# Patient Record
Sex: FEMALE | Race: WHITE | NOT HISPANIC OR LATINO | Employment: FULL TIME | URBAN - METROPOLITAN AREA
[De-identification: names, ages, dates, MRNs, and addresses within clinical notes are randomized per-mention and may not be internally consistent; named-entity substitution may affect disease eponyms.]

---

## 2017-01-04 ENCOUNTER — GENERIC CONVERSION - ENCOUNTER (OUTPATIENT)
Dept: OTHER | Facility: OTHER | Age: 57
End: 2017-01-04

## 2017-01-11 ENCOUNTER — GENERIC CONVERSION - ENCOUNTER (OUTPATIENT)
Dept: OTHER | Facility: OTHER | Age: 57
End: 2017-01-11

## 2017-02-02 ENCOUNTER — GENERIC CONVERSION - ENCOUNTER (OUTPATIENT)
Dept: OTHER | Facility: OTHER | Age: 57
End: 2017-02-02

## 2017-04-17 ENCOUNTER — ALLSCRIPTS OFFICE VISIT (OUTPATIENT)
Dept: OTHER | Facility: OTHER | Age: 57
End: 2017-04-17

## 2017-04-24 ENCOUNTER — GENERIC CONVERSION - ENCOUNTER (OUTPATIENT)
Dept: OTHER | Facility: OTHER | Age: 57
End: 2017-04-24

## 2017-06-19 ENCOUNTER — GENERIC CONVERSION - ENCOUNTER (OUTPATIENT)
Dept: OTHER | Facility: OTHER | Age: 57
End: 2017-06-19

## 2017-07-10 ENCOUNTER — GENERIC CONVERSION - ENCOUNTER (OUTPATIENT)
Dept: OTHER | Facility: OTHER | Age: 57
End: 2017-07-10

## 2017-08-21 DIAGNOSIS — Z12.31 ENCOUNTER FOR SCREENING MAMMOGRAM FOR MALIGNANT NEOPLASM OF BREAST: ICD-10-CM

## 2017-08-21 DIAGNOSIS — E78.00 PURE HYPERCHOLESTEROLEMIA: ICD-10-CM

## 2017-08-21 DIAGNOSIS — R10.11 RIGHT UPPER QUADRANT PAIN: ICD-10-CM

## 2017-08-21 DIAGNOSIS — I10 ESSENTIAL (PRIMARY) HYPERTENSION: ICD-10-CM

## 2017-08-22 ENCOUNTER — ALLSCRIPTS OFFICE VISIT (OUTPATIENT)
Dept: OTHER | Facility: OTHER | Age: 57
End: 2017-08-22

## 2017-09-07 ENCOUNTER — ALLSCRIPTS OFFICE VISIT (OUTPATIENT)
Dept: OTHER | Facility: OTHER | Age: 57
End: 2017-09-07

## 2017-09-11 ENCOUNTER — GENERIC CONVERSION - ENCOUNTER (OUTPATIENT)
Dept: OTHER | Facility: OTHER | Age: 57
End: 2017-09-11

## 2017-09-19 ENCOUNTER — GENERIC CONVERSION - ENCOUNTER (OUTPATIENT)
Dept: OTHER | Facility: OTHER | Age: 57
End: 2017-09-19

## 2017-09-21 ENCOUNTER — GENERIC CONVERSION - ENCOUNTER (OUTPATIENT)
Dept: OTHER | Facility: OTHER | Age: 57
End: 2017-09-21

## 2017-09-21 ENCOUNTER — LAB CONVERSION - ENCOUNTER (OUTPATIENT)
Dept: OTHER | Facility: OTHER | Age: 57
End: 2017-09-21

## 2017-09-21 LAB
A/G RATIO (HISTORICAL): 1.7 (CALC) (ref 1–2.5)
ALBUMIN SERPL BCP-MCNC: 3.7 G/DL (ref 3.6–5.1)
ALP SERPL-CCNC: 88 U/L (ref 33–130)
ALT SERPL W P-5'-P-CCNC: 14 U/L (ref 6–29)
AMYLASE (HISTORICAL): 58 U/L (ref 21–101)
AST SERPL W P-5'-P-CCNC: 14 U/L (ref 10–35)
BASOPHILS # BLD AUTO: 0.3 %
BASOPHILS # BLD AUTO: 23 CELLS/UL (ref 0–200)
BILIRUB SERPL-MCNC: 0.3 MG/DL (ref 0.2–1.2)
BUN SERPL-MCNC: 13 MG/DL (ref 7–25)
BUN/CREA RATIO (HISTORICAL): ABNORMAL (CALC) (ref 6–22)
CALCIUM SERPL-MCNC: 9 MG/DL (ref 8.6–10.4)
CHLORIDE SERPL-SCNC: 111 MMOL/L (ref 98–110)
CHOLEST SERPL-MCNC: 176 MG/DL
CHOLEST/HDLC SERPL: 3.5 (CALC)
CO2 SERPL-SCNC: 26 MMOL/L (ref 20–31)
CREAT SERPL-MCNC: 0.63 MG/DL (ref 0.5–1.05)
DEPRECATED RDW RBC AUTO: 13.2 % (ref 11–15)
EGFR AFRICAN AMERICAN (HISTORICAL): 118 ML/MIN/1.73M2
EGFR-AMERICAN CALC (HISTORICAL): 102 ML/MIN/1.73M2
EOSINOPHIL # BLD AUTO: 198 CELLS/UL (ref 15–500)
EOSINOPHIL # BLD AUTO: 2.6 %
GAMMA GLOBULIN (HISTORICAL): 2.2 G/DL (CALC) (ref 1.9–3.7)
GLUCOSE (HISTORICAL): 100 MG/DL (ref 65–99)
HCT VFR BLD AUTO: 35.6 % (ref 35–45)
HDLC SERPL-MCNC: 51 MG/DL
HGB BLD-MCNC: 11.5 G/DL (ref 11.7–15.5)
LDL CHOLESTEROL (HISTORICAL): 95 MG/DL (CALC)
LIPASE SERPL-CCNC: 44 U/L (ref 7–60)
LYMPHOCYTES # BLD AUTO: 1345 CELLS/UL (ref 850–3900)
LYMPHOCYTES # BLD AUTO: 17.7 %
MCH RBC QN AUTO: 31.9 PG (ref 27–33)
MCHC RBC AUTO-ENTMCNC: 32.3 G/DL (ref 32–36)
MCV RBC AUTO: 98.9 FL (ref 80–100)
MONOCYTES # BLD AUTO: 388 CELLS/UL (ref 200–950)
MONOCYTES (HISTORICAL): 5.1 %
NEUTROPHILS # BLD AUTO: 5647 CELLS/UL (ref 1500–7800)
NEUTROPHILS # BLD AUTO: 74.3 %
NON-HDL-CHOL (CHOL-HDL) (HISTORICAL): 125 MG/DL (CALC)
PLATELET # BLD AUTO: 288 THOUSAND/UL (ref 140–400)
PMV BLD AUTO: 10 FL (ref 7.5–12.5)
POTASSIUM SERPL-SCNC: 4.4 MMOL/L (ref 3.5–5.3)
RBC # BLD AUTO: 3.6 MILLION/UL (ref 3.8–5.1)
SODIUM SERPL-SCNC: 144 MMOL/L (ref 135–146)
TOTAL PROTEIN (HISTORICAL): 5.9 G/DL (ref 6.1–8.1)
TRIGL SERPL-MCNC: 209 MG/DL
WBC # BLD AUTO: 7.6 THOUSAND/UL (ref 3.8–10.8)

## 2017-09-25 ENCOUNTER — HOSPITAL ENCOUNTER (OUTPATIENT)
Dept: CT IMAGING | Facility: HOSPITAL | Age: 57
Discharge: HOME | End: 2017-09-25
Attending: INTERNAL MEDICINE
Payer: COMMERCIAL

## 2017-09-25 DIAGNOSIS — R10.11 RIGHT UPPER QUADRANT PAIN: ICD-10-CM

## 2017-09-25 PROCEDURE — 74177 CT ABD & PELVIS W/CONTRAST: CPT

## 2017-09-25 RX ADMIN — IOHEXOL 100 ML: 350 INJECTION, SOLUTION INTRAVENOUS at 14:09

## 2017-09-27 ENCOUNTER — GENERIC CONVERSION - ENCOUNTER (OUTPATIENT)
Dept: OTHER | Facility: OTHER | Age: 57
End: 2017-09-27

## 2017-09-28 ENCOUNTER — GENERIC CONVERSION - ENCOUNTER (OUTPATIENT)
Dept: OTHER | Facility: OTHER | Age: 57
End: 2017-09-28

## 2017-10-25 ENCOUNTER — ALLSCRIPTS OFFICE VISIT (OUTPATIENT)
Dept: OTHER | Facility: OTHER | Age: 57
End: 2017-10-25

## 2017-10-25 DIAGNOSIS — Z12.31 ENCOUNTER FOR SCREENING MAMMOGRAM FOR MALIGNANT NEOPLASM OF BREAST: ICD-10-CM

## 2017-10-26 NOTE — PROGRESS NOTES
Assessment  1  Benign essential HTN (401 1) (I10)   2  History of Depressive disorder (311) (F32 9)   3  Chronic pain (338 29) (G89 29)   4  Dermatophytosis, nail (110 1) (B35 1)   5  History of esophagitis (V12 79) (Z87 19)   6  Gastritis (535 50) (K29 70)   7  History of kidney stones (V13 01) (Z87 442)   8  Anxiety (300 00) (F41 9)   9  Hypercholesterolemia (272 0) (E78 00)   10  Psoriasis (696 1) (L40 9)    Plan  Anxiety    · From  ClonazePAM 0 5 MG Oral Tablet take 1 tablet by mouth twice a day if needed To  ClonazePAM 0 5 MG Oral Tablet take 1 tablet by mouth three times a day if needed  Benign essential HTN    · From  AmLODIPine Besylate 5 MG Oral Tablet take 1 tablet by mouth once daily To  AmLODIPine Besylate 10 MG Oral Tablet TAKE 1 TABLET DAILY AS DIRECTED    Discussion/Summary  Discussion Summary:   1  increase amlodipine dose- call if not improving - terun to office in 6-8 weeksclonazepam- will increae to 3x dayas needed  Counseling Documentation With Imm: The patient was counseled regarding diagnostic results,-- prognosis,-- risks and benefits of treatment options  Medication SE Review and Pt Understands Tx: Possible side effects of new medications were reviewed with the patient/guardian today  Self Referrals:   Self Referrals: Yes      Chief Complaint  Chief Complaint Free Text Note Form: Pt here for f/u today  Already had her flu shot last time  I gave 5 wishes today  Wants to discuss increasing Clonazepam  Pt states that she will be finishing her current Hydrocodone rx and then try to stay off of it  dk  dr Oscar Bhatia since last here  History of Present Illness  Hypertension (Follow-Up): The patient presents for follow-up of essential hypertension-- and-- elevated today- doing oot as this is busy season busy  She has no comorbid illnesses     Symptoms:       HPI: 1  hypertension- elevated today- increased stress and anxietyabdominal pain- had ct- some gastritis-seen by gi- had seen by them- stopped alieve or ibuprofen or meloxicam- using tylenol for joint pain and now gi pain is better - has increased flatulenceanxiety- improving with clonazepam- wishes to increase if possible      Review of Systems  Complete-Female:   Constitutional: no fever,-- no chills-- and-- not feeling tired  ENT: no nasal discharge  Cardiovascular: no chest pain-- and-- no palpitations  Respiratory: smoking 1ppd- discussed cessation, but-- no cough  Gastrointestinal: blaoting flatulence  Genitourinary: no pelvic pain  Active Problems  1  Allergic rhinitis (477 9) (J30 9)   2  Anxiety (300 00) (F41 9)   3  Asbestos exposure (V15 84) (Z77 090)   4  Benign essential HTN (401 1) (I10)   5  Chronic pain (338 29) (G89 29)   6  Dermatophytosis, nail (110 1) (B35 1)   7  Gastritis (535 50) (K29 70)   8  Hiatal hernia (553 3) (K44 9)   9  History of kidney stones (V13 01) (Z87 442)   10  Hypercholesterolemia (272 0) (E78 00)   11  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)   12  Other screening mammogram (V76 12) (Z12 31)   13  Psoriasis (696 1) (L40 9)   14  Restless leg syndrome (333 94) (G25 81)    Past Medical History  1  History of Cough (786 2) (R05)   2  History of urinary frequency (V13 09) (Z87 898)   3  History of UTI (V13 02) (Z87 440)   4  History of Tendonitis of wrist, right (727 05) (M77 8)   5  History of Tobacco use disorder (305 1) (F17 200)    Family History  Mother    1  Denied: Family history of substance abuse  Father    2  Denied: Family history of substance abuse  Other    3  Family history of Cardiovascular disease    Social History   · Always uses seat belt   · Current every day smoker (305 1) (F17 200)   · Dental care, regularly   · Exercise: Walking   · Lives with    · Living Situation: Supportive and safe   ·    · No advance directives (V49 89) (Z78 9)   · No alcohol use   · No illicit drug use  Social History Reviewed: The social history was reviewed and updated today  Current Meds   1  AmLODIPine Besylate 5 MG Oral Tablet; take 1 tablet by mouth once daily; Therapy: 54ICQ6564 to (Evaluate:22Kqc6737)  Requested for: 89QVD0527; Last Rx:29Nec3780   Ordered   2  Claritin-D 12 Hour 5-120 MG Oral Tablet Extended Release 12 Hour; TAKE 1 TABLET EVERY 12   HOURS AS NEEDED; Therapy: 27MZD9625 to Recorded   3  ClonazePAM 0 5 MG Oral Tablet; take 1 tablet by mouth twice a day if needed; Therapy: 08YZL4219 to (Evaluate:05Vir6600)  Requested for: 36Fnw1397; Last Rx:20Bgf3317   Ordered   4  Cranberry 200 MG Oral Capsule; take 1 capsule daily; Therapy: 11ICR7002 to Recorded   5  Fluticasone Propionate 50 MCG/ACT Nasal Suspension; INSTILL 2 SQUIRT Bedtime IN EACH   NOSTRIL DAILY; Therapy: 19Gfn0999 to (Evaluate:02Rqu9379)  Requested for: 23Ozf4002; Last Rx:64Uxz0524   Ordered   6  Gabapentin 300 MG Oral Capsule; 1 in the AM and 2 hs; Therapy: 75KAA7154 to  Requested for: 25Oct2017 Recorded   7  Hydrocodone-Acetaminophen  MG Oral Tablet; TAKE 1 TABLET 3 times daily; Therapy: 21YGC0329 to (Evaluate:22Ipx1849); Last Rx:29Pyb7748 Ordered   8  Multiple Vitamin TABS; Take 1 tablet daily; Therapy: 05QCO2488 to (Evaluate:29Oct2016) Recorded   9  Omeprazole 40 MG Oral Capsule Delayed Release; take 1 capsule daily; Therapy: 25Kim2461 to (Last Rx:55Xeo0697)  Requested for: 33Mpx8882 Ordered   10  Vitamin D3 5000 UNIT Oral Tablet; 1 qd; Therapy: 25YSA3501 to Recorded  Medication List Reviewed: The medication list was reviewed and updated today  Allergies  1  No Known Drug Allergies  2  Pollen   3  Trees    Vitals  Vital Signs    Recorded: 61QGZ2380 08:36AM   Heart Rate 64   Respiration 16   Systolic 588   Diastolic 80   Height 5 ft 2 in   Weight 122 lb    BMI Calculated 22 31   BSA Calculated 1 55     Physical Exam    Constitutional   General appearance: No acute distress, well appearing and well nourished      Ears, Nose, Mouth, and Throat   Otoscopic examination: Tympanic membranes translucent with normal light reflex  Canals patent without erythema  Nasal mucosa, septum, and turbinates: Normal without edema or erythema  Pulmonary   Auscultation of lungs: Abnormal  -- some end exp wheezes on right upper lobe  Cardiovascular   Auscultation of heart: Normal rate and rhythm, normal S1 and S2, without murmurs  Abdomen   Abdomen: Non-tender, no masses  Lymphatic   Palpation of lymph nodes in neck: No lymphadenopathy      Musculoskeletal   Gait and station: Normal     Inspection/palpation of joints, bones, and muscles: Normal          Future Appointments    Date/Time Provider Specialty Site   12/22/2017 01:00 PM Marco A Rodriguez DO Internal Medicine [facility] INTERNAL MED     Signatures   Electronically signed by : Merline Elias DO; Oct 25 2017  9:33AM EST                       (Author)

## 2017-12-14 ENCOUNTER — ALLSCRIPTS OFFICE VISIT (OUTPATIENT)
Dept: OTHER | Facility: OTHER | Age: 57
End: 2017-12-14

## 2017-12-14 DIAGNOSIS — S63.91XA SPRAIN OF UNSPECIFIED PART OF RIGHT WRIST AND HAND, INITIAL ENCOUNTER: ICD-10-CM

## 2017-12-14 DIAGNOSIS — S66.911A STRAIN OF UNSPECIFIED MUSCLE, FASCIA AND TENDON AT WRIST AND HAND LEVEL, RIGHT HAND, INITIAL ENCOUNTER: ICD-10-CM

## 2017-12-14 DIAGNOSIS — D64.9 ANEMIA: ICD-10-CM

## 2017-12-15 NOTE — PROGRESS NOTES
Assessment  1  Benign essential HTN (401 1) (I10)   2  Anxiety (300 00) (F41 9)   3  Anemia (285 9) (D64 9)   4  Psoriasis (696 1) (L40 9)    Plan  Anemia    · (1) FOLATE; Status:Active; Requested for:04Hju3244;    · (1) IRON SATURATION %, TIBC; Status:Active; Requested for:85Joj8440;    · (1) VITAMIN B12; Status:Active; Requested for:07Tug8347;    · (1) VITAMIN D 25-HYDROXY; Status:Active; Requested for:46Kvw7235; Anxiety    · ClonazePAM 0 5 MG Oral Tablet; take 1 tablet by mouth three times a day if needed  Need for Tdap vaccination    · Tdap (Adacel)  Psoriasis    · Cephalexin 500 MG Oral Capsule; TAKE 1 CAPSULE 3 TIMES DAILY UNTIL GONE   · Clobetasol Propionate 0 05 % External Cream; APPLY SPARINGLY TO AFFECTED AREA(S)TWICE DAILY  Sprain or strain of right hand    · (1) UMM SCREEN W/REFLEX TO TITER/PATTERN; Status:Active; Requested for:64Yum7152;    · (1) URIC ACID; Status:Active; Requested for:48Ovp3409;     Chief Complaint  Chief Complaint Free Text Note Form: duplicate note      Active Problems  1  Allergic rhinitis (477 9) (J30 9)   2  Anxiety (300 00) (F41 9)   3  Asbestos exposure (V15 84) (Z77 090)   4  Benign essential HTN (401 1) (I10)   5  Chronic pain (338 29) (G89 29)   6  Dermatophytosis, nail (110 1) (B35 1)   7  Gastritis (535 50) (K29 70)   8  Hiatal hernia (553 3) (K44 9)   9  History of kidney stones (V13 01) (Z87 442)   10  Hypercholesterolemia (272 0) (E78 00)   11  Mental health problem (V40 9) (F48 9)   12  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)   13  No advance directives (V49 89) (Z78 9)   14  Other screening mammogram (V76 12) (Z12 31)   15  Psoriasis (696 1) (L40 9)   16  Restless leg syndrome (333 94) (G25 81)    Past Medical History  1  History of Cough (786 2) (R05)   2  History of urinary frequency (V13 09) (Z87 898)   3  History of UTI (V13 02) (Z87 440)   4  History of Tendonitis of wrist, right (727 05) (M77 8)   5   History of Tobacco use disorder (305 1) (F17 200)    Family History  Mother    1  Denied: Family history of substance abuse  Father    2  Denied: Family history of substance abuse  Sister    3  Family history of mental disorder (V17 0) (Z81 8)  Other    4  Family history of Cardiovascular disease    Social History   · Always uses seat belt   · Current every day smoker (305 1) (F17 200)   · Dental care, regularly   · Exercise: Walking   · Lives with    · Living Situation: Supportive and safe   ·    · No advance directives (V49 89) (Z78 9)   · No advance directives (V49 89) (Z78 9)   · No alcohol use   · No illicit drug use    Current Meds   1  AmLODIPine Besylate 10 MG Oral Tablet; TAKE 1 TABLET DAILY AS DIRECTED; Therapy: 89IZM9477 to (Evaluate:20Oct2018)  Requested for: 25Oct2017; Last Rx:03Out7613 Ordered   2  Claritin-D 12 Hour 5-120 MG Oral Tablet Extended Release 12 Hour; TAKE 1 TABLET EVERY 12 HOURS AS NEEDED; Therapy: 25OAJ1982 to Recorded   3  ClonazePAM 0 5 MG Oral Tablet; take 1 tablet by mouth three times a day if needed; Therapy: 49IEL1993 to (XBGTERBR:51FDQ3460)  Requested for: 26Oct2017; Last NO:04INR8439 Ordered   4  Cranberry 200 MG Oral Capsule; take 1 capsule daily; Therapy: 12UXZ6573 to Recorded   5  Fluticasone Propionate 50 MCG/ACT Nasal Suspension; INSTILL 2 SQUIRT Bedtime IN EACH NOSTRIL DAILY; Therapy: 03Fxb8533 to (Evaluate:38Wrc3401)  Requested for: 33Xdj6957; Last Rx:91Nml7023 Ordered   6  Gabapentin 300 MG Oral Capsule; 1 in the AM and 2 hs; Therapy: 66YIL4347 to  Requested for: 25Oct2017 Recorded   7  Hydrocodone-Acetaminophen  MG Oral Tablet; TAKE 1 TABLET 3 times daily; Therapy: 42BJP5796 to (Evaluate:79Hix5588); Last Rx:55Nsh1924 Ordered   8  Meloxicam 15 MG Oral Tablet; take one tablet by mouth daily; Therapy: 89JFX2571 to Recorded   9  Multiple Vitamin TABS; Take 1 tablet daily; Therapy: 01QMJ8718 to (Evaluate:29Oct2016) Recorded   10   Omeprazole 40 MG Oral Capsule Delayed Release; take 1 capsule daily; Therapy: 87Pvc0922 to (Last Rx:07Sep2017)  Requested for: 79Pst5485 Ordered   11  Vitamin D3 5000 UNIT Oral Tablet; 1 qd; Therapy: 31Qrd3735 to Recorded    Allergies  1  No Known Drug Allergies  2  Pollen   3  Trees    Vitals  Vital Signs    Recorded: 03LAW8584 08:25AM   Heart Rate 87, R DP   Pulse Quality Normal, R DP   Systolic 215, LUE, Sitting   Diastolic 76, LUE, Sitting   Height 5 ft 2 in   Weight 127 lb 8 0 oz   BMI Calculated 23 32   BSA Calculated 1 58   O2 Saturation 95, RA     Results/Data  PHQ-2 Adult Depression Screening 56WVR3203 08:28AM User, Ahs     Test Name Result Flag Reference   PHQ-2 Adult Depression Score 0     Over the last two weeks, how often have you been bothered by any of the following problems?  Little interest or pleasure in doing things: Not at all - 0 Feeling down, depressed, or hopeless: Not at all - 0   PHQ-2 Adult Depression Screening Negative         Future Appointments    Date/Time Provider Specialty Site   03/14/2018 08:30 AM Cleveland Tucker DO Internal Medicine [facility] INTERNAL MED     Signatures   Electronically signed by : Jeff Quiles DO; Dec 14 2017  9:24AM EST                       (Author)

## 2017-12-15 NOTE — PROGRESS NOTES
Assessment  1  Benign essential HTN (401 1) (I10)   2  Anxiety (300 00) (F41 9)   3  Anemia (285 9) (D64 9)   4  Psoriasis (696 1) (L40 9)    Plan  Anemia    · (1) FOLATE; Status:Active; Requested for:68Mrh4247;    · (1) IRON SATURATION %, TIBC; Status:Active; Requested for:29Pqm1935;    · (1) VITAMIN B12; Status:Active; Requested for:15Lol2942;    · (1) VITAMIN D 25-HYDROXY; Status:Active; Requested for:45Hyl5042; Anxiety    · ClonazePAM 0 5 MG Oral Tablet; take 1 tablet by mouth three times a day if needed  Psoriasis    · Cephalexin 500 MG Oral Capsule; TAKE 1 CAPSULE 3 TIMES DAILY UNTIL GONE   · Clobetasol Propionate 0 05 % External Cream; APPLY SPARINGLY TO AFFECTED AREA(S)TWICE DAILY  Sprain or strain of right hand    · (1) UMM SCREEN W/REFLEX TO TITER/PATTERN; Status:Active; Requested for:86Skl3951;    · (1) URIC ACID; Status:Active; Requested for:38Bdr1413;     Discussion/Summary  Discussion Summary:   Will treat with 1 week cephalexin- if not improved call tdap today  Medication SE Review and Pt Understands Tx: Possible side effects of new medications were reviewed with the patient/guardian today  Chief Complaint  Chief Complaint Free Text Note Form: Pt is here today for a follow up visit for her BP and fungus on her nails  Pt is asking to get a tetanus shot today  Medications are current  DD      History of Present Illness  HPI: 1  right hand swelling- had bumped the right lateral hand 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- went to [facility] on nov 22- had xray and cbc- normal wbc  given medrol dose pack and due to go back to work on Monday  Will test uric acid level to r/po gout2  tetanus- our records show last tetanus in 2005- will give tdap today3  back pain- no severe flare of symptoms now- on gabapentin with some improvement- last saw Dr Stefanie Hassan in september- stable4   anxiety- doing better with klonipin- worried about her son having some neurologic issues after injuries- using 3x day currently5  hypertesnion      Review of Systems  Complete-Female:  Constitutional: feeling tired-- and-- anxiety issues with son- limited sleep at times  Eyes: no eyesight problems  Cardiovascular: no chest pain-- and-- no palpitations  Respiratory: no cough-- and-- no shortness of breath during exertion  Active Problems  1  Allergic rhinitis (477 9) (J30 9)   2  Anxiety (300 00) (F41 9)   3  Asbestos exposure (V15 84) (Z77 090)   4  Benign essential HTN (401 1) (I10)   5  Chronic pain (338 29) (G89 29)   6  Dermatophytosis, nail (110 1) (B35 1)   7  Gastritis (535 50) (K29 70)   8  Hiatal hernia (553 3) (K44 9)   9  History of kidney stones (V13 01) (Z87 442)   10  Hypercholesterolemia (272 0) (E78 00)   11  Mental health problem (V40 9) (F48 9)   12  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)   13  No advance directives (V49 89) (Z78 9)   14  Other screening mammogram (V76 12) (Z12 31)   15  Psoriasis (696 1) (L40 9)   16  Restless leg syndrome (333 94) (G25 81)    Past Medical History  1  History of Cough (786 2) (R05)   2  History of urinary frequency (V13 09) (Z87 898)   3  History of UTI (V13 02) (Z87 440)   4  History of Tendonitis of wrist, right (727 05) (M77 8)   5  History of Tobacco use disorder (305 1) (F17 200)    Family History  Mother    1  Denied: Family history of substance abuse  Father    2  Denied: Family history of substance abuse  Sister    3  Family history of mental disorder (V17 0) (Z81 8)  Other    4  Family history of Cardiovascular disease    Social History   · Always uses seat belt   · Current every day smoker (305 1) (F17 200)   · Dental care, regularly   · Exercise: Walking   · Lives with    · Living Situation: Supportive and safe   ·    · No advance directives (V49 89) (Z78 9)   · No advance directives (V49 89) (Z78 9)   · No alcohol use   · No illicit drug use    Current Meds   1   AmLODIPine Besylate 10 MG Oral Tablet; TAKE 1 TABLET DAILY AS DIRECTED; Therapy: 43LHD7430 to (Evaluate:20Oct2018)  Requested for: 25Oct2017; Last Rx:11Ehy9739 Ordered   2  Claritin-D 12 Hour 5-120 MG Oral Tablet Extended Release 12 Hour; TAKE 1 TABLET EVERY 12 HOURS AS NEEDED; Therapy: 78CIM0495 to Recorded   3  ClonazePAM 0 5 MG Oral Tablet; take 1 tablet by mouth three times a day if needed; Therapy: 10QBO2349 to (AQNVVQWM:32RMM7116)  Requested for: 26Oct2017; Last VE:14IFR2873 Ordered   4  Cranberry 200 MG Oral Capsule; take 1 capsule daily; Therapy: 91IJT2934 to Recorded   5  Fluticasone Propionate 50 MCG/ACT Nasal Suspension; INSTILL 2 SQUIRT Bedtime IN EACH NOSTRIL DAILY; Therapy: 66Cua6526 to (Evaluate:98Bpj0765)  Requested for: 17Apr2017; Last Rx:70Wuk9378 Ordered   6  Gabapentin 300 MG Oral Capsule; 1 in the AM and 2 hs; Therapy: 72RGK2770 to  Requested for: 25Oct2017 Recorded   7  Hydrocodone-Acetaminophen  MG Oral Tablet; TAKE 1 TABLET 3 times daily; Therapy: 31RFN5508 to (Evaluate:49Qes3343); Last Rx:91Wqt4873 Ordered   8  Meloxicam 15 MG Oral Tablet; take one tablet by mouth daily; Therapy: 95WRW9482 to Recorded   9  Multiple Vitamin TABS; Take 1 tablet daily; Therapy: 94NYY4583 to (Evaluate:29Oct2016) Recorded   10  Omeprazole 40 MG Oral Capsule Delayed Release; take 1 capsule daily; Therapy: 96Knj7507 to (Last Rx:86Rdw8379)  Requested for: 93Ivz8844 Ordered   11  Vitamin D3 5000 UNIT Oral Tablet; 1 qd; Therapy: 82SVF2500 to Recorded  Medication List Reviewed: The medication list was reviewed and updated today  Allergies  1  No Known Drug Allergies  2  Pollen   3  Trees    Vitals  Vital Signs    Recorded: 05OOO5164 08:25AM   Heart Rate 87, R DP   Pulse Quality Normal, R DP   Systolic 171, LUE, Sitting   Diastolic 76, LUE, Sitting   Height 5 ft 2 in   Weight 127 lb 8 0 oz   BMI Calculated 23 32   BSA Calculated 1 58   O2 Saturation 95, RA     Physical Exam   Constitutional  General appearance: No acute distress, well appearing and well nourished     Ears, Nose, Mouth, and Throat  External inspection of ears and nose: Normal    Oropharynx: Normal with no erythema, edema, exudate or lesions  Pulmonary  Auscultation of lungs: Clear to auscultation  Cardiovascular  Auscultation of heart: Normal rate and rhythm, normal S1 and S2, without murmurs  Abdomen  Abdomen: Non-tender, no masses  Musculoskeletal  Digits and nails: Abnormal  -- right 4th mcp- with some redness - no increased warmth mild erythema  Results/Data  PHQ-2 Adult Depression Screening 52LXJ3101 08:28AM User, s     Test Name Result Flag Reference   PHQ-2 Adult Depression Score 0     Over the last two weeks, how often have you been bothered by any of the following problems?  Little interest or pleasure in doing things: Not at all - 0 Feeling down, depressed, or hopeless: Not at all - 0   PHQ-2 Adult Depression Screening Negative         Signatures   Electronically signed by : Parth Decker DO; Dec 14 2017  8:57AM EST                       (Author)

## 2018-01-03 ENCOUNTER — LAB CONVERSION - ENCOUNTER (OUTPATIENT)
Dept: OTHER | Facility: OTHER | Age: 58
End: 2018-01-03

## 2018-01-03 LAB
25(OH)D3 SERPL-MCNC: 75 NG/ML (ref 30–100)
ANTI-NUCLEAR ANTIBODY (ANA) (HISTORICAL): NEGATIVE
FOLATE SERPL-MCNC: >24 NG/ML
IRON SATN MFR SERPL: 9 % (CALC) (ref 11–50)
IRON SERPL-MCNC: 36 MCG/DL (ref 45–160)
TIBC SERPL-MCNC: 411 MCG/DL (CALC) (ref 250–450)
URIC ACID (HISTORICAL): 2.9 MG/DL (ref 2.5–7)
VIT B12 SERPL-MCNC: 1016 PG/ML (ref 200–1100)

## 2018-01-04 ENCOUNTER — GENERIC CONVERSION - ENCOUNTER (OUTPATIENT)
Dept: OTHER | Facility: OTHER | Age: 58
End: 2018-01-04

## 2018-01-11 NOTE — RESULT NOTES
Verified Results  Chintan VASQUEZ  25  68HGB7283 10:36AM Vonnie Lancaster    Order Number: [id number]    - Patient Instructions: To schedule this appointment, please contact Central Scheduling at [phone]  Test Name Result Flag Reference   US GALLBLADDER (Report)     RIGHT UPPER QUADRANT ULTRASOUND     INDICATION: Epigastric pain  COMPARISON: Abdomen and pelvic CT from 8/31/2013  TECHNIQUE:  Real-time ultrasound of the right upper quadrant was performed with a curvilinear transducer with both volumetric sweeps and still imaging techniques  FINDINGS:     PANCREAS: Visualized portions of the pancreas are within normal limits  AORTA AND IVC: Visualized portions are normal for patient age  LIVER:   Size: Within normal range  The liver measures 15 4 cm in the midclavicular line  Contour: Surface contour is smooth  Parenchyma: Echogenicity and echotexture are within normal limits  No evidence of suspicious mass  The main portal vein is patent and hepatopetal       BILIARY:   The gallbladder is normal in caliber  No wall thickening or pericholecystic fluid  No stones or sludge identified  No sonographic Parsons's sign  No intrahepatic biliary dilatation  CBD measures 4 mm  No choledocholithiasis  KIDNEY:    Right kidney measures 10 6 cm  Within normal limits  ASCITES:  None         IMPRESSION:     Normal        Workstation performed: [id number]     Signed by:   Kya Hurst MD   10/7/16

## 2018-01-13 VITALS
DIASTOLIC BLOOD PRESSURE: 70 MMHG | SYSTOLIC BLOOD PRESSURE: 120 MMHG | WEIGHT: 118.5 LBS | TEMPERATURE: 97.2 F | BODY MASS INDEX: 23.14 KG/M2

## 2018-01-14 VITALS
WEIGHT: 123 LBS | BODY MASS INDEX: 22.63 KG/M2 | HEART RATE: 76 BPM | DIASTOLIC BLOOD PRESSURE: 80 MMHG | SYSTOLIC BLOOD PRESSURE: 130 MMHG | HEIGHT: 62 IN

## 2018-01-14 VITALS
DIASTOLIC BLOOD PRESSURE: 76 MMHG | BODY MASS INDEX: 25 KG/M2 | WEIGHT: 128 LBS | SYSTOLIC BLOOD PRESSURE: 144 MMHG | HEART RATE: 86 BPM | TEMPERATURE: 99 F

## 2018-01-14 VITALS
BODY MASS INDEX: 22.45 KG/M2 | WEIGHT: 122 LBS | RESPIRATION RATE: 16 BRPM | SYSTOLIC BLOOD PRESSURE: 152 MMHG | HEART RATE: 64 BPM | HEIGHT: 62 IN | DIASTOLIC BLOOD PRESSURE: 80 MMHG

## 2018-01-16 NOTE — RESULT NOTES
Verified Results  * CT ABDOMEN PELVIS W CONTRAST [id number] 01:21PM Bryan Pay Order Number: [id number]    - Patient Instructions: To schedule this appointment, please contact Central Scheduling at [phone]  Test Name Result Flag Reference   CT ABDOMEN PELVIS W CONTRAST (Report)     CT ABDOMEN AND PELVIS WITH IV CONTRAST     INDICATION: 53-year-old female, right upper quadrant pain     COMPARISON: 8/31/2013 CT     TECHNIQUE: CT examination of the abdomen and pelvis was performed  Reformatted images were created in axial, sagittal, and coronal planes  Radiation dose length product (DLP) for this visit: 183 95 mGy-cm   This examination, like all CT scans performed in the [facility], was performed utilizing techniques to minimize radiation dose exposure, including the use of iterative   reconstruction and automated exposure control  IV Contrast: 100 mL of iohexol (OMNIPAQUE)      Enteric Contrast: Enteric contrast was administered  FINDINGS:     ABDOMEN     LOWER CHEST: No significant abnormalities identified in the lower chest      LIVER/BILIARY TREE: Mild hepatomegaly  GALLBLADDER: Mildly contracted, no calculi  SPLEEN: Unremarkable  PANCREAS: Unremarkable  ADRENAL GLANDS: Unremarkable  KIDNEYS/URETERS: Multiple nonobstructive left renal calculi again evident  Tiny right renal calculus previously identified not visualized  No hydronephrosis or hydroureter  STOMACH AND BOWEL: There is apparent wall thickening involving the region of the gastric antrum suspicious for gastritis  If clinically appropriate, direct endoscopic assessment may be warranted  This appearance was not evident on the prior study  APPENDIX: No findings to suggest appendicitis  ABDOMINOPELVIC CAVITY: No ascites or free intraperitoneal air  No lymphadenopathy  VESSELS: Unremarkable for patient's age       PELVIS     REPRODUCTIVE ORGANS: Unremarkable for patient's age      URINARY BLADDER: Unremarkable  ABDOMINAL WALL/INGUINAL REGIONS: Unremarkable  OSSEOUS STRUCTURES: No acute fracture or destructive osseous lesion  IMPRESSION:   Development of suspected gastric antral wall thickening suspicious for gastritis  Clinical correlation recommended   Direct endoscopic assessment may be warranted     Persistent nonobstructive left renal calculi       ##sigslh##sigslh               Workstation performed: [id number]     Signed by:   Freda Kearns MD   9/26/17

## 2018-01-17 NOTE — RESULT NOTES
Verified Results  (1) CBC/PLT/DIFF 06Oct2016 11:19AM Lissette Arguello     Test Name Result Flag Reference   WHITE BLOOD CELL COUNT 6 5 Thousand/uL  3 8-10 8   RED BLOOD CELL COUNT 3 79 Million/uL L 3 80-5 10   HEMOGLOBIN 11 9 g/dL  11 7-15 5   HEMATOCRIT 37 4 %  35 0-45 0   MCV 98 6 fL  80 0-100 0   MCH 31 4 pg  27 0-33 0   MCHC 31 8 g/dL L 32 0-36 0   RDW 13 8 %  11 0-15 0   PLATELET COUNT 575 Thousand/uL  140-400   MPV 7 8 fL  7 5-11 5   ABSOLUTE NEUTROPHILS 4459 cells/uL  2814-5482   ABSOLUTE LYMPHOCYTES 1541 cells/uL  850-3900   ABSOLUTE MONOCYTES 267 cells/uL  200-950   ABSOLUTE EOSINOPHILS 208 cells/uL     ABSOLUTE BASOPHILS 26 cells/uL  0-200   NEUTROPHILS 68 6 %     LYMPHOCYTES 23 7 %     MONOCYTES 4 1 %     EOSINOPHILS 3 2 %     BASOPHILS 0 4 %       (1) COMPREHENSIVE METABOLIC PANEL [id number] 62:05GW Kelsey Alegria     Test Name Result Flag Reference   GLUCOSE 83 mg/dL  65-99   Fasting reference interval   UREA NITROGEN (BUN) 18 mg/dL  7-25   CREATININE 0 70 mg/dL  0 50-1 05   For patients >52years of age, the reference limit  for Creatinine is approximately 13% higher for people  identified as -American  eGFR NON-AFR   AMERICAN 98 mL/min/1 73m2  > OR = 60   eGFR AFRICAN AMERICAN 114 mL/min/1 73m2  > OR = 60   BUN/CREATININE RATIO   5-71   NOT APPLICABLE (calc)   SODIUM 141 mmol/L  135-146   POTASSIUM 4 5 mmol/L  3 5-5 3   CHLORIDE 107 mmol/L     CARBON DIOXIDE 26 mmol/L  20-31   CALCIUM 9 1 mg/dL  8 6-10 4   PROTEIN, TOTAL 6 3 g/dL  6 1-8 1   ALBUMIN 4 2 g/dL  3 6-5 1   GLOBULIN 2 1 g/dL (calc)  1 9-3 7   ALBUMIN/GLOBULIN RATIO 2 0 (calc)  1 0-2 5   BILIRUBIN, TOTAL 0 3 mg/dL  0 2-1 2   ALKALINE PHOSPHATASE 69 U/L     AST 18 U/L  10-35   ALT 17 U/L  6-29     (1) LIPASE 06Oct2016 11:19AM Lissette Arguello   REPORT COMMENT:  FASTING:NO     Test Name Result Flag Reference   LIPASE 26 U/L  7-60     (Q) AMYLASE 06Oct2016 11:19AM Kelsey Alegria     Test Name Result Flag Reference   AMYLASE 55 U/L

## 2018-01-17 NOTE — PROGRESS NOTES
Assessment    1  Anxiety (300 00) (F41 9)   2  Restless leg syndrome (333 94) (G25 81)   3  Onychomycosis (110 1) (B35 1)   4  Allergic rhinitis (477 9) (J30 9)   5  Benign essential HTN (401 1) (I10)   6  Encounter for preventive health examination (V70 0) (Z00 00)    Plan  Anxiety    · ClonazePAM 0 5 MG Oral Tablet; TAKE 1 TABLET TWICE DAILY AS NEEDED  Benign essential HTN    · AmLODIPine Besylate 5 MG Oral Tablet; TAKE 1 TABLET DAILY  Chronic pain    · Gabapentin 100 MG Oral Capsule; 3 pills qhs  Encounter for screening mammogram for breast cancer    · * MAMMO SCREENING BILATERAL W CAD; Status:Hold For - Scheduling; Requested  for:20Jun2016;   Onychomycosis    · Terbinafine HCl - 250 MG Oral Tablet (LamISIL); TAKE 1 TABLET DAILY    Discussion/Summary  health maintenance visit Currently, she eats a healthy diet and has an adequate exercise regimen  cervical cancer screening is managed by GYN  Breast cancer screening: self breast exam technique was taught, monthly self breast exam was advised and mammogram has been ordered  Discussed stress relieving recommendations, info given to patient  Discussed meal planning and supplementations  RTO 1-2 months  Rx  sent to pharmacy for Lamisil 250 mg  qd for nails  Chief Complaint  Pt here for PE  Lab review- 05/07/16 results in chart  Requests refill on Gabapentin  History of Present Illness  HPI: 50year old white female presents for PE  Plans to see GYN for well woman exam  Last mammogram more than one year ago  LNMP- more than 5 years ago  Still smoking, thinking of stopping, tried Chantix, and had side effects  Having a lot of stress, recently laid off, but will get re-hired July 11th, with different pay structure  Works for "[employer]  Review of Systems    Constitutional: feeling tired and Sometimes tired  , but no fever and no chills  ENT: no earache, no sore throat and no nasal discharge  Cardiovascular: no chest pain and no palpitations  Respiratory: no shortness of breath and no cough  Gastrointestinal: no abdominal pain, no nausea and no vomiting  Neurological: no headache, no numbness, no tingling, no dizziness and no fainting  Psychiatric: anxiety and sleep disturbances, but not suicidal and no depression  Active Problems    1  Acute sinusitis (461 9) (J01 90)   2  Allergic rhinitis (477 9) (J30 9)   3  Anxiety (300 00) (F41 9)   4  Asbestos exposure (V15 84) (Z77 090)   5  Benign essential HTN (401 1) (I10)   6  Chronic pain (338 29) (G89 29)   7  Depressive disorder (311) (F32 9)   8  Dermatophytosis, nail (110 1) (B35 1)   9  Esophagitis (530 10) (K20 9)   10  History of kidney stones (V13 01) (Z87 442)   11  Hypercholesterolemia (272 0) (E78 0)   12  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)   13  Need for pneumococcal vaccine (V03 82) (Z23)   14  Onychomycosis (110 1) (B35 1)   15  Psoriasis (696 1) (L40 9)   16  Restless leg syndrome (333 94) (G25 81)   17  Tendonitis of wrist, right (727 05) (M77 8)   18  Tracheobronchitis (490) (J40)    Past Medical History    · History of Cough (786 2) (R05)   · History of urinary frequency (V13 09) (V18 425)   · History of UTI (V13 02) (Z87 440)   · History of Tobacco use disorder (305 1) (Z72 0)    Family History  Other    · Family history of Cardiovascular disease  Family History    · Family history of substance abuse (V17 0) (Z81 4)    Social History    · Current every day smoker (305 1) (F17 200)   · Dental care, regularly   · Lives with    · Living Situation: Supportive and safe   ·    · No advance directives (V49 89) (Z78 9)    Current Meds   1  AmLODIPine Besylate 5 MG Oral Tablet; TAKE 1 TABLET DAILY; Therapy: 85MGK9130 to (Evaluate:11Qxy1477)  Requested for: 37TKA5823; Last   VL:[id number] Ordered   2  Claritin-D 12 Hour 5-120 MG Oral Tablet Extended Release 12 Hour; TAKE 1 TABLET   EVERY 12 HOURS AS NEEDED; Therapy: 34PNP0407 to Recorded   3   Cranberry 200 MG Oral Capsule; take 1 capsule daily; Therapy: 82TFK7041 to Recorded   4  Fish Oil 1000 MG Oral Capsule; TAKE 1 CAPSULE DAILY; Therapy: 19GQD5507 to Recorded   5  Gabapentin 100 MG Oral Capsule; Take 1 - 3 q hs;   Therapy: 63ENF4225 to Recorded   6  Hydrocodone-Acetaminophen  MG Oral Tablet; TAKE 1 TABLET 3 times daily; Therapy: 51UMM1146 to (Evaluate:81Gny7850); Last Rx:41Eky3524 Ordered   7  Multiple Vitamin Oral Tablet; Take 1 tablet daily; Therapy: 37HAD5909 to (Evaluate:29Oct2016) Recorded   8  Vitamin E 200 UNIT Oral Tablet; Take 1 tablet daily; Therapy: 68DZF7047 to Recorded    Allergies    1  No Known Drug Allergies    2  Pollen   3  Trees    Vitals   Recorded: 20Jun2016 08:55AM   Heart Rate 90, L Radial   Pulse Quality Normal, L Radial   Respiration 15   Systolic 601, LUE, Sitting   Diastolic 66, LUE, Sitting   Height 5 ft 2 in   Weight 128 lb    BMI Calculated 23 41   BSA Calculated 1 58     Physical Exam    Constitutional   General appearance: No acute distress, well appearing and well nourished  Head and Face   Head and face: Normal     Eyes   Conjunctiva and lids: No swelling, erythema or discharge  Pupils and irises: Equal, round, reactive to light  Ears, Nose, Mouth, and Throat   External inspection of ears and nose: Normal     Otoscopic examination: Tympanic membranes translucent with normal light reflex  Canals patent without erythema  Hearing: Normal   Congestion noted in middle ears  Nasal mucosa, septum, and turbinates: Normal without edema or erythema  Lips, teeth, and gums: Normal, good dentition  Oropharynx: Normal with no erythema, edema, exudate or lesions  Neck   Neck: Supple, symmetric, trachea midline, no masses  Pulmonary   Respiratory effort: No increased work of breathing or signs of respiratory distress  Auscultation of lungs: Clear to auscultation      Cardiovascular   Auscultation of heart: Normal rate and rhythm, normal S1 and S2, no murmurs  Pedal pulses: 2+ bilaterally  Examination of extremities for edema and/or varicosities: Normal     Chest   Breasts: Normal, no dimpling or skin changes appreciated  Palpation of breasts and axillae: Normal, no masses palpated  Chest: Normal     Abdomen   Abdomen: Non-tender, no masses  Liver and spleen: No hepatomegaly or splenomegaly  Lymphatic   Palpation of lymph nodes in neck: No lymphadenopathy  Palpation of lymph nodes in axillae: No lymphadenopathy  Musculoskeletal   Gait and station: Normal     Digits and nails: Abnormal   Thick, dark fingernails noted  Joints, bones, and muscles: Normal     Range of motion: Normal     Stability: Normal     Muscle strength/tone: Normal     Neurologic   Sensation: No sensory loss  Coordination: Normal finger to nose and heel to shin  Psychiatric   Judgment and insight: Normal     Orientation to person, place, and time: Normal     Recent and remote memory: Intact      Mood and affect: Normal        Signatures   Electronically signed by : ZARA Jasso; Jun 20 2016 10:35AM EST                       (Author)    Electronically signed by : ALYSSA Rahman ; Jun 20 2016 11:59AM EST

## 2018-01-17 NOTE — RESULT NOTES
Verified Results  (Q) LIPID PANEL WITH REFLEX TO DIRECT LDL 44Syg9405 07:18AM Lissette Arguello     Test Name Result Flag Reference   CHOLESTEROL, TOTAL 176 mg/dL  <200   HDL CHOLESTEROL 51 mg/dL  >68   TRIGLICERIDES 308 mg/dL H <150   LDL-CHOLESTEROL 95 mg/dL (calc)     Reference range: <100     Desirable range <100 mg/dL for patients with CHD or  diabetes and <70 mg/dL for diabetic patients with  known heart disease  LDL-C is now calculated using the Presley-Benavidez   calculation, which is a validated novel method providing   better accuracy than the Friedewald equation in the   estimation of LDL-C  Abdulaziz Grey  6402;029(57): 2084-0436   (http://[employer]/faq/JXF166)   CHOL/HDLC RATIO 3 5 (calc)  <5 0   NON HDL CHOLESTEROL 125 mg/dL (calc)  <130   For patients with diabetes plus 1 major ASCVD risk   factor, treating to a non-HDL-C goal of <100 mg/dL   (LDL-C of <70 mg/dL) is considered a therapeutic   option  (1) COMPREHENSIVE METABOLIC PANEL 52JGP4216 96:95RG Tree Samia     Test Name Result Flag Reference   GLUCOSE 100 mg/dL H 65-99   Fasting reference interval     For someone without known diabetes, a glucose value  between 100 and 125 mg/dL is consistent with  prediabetes and should be confirmed with a  follow-up test    UREA NITROGEN (BUN) 13 mg/dL  7-25   CREATININE 0 63 mg/dL  0 50-1 05   For patients >52years of age, the reference limit  for Creatinine is approximately 13% higher for people  identified as -American  eGFR NON-AFR   AMERICAN 102 mL/min/1 73m2  > OR = 60   eGFR AFRICAN AMERICAN 118 mL/min/1 73m2  > OR = 60   BUN/CREATININE RATIO   9-48   NOT APPLICABLE (calc)   SODIUM 144 mmol/L  135-146   POTASSIUM 4 4 mmol/L  3 5-5 3   CHLORIDE 111 mmol/L H    CARBON DIOXIDE 26 mmol/L  20-31   CALCIUM 9 0 mg/dL  8 6-10 4   PROTEIN, TOTAL 5 9 g/dL L 6 1-8 1   ALBUMIN 3 7 g/dL  3 6-5 1   GLOBULIN 2 2 g/dL (calc)  1 9-3 7   ALBUMIN/GLOBULIN RATIO 1 7 (calc) 1 0-2 5   BILIRUBIN, TOTAL 0 3 mg/dL  0 2-1 2   ALKALINE PHOSPHATASE 88 U/L     AST 14 U/L  10-35   ALT 14 U/L  6-29     (1) CBC/PLT/DIFF 50Csd3413 07:18AM Lissette Arguello     Test Name Result Flag Reference   WHITE BLOOD CELL COUNT 7 6 Thousand/uL  3 8-10 8   RED BLOOD CELL COUNT 3 60 Million/uL L 3 80-5 10   HEMOGLOBIN 11 5 g/dL L 11 7-15 5   HEMATOCRIT 35 6 %  35 0-45 0   MCV 98 9 fL  80 0-100 0   MCH 31 9 pg  27 0-33 0   MCHC 32 3 g/dL  32 0-36 0   RDW 13 2 %  11 0-15 0   PLATELET COUNT 183 Thousand/uL  140-400   ABSOLUTE NEUTROPHILS 5647 cells/uL  1615-7363   ABSOLUTE LYMPHOCYTES 1345 cells/uL  850-3900   ABSOLUTE MONOCYTES 388 cells/uL  200-950   ABSOLUTE EOSINOPHILS 198 cells/uL     ABSOLUTE BASOPHILS 23 cells/uL  0-200   NEUTROPHILS 74 3 %     LYMPHOCYTES 17 7 %     MONOCYTES 5 1 %     EOSINOPHILS 2 6 %     BASOPHILS 0 3 %     MPV 10 0 fL  7 5-12 5     (Q) AMYLASE 25Xcs9701 07:18AM Lissette Arguello     Test Name Result Flag Reference   AMYLASE 58 U/L       (1) LIPASE 79Lol2850 07:18AM Lissette Arguello   REPORT COMMENT:  FASTING:YES     Test Name Result Flag Reference   LIPASE 44 U/L  7-60

## 2018-01-19 ENCOUNTER — ALLSCRIPTS OFFICE VISIT (OUTPATIENT)
Dept: OTHER | Facility: OTHER | Age: 58
End: 2018-01-19

## 2018-01-19 ENCOUNTER — HOSPITAL ENCOUNTER (OUTPATIENT)
Dept: RADIOLOGY | Facility: HOSPITAL | Age: 58
Discharge: HOME | End: 2018-01-19
Payer: COMMERCIAL

## 2018-01-19 ENCOUNTER — TRANSCRIBE ORDERS (OUTPATIENT)
Dept: ADMINISTRATIVE | Facility: HOSPITAL | Age: 58
End: 2018-01-19

## 2018-01-19 DIAGNOSIS — Z77.090 CONTACT WITH AND (SUSPECTED) EXPOSURE TO ASBESTOS (CODE): ICD-10-CM

## 2018-01-19 DIAGNOSIS — F17.200 NICOTINE DEPENDENCE, UNCOMPLICATED: ICD-10-CM

## 2018-01-19 PROCEDURE — 71046 X-RAY EXAM CHEST 2 VIEWS: CPT

## 2018-01-20 NOTE — PROGRESS NOTES
Assessment   1  Abdominal pain of multiple sites (789 09) (R10 9)   2  Anemia (285 9) (D64 9)   3  Abnormal weight loss (783 21) (R63 4)   4  Allergic rhinitis (477 9) (J30 9)   5  Benign essential HTN (401 1) (I10)   6  Nicotine dependence (305 1) (F17 200)    Plan   Allergic rhinitis    · Fluticasone Propionate 50 MCG/ACT Nasal Suspension  Asbestos exposure, Nicotine dependence    · * XR CHEST PA & LATERAL; Status:Active; Requested EWU:77MLC6494; Discussion/Summary      Discussed meal planning, recommend protein shakes, samples given to patient  Patient told stool dark due to iron  Recommend increasing fiber in diet  Urged smoke cessation  Given samples of Florastor bid, for at least one week then prn  Patient to stop Claritin D and try another allergy medication  Continue HTN meds  blood pressure under good control, continue Iron daily until next blood test, after next visit in few months  RTO in a few months, may have apt  already in March  Sent to get chest x-ray  Call office in 1-2 weeks if sx  worsen or persist     Possible side effects of new medications were reviewed with the patient/guardian today  The treatment plan was reviewed with the patient/guardian  The patient/guardian understands and agrees with the treatment plan      Chief Complaint   Pt is here complaining of abdominal pain, gas  Dark stools since starting iron  Reflux  CR      History of Present Illness   HPI: 56year old white female presents with c/o abd  pain, and dark stool  Taking multivitamin with iron  Recently cut back to one daily  Also having chest pain, center, going up; and notices has more heartburn; hx  of hiatal hernia  Has sinus congestion, and cough productive of phlegm - dark/light, past few years, on and off  Admits to smoking  Has been drinking protein shakes, out of work, very stressed, since over holidays  Admits to smoking about one pack a day        Review of Systems        Constitutional: fever,-- feeling poorly,-- chills,-- feeling tired-- and-- Has sweats on and off  ENT: Congested ears  , but-- as noted in HPI,-- no earache-- and-- no sore throat  Respiratory: shortness of breath-- and-- cough, but-- as noted in HPI  Gastrointestinal: abdominal pain,-- constipation-- and-- diarrhea, but-- no nausea-- and-- no vomiting  Neurological: headache  Active Problems   1  Allergic rhinitis (477 9) (J30 9)   2  Anemia (285 9) (D64 9)   3  Anxiety (300 00) (F41 9)   4  Asbestos exposure (V15 84) (Z77 090)   5  Benign essential HTN (401 1) (I10)   6  Dermatophytosis, nail (110 1) (B35 1)   7  Hiatal hernia (553 3) (K44 9)   8  Mental health problem (V40 9) (F48 9)   9  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)   10  No advance directives (V49 89) (Z78 9)   11  Psoriasis (696 1) (L40 9)   12  Restless leg syndrome (333 94) (G25 81)    Surgical History   Surgical History Reviewed: The surgical history was reviewed and updated today  Social History    · Always uses seat belt   · Current every day smoker (305 1) (F17 200)   · Dental care, regularly   · Exercise: Walking   · Lives with    · Living Situation: Supportive and safe   ·    · No advance directives (V49 89) (Z78 9)   · No advance directives (V49 89) (Z78 9)   · No alcohol use   · No illicit drug use  The social history was reviewed and updated today  The social history was reviewed and is unchanged  Current Meds    1  AmLODIPine Besylate 10 MG Oral Tablet; TAKE 1 TABLET DAILY AS DIRECTED; Therapy: 43DOW7528 to (Evaluate:20Oct2018)  Requested for: 25Oct2017; Last     Rx:25Oct2017 Ordered   2  Claritin-D 12 Hour 5-120 MG Oral Tablet Extended Release 12 Hour; TAKE 1 TABLET     EVERY 12 HOURS AS NEEDED; Therapy: 80BVN7092 to Recorded   3  Clobetasol Propionate 0 05 % External Cream; APPLY SPARINGLY TO AFFECTED     AREA(S) TWICE DAILY; Therapy: 89JXR2942 to (Last Rx:56Wdc8072)  Requested for: 76EMT0459 Ordered   4  ClonazePAM 0 5 MG Oral Tablet; take 1 tablet by mouth three times a day if needed; Therapy: 49Kkd5459 to (Evaluate:14Mar2018)  Requested for: 03KYX4930; Last     Rx:35Dzm6516 Ordered   5  Cranberry 200 MG Oral Capsule; take 1 capsule daily; Therapy: 06JLC4044 to Recorded   6  Ferrous Sulfate 325 (65 Fe) MG Oral Tablet; 1 BID; Therapy: 69NKA6241 to Recorded   7  Fluticasone Propionate 50 MCG/ACT Nasal Suspension; INSTILL 2 SQUIRT Bedtime IN     EACH NOSTRIL DAILY; Therapy: 96Nga6326 to (Evaluate:93Uip1328)  Requested for: 35Wab7365; Last     Rx:19Miy8766 Ordered   8  Gabapentin 300 MG Oral Capsule; 1 in the AM and 2 hs; Therapy: 88JIM7045 to  Requested for: 25Oct2017 Recorded   9  Meloxicam 15 MG Oral Tablet; take one tablet by mouth daily; Therapy: 49DYF4920 to Recorded   10  Multiple Vitamin TABS; Take 1 tablet daily; Therapy: 30JGF6153 to (Evaluate:29Oct2016) Recorded   11  Omeprazole 40 MG Oral Capsule Delayed Release; take 1 capsule by mouth daily; Therapy: 29Vox3093 to (Evaluate:24Jun2018)  Requested for: 44Rrj0875; Last      Rx:70Wnh0793 Ordered   12  Vitamin D3 5000 UNIT Oral Tablet; 1 qd; Therapy: 87AAY3813 to Recorded     The medication list was reviewed and updated today  Allergies   1  No Known Drug Allergies  2  Pollen   3  Trees    Vitals    Recorded: 66VQH4906 09:32AM   Temperature 98 8 F, Tympanic   Heart Rate 84, R Radial   Systolic 763, LUE, Sitting   Diastolic 70, LUE, Sitting   Height 5 ft 2 in   Weight 121 lb    BMI Calculated 22 13   BSA Calculated 1 54     Physical Exam        Constitutional      General appearance: No acute distress, well appearing and well nourished  Eyes      Conjunctiva and lids: No swelling, erythema or discharge  Pupils and irises: Equal, round and reactive to light         Ears, Nose, Mouth, and Throat      External inspection of ears and nose: Normal        Otoscopic examination: Abnormal  -- Congestion noted in middle ears  Nasal mucosa, septum, and turbinates: Abnormal  -- Turbinates inflamed  Oropharynx: Normal with no erythema, edema, exudate or lesions  Pulmonary      Respiratory effort: No increased work of breathing or signs of respiratory distress  Auscultation of lungs: Clear to auscultation            Future Appointments      Date/Time Provider Specialty Site   03/14/2018 08:30 AM Vickie Nielsen DO Internal Medicine [facility]     Signatures    Electronically signed by : Rayne Gunter, [facility]; Jan 19 2018 11:23AM EST                       (Author)     Electronically signed by : ALYSSA Garvey ; Jan 19 2018  2:12PM EST

## 2018-01-22 VITALS
HEART RATE: 62 BPM | BODY MASS INDEX: 22.45 KG/M2 | DIASTOLIC BLOOD PRESSURE: 84 MMHG | WEIGHT: 122 LBS | SYSTOLIC BLOOD PRESSURE: 126 MMHG | TEMPERATURE: 98 F | HEIGHT: 62 IN

## 2018-01-23 VITALS
DIASTOLIC BLOOD PRESSURE: 70 MMHG | HEART RATE: 84 BPM | TEMPERATURE: 98.8 F | WEIGHT: 121 LBS | BODY MASS INDEX: 22.26 KG/M2 | HEIGHT: 62 IN | SYSTOLIC BLOOD PRESSURE: 128 MMHG

## 2018-01-23 VITALS
HEART RATE: 87 BPM | SYSTOLIC BLOOD PRESSURE: 128 MMHG | BODY MASS INDEX: 23.46 KG/M2 | OXYGEN SATURATION: 95 % | DIASTOLIC BLOOD PRESSURE: 76 MMHG | HEIGHT: 62 IN | WEIGHT: 127.5 LBS

## 2018-01-23 NOTE — RESULT NOTES
Verified Results  (1) VITAMIN B12 32Abl4199 03:43PM Bianca Elizabeth     Test Name Result Flag Reference   VITAMIN B12 1016 pg/mL  200-1100     (1) URIC ACID 87Iih8938 03:43PM Jos, Lissette     Test Name Result Flag Reference   URIC ACID 2 9 mg/dL  2 5-7 0   Therapeutic target for gout patients: <6 0 mg/dL     (1) IRON SATURATION %, TIBC 86DEO2972 03:43PM Jos, Lissette     Test Name Result Flag Reference   IRON, TOTAL 36 mcg/dL L    IRON BINDING CAPACITY 411 mcg/dL (calc)  250-450   % SATURATION 9 % (calc) L 11-50     (Q) UMM SCREEN, IFA, WITH REFLEX TO TITER AND PATTERN 79Oyg2306 03:43PM Jos, Lissette     Test Name Result Flag Reference   UMM SCREEN, IFA NEGATIVE  NEGATIVE   UMM IFA is a first line screen for detecting the  presence of up to approximately 150 autoantibodies in  various autoimmune diseases  A negative UMM IFA result  suggests UMM-associated autoimmune diseases are not  present at this time  Visit Physician FAQs for interpretation of all  antibodies in the [place], prevalence, and association  with diseases at [email or web address]/  [email or web address]     (1) FOLATE 82Iej1915 03:43PM Jos, Lissette     Test Name Result Flag Reference   FOLATE, SERUM >24 0 ng/mL     Reference Range                             Low:           <3 4                             Borderline:    3 4-5 4                             Normal:        >5 4     *(Q) VITAMIN D, 25-HYDROXY, LC/MS/MS [id number] 03:43PM Lissette Arguello   REPORT COMMENT:  FASTING:NO     Test Name Result Flag Reference   VITAMIN D, 25-OH, TOTAL 75 ng/mL     Vitamin D Status         25-OH Vitamin D:     Deficiency:                    <20 ng/mL  Insufficiency:             20 - 29 ng/mL  Optimal:                 > or = 30 ng/mL     For 25-OH Vitamin D testing on patients on   D2-supplementation and patients for whom quantitation   of D2 and D3 fractions is required, the QuestAssureD(TM)  25-OH VIT D, (D2,D3), LC/MS/MS is recommended: order   code 70872 (patients >2yrs)  For more information on this test, go to:  http://[employer]/faq/JBJ298  (This link is being provided for   informational/educational purposes only )

## 2018-02-01 ENCOUNTER — TELEPHONE (OUTPATIENT)
Dept: FAMILY MEDICINE CLINIC | Facility: HOSPITAL | Age: 58
End: 2018-02-01

## 2018-02-02 DIAGNOSIS — D50.8 OTHER IRON DEFICIENCY ANEMIA: Primary | ICD-10-CM

## 2018-02-05 LAB
BASOPHILS # BLD AUTO: 32 CELLS/UL (ref 0–200)
BASOPHILS NFR BLD AUTO: 0.5 %
EOSINOPHIL # BLD AUTO: 158 CELLS/UL (ref 15–500)
EOSINOPHIL NFR BLD AUTO: 2.5 %
ERYTHROCYTE [DISTWIDTH] IN BLOOD BY AUTOMATED COUNT: 14.9 % (ref 11–15)
FOLATE SERPL-MCNC: >24 NG/ML
HCT VFR BLD AUTO: 39.4 % (ref 35–45)
HGB BLD-MCNC: 12.9 G/DL (ref 11.7–15.5)
LYMPHOCYTES # BLD AUTO: 1588 CELLS/UL (ref 850–3900)
LYMPHOCYTES NFR BLD AUTO: 25.2 %
MCH RBC QN AUTO: 32.7 PG (ref 27–33)
MCHC RBC AUTO-ENTMCNC: 32.7 G/DL (ref 32–36)
MCV RBC AUTO: 99.7 FL (ref 80–100)
MONOCYTES # BLD AUTO: 510 CELLS/UL (ref 200–950)
MONOCYTES NFR BLD AUTO: 8.1 %
NEUTROPHILS # BLD AUTO: 4013 CELLS/UL (ref 1500–7800)
NEUTROPHILS NFR BLD AUTO: 63.7 %
PLATELET # BLD AUTO: 341 THOUSAND/UL (ref 140–400)
PMV BLD REES-ECKER: 10.3 FL (ref 7.5–12.5)
RBC # BLD AUTO: 3.95 MILLION/UL (ref 3.8–5.1)
WBC # BLD AUTO: 6.3 THOUSAND/UL (ref 3.8–10.8)

## 2018-03-13 PROBLEM — K44.9 HIATAL HERNIA: Status: ACTIVE | Noted: 2017-08-22

## 2018-03-13 PROBLEM — R63.4 ABNORMAL WEIGHT LOSS: Status: ACTIVE | Noted: 2018-01-19

## 2018-03-13 PROBLEM — D64.9 ANEMIA: Status: ACTIVE | Noted: 2017-12-14

## 2018-03-14 ENCOUNTER — OFFICE VISIT (OUTPATIENT)
Dept: FAMILY MEDICINE CLINIC | Facility: HOSPITAL | Age: 58
End: 2018-03-14
Payer: COMMERCIAL

## 2018-03-14 VITALS
BODY MASS INDEX: 23.79 KG/M2 | HEIGHT: 61 IN | SYSTOLIC BLOOD PRESSURE: 122 MMHG | HEART RATE: 91 BPM | DIASTOLIC BLOOD PRESSURE: 72 MMHG | WEIGHT: 126 LBS | OXYGEN SATURATION: 96 %

## 2018-03-14 DIAGNOSIS — L60.9 NAIL ABNORMALITY: ICD-10-CM

## 2018-03-14 DIAGNOSIS — I10 BENIGN ESSENTIAL HTN: Primary | ICD-10-CM

## 2018-03-14 DIAGNOSIS — D64.9 ANEMIA, UNSPECIFIED TYPE: ICD-10-CM

## 2018-03-14 DIAGNOSIS — K44.9 HIATAL HERNIA: ICD-10-CM

## 2018-03-14 DIAGNOSIS — F41.9 ANXIETY: ICD-10-CM

## 2018-03-14 DIAGNOSIS — R19.8 ALTERED BOWEL FUNCTION: ICD-10-CM

## 2018-03-14 PROBLEM — L60.0 INGROWN TOENAIL WITHOUT INFECTION: Status: ACTIVE | Noted: 2018-03-14

## 2018-03-14 PROCEDURE — 99214 OFFICE O/P EST MOD 30 MIN: CPT | Performed by: INTERNAL MEDICINE

## 2018-03-14 RX ORDER — CLOBETASOL PROPIONATE 0.5 MG/G
CREAM TOPICAL 2 TIMES DAILY
COMMUNITY
Start: 2017-12-14 | End: 2018-07-22

## 2018-03-14 RX ORDER — CRANBERRY FRUIT EXTRACT 200 MG
1 CAPSULE ORAL DAILY
COMMUNITY
Start: 2016-05-02

## 2018-03-14 RX ORDER — FERROUS SULFATE 325(65) MG
TABLET ORAL 2 TIMES DAILY
COMMUNITY
Start: 2018-01-04 | End: 2018-03-14 | Stop reason: SDUPTHER

## 2018-03-14 RX ORDER — MAG HYDROX/ALUMINUM HYD/SIMETH 400-400-40
SUSPENSION, ORAL (FINAL DOSE FORM) ORAL DAILY
COMMUNITY
Start: 2017-09-07

## 2018-03-14 RX ORDER — GABAPENTIN 300 MG/1
CAPSULE ORAL
Refills: 0 | COMMUNITY
Start: 2018-02-23 | End: 2018-03-28 | Stop reason: SDUPTHER

## 2018-03-14 RX ORDER — MELOXICAM 15 MG/1
1 TABLET ORAL DAILY
COMMUNITY
Start: 2017-12-14 | End: 2018-03-29 | Stop reason: SDUPTHER

## 2018-03-14 RX ORDER — OMEPRAZOLE 40 MG/1
1 CAPSULE, DELAYED RELEASE ORAL DAILY
COMMUNITY
Start: 2017-08-22 | End: 2018-06-17 | Stop reason: SDUPTHER

## 2018-03-14 RX ORDER — CLONAZEPAM 0.5 MG/1
1 TABLET ORAL 3 TIMES DAILY PRN
COMMUNITY
Start: 2016-06-20 | End: 2018-03-14 | Stop reason: SDUPTHER

## 2018-03-14 RX ORDER — CLONAZEPAM 0.5 MG/1
0.5 TABLET ORAL 3 TIMES DAILY PRN
Qty: 90 TABLET | Refills: 0 | Status: SHIPPED | OUTPATIENT
Start: 2018-03-14 | End: 2018-06-26 | Stop reason: ALTCHOICE

## 2018-03-14 RX ORDER — AMLODIPINE BESYLATE 10 MG/1
TABLET ORAL
Refills: 0 | COMMUNITY
Start: 2018-01-18 | End: 2018-07-11 | Stop reason: SDUPTHER

## 2018-03-14 RX ORDER — MULTIVITAMIN
1 TABLET ORAL DAILY
COMMUNITY
Start: 2016-05-02

## 2018-03-14 RX ORDER — FERROUS SULFATE 325(65) MG
325 TABLET ORAL
Qty: 30 TABLET | Refills: 0
Start: 2018-03-14 | End: 2018-07-18 | Stop reason: HOSPADM

## 2018-03-14 RX ORDER — FLUTICASONE PROPIONATE 50 MCG
2 SPRAY, SUSPENSION (ML) NASAL
COMMUNITY
Start: 2017-04-17 | End: 2019-11-14 | Stop reason: SDUPTHER

## 2018-03-14 NOTE — ASSESSMENT & PLAN NOTE
Improved symptoms since prilosec raised to 40 mg    Now with increased flatus and alternating with loose stools  And bloating and then constipated with hemorrhoid irritation

## 2018-03-14 NOTE — PROGRESS NOTES
Assessment/Plan:             Problem List Items Addressed This Visit        Digestive    Hiatal hernia    Relevant Medications    omeprazole (PriLOSEC) 40 MG capsule       Cardiovascular and Mediastinum    Benign essential HTN - Primary     Doing well today- no headache or dizziness  Continue on amlodipine         Relevant Medications    amLODIPine (NORVASC) 10 mg tablet    Other Relevant Orders    Lipid Panel with Direct LDL reflex    Comprehensive metabolic panel       Musculoskeletal and Integument    Nail abnormality     Wishes to seek opinion with another dermatologist         Relevant Orders    Sedimentation rate, automated    UMM Screen w/ Reflex to Titer/Pattern       Other    Anxiety     Trying melatonin for sleep- on tid clonazepam         Relevant Medications    clonazePAM (KlonoPIN) 0 5 mg tablet    Anemia     Will decrease the iron to once daily as she has normal readings in feb cbc         Relevant Medications    ferrous sulfate 325 (65 Fe) mg tablet    Other Relevant Orders    CBC and differential    Iron Panel    Altered bowel function     Improved symptoms since prilosec raised to 40 mg   Now with increased flatus and alternating with loose stools  And bloating and then constipated with hemorrhoid irritation                 Subjective:      Patient ID: Mecca Walker is a 56 y o  female- see porch charting  Overall feeling stable    HPI    The following portions of the patient's history were reviewed and updated as appropriate: allergies, current medications and problem list      Review of Systems   Gastrointestinal: Positive for diarrhea  Negative for abdominal pain  All other systems reviewed and are negative          Objective:      Current Outpatient Prescriptions:     amLODIPine (NORVASC) 10 mg tablet, , Disp: , Rfl: 0    Cholecalciferol (VITAMIN D3) 5000 units CAPS, Take by mouth daily, Disp: , Rfl:     clobetasol (TEMOVATE) 0 05 % cream, Apply topically 2 (two) times a day, Disp: , Rfl:   clonazePAM (KlonoPIN) 0 5 mg tablet, Take 1 tablet (0 5 mg total) by mouth 3 (three) times a day as needed for anxiety, Disp: 90 tablet, Rfl: 0    Cranberry 200 MG CAPS, Take 1 capsule by mouth daily, Disp: , Rfl:     ferrous sulfate 325 (65 Fe) mg tablet, Take 1 tablet (325 mg total) by mouth daily with breakfast, Disp: 30 tablet, Rfl: 0    fluticasone (FLONASE) 50 mcg/act nasal spray, 2 Squirts into each nostril, Disp: , Rfl:     gabapentin (NEURONTIN) 300 mg capsule, , Disp: , Rfl: 0    loratadine-pseudoephedrine (CLARITIN-D 12 HOUR) 5-120 mg per tablet, Take 1 tablet by mouth every 12 (twelve) hours as needed, Disp: , Rfl:     meloxicam (MOBIC) 15 mg tablet, Take 1 tablet by mouth daily, Disp: , Rfl:     Multiple Vitamin tablet, Take 1 tablet by mouth daily, Disp: , Rfl:     omeprazole (PriLOSEC) 40 MG capsule, Take 1 capsule by mouth daily, Disp: , Rfl:     /72   Pulse 90   Resp 16   Ht 5' 3" (1 6 m)   Wt 65 3 kg (144 lb)   BMI 25 51 kg/m²          Physical Exam   Constitutional: She is oriented to person, place, and time  She appears well-developed and well-nourished  No distress  HENT:   Right Ear: External ear normal    Left Ear: External ear normal    Mouth/Throat: Oropharynx is clear and moist    Eyes: Conjunctivae are normal  Right eye exhibits no discharge  Left eye exhibits no discharge  No scleral icterus  Neck: Neck supple  No thyromegaly present  Cardiovascular: Normal rate and regular rhythm  No murmur heard  Pulmonary/Chest: Breath sounds normal  No respiratory distress  She has no wheezes  She has no rales  Abdominal: Soft  She exhibits no distension  There is no tenderness  Increased bowel sounds   Musculoskeletal: She exhibits no edema or tenderness  Lymphadenopathy:     She has no cervical adenopathy  Neurological: She is alert and oriented to person, place, and time  No cranial nerve deficit  She exhibits normal muscle tone  Skin: Skin is dry   No rash noted  Some cracking in hands and thickened gray fingernails   Psychiatric: She has a normal mood and affect  Her behavior is normal  Judgment and thought content normal    Nursing note and vitals reviewed

## 2018-03-14 NOTE — PATIENT INSTRUCTIONS
See dermatology and podiatry when insurance is secured next month  Do labs in July   check with gi about stool studies

## 2018-03-28 DIAGNOSIS — G89.29 OTHER CHRONIC PAIN: Primary | ICD-10-CM

## 2018-03-29 RX ORDER — GABAPENTIN 300 MG/1
CAPSULE ORAL
Qty: 90 CAPSULE | Refills: 5 | Status: SHIPPED | OUTPATIENT
Start: 2018-03-29 | End: 2018-10-12 | Stop reason: SDUPTHER

## 2018-03-29 RX ORDER — MELOXICAM 15 MG/1
15 TABLET ORAL DAILY
Qty: 30 TABLET | Refills: 5 | Status: SHIPPED | OUTPATIENT
Start: 2018-03-29 | End: 2018-07-11 | Stop reason: SDUPTHER

## 2018-06-17 DIAGNOSIS — K21.9 GASTROESOPHAGEAL REFLUX DISEASE, ESOPHAGITIS PRESENCE NOT SPECIFIED: Primary | ICD-10-CM

## 2018-06-18 RX ORDER — OMEPRAZOLE 40 MG/1
CAPSULE, DELAYED RELEASE ORAL
Qty: 30 CAPSULE | Refills: 5 | Status: SHIPPED | OUTPATIENT
Start: 2018-06-18 | End: 2018-11-13 | Stop reason: SDUPTHER

## 2018-06-26 ENCOUNTER — OFFICE VISIT (OUTPATIENT)
Dept: FAMILY MEDICINE CLINIC | Facility: HOSPITAL | Age: 58
End: 2018-06-26
Payer: COMMERCIAL

## 2018-06-26 ENCOUNTER — TELEPHONE (OUTPATIENT)
Dept: FAMILY MEDICINE CLINIC | Facility: HOSPITAL | Age: 58
End: 2018-06-26

## 2018-06-26 VITALS
SYSTOLIC BLOOD PRESSURE: 116 MMHG | WEIGHT: 131 LBS | HEIGHT: 60 IN | HEART RATE: 91 BPM | DIASTOLIC BLOOD PRESSURE: 70 MMHG | BODY MASS INDEX: 25.72 KG/M2 | TEMPERATURE: 98.4 F

## 2018-06-26 DIAGNOSIS — L03.114 CELLULITIS OF LEFT UPPER EXTREMITY: ICD-10-CM

## 2018-06-26 DIAGNOSIS — M70.22 OLECRANON BURSITIS OF LEFT ELBOW: Primary | ICD-10-CM

## 2018-06-26 PROCEDURE — 99212 OFFICE O/P EST SF 10 MIN: CPT | Performed by: INTERNAL MEDICINE

## 2018-06-26 RX ORDER — CEPHALEXIN 500 MG/1
500 CAPSULE ORAL 4 TIMES DAILY
Qty: 28 CAPSULE | Refills: 0 | Status: SHIPPED | OUTPATIENT
Start: 2018-06-26 | End: 2018-07-03

## 2018-06-26 NOTE — PROGRESS NOTES
1  Left elbow pain and swelling- developed a week ago- was doing deck work and then thought she had a bug bite in area  Now feels warm  No fever but increased swelling noted- no drainage  Had prior injection in shoulders years ago but nothing recently  Had prior treatment for joint swelling on right hand- has lab slip to get inflammatory labs done before appt next month  PE- afebrile-s ee rounding data  Heent- not flushed  Ext- left elbow withvmild increased warmth-and moderate swelling  no drainage or open lesion noted     A/p- left olecronon bursitis- susppect some local cellulitis- will give rx for cephalexin- to call if not imoproving- may need to aspirate if not improving

## 2018-06-29 ENCOUNTER — OFFICE VISIT (OUTPATIENT)
Dept: FAMILY MEDICINE CLINIC | Facility: HOSPITAL | Age: 58
End: 2018-06-29
Payer: COMMERCIAL

## 2018-06-29 ENCOUNTER — TELEPHONE (OUTPATIENT)
Dept: FAMILY MEDICINE CLINIC | Facility: HOSPITAL | Age: 58
End: 2018-06-29

## 2018-06-29 VITALS
SYSTOLIC BLOOD PRESSURE: 118 MMHG | BODY MASS INDEX: 26.31 KG/M2 | HEART RATE: 92 BPM | WEIGHT: 134 LBS | HEIGHT: 60 IN | DIASTOLIC BLOOD PRESSURE: 74 MMHG | TEMPERATURE: 99.6 F

## 2018-06-29 DIAGNOSIS — M70.22 OLECRANON BURSITIS OF LEFT ELBOW: Primary | ICD-10-CM

## 2018-06-29 PROCEDURE — 99212 OFFICE O/P EST SF 10 MIN: CPT | Performed by: INTERNAL MEDICINE

## 2018-06-29 PROCEDURE — 20605 DRAIN/INJ JOINT/BURSA W/O US: CPT | Performed by: INTERNAL MEDICINE

## 2018-06-29 NOTE — PROGRESS NOTES
Assessment/Plan:       Problem List Items Addressed This Visit     None      Visit Diagnoses     Olecranon bursitis of left elbow    -  Primary            Subjective:      Patient ID: Franky Robertson is a 56 y o  female    Still having swelling in left elbow despite taking antibioitc- now more localized swelling in left olecranon region  No f chills- has low grade temp 99 6- no hx personally or of family hx of gout  Has chronic back pain- is on meloxicam for hand  Inflammation-had some right hand inflammation  Has never seen rheumatology in past   Procedure- after prep with betadine then alcohol wipes- a small amount of straw colored fluid was removed from left olecranon region  and sent for cell count and crytal search  The following portions of the patient's history were reviewed and updated as appropriate: allergies, current medications and problem list      Review of Systems   Constitutional: Negative for chills  All other systems reviewed and are negative          Objective:      Current Outpatient Prescriptions:     amLODIPine (NORVASC) 10 mg tablet, , Disp: , Rfl: 0    cephalexin (KEFLEX) 500 mg capsule, Take 1 capsule (500 mg total) by mouth 4 (four) times a day for 7 days, Disp: 28 capsule, Rfl: 0    Cholecalciferol (VITAMIN D3) 5000 units CAPS, Take by mouth daily, Disp: , Rfl:     clobetasol (TEMOVATE) 0 05 % cream, Apply topically 2 (two) times a day, Disp: , Rfl:     Cranberry 200 MG CAPS, Take 1 capsule by mouth daily, Disp: , Rfl:     ferrous sulfate 325 (65 Fe) mg tablet, Take 1 tablet (325 mg total) by mouth daily with breakfast, Disp: 30 tablet, Rfl: 0    fluticasone (FLONASE) 50 mcg/act nasal spray, 2 Squirts into each nostril, Disp: , Rfl:     gabapentin (NEURONTIN) 300 mg capsule, take 1 capsule by mouth daily AND 2 CAPSULES EACH NIGHT, Disp: 90 capsule, Rfl: 5    loratadine-pseudoephedrine (CLARITIN-D 12 HOUR) 5-120 mg per tablet, Take 1 tablet by mouth every 12 (twelve) hours as needed, Disp: , Rfl:     meloxicam (MOBIC) 15 mg tablet, Take 1 tablet (15 mg total) by mouth daily, Disp: 30 tablet, Rfl: 5    Multiple Vitamin tablet, Take 1 tablet by mouth daily, Disp: , Rfl:     omeprazole (PriLOSEC) 40 MG capsule, take 1 capsule by mouth once daily, Disp: 30 capsule, Rfl: 5    /72   Pulse 90   Resp 16   Ht 5' 3" (1 6 m)   Wt 65 3 kg (144 lb)   BMI 25 51 kg/m²          Physical Exam   Musculoskeletal:   Warm swollen left elbow in olecranon region- more local and less redness than earlier in week  Nursing note and vitals reviewed

## 2018-07-02 LAB
ALBUMIN SERPL-MCNC: 4 G/DL (ref 3.5–5.5)
ALBUMIN/GLOB SERPL: 1.9 {RATIO} (ref 1.2–2.2)
ALP SERPL-CCNC: 91 IU/L (ref 39–117)
ALT SERPL-CCNC: 12 IU/L (ref 0–32)
AMBIG ABBREV DEFAULT: NORMAL
ANA SER QL: NEGATIVE
APPEARANCE SNV: ABNORMAL
AST SERPL-CCNC: 13 IU/L (ref 0–40)
BASOPHILS # BLD AUTO: 0 X10E3/UL (ref 0–0.2)
BASOPHILS NFR BLD AUTO: 0 %
BASOPHILS NFR SNV MANUAL: 0 %
BILIRUB SERPL-MCNC: <0.2 MG/DL (ref 0–1.2)
BUN SERPL-MCNC: 17 MG/DL (ref 6–24)
BUN/CREAT SERPL: 25 (ref 9–23)
CALCIUM SERPL-MCNC: 9.4 MG/DL (ref 8.7–10.2)
CHLORIDE SERPL-SCNC: 104 MMOL/L (ref 96–106)
CHOLEST SERPL-MCNC: 171 MG/DL (ref 100–199)
CO2 SERPL-SCNC: 23 MMOL/L (ref 20–29)
COLOR SNV: ABNORMAL
CREAT SERPL-MCNC: 0.69 MG/DL (ref 0.57–1)
CRYSTALS SNV MICRO: NORMAL /HPF
EOSINOPHIL # BLD AUTO: 0.2 X10E3/UL (ref 0–0.4)
EOSINOPHIL NFR BLD AUTO: 2 %
EOSINOPHIL NFR SNV MANUAL: 0 % (ref 0–2)
ERYTHROCYTE [DISTWIDTH] IN BLOOD BY AUTOMATED COUNT: 15 % (ref 12.3–15.4)
ERYTHROCYTE [SEDIMENTATION RATE] IN BLOOD BY WESTERGREN METHOD: 11 MM/HR (ref 0–40)
FERRITIN SERPL-MCNC: 65 NG/ML (ref 15–150)
GLOBULIN SER-MCNC: 2.1 G/DL (ref 1.5–4.5)
GLUCOSE SERPL-MCNC: 94 MG/DL (ref 65–99)
HCT VFR BLD AUTO: 39.4 % (ref 34–46.6)
HDLC SERPL-MCNC: 52 MG/DL
HGB BLD-MCNC: 13.1 G/DL (ref 11.1–15.9)
IMM GRANULOCYTES # BLD: 0 X10E3/UL (ref 0–0.1)
IMM GRANULOCYTES NFR BLD: 0 %
IRON SATN MFR SERPL: 23 % (ref 15–55)
IRON SERPL-MCNC: 72 UG/DL (ref 27–159)
LDLC SERPL CALC-MCNC: 88 MG/DL (ref 0–99)
LYMPHOCYTES # BLD AUTO: 2 X10E3/UL (ref 0.7–3.1)
LYMPHOCYTES NFR BLD AUTO: 22 %
LYMPHOCYTES NFR SNV MANUAL: 0 % (ref 0–74)
MCH RBC QN AUTO: 33.9 PG (ref 26.6–33)
MCHC RBC AUTO-ENTMCNC: 33.2 G/DL (ref 31.5–35.7)
MCV RBC AUTO: 102 FL (ref 79–97)
MONOCYTES # BLD AUTO: 0.5 X10E3/UL (ref 0.1–0.9)
MONOCYTES NFR BLD AUTO: 6 %
MONOS+MACROS NFR SNV MANUAL: 8 % (ref 0–69)
NEUTROPHILS # BLD AUTO: 6.4 X10E3/UL (ref 1.4–7)
NEUTROPHILS NFR BLD AUTO: 70 %
NEUTROPHILS NFR SNV MANUAL: 92 % (ref 0–24)
PLATELET # BLD AUTO: 279 X10E3/UL (ref 150–379)
POTASSIUM SERPL-SCNC: 4.4 MMOL/L (ref 3.5–5.2)
PROT SERPL-MCNC: 6.1 G/DL (ref 6–8.5)
RBC # BLD AUTO: 3.86 X10E6/UL (ref 3.77–5.28)
SL AMB EGFR AFRICAN AMERICAN: 113 ML/MIN/1.73
SL AMB EGFR NON AFRICAN AMERICAN: 98 ML/MIN/1.73
SODIUM SERPL-SCNC: 142 MMOL/L (ref 134–144)
SPECIMEN SOURCE: ABNORMAL
SPECIMEN SOURCE: NORMAL
SYNOVIOCYTES NFR SNV: 3 % (ref 0–15)
TIBC SERPL-MCNC: 317 UG/DL (ref 250–450)
TRIGL SERPL-MCNC: 153 MG/DL (ref 0–149)
UIBC SERPL-MCNC: 245 UG/DL (ref 131–425)
WBC # BLD AUTO: 9 X10E3/UL (ref 3.4–10.8)
WBC # SNV MANUAL: ABNORMAL CELLS/UL

## 2018-07-10 ENCOUNTER — APPOINTMENT (OUTPATIENT)
Dept: RADIOLOGY | Facility: CLINIC | Age: 58
End: 2018-07-10
Payer: COMMERCIAL

## 2018-07-10 ENCOUNTER — OFFICE VISIT (OUTPATIENT)
Dept: OBGYN CLINIC | Facility: CLINIC | Age: 58
End: 2018-07-10
Payer: COMMERCIAL

## 2018-07-10 VITALS
WEIGHT: 133.6 LBS | SYSTOLIC BLOOD PRESSURE: 137 MMHG | HEIGHT: 63 IN | DIASTOLIC BLOOD PRESSURE: 89 MMHG | BODY MASS INDEX: 23.67 KG/M2 | HEART RATE: 108 BPM

## 2018-07-10 DIAGNOSIS — M25.422 ELBOW SWELLING, LEFT: ICD-10-CM

## 2018-07-10 DIAGNOSIS — M70.22 OLECRANON BURSITIS OF LEFT ELBOW: ICD-10-CM

## 2018-07-10 DIAGNOSIS — M25.422 ELBOW SWELLING, LEFT: Primary | ICD-10-CM

## 2018-07-10 PROCEDURE — 99203 OFFICE O/P NEW LOW 30 MIN: CPT | Performed by: FAMILY MEDICINE

## 2018-07-10 PROCEDURE — 73080 X-RAY EXAM OF ELBOW: CPT

## 2018-07-10 NOTE — ASSESSMENT & PLAN NOTE
Left elbow swelling consistent with olecranon bursitis which has since resolved since undergoing a previous aspiration by her PCP 1 week ago  Going forward, I have recommended that if her swelling returns, she may repeat a 2nd aspiration of the olecranon bursa and then I would recommend following this up with a 1 cc steroid into the olecranon bursa for alleviation of this bursitis  Follow-up in the future as needed for any further concerns or questions

## 2018-07-10 NOTE — PROGRESS NOTES
Assessment:     1  Elbow swelling, left    2  Olecranon bursitis of left elbow        Plan:     Problem List Items Addressed This Visit     Elbow swelling, left - Primary    Relevant Orders    XR elbow 3+ vw left    Olecranon bursitis of left elbow     Left elbow swelling consistent with olecranon bursitis which has since resolved since undergoing a previous aspiration by her PCP 1 week ago  Going forward, I have recommended that if her swelling returns, she may repeat a 2nd aspiration of the olecranon bursa and then I would recommend following this up with a 1 cc steroid into the olecranon bursa for alleviation of this bursitis  Follow-up in the future as needed for any further concerns or questions  Subjective:     Patient ID: Rivera Canales is a 56 y o  female  Chief Complaint:  Patient is a 72-year-old female presenting today for evaluation of left elbow pain and swelling  She reports hitting her elbow couple weeks back and developing swelling of her elbow  One week ago she underwent aspiration the left elbow for a suspected olecranon bursitis by her PCP  She states that since that procedure, she has had no further pain or swelling in the elbow  She has also completed a course of antibiotics  At this time, she has no pain in the elbow she denies any redness, warmth, numbness or tingling  She denies any any crepitus  Allergy:  Allergies   Allergen Reactions    Pollen Extract     Tree Extract      Medications:  all current active meds have been reviewed  Past Medical History:  Past Medical History:   Diagnosis Date    Tobacco use disorder      Past Surgical History:  History reviewed  No pertinent surgical history    Family History:  Family History   Problem Relation Age of Onset    Mental illness Sister     Other Other         Cardiovascular disease     Social History:  History   Alcohol Use No     History   Drug Use No     History   Smoking Status    Current Every Day Smoker Smokeless Tobacco    Never Used     Review of Systems   Constitutional: Negative  HENT: Negative  Eyes: Negative  Respiratory: Negative  Cardiovascular: Negative  Gastrointestinal: Negative  Genitourinary: Negative  Musculoskeletal: Positive for arthralgias, joint swelling and myalgias  Skin: Negative  Allergic/Immunologic: Negative  Neurological: Negative  Hematological: Negative  Psychiatric/Behavioral: Negative  Objective:  BP Readings from Last 1 Encounters:   07/10/18 137/89      Wt Readings from Last 1 Encounters:   07/10/18 60 6 kg (133 lb 9 6 oz)      BMI:   Estimated body mass index is 23 67 kg/m² as calculated from the following:    Height as of this encounter: 5' 3" (1 6 m)  Weight as of this encounter: 60 6 kg (133 lb 9 6 oz)  BSA:   Estimated body surface area is 1 63 meters squared as calculated from the following:    Height as of this encounter: 5' 3" (1 6 m)  Weight as of this encounter: 60 6 kg (133 lb 9 6 oz)  Physical Exam   Constitutional: She is oriented to person, place, and time  Vital signs are normal  She appears well-developed  HENT:   Head: Normocephalic  Eyes: Pupils are equal, round, and reactive to light  Pulmonary/Chest: Effort normal    Musculoskeletal: She exhibits edema  She exhibits no tenderness  Neurological: She is alert and oriented to person, place, and time  Skin: Skin is warm and dry  Psychiatric: She has a normal mood and affect  Nursing note and vitals reviewed  Left Elbow Exam     Tenderness   The patient is experiencing tenderness in the olecranon fossa  Range of Motion   Extension: normal   Flexion: normal   Pronation: normal   Supination: normal     Muscle Strength   The patient has normal left elbow strength    Pronation:  5/5   Supination:  5/5     Tests Varus: negative  Valgus: negative        Other   Erythema: absent  Sensation: normal  Pulse: present            I have personally reviewed pertinent films in PACS  No osseous abnormalities noted

## 2018-07-11 ENCOUNTER — OFFICE VISIT (OUTPATIENT)
Dept: FAMILY MEDICINE CLINIC | Facility: HOSPITAL | Age: 58
End: 2018-07-11
Payer: COMMERCIAL

## 2018-07-11 ENCOUNTER — HOSPITAL ENCOUNTER (OUTPATIENT)
Dept: RADIOLOGY | Facility: HOSPITAL | Age: 58
Discharge: HOME | End: 2018-07-11
Attending: INTERNAL MEDICINE
Payer: COMMERCIAL

## 2018-07-11 VITALS
SYSTOLIC BLOOD PRESSURE: 130 MMHG | HEIGHT: 63 IN | HEART RATE: 98 BPM | WEIGHT: 133.5 LBS | DIASTOLIC BLOOD PRESSURE: 80 MMHG | TEMPERATURE: 98.5 F | OXYGEN SATURATION: 93 % | BODY MASS INDEX: 23.65 KG/M2

## 2018-07-11 DIAGNOSIS — E78.00 HYPERCHOLESTEROLEMIA: ICD-10-CM

## 2018-07-11 DIAGNOSIS — G89.29 OTHER CHRONIC PAIN: ICD-10-CM

## 2018-07-11 DIAGNOSIS — M70.22 OLECRANON BURSITIS OF LEFT ELBOW: ICD-10-CM

## 2018-07-11 DIAGNOSIS — I10 BENIGN ESSENTIAL HTN: Primary | ICD-10-CM

## 2018-07-11 DIAGNOSIS — I10 ESSENTIAL HYPERTENSION: ICD-10-CM

## 2018-07-11 PROCEDURE — 73522 X-RAY EXAM HIPS BI 3-4 VIEWS: CPT

## 2018-07-11 PROCEDURE — 3079F DIAST BP 80-89 MM HG: CPT | Performed by: INTERNAL MEDICINE

## 2018-07-11 PROCEDURE — 3075F SYST BP GE 130 - 139MM HG: CPT | Performed by: INTERNAL MEDICINE

## 2018-07-11 PROCEDURE — 99214 OFFICE O/P EST MOD 30 MIN: CPT | Performed by: INTERNAL MEDICINE

## 2018-07-11 RX ORDER — MELOXICAM 7.5 MG/1
7.5 TABLET ORAL DAILY
Qty: 30 TABLET | Refills: 3 | Status: SHIPPED | OUTPATIENT
Start: 2018-07-11 | End: 2018-11-02 | Stop reason: SDUPTHER

## 2018-07-11 RX ORDER — AMLODIPINE BESYLATE 5 MG/1
5 TABLET ORAL DAILY
Qty: 90 TABLET | Refills: 3 | Status: SHIPPED | OUTPATIENT
Start: 2018-07-11 | End: 2018-11-13 | Stop reason: SDUPTHER

## 2018-07-11 NOTE — PROGRESS NOTES
Assessment/Plan:             Problem List Items Addressed This Visit        Cardiovascular and Mediastinum    Benign essential HTN - Primary     Well controlled            Musculoskeletal and Integument    Olecranon bursitis of left elbow     Seen by Dr Maryjo Buchanan- no further draiange needed at that time- ws told it may take another 2-3 weeks to resolve- had negative crystal search on my aspiration but wbc  Now with some soreness when bumping it - discussed if not improving willl have her come back for steroid injection            Other    Hypercholesterolemia     Stable labs         Chronic pain     Had prior mri of lower back but now with increased pain in hips laterally and in right groin in past week  Using mobic 15 mg daily  But has some reflux symptoms         Relevant Medications    meloxicam (MOBIC) 7 5 mg tablet    Other Relevant Orders    XR hip/pelv 2-3 vws right if performed    XR hip/pelv 2-3 vws left if performed            Subjective:      Patient ID: Maryjo Buchanan is a 56 y o  female    1  Chest pain-- more reflux related- having some reflux  Issues- goes thorugh to back and into midepigastric- had egd with Dr Suleiman Gonzales- has hiatal hernia- om omeprazole  No diaphoresis or palpitations with pain        The following portions of the patient's history were reviewed and updated as appropriate: allergies, current medications and problem list      Review of Systems   Cardiovascular: Negative for palpitations and leg swelling  All other systems reviewed and are negative          Objective:      Current Outpatient Prescriptions:     amLODIPine (NORVASC) 10 mg tablet, , Disp: , Rfl: 0    Cholecalciferol (VITAMIN D3) 5000 units CAPS, Take by mouth daily, Disp: , Rfl:     clobetasol (TEMOVATE) 0 05 % cream, Apply topically 2 (two) times a day, Disp: , Rfl:     Cranberry 200 MG CAPS, Take 1 capsule by mouth daily, Disp: , Rfl:     ferrous sulfate 325 (65 Fe) mg tablet, Take 1 tablet (325 mg total) by mouth daily with breakfast, Disp: 30 tablet, Rfl: 0    fluticasone (FLONASE) 50 mcg/act nasal spray, 2 Squirts into each nostril, Disp: , Rfl:     gabapentin (NEURONTIN) 300 mg capsule, take 1 capsule by mouth daily AND 2 CAPSULES EACH NIGHT, Disp: 90 capsule, Rfl: 5    loratadine-pseudoephedrine (CLARITIN-D 12 HOUR) 5-120 mg per tablet, Take 1 tablet by mouth every 12 (twelve) hours as needed, Disp: , Rfl:     meloxicam (MOBIC) 7 5 mg tablet, Take 1 tablet (7 5 mg total) by mouth daily, Disp: 30 tablet, Rfl: 3    Multiple Vitamin tablet, Take 1 tablet by mouth daily, Disp: , Rfl:     omeprazole (PriLOSEC) 40 MG capsule, take 1 capsule by mouth once daily, Disp: 30 capsule, Rfl: 5    Blood pressure 130/80, pulse 98, temperature 98 5 °F (36 9 °C), temperature source Tympanic, height 5' 3" (1 6 m), weight 60 6 kg (133 lb 8 oz), SpO2 93 %  Physical Exam   Constitutional: She appears well-developed and well-nourished  HENT:   Head: Normocephalic  Right Ear: External ear normal    Left Ear: External ear normal    Nose: Nose normal    Eyes: Pupils are equal, round, and reactive to light  Right eye exhibits no discharge  Left eye exhibits no discharge  Neck: No JVD present  Cardiovascular: Normal rate, regular rhythm and normal heart sounds  Exam reveals no friction rub  No murmur heard  Pulmonary/Chest: Effort normal and breath sounds normal  No respiratory distress  She has no wheezes  She has no rales  Abdominal: Soft  Bowel sounds are normal  She exhibits no distension  Musculoskeletal: Normal range of motion  No crepitance in hip rotation or flexion abdumction with some mild restriction on right  Left elbow with mild swelling- no increased warmth   Neurological: She is alert  She displays normal reflexes  No cranial nerve deficit  Skin: Skin is warm and dry  Nursing note and vitals reviewed

## 2018-07-11 NOTE — ASSESSMENT & PLAN NOTE
Had prior mri of lower back but now with increased pain in hips laterally and in right groin in past week   Using mobic 15 mg daily  But has some reflux symptoms

## 2018-07-11 NOTE — ASSESSMENT & PLAN NOTE
Seen by Dr Mcgee Messenger- no further draiange needed at that time- ws told it may take another 2-3 weeks to resolve- had negative crystal search on my aspiration but wbc   Now with some soreness when bumping it - discussed if not improving willl have her come back for steroid injection

## 2018-07-11 NOTE — PATIENT INSTRUCTIONS
Call if left elbow still with swelling in 2-3 weeks for steroid injection  Decrease amlodipine to 5 mg daily- tight bp control- check bp  1-2  X week at home  Do hip xrays today

## 2018-07-18 ENCOUNTER — TELEPHONE (OUTPATIENT)
Dept: FAMILY MEDICINE CLINIC | Facility: HOSPITAL | Age: 58
End: 2018-07-18

## 2018-07-18 ENCOUNTER — OFFICE VISIT (OUTPATIENT)
Dept: FAMILY MEDICINE CLINIC | Facility: HOSPITAL | Age: 58
End: 2018-07-18
Payer: COMMERCIAL

## 2018-07-18 VITALS
HEIGHT: 63 IN | DIASTOLIC BLOOD PRESSURE: 80 MMHG | OXYGEN SATURATION: 97 % | SYSTOLIC BLOOD PRESSURE: 148 MMHG | HEART RATE: 88 BPM | WEIGHT: 131 LBS | BODY MASS INDEX: 23.21 KG/M2

## 2018-07-18 DIAGNOSIS — F41.9 ANXIETY: ICD-10-CM

## 2018-07-18 DIAGNOSIS — Z12.39 SCREENING FOR BREAST CANCER: Primary | ICD-10-CM

## 2018-07-18 DIAGNOSIS — R10.9 ABDOMINAL PAIN, UNSPECIFIED ABDOMINAL LOCATION: ICD-10-CM

## 2018-07-18 PROCEDURE — 3008F BODY MASS INDEX DOCD: CPT | Performed by: PHYSICIAN ASSISTANT

## 2018-07-18 PROCEDURE — 99214 OFFICE O/P EST MOD 30 MIN: CPT | Performed by: PHYSICIAN ASSISTANT

## 2018-07-18 RX ORDER — ACETAMINOPHEN AND CODEINE PHOSPHATE 300; 30 MG/1; MG/1
1 TABLET ORAL
Qty: 90 TABLET | Refills: 2 | Status: SHIPPED | OUTPATIENT
Start: 2018-07-18 | End: 2018-11-13 | Stop reason: SDUPTHER

## 2018-07-18 RX ORDER — CLONAZEPAM 0.5 MG/1
0.5 TABLET ORAL 2 TIMES DAILY PRN
Qty: 60 TABLET | Refills: 2 | Status: SHIPPED | OUTPATIENT
Start: 2018-07-18 | End: 2018-08-09

## 2018-07-18 NOTE — PATIENT INSTRUCTIONS
Reviewed patient's blood test, done recently, slightly elevated trig  Discussed meal planning, recommend 3 balanced meals daily, info given to patient  Referred to GI for follow up, and mammogram    Recommend Klonopin bid, prn, and Tylenol #3 for pain

## 2018-07-18 NOTE — PROGRESS NOTES
Assessment/Plan:         Diagnoses and all orders for this visit:    Screening for breast cancer  -     Mammo screening bilateral w cad; Future    Abdominal pain, unspecified abdominal location  -     acetaminophen-codeine (TYLENOL/CODEINE #3) 300-30 MG per tablet; Take 1 tablet by mouth 3 (three) times a day with meals    Anxiety  -     clonazePAM (KlonoPIN) 0 5 mg tablet; Take 1 tablet (0 5 mg total) by mouth 2 (two) times a day as needed for anxiety        Subjective:      Patient ID: Eladio Duane is a 56 y o  female  56year old white female presents with ongoing pain all over ribs, on sides and upper back pain, middle area, ongoing over one year, not relieved with Tylenol  Pain radiates up and down center of chest, and around gastric pouch  Concerned about having cancer, did have endoscopy and saw GI specialist 10/16, was dx  With hiatal hernia  Does take probiotics, and Omeprazole  Also has flank pain  Had blood test end of June (30th) at [address] to eating 3 meals daily-  Breakfast- eggs, or pancakes with blueberries; lunch- light- macaroni salad, or yogurt, 1/2 sandwich with cold cuts; dinner- grilled hot dogs, vegies, or chicken, or talapia/fish  Snacks-  None;  Fluids-  Water, coffee- 3- 16 oz  Admits to having stress/anxiety  Son home with chronic lyme dz , forced to work, but has multiple medical problems; Age 25  Review of Systems   Constitutional: Positive for diaphoresis and fatigue  Negative for chills and fever  Cardiovascular: Positive for chest pain  Center of chest and around gastric pouch  Gastrointestinal: Positive for abdominal pain, blood in stool, constipation and diarrhea  Negative for nausea and vomiting  Has GERD sx  And hemorrhoids- internal and external    Has bowel movement every other day  Psychiatric/Behavioral: Positive for confusion, decreased concentration and sleep disturbance   Negative for self-injury and suicidal ideas  The patient is nervous/anxious  Objective:      /80 (BP Location: Left arm, Patient Position: Sitting, Cuff Size: Standard)   Pulse 88   Ht 5' 3" (1 6 m)   Wt 59 4 kg (131 lb)   LMP  (LMP Unknown)   SpO2 97%   BMI 23 21 kg/m²          Physical Exam   Constitutional: She is oriented to person, place, and time  She appears well-developed and well-nourished  No distress  Cardiovascular: Normal rate, regular rhythm and normal heart sounds  Pulmonary/Chest: Effort normal and breath sounds normal  No respiratory distress  She has no wheezes  She has no rales  She exhibits no tenderness  Abdominal: Soft  Bowel sounds are normal  She exhibits no distension and no mass  There is no tenderness  There is no rebound and no guarding  Musculoskeletal: Normal range of motion  She exhibits no edema  Neurological: She is alert and oriented to person, place, and time  Skin: She is not diaphoretic  Psychiatric: She has a normal mood and affect  Her behavior is normal  Judgment and thought content normal    Nursing note and vitals reviewed

## 2018-07-19 NOTE — TELEPHONE ENCOUNTER
Bethanie Barragan - was this med just started? If she not going to be on it - I think that's all she get

## 2018-07-19 NOTE — TELEPHONE ENCOUNTER
Called PT and also [facility] - PT said that the Tylenol # 3 is working and she would like more than a 5 day supply - [facility] told her that we would have to call her insurance company - I guess she needs a prior [facility] [place]  HELP!

## 2018-07-20 ENCOUNTER — TELEPHONE (OUTPATIENT)
Dept: FAMILY MEDICINE CLINIC | Facility: HOSPITAL | Age: 58
End: 2018-07-20

## 2018-07-20 NOTE — TELEPHONE ENCOUNTER
I handled this  Pt can get another 15 on 7/23 per P A dept  I did P A  And will send in for pt  Pt aware     dk

## 2018-07-20 NOTE — TELEPHONE ENCOUNTER
Pt was given an rx for tylenol with codeine #3 by cyril  #90  Pharm would only give her #15 --direc are 1 tid prn  This was ins reason  Bal needs P A  We got paperwork from pharm to call for PA and I called [phone]--future scripts  Talked to April--whe is faxing form over  I called central faxing team to look for this fax so we get it  April said that the pt can get another 15 on 7/23, but then that is it  Cent faxing called back and got the form, they said it will be available in [address] now  I called pt and told her about this  Told her we most likely wont have an answer until next week, she understood  She said her pain is chronic, upper back and across her chest--that was the reason for the rx     dk

## 2018-07-22 ENCOUNTER — APPOINTMENT (EMERGENCY)
Dept: RADIOLOGY | Facility: HOSPITAL | Age: 58
DRG: 440 | End: 2018-07-22
Payer: COMMERCIAL

## 2018-07-22 ENCOUNTER — HOSPITAL ENCOUNTER (INPATIENT)
Facility: HOSPITAL | Age: 58
LOS: 4 days | Discharge: HOME | DRG: 440 | End: 2018-07-26
Attending: EMERGENCY MEDICINE | Admitting: INTERNAL MEDICINE
Payer: COMMERCIAL

## 2018-07-22 ENCOUNTER — APPOINTMENT (INPATIENT)
Dept: CT IMAGING | Facility: HOSPITAL | Age: 58
DRG: 440 | End: 2018-07-22
Payer: COMMERCIAL

## 2018-07-22 DIAGNOSIS — M70.22 OLECRANON BURSITIS OF LEFT ELBOW: ICD-10-CM

## 2018-07-22 DIAGNOSIS — K85.90 ACUTE PANCREATITIS: Primary | ICD-10-CM

## 2018-07-22 LAB
ALBUMIN SERPL BCP-MCNC: 2.9 G/DL (ref 3.5–5)
ALP SERPL-CCNC: 119 U/L (ref 46–116)
ALT SERPL W P-5'-P-CCNC: 87 U/L (ref 12–78)
ANION GAP SERPL CALCULATED.3IONS-SCNC: 11 MMOL/L (ref 4–13)
APAP SERPL-MCNC: 4.2 UG/ML (ref 10–30)
AST SERPL W P-5'-P-CCNC: 73 U/L (ref 5–45)
BASOPHILS # BLD AUTO: 0.01 THOUSANDS/ΜL (ref 0–0.1)
BASOPHILS NFR BLD AUTO: 0 % (ref 0–1)
BILIRUB SERPL-MCNC: 0.3 MG/DL (ref 0.2–1)
BUN SERPL-MCNC: 13 MG/DL (ref 5–25)
CALCIUM SERPL-MCNC: 8.6 MG/DL (ref 8.3–10.1)
CHLORIDE SERPL-SCNC: 108 MMOL/L (ref 100–108)
CLARITY, POC: CLEAR
CO2 SERPL-SCNC: 24 MMOL/L (ref 21–32)
COLOR, POC: YELLOW
CREAT SERPL-MCNC: 0.68 MG/DL (ref 0.6–1.3)
EOSINOPHIL # BLD AUTO: 0.19 THOUSAND/ΜL (ref 0–0.61)
EOSINOPHIL NFR BLD AUTO: 3 % (ref 0–6)
ERYTHROCYTE [DISTWIDTH] IN BLOOD BY AUTOMATED COUNT: 14.6 % (ref 11.6–15.1)
ETHANOL SERPL-MCNC: <3 MG/DL (ref 0–3)
EXT BILIRUBIN, UA: NEGATIVE
EXT BLOOD URINE: NORMAL
EXT GLUCOSE, UA: NEGATIVE
EXT KETONES: NEGATIVE
EXT NITRITE, UA: NEGATIVE
EXT PH, UA: 6.5
EXT PROTEIN, UA: NEGATIVE
EXT SPECIFIC GRAVITY, UA: 1.01
EXT UROBILINOGEN: 0.2
GFR SERPL CREATININE-BSD FRML MDRD: 99 ML/MIN/1.73SQ M
GLUCOSE SERPL-MCNC: 104 MG/DL (ref 65–140)
HCT VFR BLD AUTO: 33.5 % (ref 34.8–46.1)
HGB BLD-MCNC: 11 G/DL (ref 11.5–15.4)
IMM GRANULOCYTES # BLD AUTO: 0.02 THOUSAND/UL (ref 0–0.2)
IMM GRANULOCYTES NFR BLD AUTO: 0 % (ref 0–2)
LIPASE SERPL-CCNC: 1206 U/L (ref 73–393)
LYMPHOCYTES # BLD AUTO: 1.44 THOUSANDS/ΜL (ref 0.6–4.47)
LYMPHOCYTES NFR BLD AUTO: 20 % (ref 14–44)
MCH RBC QN AUTO: 33.3 PG (ref 26.8–34.3)
MCHC RBC AUTO-ENTMCNC: 32.8 G/DL (ref 31.4–37.4)
MCV RBC AUTO: 102 FL (ref 82–98)
MONOCYTES # BLD AUTO: 0.37 THOUSAND/ΜL (ref 0.17–1.22)
MONOCYTES NFR BLD AUTO: 5 % (ref 4–12)
NEUTROPHILS # BLD AUTO: 5.11 THOUSANDS/ΜL (ref 1.85–7.62)
NEUTS SEG NFR BLD AUTO: 72 % (ref 43–75)
NRBC BLD AUTO-RTO: 0 /100 WBCS
PLATELET # BLD AUTO: 282 THOUSANDS/UL (ref 149–390)
PMV BLD AUTO: 9.5 FL (ref 8.9–12.7)
POTASSIUM SERPL-SCNC: 3.6 MMOL/L (ref 3.5–5.3)
PROT SERPL-MCNC: 6.2 G/DL (ref 6.4–8.2)
RBC # BLD AUTO: 3.3 MILLION/UL (ref 3.81–5.12)
SALICYLATES SERPL-MCNC: 3.7 MG/DL (ref 3–20)
SODIUM SERPL-SCNC: 143 MMOL/L (ref 136–145)
TROPONIN I SERPL-MCNC: <0.02 NG/ML
WBC # BLD AUTO: 7.14 THOUSAND/UL (ref 4.31–10.16)
WBC # BLD EST: NEGATIVE 10*3/UL

## 2018-07-22 PROCEDURE — 36415 COLL VENOUS BLD VENIPUNCTURE: CPT | Performed by: EMERGENCY MEDICINE

## 2018-07-22 PROCEDURE — 83690 ASSAY OF LIPASE: CPT | Performed by: EMERGENCY MEDICINE

## 2018-07-22 PROCEDURE — 99285 EMERGENCY DEPT VISIT HI MDM: CPT

## 2018-07-22 PROCEDURE — 80053 COMPREHEN METABOLIC PANEL: CPT | Performed by: EMERGENCY MEDICINE

## 2018-07-22 PROCEDURE — 80320 DRUG SCREEN QUANTALCOHOLS: CPT | Performed by: EMERGENCY MEDICINE

## 2018-07-22 PROCEDURE — 85025 COMPLETE CBC W/AUTO DIFF WBC: CPT | Performed by: EMERGENCY MEDICINE

## 2018-07-22 PROCEDURE — 81002 URINALYSIS NONAUTO W/O SCOPE: CPT | Performed by: EMERGENCY MEDICINE

## 2018-07-22 PROCEDURE — 84484 ASSAY OF TROPONIN QUANT: CPT | Performed by: EMERGENCY MEDICINE

## 2018-07-22 PROCEDURE — 80329 ANALGESICS NON-OPIOID 1 OR 2: CPT | Performed by: EMERGENCY MEDICINE

## 2018-07-22 PROCEDURE — 93005 ELECTROCARDIOGRAM TRACING: CPT

## 2018-07-22 PROCEDURE — 74177 CT ABD & PELVIS W/CONTRAST: CPT

## 2018-07-22 PROCEDURE — 99223 1ST HOSP IP/OBS HIGH 75: CPT | Performed by: INTERNAL MEDICINE

## 2018-07-22 PROCEDURE — 71046 X-RAY EXAM CHEST 2 VIEWS: CPT

## 2018-07-22 RX ORDER — PANTOPRAZOLE SODIUM 40 MG/1
40 INJECTION, POWDER, FOR SOLUTION INTRAVENOUS EVERY 12 HOURS SCHEDULED
Status: DISCONTINUED | OUTPATIENT
Start: 2018-07-23 | End: 2018-07-25

## 2018-07-22 RX ORDER — GABAPENTIN 300 MG/1
600 CAPSULE ORAL
Status: DISCONTINUED | OUTPATIENT
Start: 2018-07-23 | End: 2018-07-26 | Stop reason: HOSPADM

## 2018-07-22 RX ORDER — MAGNESIUM HYDROXIDE/ALUMINUM HYDROXICE/SIMETHICONE 120; 1200; 1200 MG/30ML; MG/30ML; MG/30ML
20 SUSPENSION ORAL ONCE
Status: COMPLETED | OUTPATIENT
Start: 2018-07-22 | End: 2018-07-22

## 2018-07-22 RX ORDER — GABAPENTIN 300 MG/1
300 CAPSULE ORAL DAILY
Status: DISCONTINUED | OUTPATIENT
Start: 2018-07-23 | End: 2018-07-26 | Stop reason: HOSPADM

## 2018-07-22 RX ORDER — AMLODIPINE BESYLATE 5 MG/1
5 TABLET ORAL DAILY
Status: DISCONTINUED | OUTPATIENT
Start: 2018-07-23 | End: 2018-07-26 | Stop reason: HOSPADM

## 2018-07-22 RX ORDER — MELATONIN
5000 DAILY
Status: DISCONTINUED | OUTPATIENT
Start: 2018-07-23 | End: 2018-07-26 | Stop reason: HOSPADM

## 2018-07-22 RX ORDER — SODIUM CHLORIDE 9 MG/ML
125 INJECTION, SOLUTION INTRAVENOUS CONTINUOUS
Status: DISCONTINUED | OUTPATIENT
Start: 2018-07-23 | End: 2018-07-23

## 2018-07-22 RX ORDER — NICOTINE 21 MG/24HR
1 PATCH, TRANSDERMAL 24 HOURS TRANSDERMAL DAILY
Status: DISCONTINUED | OUTPATIENT
Start: 2018-07-23 | End: 2018-07-26 | Stop reason: HOSPADM

## 2018-07-22 RX ORDER — CLONAZEPAM 0.5 MG/1
0.5 TABLET ORAL 2 TIMES DAILY PRN
Status: DISCONTINUED | OUTPATIENT
Start: 2018-07-22 | End: 2018-07-26 | Stop reason: HOSPADM

## 2018-07-22 RX ORDER — ONDANSETRON 2 MG/ML
4 INJECTION INTRAMUSCULAR; INTRAVENOUS EVERY 6 HOURS PRN
Status: DISCONTINUED | OUTPATIENT
Start: 2018-07-22 | End: 2018-07-26 | Stop reason: HOSPADM

## 2018-07-22 RX ORDER — SODIUM CHLORIDE, SODIUM LACTATE, POTASSIUM CHLORIDE, CALCIUM CHLORIDE 600; 310; 30; 20 MG/100ML; MG/100ML; MG/100ML; MG/100ML
100 INJECTION, SOLUTION INTRAVENOUS CONTINUOUS
Status: DISCONTINUED | OUTPATIENT
Start: 2018-07-22 | End: 2018-07-22

## 2018-07-22 RX ORDER — FLUTICASONE PROPIONATE 50 MCG
2 SPRAY, SUSPENSION (ML) NASAL DAILY
Status: DISCONTINUED | OUTPATIENT
Start: 2018-07-23 | End: 2018-07-26 | Stop reason: HOSPADM

## 2018-07-22 RX ADMIN — LIDOCAINE HYDROCHLORIDE 10 ML: 20 SOLUTION ORAL; TOPICAL at 20:49

## 2018-07-22 RX ADMIN — SODIUM CHLORIDE, SODIUM LACTATE, POTASSIUM CHLORIDE, AND CALCIUM CHLORIDE 100 ML/HR: .6; .31; .03; .02 INJECTION, SOLUTION INTRAVENOUS at 22:00

## 2018-07-22 RX ADMIN — HYDROMORPHONE HYDROCHLORIDE 0.5 MG: 1 INJECTION, SOLUTION INTRAMUSCULAR; INTRAVENOUS; SUBCUTANEOUS at 22:38

## 2018-07-22 RX ADMIN — ALUMINUM HYDROXIDE, MAGNESIUM HYDROXIDE, AND SIMETHICONE 20 ML: 200; 200; 20 SUSPENSION ORAL at 20:49

## 2018-07-22 RX ADMIN — IOHEXOL 100 ML: 350 INJECTION, SOLUTION INTRAVENOUS at 23:28

## 2018-07-23 ENCOUNTER — APPOINTMENT (INPATIENT)
Dept: ULTRASOUND IMAGING | Facility: HOSPITAL | Age: 58
DRG: 440 | End: 2018-07-23
Payer: COMMERCIAL

## 2018-07-23 ENCOUNTER — APPOINTMENT (INPATIENT)
Dept: MRI IMAGING | Facility: HOSPITAL | Age: 58
DRG: 440 | End: 2018-07-23
Payer: COMMERCIAL

## 2018-07-23 PROBLEM — K85.90 ACUTE PANCREATITIS: Status: ACTIVE | Noted: 2018-07-23

## 2018-07-23 PROBLEM — R10.13 EPIGASTRIC PAIN: Status: ACTIVE | Noted: 2018-07-23

## 2018-07-23 PROBLEM — K85.81 OTHER ACUTE PANCREATITIS WITH UNINFECTED NECROSIS: Status: ACTIVE | Noted: 2018-07-23

## 2018-07-23 LAB
ALBUMIN SERPL BCP-MCNC: 2.5 G/DL (ref 3.5–5)
ALP SERPL-CCNC: 111 U/L (ref 46–116)
ALT SERPL W P-5'-P-CCNC: 70 U/L (ref 12–78)
ANION GAP SERPL CALCULATED.3IONS-SCNC: 6 MMOL/L (ref 4–13)
AST SERPL W P-5'-P-CCNC: 36 U/L (ref 5–45)
BASOPHILS # BLD AUTO: 0.01 THOUSANDS/ΜL (ref 0–0.1)
BASOPHILS NFR BLD AUTO: 0 % (ref 0–1)
BILIRUB SERPL-MCNC: 0.2 MG/DL (ref 0.2–1)
BUN SERPL-MCNC: 11 MG/DL (ref 5–25)
CALCIUM SERPL-MCNC: 8.3 MG/DL (ref 8.3–10.1)
CHLORIDE SERPL-SCNC: 111 MMOL/L (ref 100–108)
CO2 SERPL-SCNC: 27 MMOL/L (ref 21–32)
CREAT SERPL-MCNC: 0.52 MG/DL (ref 0.6–1.3)
EOSINOPHIL # BLD AUTO: 0.19 THOUSAND/ΜL (ref 0–0.61)
EOSINOPHIL NFR BLD AUTO: 3 % (ref 0–6)
ERYTHROCYTE [DISTWIDTH] IN BLOOD BY AUTOMATED COUNT: 14.4 % (ref 11.6–15.1)
GFR SERPL CREATININE-BSD FRML MDRD: 108 ML/MIN/1.73SQ M
GLUCOSE SERPL-MCNC: 94 MG/DL (ref 65–140)
HCT VFR BLD AUTO: 32.9 % (ref 34.8–46.1)
HGB BLD-MCNC: 10.7 G/DL (ref 11.5–15.4)
IMM GRANULOCYTES # BLD AUTO: 0.02 THOUSAND/UL (ref 0–0.2)
IMM GRANULOCYTES NFR BLD AUTO: 0 % (ref 0–2)
LYMPHOCYTES # BLD AUTO: 1.5 THOUSANDS/ΜL (ref 0.6–4.47)
LYMPHOCYTES NFR BLD AUTO: 26 % (ref 14–44)
MAGNESIUM SERPL-MCNC: 1.9 MG/DL (ref 1.6–2.6)
MCH RBC QN AUTO: 32.8 PG (ref 26.8–34.3)
MCHC RBC AUTO-ENTMCNC: 32.5 G/DL (ref 31.4–37.4)
MCV RBC AUTO: 101 FL (ref 82–98)
MONOCYTES # BLD AUTO: 0.32 THOUSAND/ΜL (ref 0.17–1.22)
MONOCYTES NFR BLD AUTO: 6 % (ref 4–12)
NEUTROPHILS # BLD AUTO: 3.69 THOUSANDS/ΜL (ref 1.85–7.62)
NEUTS SEG NFR BLD AUTO: 65 % (ref 43–75)
NRBC BLD AUTO-RTO: 0 /100 WBCS
PHOSPHATE SERPL-MCNC: 4 MG/DL (ref 2.7–4.5)
PLATELET # BLD AUTO: 274 THOUSANDS/UL (ref 149–390)
PMV BLD AUTO: 9.6 FL (ref 8.9–12.7)
POTASSIUM SERPL-SCNC: 3.7 MMOL/L (ref 3.5–5.3)
PROT SERPL-MCNC: 5.8 G/DL (ref 6.4–8.2)
RBC # BLD AUTO: 3.26 MILLION/UL (ref 3.81–5.12)
SODIUM SERPL-SCNC: 144 MMOL/L (ref 136–145)
WBC # BLD AUTO: 5.73 THOUSAND/UL (ref 4.31–10.16)

## 2018-07-23 PROCEDURE — 84100 ASSAY OF PHOSPHORUS: CPT | Performed by: INTERNAL MEDICINE

## 2018-07-23 PROCEDURE — 99232 SBSQ HOSP IP/OBS MODERATE 35: CPT | Performed by: INTERNAL MEDICINE

## 2018-07-23 PROCEDURE — 74181 MRI ABDOMEN W/O CONTRAST: CPT

## 2018-07-23 PROCEDURE — 80053 COMPREHEN METABOLIC PANEL: CPT | Performed by: INTERNAL MEDICINE

## 2018-07-23 PROCEDURE — 76376 3D RENDER W/INTRP POSTPROCES: CPT

## 2018-07-23 PROCEDURE — 85025 COMPLETE CBC W/AUTO DIFF WBC: CPT | Performed by: INTERNAL MEDICINE

## 2018-07-23 PROCEDURE — C9113 INJ PANTOPRAZOLE SODIUM, VIA: HCPCS | Performed by: INTERNAL MEDICINE

## 2018-07-23 PROCEDURE — 83735 ASSAY OF MAGNESIUM: CPT | Performed by: INTERNAL MEDICINE

## 2018-07-23 PROCEDURE — 76705 ECHO EXAM OF ABDOMEN: CPT

## 2018-07-23 RX ORDER — SODIUM CHLORIDE, SODIUM LACTATE, POTASSIUM CHLORIDE, CALCIUM CHLORIDE 600; 310; 30; 20 MG/100ML; MG/100ML; MG/100ML; MG/100ML
150 INJECTION, SOLUTION INTRAVENOUS CONTINUOUS
Status: DISCONTINUED | OUTPATIENT
Start: 2018-07-23 | End: 2018-07-26

## 2018-07-23 RX ADMIN — HYDROMORPHONE HYDROCHLORIDE 0.5 MG: 1 INJECTION, SOLUTION INTRAMUSCULAR; INTRAVENOUS; SUBCUTANEOUS at 14:13

## 2018-07-23 RX ADMIN — PANTOPRAZOLE SODIUM 40 MG: 40 INJECTION, POWDER, FOR SOLUTION INTRAVENOUS at 00:19

## 2018-07-23 RX ADMIN — SODIUM CHLORIDE, SODIUM LACTATE, POTASSIUM CHLORIDE, AND CALCIUM CHLORIDE 150 ML/HR: .6; .31; .03; .02 INJECTION, SOLUTION INTRAVENOUS at 22:22

## 2018-07-23 RX ADMIN — HYDROMORPHONE HYDROCHLORIDE 0.5 MG: 1 INJECTION, SOLUTION INTRAMUSCULAR; INTRAVENOUS; SUBCUTANEOUS at 05:21

## 2018-07-23 RX ADMIN — SODIUM CHLORIDE, SODIUM LACTATE, POTASSIUM CHLORIDE, AND CALCIUM CHLORIDE 150 ML/HR: .6; .31; .03; .02 INJECTION, SOLUTION INTRAVENOUS at 10:52

## 2018-07-23 RX ADMIN — HYDROMORPHONE HYDROCHLORIDE 1 MG: 1 INJECTION, SOLUTION INTRAMUSCULAR; INTRAVENOUS; SUBCUTANEOUS at 20:34

## 2018-07-23 RX ADMIN — HYDROMORPHONE HYDROCHLORIDE 0.5 MG: 1 INJECTION, SOLUTION INTRAMUSCULAR; INTRAVENOUS; SUBCUTANEOUS at 17:13

## 2018-07-23 RX ADMIN — GABAPENTIN 600 MG: 300 CAPSULE ORAL at 21:18

## 2018-07-23 RX ADMIN — SODIUM CHLORIDE 125 ML/HR: 0.9 INJECTION, SOLUTION INTRAVENOUS at 00:20

## 2018-07-23 RX ADMIN — PANTOPRAZOLE SODIUM 40 MG: 40 INJECTION, POWDER, FOR SOLUTION INTRAVENOUS at 20:34

## 2018-07-23 RX ADMIN — HYDROMORPHONE HYDROCHLORIDE 0.5 MG: 1 INJECTION, SOLUTION INTRAMUSCULAR; INTRAVENOUS; SUBCUTANEOUS at 00:40

## 2018-07-23 RX ADMIN — AMLODIPINE BESYLATE 5 MG: 5 TABLET ORAL at 09:39

## 2018-07-23 RX ADMIN — HYDROMORPHONE HYDROCHLORIDE 0.5 MG: 1 INJECTION, SOLUTION INTRAMUSCULAR; INTRAVENOUS; SUBCUTANEOUS at 10:46

## 2018-07-23 NOTE — NURSING NOTE
Report from [facility] @ [phone], patient to go for CT  Prior to coming to unit

## 2018-07-23 NOTE — ED PROVIDER NOTES
History  Chief Complaint   Patient presents with    Chest Pain     Chest pain all over chest into abdomen and into low back; has had it since February; had upper GI done and found hiatal hernia; colonoscopy showed hemorrhoids  56year old female presents with aching/stabbing pains over her entire abdomen, chest and low back which has been constant since 02/2018 when she was diagnosed with hiatal hernia  Patient had been seen by GI who had performed an upper endoscopy and colonoscopy with findings of the hiatal hernia and internal hemorrhoids according to patient  She has been taking omeprazole daily, but developed hip pain and subsequently decreased her dose several days ago  Patient denies shortness of breath  Mild urinary frequency  No hematuria or dysuria  No nausea or vomiting  Normal bowel movement this morning  No recent fevers or chills  Patient was recently prescribed tylenol 3 by her PCP for this pain which she last took 1 hour ago with no improvement in symptoms  No recent alcohol use  No prior abdominal surgeries  Patient's  pulled me aside and states that he has seen the patient taking more than her prescribed amount of medications including aspirin  History provided by:  Patient  Chest Pain   Chest pain location: entire anterior chest   Pain quality: aching and stabbing    Radiates to: abdomen and lower back  Pain radiates to the back: yes    Pain severity:  Severe  Onset quality:  Unable to specify  Duration:  5 months  Last known well instant: Feb, 2018  Timing:  Constant  Progression:  Unchanged  Chronicity:  Chronic  Relieved by:  Nothing  Worsened by:  Nothing tried  Ineffective treatments: omeprazole, tylenol 3    Associated symptoms: abdominal pain    Associated symptoms: no cough, no fever, no headache, no nausea, no palpitations, no shortness of breath, not vomiting and no weakness    Abdominal pain:     Location:  Generalized    Quality:  Aching and stabbing    Severity: Severe    Onset quality:  Unable to specify    Duration:  5 months    Timing:  Constant    Progression:  Unchanged    Chronicity:  Chronic  Risk factors: hypertension and smoking        Prior to Admission Medications   Prescriptions Last Dose Informant Patient Reported? Taking? Cholecalciferol (VITAMIN D3) 5000 units CAPS   Yes No   Sig: Take by mouth daily   Cranberry 200 MG CAPS   Yes No   Sig: Take 1 capsule by mouth daily   Multiple Vitamin tablet   Yes No   Sig: Take 1 tablet by mouth daily   acetaminophen-codeine (TYLENOL/CODEINE #3) 300-30 MG per tablet   No No   Sig: Take 1 tablet by mouth 3 (three) times a day with meals   amLODIPine (NORVASC) 5 mg tablet   No No   Sig: Take 1 tablet (5 mg total) by mouth daily Decreased dose   clonazePAM (KlonoPIN) 0 5 mg tablet   No No   Sig: Take 1 tablet (0 5 mg total) by mouth 2 (two) times a day as needed for anxiety   fluticasone (FLONASE) 50 mcg/act nasal spray   Yes No   Si Squirts into each nostril   gabapentin (NEURONTIN) 300 mg capsule   No No   Sig: take 1 capsule by mouth daily AND 2 CAPSULES EACH NIGHT   loratadine-pseudoephedrine (CLARITIN-D 12 HOUR) 5-120 mg per tablet   Yes No   Sig: Take 1 tablet by mouth every 12 (twelve) hours as needed   meloxicam (MOBIC) 7 5 mg tablet   No No   Sig: Take 1 tablet (7 5 mg total) by mouth daily   omeprazole (PriLOSEC) 40 MG capsule   No No   Sig: take 1 capsule by mouth once daily      Facility-Administered Medications: None       Past Medical History:   Diagnosis Date    Kidney stones     Psychiatric disorder     anxiety    Tobacco use disorder        Past Surgical History:   Procedure Laterality Date    NO PAST SURGERIES         Family History   Problem Relation Age of Onset    Mental illness Sister    Cherise Dickinson Other Other         Cardiovascular disease     I have reviewed and agree with the history as documented      Social History   Substance Use Topics    Smoking status: Current Every Day Smoker    Smokeless tobacco: Never Used    Alcohol use No        Review of Systems   Constitutional: Negative for appetite change, chills and fever  HENT: Negative for congestion, rhinorrhea and sore throat  Respiratory: Negative for cough, chest tightness and shortness of breath  Cardiovascular: Positive for chest pain  Negative for palpitations and leg swelling  Gastrointestinal: Positive for abdominal pain  Negative for constipation, diarrhea, nausea and vomiting  Genitourinary: Positive for frequency  Negative for dysuria and hematuria  Musculoskeletal: Negative for myalgias, neck pain and neck stiffness  Skin: Negative for pallor  Neurological: Negative for syncope, weakness and headaches  All other systems reviewed and are negative  Physical Exam  Physical Exam   Constitutional: She is oriented to person, place, and time  She appears well-developed and well-nourished  Non-toxic appearance  No distress  HENT:   Head: Normocephalic and atraumatic  Eyes: Conjunctivae and EOM are normal  Pupils are equal, round, and reactive to light  Neck: Normal range of motion  Neck supple  No tracheal deviation present  No thyromegaly present  Cardiovascular: Normal rate, regular rhythm, normal heart sounds and intact distal pulses  Pulmonary/Chest: Effort normal and breath sounds normal    Abdominal: Soft  Bowel sounds are normal  She exhibits no distension  There is tenderness in the epigastric area  Lymphadenopathy:     She has no cervical adenopathy  Neurological: She is alert and oriented to person, place, and time  Skin: Skin is warm and dry  She is not diaphoretic  Nursing note and vitals reviewed        Vital Signs  ED Triage Vitals   Temperature Pulse Respirations Blood Pressure SpO2   07/22/18 2040 07/22/18 2040 07/22/18 2040 07/22/18 2040 07/22/18 2040   (!) 96 9 °F (36 1 °C) 79 14 (!) 174/85 96 %      Temp Source Heart Rate Source Patient Position - Orthostatic VS BP Location FiO2 (%) 07/22/18 2040 07/22/18 2040 07/22/18 2040 07/22/18 2040 --   Tympanic Monitor Lying Right arm       Pain Score       07/22/18 2238       Worst Possible Pain           Vitals:    07/22/18 2040 07/22/18 2230 07/22/18 2327   BP: (!) 174/85 167/75 131/70   Pulse: 79 74 76   Patient Position - Orthostatic VS: Lying Lying Lying       Visual Acuity      ED Medications  Medications   amLODIPine (NORVASC) tablet 5 mg (not administered)   cholecalciferol (VITAMIN D3) tablet 5,000 Units (not administered)   clonazePAM (KlonoPIN) tablet 0 5 mg (not administered)   fluticasone (FLONASE) 50 mcg/act nasal spray 2 spray (not administered)   gabapentin (NEURONTIN) capsule 300 mg (not administered)   multivitamin-minerals (CENTRUM) tablet 1 tablet (not administered)   sodium chloride 0 9 % infusion (125 mL/hr Intravenous New Bag 7/23/18 0020)   ondansetron (ZOFRAN) injection 4 mg (not administered)   nicotine (NICODERM CQ) 21 mg/24 hr TD 24 hr patch 1 patch (not administered)   pantoprazole (PROTONIX) injection 40 mg (40 mg Intravenous Given 7/23/18 0019)   gabapentin (NEURONTIN) capsule 600 mg (600 mg Oral Not Given 7/23/18 0017)   HYDROmorphone (DILAUDID) injection 0 5 mg (0 5 mg Intravenous Given 7/23/18 0040)   enoxaparin (LOVENOX) subcutaneous injection 40 mg (not administered)   lidocaine viscous (XYLOCAINE) 2 % mucosal solution 10 mL (10 mL Swish & Swallow Given 7/22/18 2049)   aluminum-magnesium hydroxide-simethicone (MYLANTA) 200-200-20 mg/5 mL oral suspension 20 mL (20 mL Oral Given 7/22/18 2049)   HYDROmorphone (DILAUDID) injection 0 5 mg (0 5 mg Intravenous Given 7/22/18 2238)   iohexol (OMNIPAQUE) 350 MG/ML injection (MULTI-DOSE) 100 mL (100 mL Intravenous Given 7/22/18 2328)       Diagnostic Studies  Results Reviewed     Procedure Component Value Units Date/Time    Ethanol [[id number]]  (Normal) Collected:  07/22/18 2252    Lab Status:  Final result Specimen:  Blood from Arm, Left Updated:  07/22/18 9761     Ethanol Lvl <3 mg/dL     Salicylate level [[id number]]  (Normal) Collected:  07/22/18 2252    Lab Status:  Final result Specimen:  Blood from Arm, Left Updated:  39/01/96 1591     Salicylate Lvl 3 7 mg/dL     Acetaminophen level [[id number]]  (Abnormal) Collected:  07/22/18 2252    Lab Status:  Final result Specimen:  Blood from Arm, Left Updated:  07/22/18 2331     Acetaminophen Level 4 2 (L) ug/mL     POCT urinalysis dipstick [[id number]]  (Normal) Resulted:  07/22/18 2210    Lab Status:  Final result Specimen:  Urine Updated:  07/22/18 2211     Color, UA yellow     Clarity, UA clear     EXT Glucose, UA (Ref: Negative) negative     EXT Bilirubin, UA (Ref: Negative) negative     EXT Ketones, UA (Ref: Negative) negative     EXT Spec Grav, UA 1 010     EXT Blood, UA (Ref: Negative) trace     EXT pH, UA 6 5     EXT Protein, UA (Ref: Negative) negative     EXT Urobilinogen, UA (Ref: 0 2- 1 0) 0 2     EXT Leukocytes, UA (Ref: Negative) negative     EXT Nitrite, UA (Ref: Negative) negative    Troponin I [[id number]]  (Normal) Collected:  07/22/18 2049    Lab Status:  Final result Specimen:  Blood from Arm, Right Updated:  07/22/18 2140     Troponin I <0 02 ng/mL     Comprehensive metabolic panel [[id number]]  (Abnormal) Collected:  07/22/18 2049    Lab Status:  Final result Specimen:  Blood from Arm, Right Updated:  07/22/18 2136     Sodium 143 mmol/L      Potassium 3 6 mmol/L      Chloride 108 mmol/L      CO2 24 mmol/L      Anion Gap 11 mmol/L      BUN 13 mg/dL      Creatinine 0 68 mg/dL      Glucose 104 mg/dL      Calcium 8 6 mg/dL      AST 73 (H) U/L      ALT 87 (H) U/L      Alkaline Phosphatase 119 (H) U/L      Total Protein 6 2 (L) g/dL      Albumin 2 9 (L) g/dL      Total Bilirubin 0 30 mg/dL      eGFR 99 ml/min/1 73sq m     Narrative:         National Kidney Disease Education Program recommendations are as follows:  GFR calculation is accurate only with a steady state creatinine  Chronic Kidney disease less than 60 ml/min/1 73 sq  meters  Kidney failure less than 15 ml/min/1 73 sq  meters  Lipase [[id number]]  (Abnormal) Collected:  07/22/18 2049    Lab Status:  Final result Specimen:  Blood from Arm, Right Updated:  07/22/18 2136     Lipase 1,206 (H) u/L     CBC and differential [[id number]]  (Abnormal) Collected:  07/22/18 2049    Lab Status:  Final result Specimen:  Blood from Arm, Right Updated:  07/22/18 2119     WBC 7 14 Thousand/uL      RBC 3 30 (L) Million/uL      Hemoglobin 11 0 (L) g/dL      Hematocrit 33 5 (L) %       (H) fL      MCH 33 3 pg      MCHC 32 8 g/dL      RDW 14 6 %      MPV 9 5 fL      Platelets 444 Thousands/uL      nRBC 0 /100 WBCs      Neutrophils Relative 72 %      Immat GRANS % 0 %      Lymphocytes Relative 20 %      Monocytes Relative 5 %      Eosinophils Relative 3 %      Basophils Relative 0 %      Neutrophils Absolute 5 11 Thousands/µL      Immature Grans Absolute 0 02 Thousand/uL      Lymphocytes Absolute 1 44 Thousands/µL      Monocytes Absolute 0 37 Thousand/µL      Eosinophils Absolute 0 19 Thousand/µL      Basophils Absolute 0 01 Thousands/µL                  CT abdomen pelvis with contrast   Final Result by Qubrit DO Ajay (07/23 0010)      Distended gallbladder with intra and extrahepatic biliary ductal dilatation  Further evaluation with a right upper quadrant sonogram is recommended to rule out acute cholecystitis  Mild thickening of the distal gastric wall and proximal duodenum may represent gastritis/duodenitis  Recommend posttreatment follow-up with gastroenterology  The study was marked in EPIC for significant notification              Workstation performed: [id number]         XR chest 2 views   ED Interpretation by Harjit Joy MD (07/22 2131)   No acute pulmonary pathology      US right upper quadrant    (Results Pending)              Procedures  ECG 12 Lead Documentation  Date/Time: 7/22/2018 9:31 PM  Performed by: Martha Cintron by: Lara Amos Indications / Diagnosis:  Chest pain  ECG reviewed by me, the ED Provider: yes    Patient location:  ED  Previous ECG:     Previous ECG:  Compared to current    Comparison ECG info:  5/21/2001 normal EKG    Similarity:  No change  Interpretation:     Interpretation: normal    Rate:     ECG rate:  80    ECG rate assessment: normal    Rhythm:     Rhythm: sinus rhythm    Ectopy:     Ectopy: none    QRS:     QRS axis:  Normal    QRS intervals:  Normal  Conduction:     Conduction: normal    ST segments:     ST segments:  Normal  T waves:     T waves: normal      Abdominal Ultrasound  Performed by: Yoon Mendez by: Marisol Medina     Procedure date/time:  7/22/2018 10:05 PM  Patient location:  ED  Procedure details:     Indications: abdominal pain      Assessment for:  Gallstones    Hepatobiliary:  Visualized  Hepatobiliary findings:     Common bile duct:  Unable to visualize    Gallbladder wall:  Normal    Gallbladder stones: not identified      Mass: not identified      Intra-abdominal fluid: not identified      Polyps: not identified      Sonographic Parsons's sign: negative    Ultrasound image(s) saved with chart: No    Comments:      Dilated gallbladder           Phone Contacts  ED Phone Contact    ED Course  ED Course as of Jul 23 0146   Sun Jul 22, 2018   2142 Lipase: (!) 1,206                               MDM  Number of Diagnoses or Management Options  Acute pancreatitis: new and requires workup  Diagnosis management comments: 56year old female presents with chronic abdominal, chest and back pain unresponsive to tylenol 3 recently prescribed by her PCP  Epigastric tenderness on exam  Labs significant for elevated lipase  Bedside ultrasound shows dilated gallbladder without wall thickening or stones  Unable to visualize CBD  Coma panel added as patient's  concerned that she has been taking more than her prescribed amount of medications   Patient admitted for further evaluation and management  Amount and/or Complexity of Data Reviewed  Clinical lab tests: ordered and reviewed  Tests in the radiology section of CPT®: ordered and reviewed    Patient Progress  Patient progress: stable    CritCare Time    Disposition  Final diagnoses:   Acute pancreatitis     Time reflects when diagnosis was documented in both MDM as applicable and the Disposition within this note     Time User Action Codes Description Comment    7/22/2018 10:04 PM Barbra Knight [K85 [id number]] Acute pancreatitis       ED Disposition     ED Disposition Condition Comment    Admit  Case was discussed with MARISA and the patient's admission status was agreed to be Admission Status: inpatient status to the service of Dr Arvind Del Angel           Follow-up Information    None         Current Discharge Medication List      CONTINUE these medications which have NOT CHANGED    Details   acetaminophen-codeine (TYLENOL/CODEINE #3) 300-30 MG per tablet Take 1 tablet by mouth 3 (three) times a day with meals  Qty: 90 tablet, Refills: 2    Associated Diagnoses: Abdominal pain, unspecified abdominal location      amLODIPine (NORVASC) 5 mg tablet Take 1 tablet (5 mg total) by mouth daily Decreased dose  Qty: 90 tablet, Refills: 3    Associated Diagnoses: Benign essential HTN      Cholecalciferol (VITAMIN D3) 5000 units CAPS Take by mouth daily      clonazePAM (KlonoPIN) 0 5 mg tablet Take 1 tablet (0 5 mg total) by mouth 2 (two) times a day as needed for anxiety  Qty: 60 tablet, Refills: 2    Associated Diagnoses: Anxiety      Cranberry 200 MG CAPS Take 1 capsule by mouth daily      fluticasone (FLONASE) 50 mcg/act nasal spray 2 Squirts into each nostril      gabapentin (NEURONTIN) 300 mg capsule take 1 capsule by mouth daily AND 2 CAPSULES EACH NIGHT  Qty: 90 capsule, Refills: 5    Associated Diagnoses: Other chronic pain      loratadine-pseudoephedrine (CLARITIN-D 12 HOUR) 5-120 mg per tablet Take 1 tablet by mouth every 12 (twelve) hours as needed      meloxicam (MOBIC) 7 5 mg tablet Take 1 tablet (7 5 mg total) by mouth daily  Qty: 30 tablet, Refills: 3    Associated Diagnoses: Other chronic pain      Multiple Vitamin tablet Take 1 tablet by mouth daily      omeprazole (PriLOSEC) 40 MG capsule take 1 capsule by mouth once daily  Qty: 30 capsule, Refills: 5    Associated Diagnoses: Gastroesophageal reflux disease, esophagitis presence not specified           No discharge procedures on file      ED Provider  Electronically Signed by           Dg Aviles MD  07/23/18 7113

## 2018-07-23 NOTE — PROGRESS NOTES
[facility] Progress Note  Patient: Alexander Chavez 56 y o  female   MRN: [id number]  PCP: Nick Londono DO  Unit/Bed#: [address] [place] [address] Encounter: [id number]  Date Of Visit: 18    Assessment:    Principal Problem:    Acute pancreatitis  Active Problems:    Restless leg syndrome    Chronic pain    Benign essential HTN    Anxiety    Hiatal hernia    Epigastric pain      Plan:    · Acute pancreatitis with enlarged common bile duct likely biliary - aggressive IV fluids with IV Ringer's lactate, analgesics for pain relief, NPO for now monitor BUN, hematocrit, GI consulted  · Transaminitis with dilated biliary system monitor  · History of gastritis/chart lower knee  · Tobacco abuse counseled pre contemplative  · Hypertension monitor blood pressures, continue amlodipine  · Anxiety continue Klonopin       VTE Pharmacologic Prophylaxis:   Pharmacologic: Enoxaparin (Lovenox)  Mechanical VTE Prophylaxis in Place: Yes    Patient Centered Rounds: I have performed bedside rounds with nursing staff today  Discussions with Specialists or Other Care Team Provider:     Education and Discussions with Family / Patient:  Discussed with the patient    Time Spent for Care: 30 minutes  More than 50% of total time spent on counseling and coordination of care as described above      Current Length of Stay: 1 day(s)    Current Patient Status: Inpatient   Certification Statement: The patient will continue to require additional inpatient hospital stay due to As mentioned    Discharge Plan / Estimated Discharge Date:  Acute pancreatitis requiring further evaluation close monitoring as outlined    Code Status: Level 1 - Full Code      Subjective:     "I want to smoke"  Patient reports feeling irritable as she is unable to smoke  Abdominal pain noted  History chart labs medications reviewed      Objective:     Vitals:   Temp (24hrs), Av 8 °F (36 6 °C), Min:96 9 °F (36 1 °C), Max:98 9 °F (37 2 °C)    HR:  [facility] 76  Resp:  [14-20] 20  BP: (131-174)/(70-85) 138/78  SpO2:  [94 %-97 %] 94 %  Body mass index is 24 83 kg/m²  Input and Output Summary (last 24 hours):     No intake or output data in the 24 hours ending 07/23/18 1532    Physical Exam:     Physical Exam    Comfortably sitting up in chair  Neck supple  Lungs diminished breath sounds bases  Heart sounds S1 and S2 noted  Abdomen soft epigastric tenderness noted  Awake alert obeys simple commands  No rash    Additional Data:     Labs:      Results from last 7 days  Lab Units 07/23/18  0531   WBC Thousand/uL 5 73   HEMOGLOBIN g/dL 10 7*   HEMATOCRIT % 32 9*   PLATELETS Thousands/uL 274   NEUTROS PCT % 65   LYMPHS PCT % 26   MONOS PCT % 6   EOS PCT % 3       Results from last 7 days  Lab Units 07/23/18  0531   SODIUM mmol/L 144   POTASSIUM mmol/L 3 7   CHLORIDE mmol/L 111*   CO2 mmol/L 27   BUN mg/dL 11   CREATININE mg/dL 0 52*   CALCIUM mg/dL 8 3   TOTAL PROTEIN g/dL 5 8*   BILIRUBIN TOTAL mg/dL 0 20   ALK PHOS U/L 111   ALT U/L 70   AST U/L 36   GLUCOSE RANDOM mg/dL 94           * I Have Reviewed All Lab Data Listed Above  * Additional Pertinent Lab Tests Reviewed:  [facility] Admission Reviewed    Imaging:    Imaging Reports Reviewed Today Include: imaging studies reviewed  Imaging Personally Reviewed by Myself Includes:      Recent Cultures (last 7 days):           Last 24 Hours Medication List:     Current Facility-Administered Medications:  amLODIPine 5 mg Oral Daily Shawna Farooq MD    cholecalciferol 5,000 Units Oral Daily Shawna Farooq MD    clonazePAM 0 5 mg Oral BID PRN Shawna Farooq MD    enoxaparin 40 mg Subcutaneous Q24H [address] Shawna Farooq MD    fluticasone 2 spray Nasal Daily Shawna Farooq MD    gabapentin 300 mg Oral Daily Shawna Farooq MD    gabapentin 600 mg Oral HS Shawna ARCHER MD    HYDROmorphone 0 5 mg Intravenous Q4H PRN Shawna Farooq MD    lactated ringers 150 mL/hr Intravenous Continuous Uzma Mac MD Last Rate: 150 mL/hr (07/23/18 1052)   multivitamin-minerals 1 tablet Oral Daily Shawna Coffey MD    nicotine 1 patch Transdermal Daily Shawna Coffey MD    ondansetron 4 mg Intravenous Q6H PRN Shawna Coffey MD    pantoprazole 40 mg Intravenous Q12H [address] Alice Mooney MD         Today, Patient Was Seen By: Uzma Mac MD    ** Please Note: This note has been constructed using a voice recognition system   **

## 2018-07-23 NOTE — H&P
H&P Exam - Maryjo Buchanan 56 y o  female MRN: [id number]    Unit/Bed#: [address] [place] [address] Encounter: [id number]        History of Present Illness     HPI:  Maryjo Buchanan is a 56 y o  female history of hypertension, hiatal hernia, chronic pain, anxiety and depression that presents to the ED today with persistent pain in the abdomen  She states the pain usually starts on both sides of the lateral chest/ribs and then move to the middle in the epigastric area  She states it is worse with food  She also has some back pain  She states she has been having the pain for over 1 yr and but it has been worse in the last 2 months  About 1 week ago she was prescribed tylenol #3 but was only written for 5 days  She has since ran out and pain is getting worse hence presentation to the ED  Pain described as sometime burning  She had upper endoscopy and colonoscopy done and noted to have hiatal hernia and internal hemorrhoids  She has intermittent rectal bleeding from her hemorrhoids  No melena  No abdominal distention  No nausea or vomiting  No fever or chills  No shortness of breath or diaphoresis       In the ED she was found to have elevated lipase to 1206    Historical Information   Past Medical History:   Diagnosis Date    Kidney stones     Psychiatric disorder     anxiety    Tobacco use disorder      Patient Active Problem List   Diagnosis    Restless leg syndrome    Psoriasis    Narcolepsy without cataplexy    Hypercholesterolemia    Chronic pain    Benign essential HTN    Anxiety    Abnormal weight loss    Allergic rhinitis    Anemia    Hiatal hernia    Altered bowel function    Ingrown toenail without infection    Nail abnormality    Elbow swelling, left    Olecranon bursitis of left elbow     Past Surgical History:   Procedure Laterality Date    NO PAST SURGERIES         Social History   History   Alcohol Use No     History   Drug Use No     History   Smoking Status    Current Every Day Smoker Smokeless Tobacco    Never Used       Family History:   Family History   Problem Relation Age of Onset    Mental illness Sister     Other Other         Cardiovascular disease       Meds/Allergies       Current Facility-Administered Medications:     [START ON 7/23/2018] amLODIPine (NORVASC) tablet 5 mg, 5 mg, Oral, Daily, Shawna Magallanes MD    clonazePAM (KlonoPIN) tablet 0 5 mg, 0 5 mg, Oral, BID PRN, Shawna Magallanes MD    [START ON 7/23/2018] fluticasone (FLONASE) 50 mcg/act nasal spray 2 spray, 2 spray, Nasal, Daily, Shawna Magallanes MD    [START ON 7/23/2018] gabapentin (NEURONTIN) capsule 300 mg, 300 mg, Oral, Daily, Shawna Magallanes MD    lactated ringers infusion, 100 mL/hr, Intravenous, Continuous, Dg Aviles MD, Last Rate: 100 mL/hr at 07/22/18 2200, 100 mL/hr at 07/22/18 2200    [START ON 7/23/2018] Multiple Vitamin 1 tablet, 1 tablet, Oral, Daily, Shawna Magallanes MD    [START ON 7/23/2018] nicotine (NICODERM CQ) 21 mg/24 hr TD 24 hr patch 1 patch, 1 patch, Transdermal, Daily, Shawna Magallanes MD    ondansetron (ZOFRAN) injection 4 mg, 4 mg, Intravenous, Q6H PRN, Shawna Magallanes MD    [START ON 7/23/2018] sodium chloride 0 9 % infusion, 125 mL/hr, Intravenous, Continuous, Shawna Magallanes MD    [START ON 7/23/2018] Vitamin D3 CAPS, , Oral, Daily, Shawna Magallanes MD    Allergies   Allergen Reactions    Pollen Extract     Tree Extract        Review of Systems   Constitutional: Negative for activity change, appetite change, chills and diaphoresis  HENT: Negative for congestion, ear discharge, ear pain, hearing loss, nosebleeds, rhinorrhea, sinus pain and sore throat  Eyes: Negative for pain and discharge  Respiratory: Negative for chest tightness, shortness of breath and wheezing  Cardiovascular: Positive for chest pain  Negative for palpitations and leg swelling  Gastrointestinal: Positive for abdominal pain and blood in stool   Negative for abdominal distention, diarrhea, nausea, rectal pain and vomiting  Endocrine: Negative for cold intolerance and polydipsia  Genitourinary: Negative for difficulty urinating, dysuria, frequency and hematuria  Musculoskeletal: Negative for arthralgias, gait problem, joint swelling and myalgias  Skin: Negative for color change, pallor and rash  Allergic/Immunologic: Negative for environmental allergies  Neurological: Negative for dizziness, seizures, speech difficulty, weakness, light-headedness, numbness and headaches  Hematological: Negative for adenopathy  Psychiatric/Behavioral: Negative for confusion, dysphoric mood and hallucinations  The patient is not nervous/anxious  Objective   Vitals: Blood pressure 131/70, pulse 76, temperature 97 7 °F (36 5 °C), temperature source Oral, resp  rate 18, height 5' 1" (1 549 m), weight 59 6 kg (131 lb 6 3 oz), SpO2 94 %  Physical Exam   General- Awake and alert, oriented X3, lying comfortable in bed at this time  HEENT: PERRLA, EOMI, sclera anicteric, moist mucous membranes, tongue mucosa moist without lesions  Neck: supple, no JVD, lymphadenopathy, thyromegaly  Heart: Regular rate and rhythm, S1S2 present  No murmur, rub or gallop  Lungs; Clear to auscultation bilaterally  No wheezing, crackles or rhonchi  No accessory muscle use or respiratory distress  Abdomen: soft, mild epigastric tenderness, non-distended, NABS  No guarding or rebound  No peritoneal sound or mass  Extremities: no clubbing, cyanosis, or edema  2+ pedal pulses bilaterally  Full range of motion  Neurologic:  Cranial nerves II-XII intact  Strength and sensation globally intact  Speech fluent and goal directed  Awake, alert and oriented x 3  Skin: warm and dry  No petechiae, purpura or rash      Lab Results:     Results from last 7 days  Lab Units 07/22/18  2049   WBC Thousand/uL 7 14   HEMOGLOBIN g/dL 11 0*   HEMATOCRIT % 33 5*   PLATELETS Thousands/uL 282       Results from last 7 days  Lab Units 07/22/18 2049   SODIUM mmol/L 143   POTASSIUM mmol/L 3 6   CHLORIDE mmol/L 108   CO2 mmol/L 24   BUN mg/dL 13   CREATININE mg/dL 0 68   GLUCOSE RANDOM mg/dL 104   CALCIUM mg/dL 8 6         Imaging:  CT abdomen pelvis with contrast   Final Result by Kevyn Herrera DO (07/23 0010)      Distended gallbladder with intra and extrahepatic biliary ductal dilatation  Further evaluation with a right upper quadrant sonogram is recommended to rule out acute cholecystitis  Mild thickening of the distal gastric wall and proximal duodenum may represent gastritis/duodenitis  Recommend posttreatment follow-up with gastroenterology  The study was marked in EPIC for significant notification  Workstation performed: [id number]         XR chest 2 views   ED Interpretation by Charlotte Lakhani MD (07/22 2131)   No acute pulmonary pathology          Assessment/Plan     1  Acute pancreatitis- Unclear cause  She has been having chronic abdominal pain with recent exacerbation  CT showing distended gallbladder but no gallstones  I will obtain a right upper quadrant sonogram  Will obtain GI consultation  Will keep her NPO for now  Place on IVF for hydration  Stop her NSAIDs  IV pain medications for better pain control  Continue her PPI    2  Transaminitis- With CT showing intra and extrahepatic ductal dilatation  Will obtain RUQ sonongram  Trend liver function test      3  History of hiatal hernia/gastritis- Possible contributing to her pain  Will continue with PPI  4  Anxiety - Continue her klonopin    5  Hypertension- Continue with amlodipine 5 mg daily    6  Tobacco abuse- Place on nicotine patch        Code Status: Level 1 - Full Code      Counseling / Coordination of Care  Total floor / unit time spent today 40 minutes  Greater than 50% of total time was spent with the patient and / or family counseling and / or coordination of care    Anticipated duration of stay at least 2 midnight    Portions of the record may have been created with voice recognition software  Occasional wrong word or "sound a like" substitutions may have occurred due to the inherent limitations of voice recognition software  Read the chart carefully and recognize, using context, where substitutions have occurred

## 2018-07-23 NOTE — ED NOTES
Report given to [facility]  Pt to go to CT and then to floor        Beverley Phipps RN  07/22/18 9654
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
## 2018-07-23 NOTE — CASE MANAGEMENT
Initial Clinical Review  Admission: Date/Time/Statement: 7/22/18 @ 2221   Orders Placed This Encounter   Procedures    Inpatient Admission (expected length of stay for this patient is greater than two midnights)     Standing Status:   Standing     Number of Occurrences:   1     Order Specific Question:   Admitting Physician     Answer:   Missy Fox [[id number]]     Order Specific Question:   Level of Care     Answer:   Med Surg [[id number]]     Order Specific Question:   Estimated length of stay     Answer:   More than 2 Midnights     Order Specific Question:   Certification     Answer:   I certify that inpatient services are medically necessary for this patient for a duration of greater than two midnights  See H&P and MD Progress Notes for additional information about the patient's course of treatment  ED: Date/Time/Mode of Arrival:   ED Arrival Information     Expected Arrival Acuity Means of Arrival Escorted By Service Admission Type    - 7/22/2018 20:32 Urgent Walk-In Family Member General Medicine Urgent    Arrival Complaint    CHEST PAIN/ABD PAIN      Chief Complaint:   Chief Complaint   Patient presents with    Chest Pain     Chest pain all over chest into abdomen and into low back; has had it since February; had upper GI done and found hiatal hernia; colonoscopy showed hemorrhoids  History of Illness:   56year old female presents with aching/stabbing pains over her entire abdomen, chest and low back which has been constant since 02/2018 when she was diagnosed with hiatal hernia    ED Vital Signs:   ED Triage Vitals   Temperature Pulse Respirations Blood Pressure SpO2   07/22/18 2040 07/22/18 2040 07/22/18 2040 07/22/18 2040 07/22/18 2040   (!) 96 9 °F (36 1 °C) 79 14 (!) 174/85 96 %      Temp Source Heart Rate Source Patient Position - Orthostatic VS BP Location FiO2 (%)   07/22/18 2040 07/22/18 2040 07/22/18 2040 07/22/18 2040 --   Tympanic Monitor Lying Right arm       Pain Score       07/22/18 2238 Worst Possible Pain        Wt Readings from Last 1 Encounters:   07/22/18 59 6 kg (131 lb 6 3 oz)   Vital Signs (abnormal): Abnormal Labs/Diagnostic Test Results:   HGB 11 0 AST 73 ALT 87 ALK PHOS 119 TPROT 6 2 ALB 2 9 LIPASE 1206   TROP NEG  ACETAMINOPHEN 4 2   CXR=No acute cardiopulmonary disease  CT A/P=Distended gallbladder with intra and extrahepatic biliary ductal dilatation  Further evaluation with a right upper quadrant sonogram is recommended to rule out acute cholecystitis  Mild thickening of the distal gastric wall and proximal duodenum may represent gastritis/duodenitis  Recommend posttreatment follow-up with gastroenterology  EKG=Interpretation: normal    Rate:     ECG rate:  80    ECG rate assessment: normal    Rhythm:     Rhythm: sinus rhythm    Ectopy:     Ectopy: none    QRS:     QRS axis:  Normal    QRS intervals:  Normal  Conduction:     Conduction: normal    ST segments:     ST segments:  Normal  T waves:     T waves: normal    ED Treatment:   Medication Administration from 07/22/2018 2032 to 07/22/2018 2336       Date/Time Order Dose Route Action Action by Comments     07/22/2018 2049 lidocaine viscous (XYLOCAINE) 2 % mucosal solution 10 mL 10 mL Swish & Swallow Given Glen Orozco, WENDY      07/22/2018 2049 aluminum-magnesium hydroxide-simethicone (MYLANTA) 200-200-20 mg/5 mL oral suspension 20 mL 20 mL Oral Given Glen Orozco RN      07/22/2018 2200 lactated ringers infusion 100 mL/hr Intravenous [address] Glen Orozco, WENDY      07/22/2018 2238 HYDROmorphone (DILAUDID) injection 0 5 mg 0 5 mg Intravenous Given Gloria Brewster RN      07/22/2018 2328 iohexol (OMNIPAQUE) 350 MG/ML injection (MULTI-DOSE) 100 mL 100 mL Intravenous Given Arie West       Past Medical/Surgical History:    Active Ambulatory Problems     Diagnosis Date Noted    Restless leg syndrome 12/08/2014    Psoriasis 12/08/2014    Narcolepsy without cataplexy 12/08/2014    Hypercholesterolemia 12/08/2014    Chronic pain 12/08/2014    Benign essential HTN 05/02/2016    Anxiety 12/08/2014    Abnormal weight loss 01/19/2018    Allergic rhinitis 12/08/2014    Anemia 12/14/2017    Hiatal hernia 08/22/2017    Altered bowel function 03/14/2018    Ingrown toenail without infection 03/14/2018    Nail abnormality 03/14/2018    Elbow swelling, left 07/10/2018    Olecranon bursitis of left elbow 07/10/2018     Resolved Ambulatory Problems     Diagnosis Date Noted    No Resolved Ambulatory Problems     Past Medical History:   Diagnosis Date    Kidney stones     Psychiatric disorder     Tobacco use disorder    Admitting Diagnosis: Acute pancreatitis [K85 90]  Chest pain syndrome [R07 9]  Age/Sex: 56 y o  female  Assessment/Plan:   1  Acute pancreatitis- Unclear cause  She has been having chronic abdominal pain with recent exacerbation  CT showing distended gallbladder but no gallstones  I will obtain a right upper quadrant sonogram  Will obtain GI consultation  Will keep her NPO for now  Place on IVF for hydration  Stop her NSAIDs  IV pain medications for better pain control  Continue her PPI  2  Transaminitis- With CT showing intra and extrahepatic ductal dilatation  Will obtain RUQ sonongram  Trend liver function test    3  History of hiatal hernia/gastritis- Possible contributing to her pain   Will continue with PPI  Admission Orders:  MED SURG  CONSULT GI  NPO  Scheduled Meds:   Current Facility-Administered Medications:  amLODIPine 5 mg Oral Daily Shawna Smart MD    cholecalciferol 5,000 Units Oral Daily Shawna Smart MD    clonazePAM 0 5 mg Oral BID PRN Shawna Smart MD    enoxaparin 40 mg Subcutaneous Q24H [address] Shawna Smart MD    fluticasone 2 spray Nasal Daily Shawna Smart MD    gabapentin 300 mg Oral Daily Shawna Smart MD    gabapentin 600 mg Oral HS Shawna Smart MD    HYDROmorphone 0 5 mg Intravenous Q4H PRN Shawna Smart MD    multivitamin-minerals 1 tablet Oral Daily Shawna Ortiz MD    nicotine 1 patch Transdermal Daily Shawna Ortiz MD    ondansetron 4 mg Intravenous Q6H PRN Shawna Ortiz MD    pantoprazole 40 mg Intravenous Q12H [facility] Shawna Ortiz MD    sodium chloride 125 mL/hr Intravenous Continuous Shawna Ortiz MD Last Rate: 125 mL/hr (07/23/18 0020)   Continuous Infusions:   sodium chloride 125 mL/hr Last Rate: 125 mL/hr (07/23/18 0020)   PRN Meds: clonazePAM    HYDROmorphone    ondansetron  Thank you,  Maia Aqq  [facility] Utilization Review Department  Phone: [phone]; Fax [phone]  ATTENTION: The Network Utilization Review Department is now centralized for our 9 Facilities  Make a note that we have a new phone and fax numbers for our Department  Please call with any questions or concerns to [phone] and carefully follow the prompts so that you are directed to the right person  All voicemails are confidential  Fax any determinations, approvals, denials, and requests for initial or continue stay review clinical to [phone]  Due to HIGH CALL volume, it would be easier if you could please send faxed requests to expedite your requests and in part, help us provide discharge notifications faster

## 2018-07-24 LAB
ALBUMIN SERPL BCP-MCNC: 2.5 G/DL (ref 3.5–5)
ALP SERPL-CCNC: 103 U/L (ref 46–116)
ALT SERPL W P-5'-P-CCNC: 58 U/L (ref 12–78)
AMYLASE SERPL-CCNC: 81 IU/L (ref 25–115)
ANION GAP SERPL CALCULATED.3IONS-SCNC: 7 MMOL/L (ref 4–13)
AST SERPL W P-5'-P-CCNC: 29 U/L (ref 5–45)
ATRIAL RATE: 80 BPM
BASOPHILS # BLD AUTO: 0.01 THOUSANDS/ΜL (ref 0–0.1)
BASOPHILS NFR BLD AUTO: 0 % (ref 0–1)
BILIRUB DIRECT SERPL-MCNC: 0.07 MG/DL (ref 0–0.2)
BILIRUB SERPL-MCNC: 0.2 MG/DL (ref 0.2–1)
BUN SERPL-MCNC: 7 MG/DL (ref 5–25)
CALCIUM SERPL-MCNC: 8.9 MG/DL (ref 8.3–10.1)
CHLORIDE SERPL-SCNC: 107 MMOL/L (ref 100–108)
CO2 SERPL-SCNC: 29 MMOL/L (ref 21–32)
CREAT SERPL-MCNC: 0.5 MG/DL (ref 0.6–1.3)
EOSINOPHIL # BLD AUTO: 0.12 THOUSAND/ΜL (ref 0–0.61)
EOSINOPHIL NFR BLD AUTO: 3 % (ref 0–6)
ERYTHROCYTE [DISTWIDTH] IN BLOOD BY AUTOMATED COUNT: 13.6 % (ref 11.6–15.1)
FOLATE SERPL-MCNC: >20 NG/ML (ref 3.1–17.5)
GFR SERPL CREATININE-BSD FRML MDRD: 109 ML/MIN/1.73SQ M
GLUCOSE SERPL-MCNC: 84 MG/DL (ref 65–140)
HCT VFR BLD AUTO: 32.9 % (ref 34.8–46.1)
HGB BLD-MCNC: 10.9 G/DL (ref 11.5–15.4)
IMM GRANULOCYTES # BLD AUTO: 0.01 THOUSAND/UL (ref 0–0.2)
IMM GRANULOCYTES NFR BLD AUTO: 0 % (ref 0–2)
LIPASE SERPL-CCNC: 421 U/L (ref 73–393)
LYMPHOCYTES # BLD AUTO: 1.77 THOUSANDS/ΜL (ref 0.6–4.47)
LYMPHOCYTES NFR BLD AUTO: 38 % (ref 14–44)
MCH RBC QN AUTO: 33 PG (ref 26.8–34.3)
MCHC RBC AUTO-ENTMCNC: 33.1 G/DL (ref 31.4–37.4)
MCV RBC AUTO: 100 FL (ref 82–98)
MONOCYTES # BLD AUTO: 0.3 THOUSAND/ΜL (ref 0.17–1.22)
MONOCYTES NFR BLD AUTO: 6 % (ref 4–12)
NEUTROPHILS # BLD AUTO: 2.5 THOUSANDS/ΜL (ref 1.85–7.62)
NEUTS SEG NFR BLD AUTO: 53 % (ref 43–75)
NRBC BLD AUTO-RTO: 0 /100 WBCS
P AXIS: 76 DEGREES
PLATELET # BLD AUTO: 295 THOUSANDS/UL (ref 149–390)
PMV BLD AUTO: 9.5 FL (ref 8.9–12.7)
POTASSIUM SERPL-SCNC: 3.7 MMOL/L (ref 3.5–5.3)
PR INTERVAL: 130 MS
PROT SERPL-MCNC: 5.7 G/DL (ref 6.4–8.2)
QRS AXIS: -10 DEGREES
QRSD INTERVAL: 94 MS
QT INTERVAL: 360 MS
QTC INTERVAL: 415 MS
RBC # BLD AUTO: 3.3 MILLION/UL (ref 3.81–5.12)
SODIUM SERPL-SCNC: 143 MMOL/L (ref 136–145)
T WAVE AXIS: 21 DEGREES
VENTRICULAR RATE: 80 BPM
VIT B12 SERPL-MCNC: 1700 PG/ML (ref 100–900)
WBC # BLD AUTO: 4.71 THOUSAND/UL (ref 4.31–10.16)

## 2018-07-24 PROCEDURE — 82150 ASSAY OF AMYLASE: CPT | Performed by: INTERNAL MEDICINE

## 2018-07-24 PROCEDURE — C9113 INJ PANTOPRAZOLE SODIUM, VIA: HCPCS | Performed by: INTERNAL MEDICINE

## 2018-07-24 PROCEDURE — 83690 ASSAY OF LIPASE: CPT | Performed by: INTERNAL MEDICINE

## 2018-07-24 PROCEDURE — 93010 ELECTROCARDIOGRAM REPORT: CPT | Performed by: INTERNAL MEDICINE

## 2018-07-24 PROCEDURE — 80048 BASIC METABOLIC PNL TOTAL CA: CPT | Performed by: INTERNAL MEDICINE

## 2018-07-24 PROCEDURE — 99232 SBSQ HOSP IP/OBS MODERATE 35: CPT | Performed by: HOSPITALIST

## 2018-07-24 PROCEDURE — 82746 ASSAY OF FOLIC ACID SERUM: CPT | Performed by: INTERNAL MEDICINE

## 2018-07-24 PROCEDURE — 80076 HEPATIC FUNCTION PANEL: CPT | Performed by: INTERNAL MEDICINE

## 2018-07-24 PROCEDURE — 82607 VITAMIN B-12: CPT | Performed by: INTERNAL MEDICINE

## 2018-07-24 PROCEDURE — 99231 SBSQ HOSP IP/OBS SF/LOW 25: CPT | Performed by: SURGERY

## 2018-07-24 PROCEDURE — 85025 COMPLETE CBC W/AUTO DIFF WBC: CPT | Performed by: INTERNAL MEDICINE

## 2018-07-24 PROCEDURE — 99254 IP/OBS CNSLTJ NEW/EST MOD 60: CPT | Performed by: SURGERY

## 2018-07-24 RX ADMIN — VITAMIN D, TAB 1000IU (100/BT) 5000 UNITS: 25 TAB at 08:11

## 2018-07-24 RX ADMIN — SODIUM CHLORIDE, SODIUM LACTATE, POTASSIUM CHLORIDE, AND CALCIUM CHLORIDE 150 ML/HR: .6; .31; .03; .02 INJECTION, SOLUTION INTRAVENOUS at 18:54

## 2018-07-24 RX ADMIN — HYDROMORPHONE HYDROCHLORIDE 1 MG: 1 INJECTION, SOLUTION INTRAMUSCULAR; INTRAVENOUS; SUBCUTANEOUS at 21:51

## 2018-07-24 RX ADMIN — HYDROMORPHONE HYDROCHLORIDE 1 MG: 1 INJECTION, SOLUTION INTRAMUSCULAR; INTRAVENOUS; SUBCUTANEOUS at 08:10

## 2018-07-24 RX ADMIN — GABAPENTIN 600 MG: 300 CAPSULE ORAL at 21:23

## 2018-07-24 RX ADMIN — HYDROMORPHONE HYDROCHLORIDE 1 MG: 1 INJECTION, SOLUTION INTRAMUSCULAR; INTRAVENOUS; SUBCUTANEOUS at 04:35

## 2018-07-24 RX ADMIN — HYDROMORPHONE HYDROCHLORIDE 1 MG: 1 INJECTION, SOLUTION INTRAMUSCULAR; INTRAVENOUS; SUBCUTANEOUS at 18:54

## 2018-07-24 RX ADMIN — ENOXAPARIN SODIUM 40 MG: 40 INJECTION, SOLUTION INTRAVENOUS; SUBCUTANEOUS at 08:11

## 2018-07-24 RX ADMIN — SODIUM CHLORIDE, SODIUM LACTATE, POTASSIUM CHLORIDE, AND CALCIUM CHLORIDE 150 ML/HR: .6; .31; .03; .02 INJECTION, SOLUTION INTRAVENOUS at 04:35

## 2018-07-24 RX ADMIN — HYDROMORPHONE HYDROCHLORIDE 1 MG: 1 INJECTION, SOLUTION INTRAMUSCULAR; INTRAVENOUS; SUBCUTANEOUS at 00:19

## 2018-07-24 RX ADMIN — HYDROMORPHONE HYDROCHLORIDE 1 MG: 1 INJECTION, SOLUTION INTRAMUSCULAR; INTRAVENOUS; SUBCUTANEOUS at 14:42

## 2018-07-24 RX ADMIN — AMLODIPINE BESYLATE 5 MG: 5 TABLET ORAL at 08:10

## 2018-07-24 RX ADMIN — Medication 1 TABLET: at 08:10

## 2018-07-24 RX ADMIN — GABAPENTIN 300 MG: 300 CAPSULE ORAL at 08:11

## 2018-07-24 RX ADMIN — NICOTINE 1 PATCH: 21 PATCH, EXTENDED RELEASE TRANSDERMAL at 08:11

## 2018-07-24 RX ADMIN — PANTOPRAZOLE SODIUM 40 MG: 40 INJECTION, POWDER, FOR SOLUTION INTRAVENOUS at 08:11

## 2018-07-24 RX ADMIN — HYDROMORPHONE HYDROCHLORIDE 1 MG: 1 INJECTION, SOLUTION INTRAMUSCULAR; INTRAVENOUS; SUBCUTANEOUS at 11:39

## 2018-07-24 RX ADMIN — PANTOPRAZOLE SODIUM 40 MG: 40 INJECTION, POWDER, FOR SOLUTION INTRAVENOUS at 21:23

## 2018-07-24 RX ADMIN — SODIUM CHLORIDE, SODIUM LACTATE, POTASSIUM CHLORIDE, AND CALCIUM CHLORIDE 150 ML/HR: .6; .31; .03; .02 INJECTION, SOLUTION INTRAVENOUS at 11:08

## 2018-07-24 NOTE — PROGRESS NOTES
Alexander Chavez  [id number]    56 y o   female      ASSESSMENT  1  Acute abdominal and chest pain likely related to gallbladder disease/chemical pancreatitis  Patient has distended gallbladder and sludge on exam with associated chest pain and upper abdominal pain  Dilated common bile duct on ultrasonography with minimal gallbladder sludge but no calculi noted  CT scan of the abdomen without oral contrast showed pancreas to appear unremarkable and a distended gallbladder with intra and extrahepatic biliary duct dilation  MRCP also showed distended common bile duct measuring 10 mm  Potential distal common bile stricture  LFTs have normalized  Lipase improving  2   History of hiatal hernia and gastritis - underwent an upper endoscopy and February 2017 with gastritis and a hiatal hernia  No other significant findings  3   Tobacco dependence  4  History of kidney stones  5  Colonoscopy performed in February 2007 without any abnormalities    PLAN  1  Continue with aggressive IV fluids and bowel rest  2  Discussed with Dr Whitaker who would like to review imaging studies prior to a decision as to whether an ERCP will be performed  3   Discussed with Marilou (PA) from surgery who feels that an ERCP should be performed before any decision would be made about surgery  4  Will check  level    Chief Complaint   Patient presents with    Chest Pain     Chest pain all over chest into abdomen and into low back; has had it since February; had upper GI done and found hiatal hernia; colonoscopy showed hemorrhoids  SUBJECTIVE/HPI   Continues with chest and epigastric pain, although slightly improved since yesterday  Denies any nausea vomiting     No fevers or chills      /71   Pulse 73   Temp 97 7 °F (36 5 °C) (Oral)   Resp 18   Ht 5' 1" (1 549 m)   Wt 59 6 kg (131 lb 6 3 oz)   LMP  (LMP Unknown)   SpO2 95%   BMI 24 83 kg/m²       PHYSICALEXAM  Constitutional:  Well developed, well nourished, no acute distress, non-toxic appearance   Eyes:   conjunctiva normal   HENT:  Atraumatic  Respiratory:  No respiratory distress  Cardiovascular:  Normal rate  GI:  Soft, nondistended, normal bowel sounds, nontender  Musculoskeletal:  No edema  Neurologic:  Alert & oriented x 3   Psychiatric:  Speech and behavior appropriate       Lab Results   Component Value Date    GLUCOSE 84 07/24/2018    CALCIUM 8 9 07/24/2018     07/24/2018    K 3 7 07/24/2018    CO2 29 07/24/2018     07/24/2018    BUN 7 07/24/2018    CREATININE 0 50 (L) 07/24/2018     Lab Results   Component Value Date    WBC 4 71 07/24/2018    HGB 10 9 (L) 07/24/2018    HCT 32 9 (L) 07/24/2018     (H) 07/24/2018     07/24/2018     Lab Results   Component Value Date    ALT 58 07/24/2018    AST 29 07/24/2018    ALKPHOS 103 07/24/2018    BILITOT 0 20 07/24/2018     Lab Results   Component Value Date    AMYLASE 81 07/24/2018    AMYLASE 58 09/20/2017     Lab Results   Component Value Date    LIPASE 421 (H) 07/24/2018     Lab Results   Component Value Date    IRON 36 (L) 12/29/2017    TIBC 411 12/29/2017     No results found for: INR    Counseling / Coordination of Care  Total floor / unit time spent today 25 minutes  Greater than 50% of total time was spent with the patient and / or family counseling and / or coordination of care  A description of the counseling / coordination of care: 15    Hurshel Billing

## 2018-07-24 NOTE — SOCIAL WORK
Met with patient  Explained role of care management  Patient lives with spouse and 25year old son in a two story home  Independent adls and ambulation, drives, works  DME - denies  Past services - IP Psych in past - doesn't remember where  Pharmacy of choice is [facility]  She plans on returning home at discharge and is unsure if she will need VNA  Will follow and arrange services if needed

## 2018-07-24 NOTE — PROGRESS NOTES
Assessment and plan    CBD [id number] GI, ERCP recommended  Left bursitis elbow-orthopedic evaluation for ID, steroid injection, as requested by patient  Transaminitis:  Follow-up GI recommendations  Hypertension:  BP control, continue blood pressure monitoring, continue meds  Generalized anxiety disorder:  Klonopin    VTE Pharmacologic Prophylaxis:   Pharmacologic: Enoxaparin (Lovenox)  Mechanical VTE Prophylaxis in Place: Yes    Patient Centered Rounds: I have performed bedside rounds with nursing staff today  Discussions with Specialists or Other Care Team Provider: yes    Education and Discussions with Family / Patient: yes    Time Spent for Care: 45 minutes  More than 50% of total time spent on counseling and coordination of care as described above  Current Length of Stay: 2 day(s)    Current Patient Status: Inpatient   Certification Statement: The patient will continue to require additional inpatient hospital stay due to med mgt of CBD stricture    Discharge Plan:  Home when medically stable    Code Status: Level 1 - Full Code      Subjective:   Patient reported redness in the left elbow  Review of systems negative otherwise    Objective:     Vitals:   Temp (24hrs), Av 9 °F (36 6 °C), Min:97 7 °F (36 5 °C), Max:98 3 °F (36 8 °C)    HR:  [65-76] 76  Resp:  [18-20] 20  BP: (121-150)/(69-71) 121/69  SpO2:  [93 %-97 %] 93 %  Body mass index is 24 83 kg/m²  Input and Output Summary (last 24 hours): Intake/Output Summary (Last 24 hours) at 18 1933  Last data filed at 18 1757   Gross per 24 hour   Intake           2387 5 ml   Output             3000 ml   Net           -612 5 ml       Physical Exam:     Physical Exam   Constitutional: She is oriented to person, place, and time  No distress  HENT:   Head: Normocephalic and atraumatic  Mouth/Throat: Oropharynx is clear and moist    Eyes: Conjunctivae and EOM are normal  Pupils are equal, round, and reactive to light   No scleral icterus  Neck: Normal range of motion  Neck supple  No JVD present  Cardiovascular: Normal rate and normal heart sounds  Exam reveals no gallop and no friction rub  No murmur heard  Pulmonary/Chest: Effort normal and breath sounds normal  No respiratory distress  She has no wheezes  She has no rales  Abdominal: Soft  Bowel sounds are normal  She exhibits no distension  There is no tenderness  There is no rebound and no guarding  Musculoskeletal: Normal range of motion  She exhibits no edema, tenderness or deformity  Neurological: She is alert and oriented to person, place, and time  She has normal reflexes  She displays normal reflexes  No cranial nerve deficit  She exhibits normal muscle tone  Coordination normal    Skin: Skin is warm  No erythema  Psychiatric: She has a normal mood and affect  Her behavior is normal          Additional Data:     Labs:      Results from last 7 days  Lab Units 07/24/18  0457   WBC Thousand/uL 4 71   HEMOGLOBIN g/dL 10 9*   HEMATOCRIT % 32 9*   PLATELETS Thousands/uL 295   NEUTROS PCT % 53   LYMPHS PCT % 38   MONOS PCT % 6   EOS PCT % 3       Results from last 7 days  Lab Units 07/24/18  0458 07/24/18  0217   SODIUM mmol/L 143  --    POTASSIUM mmol/L 3 7  --    CHLORIDE mmol/L 107  --    CO2 mmol/L 29  --    BUN mg/dL 7  --    CREATININE mg/dL 0 50*  --    CALCIUM mg/dL 8 9  --    TOTAL PROTEIN g/dL  --  5 7*   BILIRUBIN TOTAL mg/dL  --  0 20   ALK PHOS U/L  --  103   ALT U/L  --  58   AST U/L  --  29   GLUCOSE RANDOM mg/dL 84  --                      * I Have Reviewed All Lab Data Listed Above  * Additional Pertinent Lab Tests Reviewed:  Octavio 66 Admission Reviewed    Imaging:    Imaging Reports Reviewed Today     Recent Cultures (last 7 days):           Last 24 Hours Medication List:     Current Facility-Administered Medications:  amLODIPine 5 mg Oral Daily Shawna Mcneill MD    cholecalciferol 5,000 Units Oral Daily Shawna Yeboah V, MD    clonazePAM 0 5 mg Oral BID PRN Shawna [place] Better, MD    enoxaparin 40 mg Subcutaneous Q24H [address] Shawna Jane Better, MD    fluticasone 2 spray Nasal Daily Shawna Jane Better, MD    gabapentin 300 mg Oral Daily Shawna Jane Better, MD    gabapentin 600 mg Oral HS Shawna [place] Better, MD    HYDROmorphone 1 mg Intravenous Q3H PRN ROXY Hernández    lactated ringers 150 mL/hr Intravenous Continuous Lonnie Kenyon MD Last Rate: 150 mL/hr (07/24/18 7084)   multivitamin-minerals 1 tablet Oral Daily Shawna [place] Better, MD    nicotine 1 patch Transdermal Daily Shawna Jane Better, MD    ondansetron 4 mg Intravenous Q6H PRN Shawna [place] Better, MD    pantoprazole 40 mg Intravenous Q12H [address] Mis Navarrete MD         Today, Patient Was Seen By: Reza Carney MD    ** Please Note: Dictation voice to text software may have been used in the creation of this document   **

## 2018-07-24 NOTE — CONSULTS
Consultation - [place] 56 y o  female MRN: [id number]  Unit/Bed#: [address] [place] [address] Encounter: [id number]    Assessment/Plan     Upper abdominal pain- related to pancreatitis vs biliary disease vs gastritis   -s/p MRCP with findings of CBD dilatation to 10 mm with possible distal common bile duct stricture  Gallbladder distension without wall thickening, cholelithiasis, or bili cholecystic fluid   - RUQ U/S with enlarged common bile duct and mild intrahepatic biliary dilatation  Minimal gallbladder sludge  No choledocholithiasis  -CT findings of mild thickening of the distal gastric wall and proximal duodenum which may represent gastritis/duodenitis    Pancreatitis  -continue supportive care, IV fluids, bowel rest  -continue to trend lipase-improved to 400 today  -possibly related to common bile duct stricture  -not likely related to gallbladder disease  No findings of gallstones or choledocholithiasis  Possibly related to gallbladder sludge in setting of common bile duct stricture  -would recommend ERCP to rule out common bile duct stricture  If ERCP negative will consider  laparoscopic cholecystectomy    Biliary ductal dilatation  ERCP per GI with washings, biopsy  -CA 19-9 pending    H/o hiatal hernia/gastritis  -continue PPI  -s/p EGD 2017  -may require repeat EGD with CT  findings of mild thickening of the distal gastric wall and proximal duodenum which may represent gastritis/duodenitis    Tobacco abuse-cessation recommended    History of Present Illness     HPI:  Swathi Lan is a 56 y o  female who   Presented to the emergency department yesterday with complaints of severe upper abdominal and lower chest discomfort  Patient complains of similar discomfort over the past few months  Admits to abdominal bloating  She admits to more epigastric discomfort after eating at times but not pain similar to what she has at this time  She is followed by [facility]    She did undergo an EGD last year with findings of hiatal hernia and gastritis  She also underwent a colonoscopy which was unremarkable other than a small rectal polyp  Patient denies any nausea or vomiting  She denies any changes in her bowel habits  She denies any discoloration or blood in her stool  She denies any recent weight changes  She denies any fevers or chills  CT scan consistent with distended gallbladder and possible gastritis  Right upper quadrant ultrasound showed common bile duct dilatation but no significant gallbladder disease and no findings of gallstones  Her LFTs have remained normal  MRCP yesterday confirmed common bile duct dilatation with findings of possible distal stricture and no choledocholithiasis  We are consulted regarding these findings  Consults    Review of Systems   Constitutional: Positive for appetite change  Negative for chills, fever and unexpected weight change  HENT: Negative  Eyes: Negative  Respiratory: Negative  Negative for cough, chest tightness, shortness of breath and wheezing  Cardiovascular: Positive for chest pain  Negative for palpitations and leg swelling  Gastrointestinal: Positive for abdominal distention and abdominal pain  Negative for blood in stool, constipation, diarrhea, nausea and vomiting  Endocrine: Negative  Genitourinary: Negative  Negative for difficulty urinating, dysuria and hematuria  Musculoskeletal: Negative  Skin: Negative  Negative for rash and wound  Neurological: Negative  Hematological: Negative  Psychiatric/Behavioral: Negative          Historical Information   Past Medical History:   Diagnosis Date    Kidney stones     Psychiatric disorder     anxiety    Tobacco use disorder      Past Surgical History:   Procedure Laterality Date    NO PAST SURGERIES       Social History   History   Alcohol Use No     History   Drug Use No     History   Smoking Status    Current Every Day Smoker    Packs/day: 1 00    Years: 40 00    Types: Cigarettes   Smokeless Tobacco    Never Used     Family History: non-contributory    Meds/Allergies   all current active meds have been reviewed  Allergies   Allergen Reactions    Pollen Extract     Tree Extract        Objective   First Vitals:   Blood Pressure: (!) 174/85 (07/22/18 2040)  Pulse: 79 (07/22/18 2040)  Temperature: (!) 96 9 °F (36 1 °C) (07/22/18 2040)  Temp Source: Tympanic (07/22/18 2040)  Respirations: 14 (07/22/18 2040)  Height: 5' 1" (154 9 cm) (07/22/18 2327)  Weight - Scale: 59 4 kg (131 lb) (07/22/18 2040)  SpO2: 96 % (07/22/18 2040)    Current Vitals:   Blood Pressure: 137/71 (07/24/18 0808)  Pulse: 73 (07/24/18 0808)  Temperature: 97 7 °F (36 5 °C) (07/24/18 0822)  Temp Source: Oral (07/24/18 9648)  Respirations: 18 (07/24/18 0808)  Height: 5' 1" (154 9 cm) (07/22/18 2327)  Weight - Scale: 59 6 kg (131 lb 6 3 oz) (07/22/18 2327)  SpO2: 95 % (07/24/18 0808)      Intake/Output Summary (Last 24 hours) at 07/24/18 1130  Last data filed at 07/24/18 1106   Gross per 24 hour   Intake             1965 ml   Output             1000 ml   Net              965 ml       Invasive Devices     Peripheral Intravenous Line            Peripheral IV 07/23/18 Right Antecubital 1 day                Physical Exam   Constitutional: She is oriented to person, place, and time  She appears well-developed and well-nourished  No distress  HENT:   Head: Normocephalic and atraumatic  Mouth/Throat: Oropharynx is clear and moist  No oropharyngeal exudate  Eyes: EOM are normal  Right eye exhibits no discharge  Left eye exhibits no discharge  No scleral icterus  Neck: Neck supple  No JVD present  No tracheal deviation present  No thyromegaly present  Cardiovascular: Normal rate, regular rhythm and normal heart sounds  No murmur heard  Pulmonary/Chest: Effort normal and breath sounds normal  No respiratory distress  She has no wheezes  She has no rales  Abdominal: Soft  Nondistended, tenderness over epigastric and right upper quadrant without rebound or guarding, no Parsons sign, few bowel sounds   Musculoskeletal: Normal range of motion  She exhibits no edema or deformity  Neurological: She is alert and oriented to person, place, and time  No focal deficits   Skin: Skin is warm and dry  No rash noted  She is not diaphoretic  No pallor  Psychiatric: She has a normal mood and affect  Her behavior is normal        Lab Results:   I have personally reviewed pertinent lab results  , CBC:   Lab Results   Component Value Date    WBC 4 71 07/24/2018    HGB 10 9 (L) 07/24/2018    HCT 32 9 (L) 07/24/2018     (H) 07/24/2018     07/24/2018    MCH 33 0 07/24/2018    MCHC 33 1 07/24/2018    RDW 13 6 07/24/2018    MPV 9 5 07/24/2018    NRBC 0 07/24/2018   , CMP:   Lab Results   Component Value Date     07/24/2018    K 3 7 07/24/2018     07/24/2018    CO2 29 07/24/2018    ANIONGAP 7 07/24/2018    BUN 7 07/24/2018    CREATININE 0 50 (L) 07/24/2018    GLUCOSE 84 07/24/2018    CALCIUM 8 9 07/24/2018    AST 29 07/24/2018    ALT 58 07/24/2018    ALKPHOS 103 07/24/2018    PROT 5 7 (L) 07/24/2018    BILITOT 0 20 07/24/2018    EGFR 109 07/24/2018   , Coagulation: No results found for: PT, INR, APTT, Urinalysis: No results found for: COLORU, CLARITYU, SPECGRAV, PHUR, LEUKOCYTESUR, NITRITE, PROTEINUA, GLUCOSEU, KETONESU, BILIRUBINUR, BLOODU, Amylase:   Lab Results   Component Value Date    AMYLASE 81 07/24/2018   , Lipase:   Lab Results   Component Value Date    LIPASE 421 (H) 07/24/2018     Imaging: I have personally reviewed pertinent reports  As above  EKG, Pathology, and Other Studies: I have personally reviewed pertinent reports        Rey Jo PA-C

## 2018-07-24 NOTE — PLAN OF CARE
CARDIOVASCULAR - ADULT     Maintains optimal cardiac output and hemodynamic stability Progressing        DISCHARGE PLANNING     Discharge to home or other facility with appropriate resources Progressing        DISCHARGE PLANNING - CARE MANAGEMENT     Discharge to post-acute care or home with appropriate resources Progressing        INFECTION - ADULT     Absence or prevention of progression during hospitalization Progressing     Absence of fever/infection during neutropenic period Progressing        PAIN - ADULT     Verbalizes/displays adequate comfort level or baseline comfort level Progressing

## 2018-07-24 NOTE — PROGRESS NOTES
Pt observed to be sleeping during most hrly rounds overnight; woke to use BR to void qs and req PRN pain med x2 with stated relief

## 2018-07-25 LAB
ALBUMIN SERPL BCP-MCNC: 2.6 G/DL (ref 3.5–5)
ALP SERPL-CCNC: 101 U/L (ref 46–116)
ALT SERPL W P-5'-P-CCNC: 46 U/L (ref 12–78)
AMYLASE SERPL-CCNC: 69 IU/L (ref 25–115)
ANION GAP SERPL CALCULATED.3IONS-SCNC: 4 MMOL/L (ref 4–13)
AST SERPL W P-5'-P-CCNC: 22 U/L (ref 5–45)
BILIRUB SERPL-MCNC: 0.3 MG/DL (ref 0.2–1)
BUN SERPL-MCNC: 6 MG/DL (ref 5–25)
CALCIUM SERPL-MCNC: 8.8 MG/DL (ref 8.3–10.1)
CHLORIDE SERPL-SCNC: 105 MMOL/L (ref 100–108)
CO2 SERPL-SCNC: 34 MMOL/L (ref 21–32)
CREAT SERPL-MCNC: 0.59 MG/DL (ref 0.6–1.3)
GFR SERPL CREATININE-BSD FRML MDRD: 104 ML/MIN/1.73SQ M
GLUCOSE SERPL-MCNC: 84 MG/DL (ref 65–140)
LIPASE SERPL-CCNC: 310 U/L (ref 73–393)
POTASSIUM SERPL-SCNC: 3.6 MMOL/L (ref 3.5–5.3)
PROT SERPL-MCNC: 5.8 G/DL (ref 6.4–8.2)
SODIUM SERPL-SCNC: 143 MMOL/L (ref 136–145)

## 2018-07-25 PROCEDURE — 83690 ASSAY OF LIPASE: CPT | Performed by: NURSE PRACTITIONER

## 2018-07-25 PROCEDURE — C9113 INJ PANTOPRAZOLE SODIUM, VIA: HCPCS | Performed by: INTERNAL MEDICINE

## 2018-07-25 PROCEDURE — 80053 COMPREHEN METABOLIC PANEL: CPT | Performed by: INTERNAL MEDICINE

## 2018-07-25 PROCEDURE — 82150 ASSAY OF AMYLASE: CPT | Performed by: INTERNAL MEDICINE

## 2018-07-25 PROCEDURE — 99232 SBSQ HOSP IP/OBS MODERATE 35: CPT | Performed by: HOSPITALIST

## 2018-07-25 RX ORDER — PANTOPRAZOLE SODIUM 40 MG/1
40 TABLET, DELAYED RELEASE ORAL
Status: DISCONTINUED | OUTPATIENT
Start: 2018-07-26 | End: 2018-07-26 | Stop reason: HOSPADM

## 2018-07-25 RX ORDER — ACETAMINOPHEN 325 MG/1
650 TABLET ORAL EVERY 6 HOURS PRN
Status: DISCONTINUED | OUTPATIENT
Start: 2018-07-25 | End: 2018-07-26 | Stop reason: HOSPADM

## 2018-07-25 RX ORDER — OXYCODONE HYDROCHLORIDE 5 MG/1
5 TABLET ORAL EVERY 4 HOURS PRN
Status: DISCONTINUED | OUTPATIENT
Start: 2018-07-25 | End: 2018-07-26 | Stop reason: HOSPADM

## 2018-07-25 RX ADMIN — NICOTINE 1 PATCH: 21 PATCH, EXTENDED RELEASE TRANSDERMAL at 08:15

## 2018-07-25 RX ADMIN — GABAPENTIN 600 MG: 300 CAPSULE ORAL at 21:18

## 2018-07-25 RX ADMIN — Medication 1 TABLET: at 08:14

## 2018-07-25 RX ADMIN — PANTOPRAZOLE SODIUM 40 MG: 40 INJECTION, POWDER, FOR SOLUTION INTRAVENOUS at 08:14

## 2018-07-25 RX ADMIN — SODIUM CHLORIDE, SODIUM LACTATE, POTASSIUM CHLORIDE, AND CALCIUM CHLORIDE 150 ML/HR: .6; .31; .03; .02 INJECTION, SOLUTION INTRAVENOUS at 08:15

## 2018-07-25 RX ADMIN — SODIUM CHLORIDE, SODIUM LACTATE, POTASSIUM CHLORIDE, AND CALCIUM CHLORIDE 150 ML/HR: .6; .31; .03; .02 INJECTION, SOLUTION INTRAVENOUS at 16:39

## 2018-07-25 RX ADMIN — SODIUM CHLORIDE, SODIUM LACTATE, POTASSIUM CHLORIDE, AND CALCIUM CHLORIDE 150 ML/HR: .6; .31; .03; .02 INJECTION, SOLUTION INTRAVENOUS at 00:22

## 2018-07-25 RX ADMIN — GABAPENTIN 300 MG: 300 CAPSULE ORAL at 08:14

## 2018-07-25 RX ADMIN — HYDROMORPHONE HYDROCHLORIDE 1 MG: 1 INJECTION, SOLUTION INTRAMUSCULAR; INTRAVENOUS; SUBCUTANEOUS at 07:30

## 2018-07-25 RX ADMIN — VITAMIN D, TAB 1000IU (100/BT) 5000 UNITS: 25 TAB at 08:14

## 2018-07-25 RX ADMIN — ENOXAPARIN SODIUM 40 MG: 40 INJECTION, SOLUTION INTRAVENOUS; SUBCUTANEOUS at 08:14

## 2018-07-25 RX ADMIN — AMLODIPINE BESYLATE 5 MG: 5 TABLET ORAL at 08:14

## 2018-07-25 RX ADMIN — ACETAMINOPHEN 650 MG: 325 TABLET, FILM COATED ORAL at 12:27

## 2018-07-25 RX ADMIN — HYDROMORPHONE HYDROCHLORIDE 1 MG: 1 INJECTION, SOLUTION INTRAMUSCULAR; INTRAVENOUS; SUBCUTANEOUS at 01:25

## 2018-07-25 NOTE — PROGRESS NOTES
Ines Weinstein  [id number]    56 y o   female      ASSESSMENT  1   Acute abdominal and chest pain likely related to gallbladder disease/chemical pancreatitis   Patient has distended gallbladder and sludge on exam with associated chest pain and upper abdominal pain  Dilated common bile duct on ultrasonography with minimal gallbladder sludge but no calculi noted  CT scan of the abdomen without oral contrast showed pancreas to appear unremarkable and a distended gallbladder with intra and extrahepatic biliary duct dilation  MRCP also showed distended common bile duct measuring 10 mm  Potential distal common bile stricture  LFTs have normalized  Probably a passed common bile duct stone  Given her clinical improvement I would recommend advancing her diet slowly to low fat diet  If she tolerates this in her liver enzymes remain normal tomorrow, plan outpatient ERCP to be followed by elective cholecystectomy  If she has more pain we may need to accelerate this  2   History of hiatal hernia and gastritis - underwent an upper endoscopy and February 2017 with gastritis and a hiatal hernia  No other significant findings  3   Tobacco dependence  4   History of kidney stones  5  Colonoscopy performed in February 2007 without any abnormalities    PLAN  Low fat diet recheck LFTs      SUBJECTIVE/HPI comfortable now had pain last evening but not today  Hungry and she wants more to eat then clear liquids    /62   Pulse 75   Temp 98 °F (36 7 °C)   Resp 16   Ht 5' 1" (1 549 m)   Wt 60 6 kg (133 lb 9 6 oz)   LMP  (LMP Unknown)   SpO2 93%   BMI 25 24 kg/m²       Physical Exam:  Awake and alert no acute distress skin is warm and dry abdomen is soft nontender without rebound guarding hepatosplenomegaly       Lab Results   Component Value Date    GLUCOSE 84 07/25/2018    CALCIUM 8 8 07/25/2018     07/25/2018    K 3 6 07/25/2018    CO2 34 (H) 07/25/2018     07/25/2018    BUN 6 07/25/2018    CREATININE 0 59 (L) 07/25/2018     Lab Results   Component Value Date    WBC 4 71 07/24/2018    HGB 10 9 (L) 07/24/2018    HCT 32 9 (L) 07/24/2018     (H) 07/24/2018     07/24/2018     Lab Results   Component Value Date    ALT 46 07/25/2018    AST 22 07/25/2018    ALKPHOS 101 07/25/2018    BILITOT 0 30 07/25/2018     No components found for: [id number]  Lab Results   Component Value Date    LIPASE 310 07/25/2018     Lab Results   Component Value Date    IRON 36 (L) 12/29/2017    TIBC 411 12/29/2017     No results found for: INR    Total floor / unit time spent today twenty-five minutes  Greater than 50% of total time was spent with the patient and / or family counseling and / or coordination of care   A description of the counseling / coordination of care:  25    Lev Sandoval MD

## 2018-07-25 NOTE — PROGRESS NOTES
Assessment and plan    Acute pancreatitis-resolving secondary to CBD stricture with intrahepatic duct dilatation, LFTs stable, advanced diet as tolerated  Transaminitis-resolved, appreciate GI recommendations  Hypertension:  BP control, continue blood pressure monitoring, continue meds  Generalized anxiety disorder:  Continue Klonopin  VTE Pharmacologic Prophylaxis:   Pharmacologic: Enoxaparin (Lovenox)  Mechanical VTE Prophylaxis in Place: Yes    Patient Centered Rounds: I have performed bedside rounds with nursing staff today  Discussions with Specialists or Other Care Team Provider: yes    Education and Discussions with Family / Patient: yes    Time Spent for Care: 45 minutes  More than 50% of total time spent on counseling and coordination of care as described above  Current Length of Stay: 3 day(s)    Current Patient Status: Inpatient   Certification Statement: The patient will continue to require additional inpatient hospital stay due to med mgt of acute pancreatitis    Discharge Plan: home when medically stable    Code Status: Level 1 - Full Code      Subjective:   Abdominal pain persistent, but improved as compared to yesterday  Review of systems negative otherwise    Objective:     Vitals:   Temp (24hrs), Av °F (36 7 °C), Min:97 8 °F (36 6 °C), Max:98 2 °F (36 8 °C)    HR:  [74-76] 75  Resp:  [16-20] 16  BP: (121-138)/(62-82) 132/62  SpO2:  [92 %-93 %] 93 %  Body mass index is 25 24 kg/m²  Input and Output Summary (last 24 hours): Intake/Output Summary (Last 24 hours) at 18 1507  Last data filed at 18 1442   Gross per 24 hour   Intake           3132 5 ml   Output             3875 ml   Net           -742 5 ml       Physical Exam:     Physical Exam   Constitutional: She is oriented to person, place, and time  No distress  HENT:   Head: Normocephalic and atraumatic     Mouth/Throat: Oropharynx is clear and moist    Eyes: Conjunctivae and EOM are normal  Pupils are equal, round, and reactive to light  No scleral icterus  Neck: Normal range of motion  No JVD present  Cardiovascular: Normal rate and normal heart sounds  Exam reveals no gallop and no friction rub  No murmur heard  Pulmonary/Chest: Effort normal and breath sounds normal  No respiratory distress  She has no wheezes  She has no rales  Abdominal: Soft  Bowel sounds are normal  She exhibits no distension  There is no tenderness  There is no rebound and no guarding  Musculoskeletal: Normal range of motion  She exhibits no edema, tenderness or deformity  Neurological: She is alert and oriented to person, place, and time  No cranial nerve deficit  Coordination normal    Skin: Skin is warm  No erythema  Psychiatric: She has a normal mood and affect  Additional Data:     Labs:      Results from last 7 days  Lab Units 07/24/18  0457   WBC Thousand/uL 4 71   HEMOGLOBIN g/dL 10 9*   HEMATOCRIT % 32 9*   PLATELETS Thousands/uL 295   NEUTROS PCT % 53   LYMPHS PCT % 38   MONOS PCT % 6   EOS PCT % 3       Results from last 7 days  Lab Units 07/25/18  0509   SODIUM mmol/L 143   POTASSIUM mmol/L 3 6   CHLORIDE mmol/L 105   CO2 mmol/L 34*   BUN mg/dL 6   CREATININE mg/dL 0 59*   CALCIUM mg/dL 8 8   TOTAL PROTEIN g/dL 5 8*   BILIRUBIN TOTAL mg/dL 0 30   ALK PHOS U/L 101   ALT U/L 46   AST U/L 22   GLUCOSE RANDOM mg/dL 84                     * I Have Reviewed All Lab Data Listed Above  * Additional Pertinent Lab Tests Reviewed:  Octavio 66 Admission Reviewed    Imaging:    Imaging Reports Reviewed Today   Recent Cultures (last 7 days):           Last 24 Hours Medication List:     Current Facility-Administered Medications:  acetaminophen 650 mg Oral Q6H PRN Ramona Archibald MD    amLODIPine 5 mg Oral Daily Shawna Delacruz MD    cholecalciferol 5,000 Units Oral Daily Shawna Delacruz MD    clonazePAM 0 5 mg Oral BID PRN Shawna Delacruz MD    enoxaparin 40 mg Subcutaneous Q24H [facility] Candia Lanes MD SUZI    fluticasone 2 spray Nasal Daily Shawna Jimenezt Remedies, MD    gabapentin 300 mg Oral Daily Shawna Jimenezt Remedies, MD    gabapentin 600 mg Oral HS Shawna Keating, MD    HYDROmorphone 1 mg Intravenous Q3H PRN ROXY Rios    lactated ringers 150 mL/hr Intravenous Continuous Venkat Hester MD Last Rate: 150 mL/hr (07/25/18 0815)   multivitamin-minerals 1 tablet Oral Daily Shawna Jimenezt Remedrl, MD    nicotine 1 patch Transdermal Daily Shawna Keating, MD    ondansetron 4 mg Intravenous Q6H PRN Shawna Keating, MD    oxyCODONE 5 mg Oral Q4H PRN Donovan Laguerre MD    [START ON 7/26/2018] pantoprazole 40 mg Oral Early Morning Donovan Laguerre MD         Today, Patient Was Seen By: Donovan Laguerre MD    ** Please Note: Dictation voice to text software may have been used in the creation of this document   **

## 2018-07-25 NOTE — PROGRESS NOTES
Progress Note - General Surgery   Alessia Flores 56 y o  female MRN: [id number]  Unit/Bed#: [address] [place] [address] Encounter: [id number]  Upper abdominal pain- likely related to pancreatitis  -patient continues with some abdominal pain after starting clear liquid diet  -continue to monitor pain and abdominal exam    -Pancreatitis  -continue supportive care, IV fluids  -currently on clear liquids  -continue to trend lipase  -possibly related to common bile duct stricture although no findings of elevated transaminases or bilirubin  -not likely related to gallbladder disease  No findings of gallstones or choledocholithiasis  Possibly related to gallbladder sludge in setting of common bile duct stricture  -would recommend ERCP to rule out common bile duct stricture  If ERCP negative will consider  laparoscopic cholecystectomy  This workup can be done as an outpatient if patient continues to improve      Biliary ductal dilatation  ERCP per GI with washings, biopsy  -CA 19-9 pending  -GI planning ERCP as outpatient     H/o hiatal hernia/gastritis  -continue PPI  -s/p EGD 2017  -may require repeat EGD with CT  findings of mild thickening of the distal gastric wall and proximal duodenum which may represent gastritis/duodenitis  This can also be done as an outpatient      Tobacco abuse-cessation recommended    Subjective/Objective   Chief Complaint:  Abdominal pain    Subjective:  Patient states she had some increased upper abdominal pain last evening  Pain symptoms better this morning  She is tolerating clear liquids  She is out of bed and ambulating  Objective:     Blood pressure 132/62, pulse 75, temperature 98 °F (36 7 °C), resp  rate 16, height 5' 1" (1 549 m), weight 60 6 kg (133 lb 9 6 oz), SpO2 93 %, not currently breastfeeding  ,Body mass index is 25 24 kg/m²        Intake/Output Summary (Last 24 hours) at 07/25/18 1027  Last data filed at 07/25/18 0821   Gross per 24 hour   Intake           3352 5 ml   Output 3325 ml   Net             27 5 ml       Invasive Devices     Peripheral Intravenous Line            Peripheral IV 07/23/18 Right Antecubital 2 days                Physical Exam   Constitutional: She is oriented to person, place, and time  No distress  HENT:  Sclerae anicteric, mucous membranes moist  Neck: Normal range of motion  Neck supple  No JVD present  No thyromegaly  Cardiovascular: Normal rate and normal heart sounds  No murmur heard  Pulmonary/Chest: Effort normal and breath sounds normal  No respiratory distress  She has no wheezes  She has no rales  Abdominal: Soft  Bowel sounds are normal  She exhibits no distension  There is no tenderness over epigastrium and right upper quadrant  No rebound or guarding  Musculoskeletal: Normal range of motion  She exhibits no edema, tenderness or deformity  Skin: Skin is warm  No erythema  No jaundice  Vital signs reviewed           Lab, Imaging and other studies:  I have personally reviewed pertinent lab results    , CBC: No results found for: WBC, HGB, HCT, MCV, PLT, ADJUSTEDWBC, MCH, MCHC, RDW, MPV, NRBC, CMP:   Lab Results   Component Value Date     07/25/2018    K 3 6 07/25/2018     07/25/2018    CO2 34 (H) 07/25/2018    ANIONGAP 4 07/25/2018    BUN 6 07/25/2018    CREATININE 0 59 (L) 07/25/2018    GLUCOSE 84 07/25/2018    CALCIUM 8 8 07/25/2018    AST 22 07/25/2018    ALT 46 07/25/2018    ALKPHOS 101 07/25/2018    PROT 5 8 (L) 07/25/2018    BILITOT 0 30 07/25/2018    EGFR 104 07/25/2018     VTE Pharmacologic Prophylaxis: Enoxaparin (Lovenox)  VTE Mechanical Prophylaxis: sequential compression device     Amanda Pena PA-C

## 2018-07-26 VITALS
TEMPERATURE: 98.2 F | BODY MASS INDEX: 25.31 KG/M2 | DIASTOLIC BLOOD PRESSURE: 76 MMHG | RESPIRATION RATE: 16 BRPM | HEIGHT: 61 IN | OXYGEN SATURATION: 93 % | SYSTOLIC BLOOD PRESSURE: 127 MMHG | HEART RATE: 71 BPM | WEIGHT: 134.04 LBS

## 2018-07-26 LAB
ALBUMIN SERPL BCP-MCNC: 2.6 G/DL (ref 3.5–5)
ALP SERPL-CCNC: 98 U/L (ref 46–116)
ALT SERPL W P-5'-P-CCNC: 45 U/L (ref 12–78)
ANION GAP SERPL CALCULATED.3IONS-SCNC: 6 MMOL/L (ref 4–13)
AST SERPL W P-5'-P-CCNC: 21 U/L (ref 5–45)
BACTERIA UR QL AUTO: ABNORMAL /HPF
BILIRUB SERPL-MCNC: 0.2 MG/DL (ref 0.2–1)
BILIRUB UR QL STRIP: NEGATIVE
BUN SERPL-MCNC: 5 MG/DL (ref 5–25)
CALCIUM SERPL-MCNC: 9.2 MG/DL (ref 8.3–10.1)
CHLORIDE SERPL-SCNC: 107 MMOL/L (ref 100–108)
CLARITY UR: CLEAR
CO2 SERPL-SCNC: 32 MMOL/L (ref 21–32)
COLOR UR: YELLOW
CREAT SERPL-MCNC: 0.68 MG/DL (ref 0.6–1.3)
ERYTHROCYTE [DISTWIDTH] IN BLOOD BY AUTOMATED COUNT: 13.3 % (ref 11.6–15.1)
GFR SERPL CREATININE-BSD FRML MDRD: 99 ML/MIN/1.73SQ M
GLUCOSE SERPL-MCNC: 120 MG/DL (ref 65–140)
GLUCOSE UR STRIP-MCNC: NEGATIVE MG/DL
HCT VFR BLD AUTO: 31.7 % (ref 34.8–46.1)
HGB BLD-MCNC: 10.5 G/DL (ref 11.5–15.4)
HGB UR QL STRIP.AUTO: ABNORMAL
KETONES UR STRIP-MCNC: NEGATIVE MG/DL
LEUKOCYTE ESTERASE UR QL STRIP: NEGATIVE
MCH RBC QN AUTO: 32.9 PG (ref 26.8–34.3)
MCHC RBC AUTO-ENTMCNC: 33.1 G/DL (ref 31.4–37.4)
MCV RBC AUTO: 99 FL (ref 82–98)
MUCOUS THREADS UR QL AUTO: ABNORMAL
NITRITE UR QL STRIP: NEGATIVE
NON-SQ EPI CELLS URNS QL MICRO: ABNORMAL /HPF
PH UR STRIP.AUTO: 6.5 [PH] (ref 4.5–8)
PLATELET # BLD AUTO: 281 THOUSANDS/UL (ref 149–390)
PMV BLD AUTO: 9.8 FL (ref 8.9–12.7)
POTASSIUM SERPL-SCNC: 3.6 MMOL/L (ref 3.5–5.3)
PROT SERPL-MCNC: 5.8 G/DL (ref 6.4–8.2)
PROT UR STRIP-MCNC: NEGATIVE MG/DL
RBC # BLD AUTO: 3.19 MILLION/UL (ref 3.81–5.12)
RBC #/AREA URNS AUTO: ABNORMAL /HPF
SODIUM SERPL-SCNC: 145 MMOL/L (ref 136–145)
SP GR UR STRIP.AUTO: <=1.005 (ref 1–1.03)
UROBILINOGEN UR QL STRIP.AUTO: 0.2 E.U./DL
WBC # BLD AUTO: 5.45 THOUSAND/UL (ref 4.31–10.16)
WBC #/AREA URNS AUTO: ABNORMAL /HPF

## 2018-07-26 PROCEDURE — 81001 URINALYSIS AUTO W/SCOPE: CPT | Performed by: INTERNAL MEDICINE

## 2018-07-26 PROCEDURE — 99239 HOSP IP/OBS DSCHRG MGMT >30: CPT | Performed by: HOSPITALIST

## 2018-07-26 PROCEDURE — 99253 IP/OBS CNSLTJ NEW/EST LOW 45: CPT | Performed by: PHYSICIAN ASSISTANT

## 2018-07-26 PROCEDURE — 85027 COMPLETE CBC AUTOMATED: CPT | Performed by: HOSPITALIST

## 2018-07-26 PROCEDURE — 80053 COMPREHEN METABOLIC PANEL: CPT | Performed by: INTERNAL MEDICINE

## 2018-07-26 RX ORDER — OXYCODONE HYDROCHLORIDE 5 MG/1
5 TABLET ORAL EVERY 4 HOURS PRN
Qty: 30 TABLET | Refills: 0 | Status: SHIPPED | OUTPATIENT
Start: 2018-07-26 | End: 2018-08-05

## 2018-07-26 RX ORDER — NICOTINE 21 MG/24HR
1 PATCH, TRANSDERMAL 24 HOURS TRANSDERMAL DAILY
Qty: 28 PATCH | Refills: 0 | Status: SHIPPED | OUTPATIENT
Start: 2018-07-27 | End: 2018-11-13 | Stop reason: CLARIF

## 2018-07-26 RX ADMIN — Medication 1 TABLET: at 08:09

## 2018-07-26 RX ADMIN — PANTOPRAZOLE SODIUM 40 MG: 40 TABLET, DELAYED RELEASE ORAL at 06:09

## 2018-07-26 RX ADMIN — VITAMIN D, TAB 1000IU (100/BT) 5000 UNITS: 25 TAB at 08:09

## 2018-07-26 RX ADMIN — NICOTINE 1 PATCH: 21 PATCH, EXTENDED RELEASE TRANSDERMAL at 08:10

## 2018-07-26 RX ADMIN — ACETAMINOPHEN 650 MG: 325 TABLET, FILM COATED ORAL at 06:08

## 2018-07-26 RX ADMIN — ENOXAPARIN SODIUM 40 MG: 40 INJECTION, SOLUTION INTRAVENOUS; SUBCUTANEOUS at 08:09

## 2018-07-26 RX ADMIN — OXYCODONE HYDROCHLORIDE 5 MG: 5 TABLET ORAL at 08:09

## 2018-07-26 RX ADMIN — GABAPENTIN 300 MG: 300 CAPSULE ORAL at 08:09

## 2018-07-26 RX ADMIN — AMLODIPINE BESYLATE 5 MG: 5 TABLET ORAL at 08:09

## 2018-07-26 NOTE — PROGRESS NOTES
Marti Ramesh  [id number]    56 y o   female      ASSESSMENT  1   Acute abdominal and chest pain likely related to gallbladder disease/chemical pancreatitis   Patient has distended gallbladder and sludge on exam with associated chest pain and upper abdominal pain   Dilated common bile duct on ultrasonography with minimal gallbladder sludge but no calculi noted   CT scan of the abdomen without oral contrast showed pancreas to appear unremarkable and a distended gallbladder with intra and extrahepatic biliary duct dilation   MRCP also showed distended common bile duct measuring 10 mm   Potential distal common bile stricture   LFTs have normalized  Probably a passed common bile duct stone  tolerating low-fat diet  Liver enzymes remain normal  Given her flank pain and history of kidney stones I would check a UA  If this is negative she could be discharged on a proton pump inhibitor for her known chronic gastroesophageal reflux and we will set her up for an elective ERCP to evaluate the distal duct  I explained to her if she has any recurrence pain she should contact us  If the ERCP is negative and she continues to do well and elective cholecystectomy would probably be in order to prevent this from happening again  2   History of hiatal hernia and gastritis - underwent an upper endoscopy and February 2017 with gastritis and a hiatal hernia   No other significant findings  Continue PPI  3   Tobacco dependence  4   History of kidney stones  5   Colonoscopy performed in February 2007 without any abnormalities      PLAN  Check UA  If negative for excessive RBCs can discharge on a low-fat diet our office will arrange ERCP  SUBJECTIVE/HPI twinges of left flank pain last night no abdominal pain eating well  No nausea mild heartburn  No bowel movement      /76 (BP Location: Right arm)   Pulse 71   Temp 98 2 °F (36 8 °C) (Oral)   Resp 16   Ht 5' 1" (1 549 m)   Wt 60 8 kg (134 lb 0 6 oz)   LMP  (LMP Unknown)   SpO2 93%   BMI 25 33 kg/m²       Physical Exam:  Awake and alert no acute distress skin is warm and dry abdomen positive bowel sounds soft nontender no rebound guarding hepatosplenomegaly  Lab Results   Component Value Date    GLUCOSE 120 07/26/2018    CALCIUM 9 2 07/26/2018     07/26/2018    K 3 6 07/26/2018    CO2 32 07/26/2018     07/26/2018    BUN 5 07/26/2018    CREATININE 0 68 07/26/2018     Lab Results   Component Value Date    WBC 5 45 07/26/2018    HGB 10 5 (L) 07/26/2018    HCT 31 7 (L) 07/26/2018    MCV 99 (H) 07/26/2018     07/26/2018     Lab Results   Component Value Date    ALT 45 07/26/2018    AST 21 07/26/2018    ALKPHOS 98 07/26/2018    BILITOT 0 20 07/26/2018     No components found for: AMYLJKJJJASE  Lab Results   Component Value Date    LIPASE 310 07/25/2018     Lab Results   Component Value Date    IRON 36 (L) 12/29/2017    TIBC 411 12/29/2017     No results found for: INR    Total floor / unit time spent today thirty-five minutes  Greater than 50% of total time was spent with the patient and / or family counseling and / or coordination of care   A description of the counseling / coordination of care:  Libertad Salcedo MD

## 2018-07-26 NOTE — DISCHARGE INSTRUCTIONS
Orthopedics:  1  Avoid leaning on left elbow  2  Cold compress as needed  3  Follow up in office in 1-2 weeks if elbow pain or swelling worsen  4  Keep elbow moving

## 2018-07-26 NOTE — CONSULTS
Consultation - Betsey Duffy 56 y o  female MRN: [id number]  Unit/Bed#: [address] [place] [address] Encounter: [id number]      Assessment/Plan     Assessment:  Left elbow olecranon bursitis    Plan:  1  No fluid palpated in bursa at this time  Do not recommend aspiration/cortisone injection  2  Avoid leaning on left elbow  3  Continue elbow ROM  4  Cold compress as needed  5  Follow up in office in 1-2 weeks with Dr Micaela Honeycutt for reevaluation if symptoms worsen  History of Present Illness   Physician Requesting Consult: Daniella White MD  Reason for Consult / Principal Problem: left elbow swelling  HPI: Aniya Rebollar is a 56y o  year old female who presents with left elbow swelling for the past month  She denies any trauma to elbow  She states she does lean on the elbow often  She has much more swelling a month ago and was seen by her PCP  Her olecranon bursa was aspirated  No cortisone injected  She states she was tested for gout at that time and the test were negative  The swelling improved and has not returned  She was seen by our sports medicine provide her Dr Kali Edmonds on July 10, 2018 and x-rays were taken that were negative  There is no fluid in the bursa at that time and aspiration was not indicated  She was told to follow up as needed  There has been no increased swelling since then  She was admitted to the medical service for abdominal pain and Orthopedics was consulted  She states she has some discomfort over the elbow when leaning on it  She denies any recent fevers or chills  She states the elbow has been somewhat erythematous since a month ago  She states she took antibiotics for a week that helped  No history of similar symptoms in the past     Inpatient consult to Orthopedic Surgery  Consult performed by: Jack Allen  Consult ordered by: Mary Ramirez          Review of Systems   Constitutional: Negative  HENT: Negative  Eyes: Negative  Respiratory: Negative  Cardiovascular: Negative  Gastrointestinal: Positive for abdominal pain  Endocrine: Negative  Genitourinary: Negative  Musculoskeletal: Positive for arthralgias and joint swelling  Skin: Negative  Allergic/Immunologic: Negative  Neurological: Negative  Hematological: Negative  Psychiatric/Behavioral: Negative  Historical Information   Past Medical History:   Diagnosis Date    Kidney stones     Psychiatric disorder     anxiety    Tobacco use disorder      Past Surgical History:   Procedure Laterality Date    NO PAST SURGERIES       Social History   History   Alcohol Use No     History   Drug Use No     History   Smoking Status    Current Every Day Smoker    Packs/day: 1 00    Years: 40 00    Types: Cigarettes   Smokeless Tobacco    Never Used     Family History: non-contributory    Meds/Allergies   all current active meds have been reviewed  Allergies   Allergen Reactions    Pollen Extract     Tree Extract        Objective   Vitals: Blood pressure 123/62, pulse 66, temperature 98 3 °F (36 8 °C), temperature source Oral, resp  rate 18, height 5' 1" (1 549 m), weight 60 6 kg (133 lb 9 6 oz), SpO2 96 %, not currently breastfeeding  ,Body mass index is 25 24 kg/m²  Intake/Output Summary (Last 24 hours) at 07/26/18 0726  Last data filed at 07/26/18 0601   Gross per 24 hour   Intake             3390 ml   Output             9300 ml   Net            -5910 ml     I/O last 24 hours: In: 1975 [I V :3390]  Out: 9300 [Urine:9300]    Invasive Devices     Peripheral Intravenous Line            Peripheral IV 07/25/18 Left Forearm less than 1 day                Physical Exam   Constitutional: She is oriented to person, place, and time  She appears well-developed  HENT:   Head: Normocephalic and atraumatic  Eyes: EOM are normal  Pupils are equal, round, and reactive to light  Neck: Neck supple  Cardiovascular: Normal rate, regular rhythm and intact distal pulses      No murmur heard   Pulmonary/Chest: Effort normal and breath sounds normal  No respiratory distress  Abdominal: Soft  She exhibits no distension  Neurological: She is alert and oriented to person, place, and time  Skin: Skin is warm  Psychiatric: She has a normal mood and affect  Nursing note and vitals reviewed  Right Elbow Exam   Right elbow exam is normal       Left Elbow Exam     Tenderness   The patient is experiencing no tenderness  Range of Motion   The patient has normal left elbow ROM  Muscle Strength   The patient has normal left elbow strength  Tests Varus: negative  Valgus: negative        Other   Sensation: normal  Pulse: present    Comments:  Erythema over olecranon process  No fluid palpated in bursa  No fluctuance            Lab Results: I have personally reviewed pertinent lab results  Imaging Studies: I have personally reviewed pertinent reports  X-rays of the left elbow reveal no abnormalities  Code Status: Level 1 - Full Code  Advance Directive and Living Will:      Power of :    POLST:      Counseling / Coordination of Care  Total floor / unit time spent today 20 minutes  Greater than 50% of total time was spent with the patient and / or family counseling and / or coordination of care

## 2018-07-26 NOTE — DISCHARGE SUMMARY
Discharging Physician / Practitioner: Krista Philip MD  PCP: Georgette Barahona DO  Admission Date:   Admission Orders     Ordered        07/22/18 2221  Inpatient Admission (expected length of stay for this patient is greater than two midnights)  Once             Discharge Date: 07/26/18    Resolved Problems  Date Reviewed: 7/18/2018    None          Consultations During Hospital Stay:  · Gastroenterology   · Surgery  · Orthopedics    Procedures Performed:   MRI of abdomen with contrast MRCP:CBD dilation up to 10 mm as seen on the recent CT scan and ultrasound without choledocholithiasis; possible distal CBD stricture   Mild intrahepatic biliary dilation   Distended gallbladder without wall thickening cholelithiasis or pericholecystic fluid  Ultrasound right upper quadrant: Enlarged common bile duct, and mild intrahepatic biliary dilatation   No obstructing lesions appreciated   Center MRCP for further evaluation  Minimal gallbladder sludge   No echogenic calculi identified  CT abdomen and pelvis with contrast: Distended gallbladder with intra and extrahepatic biliary ductal dilatation   Further evaluation with a right upper quadrant sonogram is recommended to rule out acute cholecystitis  Mild thickening of the distal gastric wall and proximal duodenum may represent gastritis/duodenitis   Recommend posttreatment follow-up with gastroenterology  Chest x-ray:  No acute cardiopulmonary disease  X-ray of hips bilateral:Mild degenerative changes seen in the both hip joint  No acute displaced fracture seen  Degenerative changes in the visualized lumbar spine    Significant Findings / Test Results:   Olecranon bursitis of left elbow  See above mgt    Incidental Findings:   none  Test Results Pending at Discharge (will require follow up):    · None     Outpatient Tests Requested:  · None    Complications:  None  Reason for Admission:  Abdominal pain  Hospital Course:     Isabella Cheng is a 56 y o  female patient who originally presented to the hospital on 7/22/2018 due to acute abdominal, chest pain likely related to gallbladder disease/chemical pancreatitis  Clinical findings on image present above  Patient was monitored clinically, LFTs trended down during hospitalization  Patient was seen evaluated by Gastroenterology, recommended to continue proton pump inhibitor for chronic GERD, and as per Gastroenterology to have an elective ERCP to evaluate distal stuck stricture  Other comorbidities including hiatal hernia/gastritis, tobacco dependence, history of kidney stones all unremarkable during this admission  During hospitalization patient was noted to left elbow olecranon bursitis patient was recommended not to have aspiration cortisone injection  Continue cool compress, and encourage elbow range of motion  Patient seen evaluated by General surgery, continue management as per GI to have an eventual ERCP to rule malignancy  Hypertension and diabetes remained stable during hospitalization  Please see above list of diagnoses and related plan for additional information  Condition at Discharge: good     Discharge Day Visit / Exam:     Subjective:  No complaints today  Review of systems negative otherwise  Vitals: Blood Pressure: 127/76 (07/26/18 0735)  Pulse: 71 (07/26/18 0735)  Temperature: 98 2 °F (36 8 °C) (07/26/18 0735)  Temp Source: Oral (07/26/18 0735)  Respirations: 16 (07/26/18 0735)  Height: 5' 1" (154 9 cm) (07/22/18 2327)  Weight - Scale: 60 8 kg (134 lb 0 6 oz) (07/26/18 0735)  SpO2: 93 % (07/26/18 0735)  Exam:   Physical Exam   Constitutional: She is oriented to person, place, and time  No distress  HENT:   Head: Normocephalic and atraumatic  Mouth/Throat: Oropharynx is clear and moist    Eyes: Conjunctivae and EOM are normal  Pupils are equal, round, and reactive to light  No scleral icterus  Neck: Normal range of motion  Neck supple  No JVD present  Cardiovascular: Normal rate    Exam reveals no gallop and no friction rub  No murmur heard  Pulmonary/Chest: Effort normal and breath sounds normal  No respiratory distress  She has no wheezes  She has no rales  Abdominal: Soft  Bowel sounds are normal  She exhibits no distension  There is no tenderness  There is no rebound and no guarding  Musculoskeletal: Normal range of motion  She exhibits no edema, tenderness or deformity  Neurological: She is alert and oriented to person, place, and time  She has normal reflexes  No cranial nerve deficit  Coordination normal    Skin: Skin is warm  No erythema  Psychiatric: She has a normal mood and affect  Discussion with Family: yes  Discharge instructions/Information to patient and family:   See after visit summary for information provided to patient and family  Provisions for Follow-Up Care:  See after visit summary for information related to follow-up care and any pertinent home health orders  Disposition:     Home    For Discharges to [facility]:   · Not Applicable to this Patient - Not Applicable to this Patient    Planned Readmission: none     Discharge Statement:  I spent 35 minutes discharging the patient  This time was spent on the day of discharge  I had direct contact with the patient on the day of discharge  Greater than 50% of the total time was spent examining patient, answering all patient questions, arranging and discussing plan of care with patient as well as directly providing post-discharge instructions  Additional time then spent on discharge activities  Discharge Medications:  See after visit summary for reconciled discharge medications provided to patient and family        ** Please Note: This note has been constructed using a voice recognition system **

## 2018-07-26 NOTE — PLAN OF CARE
CARDIOVASCULAR - ADULT     Maintains optimal cardiac output and hemodynamic stability Adequate for Discharge        DISCHARGE PLANNING     Discharge to home or other facility with appropriate resources Adequate for Discharge        DISCHARGE PLANNING - CARE MANAGEMENT     Discharge to post-acute care or home with appropriate resources Adequate for Discharge        INFECTION - ADULT     Absence or prevention of progression during hospitalization Adequate for Discharge     Absence of fever/infection during neutropenic period Adequate for Discharge        PAIN - ADULT     Verbalizes/displays adequate comfort level or baseline comfort level Adequate for Discharge

## 2018-07-26 NOTE — CASE MANAGEMENT
Notification of Discharge  This is a Notification of Discharge from our facility [address]  Please be advised that this patient has been discharge from our facility  Below you will find the admission and discharge date and time including the patients disposition  PRESENTATION DATE: 7/22/2018  8:35 PM  IP ADMISSION DATE: 7/22/18 2221  DISCHARGE DATE: 7/26/2018 12:59 PM  DISPOSITION: Home/Self Care    [address] [facility] in the [facility]  Phone: [phone]; Fax [phone]  ATTENTION: The Network Utilization Review Department is now centralized for our 9 Facilities  Make a note that we have a new phone and fax numbers for our Department  Please call with any questions or concerns to [phone] and carefully follow the prompts so that you are directed to the right person  All voicemails are confidential  Fax any determinations, approvals, denials, and requests for initial or continue stay review clinical to [phone]  Due to HIGH CALL volume, it would be easier if you could please send faxed requests to expedite your requests and in part, help us provide discharge notifications faster        PENDING Lynne Parker [phone]

## 2018-07-26 NOTE — CASE MANAGEMENT
Raejean Seats, RN Registered Nurse Signed   Case Management Date of Service: 7/22/2018  8:38 AM         []Hide copied text  Initial Clinical Review  Admission: Date/Time/Statement: 7/22/18 @ 2221         Orders Placed This Encounter   Procedures    Inpatient Admission (expected length of stay for this patient is greater than two midnights)       Standing Status:   Standing       Number of Occurrences:   1       Order Specific Question:   Admitting Physician       Answer:   Ana Moya [[id number]]       Order Specific Question:   Level of Care       Answer:   Med Surg [16]       Order Specific Question:   Estimated length of stay       Answer:   More than 2 Midnights       Order Specific Question:   Certification       Answer:   I certify that inpatient services are medically necessary for this patient for a duration of greater than two midnights  See H&P and MD Progress Notes for additional information about the patient's course of treatment  ED: Date/Time/Mode of Arrival:             ED Arrival Information      Expected Arrival Acuity Means of Arrival Escorted By Service Admission Type     - 7/22/2018 20:32 Urgent Walk-In Family Member General Medicine Urgent     Arrival Complaint     CHEST PAIN/ABD PAIN       Chief Complaint:        Chief Complaint   Patient presents with    Chest Pain       Chest pain all over chest into abdomen and into low back; has had it since February; had upper GI done and found hiatal hernia; colonoscopy showed hemorrhoids  History of Illness:   56year old female presents with aching/stabbing pains over her entire abdomen, chest and low back which has been constant since 02/2018 when she was diagnosed with hiatal hernia    ED Vital Signs:            ED Triage Vitals   Temperature Pulse Respirations Blood Pressure SpO2   07/22/18 2040 07/22/18 2040 07/22/18 2040 07/22/18 2040 07/22/18 2040   (!) 96 9 °F (36 1 °C) 79 14 (!) 174/85 96 %       Temp Source Heart Rate Source Patient Position - Orthostatic VS BP Location FiO2 (%)   07/22/18 2040 07/22/18 2040 07/22/18 2040 07/22/18 2040 --   Tympanic Monitor Lying Right arm         Pain Score           07/22/18 2238           Worst Possible Pain                Wt Readings from Last 1 Encounters:   07/22/18 59 6 kg (131 lb 6 3 oz)   Vital Signs (abnormal): Abnormal Labs/Diagnostic Test Results:   HGB 11 0 AST 73 ALT 87 ALK PHOS 119 TPROT 6 2 ALB 2 9 LIPASE 1206   TROP NEG  ACETAMINOPHEN 4 2   CXR=No acute cardiopulmonary disease  CT A/P=Distended gallbladder with intra and extrahepatic biliary ductal dilatation   Further evaluation with a right upper quadrant sonogram is recommended to rule out acute cholecystitis  Mild thickening of the distal gastric wall and proximal duodenum may represent gastritis/duodenitis   Recommend posttreatment follow-up with gastroenterology    EKG=Interpretation: normal    Rate:     ECG rate:  80    ECG rate assessment: normal    Rhythm:     Rhythm: sinus rhythm    Ectopy:     Ectopy: none    QRS:     QRS axis:  Normal    QRS intervals:  Normal  Conduction:     Conduction: normal    ST segments:     ST segments:  Normal  T waves:     T waves: normal    ED Treatment:              Medication Administration from 07/22/2018 2032 to 07/22/2018 2336        Date/Time Order Dose Route Action Action by Comments       07/22/2018 2049 lidocaine viscous (XYLOCAINE) 2 % mucosal solution 10 mL 10 mL Swish & Swallow Given Beverley Phipps RN         07/22/2018 2049 aluminum-magnesium hydroxide-simethicone (MYLANTA) 200-200-20 mg/5 mL oral suspension 20 mL 20 mL Oral Given Beverley Phipps RN         07/22/2018 2200 lactated ringers infusion 100 mL/hr Intravenous New Bag Beverley Phipps RN         07/22/2018 2238 HYDROmorphone (DILAUDID) injection 0 5 mg 0 5 mg Intravenous Given Selma Valdovinos RN         07/22/2018 2328 iohexol (OMNIPAQUE) 350 MG/ML injection (MULTI-DOSE) 100 mL 100 mL Intravenous Given Karthik Quigley       Past Medical/Surgical History: Active Ambulatory Problems     Diagnosis Date Noted    Restless leg syndrome 12/08/2014    Psoriasis 12/08/2014    Narcolepsy without cataplexy 12/08/2014    Hypercholesterolemia 12/08/2014    Chronic pain 12/08/2014    Benign essential HTN 05/02/2016    Anxiety 12/08/2014    Abnormal weight loss 01/19/2018    Allergic rhinitis 12/08/2014    Anemia 12/14/2017    Hiatal hernia 08/22/2017    Altered bowel function 03/14/2018    Ingrown toenail without infection 03/14/2018    Nail abnormality 03/14/2018    Elbow swelling, left 07/10/2018    Olecranon bursitis of left elbow 07/10/2018           Resolved Ambulatory Problems     Diagnosis Date Noted    No Resolved Ambulatory Problems           Past Medical History:   Diagnosis Date    Kidney stones      Psychiatric disorder      Tobacco use disorder     Admitting Diagnosis: Acute pancreatitis [K85 90]  Chest pain syndrome [R07 9]  Age/Sex: 56 y o  female  Assessment/Plan:   1  Acute pancreatitis- Unclear cause  She has been having chronic abdominal pain with recent exacerbation  CT showing distended gallbladder but no gallstones  I will obtain a right upper quadrant sonogram  Will obtain GI consultation  Will keep her NPO for now  Place on IVF for hydration  Stop her NSAIDs  IV pain medications for better pain control  Continue her PPI  2  Transaminitis- With CT showing intra and extrahepatic ductal dilatation  Will obtain RUQ sonongram  Trend liver function test    3  History of hiatal hernia/gastritis- Possible contributing to her pain   Will continue with PPI  Admission Orders:  MED SURG  CONSULT GI  NPO  Scheduled Meds:   Current Facility-Administered Medications:  amLODIPine 5 mg Oral Daily Shawna King MD     cholecalciferol 5,000 Units Oral Daily Shawna Kign MD     clonazePAM 0 5 mg Oral BID PRN Shawna King MD     enoxaparin 40 mg Subcutaneous Q24H [address] Shawna King MD   fluticasone 2 spray Nasal Daily Shawna Smith MD     gabapentin 300 mg Oral Daily Shawna Smith MD     gabapentin 600 mg Oral HS Shawna Smith MD     HYDROmorphone 0 5 mg Intravenous Q4H PRN Shawna Smith MD     multivitamin-minerals 1 tablet Oral Daily Shawna Smith MD     nicotine 1 patch Transdermal Daily Shawna Smith MD     ondansetron 4 mg Intravenous Q6H PRN Shawna Smith MD     pantoprazole 40 mg Intravenous Q12H [facility] Shawna Smith MD     sodium chloride 125 mL/hr Intravenous Continuous Shawna Smith MD Last Rate: 125 mL/hr (07/23/18 0020)   Continuous Infusions:   sodium chloride 125 mL/hr Last Rate: 125 mL/hr (07/23/18 0020)   PRN Meds: clonazePAM    HYDROmorphone    ondansetron  Thank you,  Maia Aqq  [facility] Utilization Review Department  Phone: [phone]; Fax [phone]  ATTENTION: The Network Utilization Review Department is now centralized for our 9 Facilities  Make a note that we have a new phone and fax numbers for our Department  Please call with any questions or concerns to [phone] and carefully follow the prompts so that you are directed to the right person  All voicemails are confidential  Fax any determinations, approvals, denials, and requests for initial or continue stay review clinical to [phone]   Due to HIGH CALL volume, it would be easier if you could please send faxed requests to expedite your requests and in part, help us provide discharge notifications faster              Pending Dorys Jarrett [facility]-[phone]

## 2018-07-30 ENCOUNTER — TRANSITIONAL CARE MANAGEMENT (OUTPATIENT)
Dept: FAMILY MEDICINE CLINIC | Facility: HOSPITAL | Age: 58
End: 2018-07-30

## 2018-08-09 ENCOUNTER — ANESTHESIA EVENT (OUTPATIENT)
Dept: PERIOP | Facility: HOSPITAL | Age: 58
End: 2018-08-09
Payer: COMMERCIAL

## 2018-08-09 RX ORDER — FLUOCINONIDE 0.5 MG/G
OINTMENT TOPICAL 2 TIMES DAILY
COMMUNITY
End: 2019-11-14

## 2018-08-09 NOTE — PRE-PROCEDURE INSTRUCTIONS
Pre-Surgery Instructions:   Medication Instructions    acetaminophen-codeine (TYLENOL/CODEINE #3) 300-30 MG per tablet Instructed patient per Anesthesia Guidelines   amLODIPine (NORVASC) 5 mg tablet Instructed patient per Anesthesia Guidelines   Cholecalciferol (VITAMIN D3) 5000 units CAPS Patient was instructed by Physician and understands   Cranberry 200 MG CAPS Patient was instructed by Physician and understands   fluocinonide (LIDEX) 0 05 % ointment Instructed patient per Anesthesia Guidelines   fluticasone (FLONASE) 50 mcg/act nasal spray Instructed patient per Anesthesia Guidelines   gabapentin (NEURONTIN) 300 mg capsule Instructed patient per Anesthesia Guidelines   loratadine-pseudoephedrine (CLARITIN-D 12 HOUR) 5-120 mg per tablet Instructed patient per Anesthesia Guidelines   meloxicam (MOBIC) 7 5 mg tablet Instructed patient per Anesthesia Guidelines   Multiple Vitamin tablet Patient was instructed by Physician and understands   nicotine (NICODERM CQ) 21 mg/24 hr TD 24 hr patch Instructed patient per Anesthesia Guidelines   omeprazole (PriLOSEC) 40 MG capsule Instructed patient per Anesthesia Guidelines  Follow prep as per physician  You will receive a call with your time to arrive at the hospital   Secure transportation, you will be having anesthesia

## 2018-08-15 ENCOUNTER — ANESTHESIA (OUTPATIENT)
Dept: PERIOP | Facility: HOSPITAL | Age: 58
End: 2018-08-15
Payer: COMMERCIAL

## 2018-08-15 ENCOUNTER — APPOINTMENT (OUTPATIENT)
Dept: RADIOLOGY | Facility: HOSPITAL | Age: 58
End: 2018-08-15
Payer: COMMERCIAL

## 2018-08-15 ENCOUNTER — HOSPITAL ENCOUNTER (OUTPATIENT)
Facility: HOSPITAL | Age: 58
Setting detail: OUTPATIENT SURGERY
Discharge: HOME | End: 2018-08-15
Attending: INTERNAL MEDICINE | Admitting: INTERNAL MEDICINE
Payer: COMMERCIAL

## 2018-08-15 VITALS
WEIGHT: 128 LBS | HEIGHT: 61 IN | OXYGEN SATURATION: 99 % | BODY MASS INDEX: 24.17 KG/M2 | RESPIRATION RATE: 18 BRPM | DIASTOLIC BLOOD PRESSURE: 69 MMHG | HEART RATE: 62 BPM | TEMPERATURE: 98 F | SYSTOLIC BLOOD PRESSURE: 108 MMHG

## 2018-08-15 DIAGNOSIS — K83.8 OTHER SPECIFIED DISEASES OF BILIARY TRACT: ICD-10-CM

## 2018-08-15 LAB — EXT PREGNANCY TEST URINE: NEGATIVE

## 2018-08-15 PROCEDURE — 81025 URINE PREGNANCY TEST: CPT | Performed by: INTERNAL MEDICINE

## 2018-08-15 PROCEDURE — 74330 X-RAY BILE/PANC ENDOSCOPY: CPT

## 2018-08-15 PROCEDURE — 88112 CYTOPATH CELL ENHANCE TECH: CPT | Performed by: PATHOLOGY

## 2018-08-15 PROCEDURE — C1769 GUIDE WIRE: HCPCS | Performed by: INTERNAL MEDICINE

## 2018-08-15 RX ORDER — FENTANYL CITRATE/PF 50 MCG/ML
25 SYRINGE (ML) INJECTION
Status: DISCONTINUED | OUTPATIENT
Start: 2018-08-15 | End: 2018-08-15 | Stop reason: HOSPADM

## 2018-08-15 RX ORDER — MIDAZOLAM HYDROCHLORIDE 1 MG/ML
INJECTION INTRAMUSCULAR; INTRAVENOUS AS NEEDED
Status: DISCONTINUED | OUTPATIENT
Start: 2018-08-15 | End: 2018-08-15 | Stop reason: SURG

## 2018-08-15 RX ORDER — METOCLOPRAMIDE HYDROCHLORIDE 5 MG/ML
10 INJECTION INTRAMUSCULAR; INTRAVENOUS ONCE AS NEEDED
Status: DISCONTINUED | OUTPATIENT
Start: 2018-08-15 | End: 2018-08-15 | Stop reason: HOSPADM

## 2018-08-15 RX ORDER — SODIUM CHLORIDE 9 MG/ML
20 INJECTION, SOLUTION INTRAVENOUS CONTINUOUS
Status: DISCONTINUED | OUTPATIENT
Start: 2018-08-15 | End: 2018-08-15 | Stop reason: HOSPADM

## 2018-08-15 RX ORDER — PROPOFOL 10 MG/ML
INJECTION, EMULSION INTRAVENOUS AS NEEDED
Status: DISCONTINUED | OUTPATIENT
Start: 2018-08-15 | End: 2018-08-15 | Stop reason: SURG

## 2018-08-15 RX ORDER — GLYCOPYRROLATE 0.2 MG/ML
INJECTION INTRAMUSCULAR; INTRAVENOUS AS NEEDED
Status: DISCONTINUED | OUTPATIENT
Start: 2018-08-15 | End: 2018-08-15 | Stop reason: SURG

## 2018-08-15 RX ORDER — ONDANSETRON 2 MG/ML
4 INJECTION INTRAMUSCULAR; INTRAVENOUS ONCE AS NEEDED
Status: DISCONTINUED | OUTPATIENT
Start: 2018-08-15 | End: 2018-08-15 | Stop reason: HOSPADM

## 2018-08-15 RX ORDER — ROCURONIUM BROMIDE 10 MG/ML
INJECTION, SOLUTION INTRAVENOUS AS NEEDED
Status: DISCONTINUED | OUTPATIENT
Start: 2018-08-15 | End: 2018-08-15 | Stop reason: SURG

## 2018-08-15 RX ORDER — FENTANYL CITRATE 50 UG/ML
INJECTION, SOLUTION INTRAMUSCULAR; INTRAVENOUS AS NEEDED
Status: DISCONTINUED | OUTPATIENT
Start: 2018-08-15 | End: 2018-08-15 | Stop reason: SURG

## 2018-08-15 RX ADMIN — PROPOFOL 150 MG: 10 INJECTION, EMULSION INTRAVENOUS at 13:10

## 2018-08-15 RX ADMIN — SODIUM CHLORIDE 20 ML/HR: 0.9 INJECTION, SOLUTION INTRAVENOUS at 11:41

## 2018-08-15 RX ADMIN — SODIUM CHLORIDE: 0.9 INJECTION, SOLUTION INTRAVENOUS at 12:50

## 2018-08-15 RX ADMIN — GLYCOPYRROLATE 0.4 MG: 0.2 INJECTION, SOLUTION INTRAMUSCULAR; INTRAVENOUS at 13:45

## 2018-08-15 RX ADMIN — FENTANYL CITRATE 50 MCG: 50 INJECTION, SOLUTION INTRAMUSCULAR; INTRAVENOUS at 13:00

## 2018-08-15 RX ADMIN — SODIUM CHLORIDE: 0.9 INJECTION, SOLUTION INTRAVENOUS at 13:20

## 2018-08-15 RX ADMIN — ROCURONIUM BROMIDE 30 MG: 10 SOLUTION INTRAVENOUS at 13:10

## 2018-08-15 RX ADMIN — GLYCOPYRROLATE 0.2 MG: 0.2 INJECTION, SOLUTION INTRAMUSCULAR; INTRAVENOUS at 13:00

## 2018-08-15 RX ADMIN — FENTANYL CITRATE 50 MCG: 50 INJECTION, SOLUTION INTRAMUSCULAR; INTRAVENOUS at 13:45

## 2018-08-15 RX ADMIN — MIDAZOLAM HYDROCHLORIDE 2 MG: 1 INJECTION, SOLUTION INTRAMUSCULAR; INTRAVENOUS at 12:45

## 2018-08-15 RX ADMIN — MIDAZOLAM HYDROCHLORIDE 2 MG: 1 INJECTION, SOLUTION INTRAMUSCULAR; INTRAVENOUS at 13:00

## 2018-08-15 RX ADMIN — NEOSTIGMINE METHYLSULFATE 3 MG: 1 INJECTION, SOLUTION INTRAMUSCULAR; INTRAVENOUS; SUBCUTANEOUS at 13:45

## 2018-08-15 NOTE — OP NOTE
**** GI/ENDOSCOPY REPORT ****     PATIENT NAME: AYO BANUELOS - VISIT ID:  Patient ID: [id number]   YOB: 1960     INTRODUCTION: Endoscopic Retrograde Cholangiopancreatography - A 57female   patient presents for an outpatient Endoscopic Retrograde   Cholangiopancreatography at [facility]  INDICATIONS: Common bile duct stricture by MRCP  CONSENT:  The benefits, risks, and alternatives to the procedure were   discussed and informed consent was obtained from the patient  PREPARATION:  EKG, pulse, pulse oximetry and blood pressure were monitored   throughout the procedure  MEDICATIONS: Anesthesia-check records     PROCEDURE:  The endoscope was passed with ease through the mouth under   direct visualization and advanced to the 2nd portion of the duodenum  The   scope was withdrawn and the mucosa was carefully examined  The views were   good  The patient's toleration of the procedure was good  FINDINGS:  Ampulla was identified cannulated, wire passed in the   trajectory of the common duct confirmed by Radiology  Dye was insufflated   the duct appeared normal confirmed by Radiology  The distal duct was   brushed and the apparatus removed  COMPLICATIONS: There were no complications  IMPRESSIONS: Ampulla was identified cannulated, wire passed in the   trajectory of the common duct confirmed by Radiology  Dye was insufflated   the duct appeared normal confirmed by Radiology  The distal duct was   brushed and the apparatus removed  RECOMMENDATIONS: With the results of the brushings we want to see her back   in follow-up  Resume regular diet as tolerated  Continue current   medications  ESTIMATED BLOOD LOSS: None  PROCEDURE CODES: 86629 - ERCP diagnostic; incl brushing or washing     ICD-9 Codes:     ICD-10 Codes:     PERFORMED BY: Dr Walker Ca MD on 08/15/2018    Version 1, electronically signed by Dr Noralee Meckel, MD on 08/15/2018 at 14:15

## 2018-08-15 NOTE — ANESTHESIA PREPROCEDURE EVALUATION
Review of Systems/Medical History  Patient summary reviewed  Chart reviewed      Cardiovascular  EKG reviewed, Hyperlipidemia, Hypertension ,    Pulmonary       GI/Hepatic     Hiatal hernia,        Kidney stones,        Endo/Other     GYN       Hematology  Anemia ,     Musculoskeletal       Neurology   Psychology           Physical Exam    Airway    Mallampati score: II  TM Distance: >3 FB  Neck ROM: full     Dental   lower dentures and upper dentures,     Cardiovascular  Rhythm: regular, Rate: normal, Cardiovascular exam normal    Pulmonary  Pulmonary exam normal     Other Findings        Anesthesia Plan  ASA Score- 2     Anesthesia Type- general with ASA Monitors  Additional Monitors:   Airway Plan: ETT  Plan Factors- Patient instructed to abstain from smoking on day of procedure  Patient did not smoke on day of surgery  Induction- intravenous  Postoperative Plan- Plan for postoperative opioid use  Informed Consent- Anesthetic plan and risks discussed with patient  I personally reviewed this patient with the CRNA  Discussed and agreed on the Anesthesia Plan with the CRNA  Aixa Peterson

## 2018-08-27 ENCOUNTER — TRANSCRIBE ORDERS (OUTPATIENT)
Dept: ADMINISTRATIVE | Facility: HOSPITAL | Age: 58
End: 2018-08-27

## 2018-08-27 DIAGNOSIS — Z12.11 SPECIAL SCREENING FOR MALIGNANT NEOPLASMS, COLON: Primary | ICD-10-CM

## 2018-08-31 ENCOUNTER — HOSPITAL ENCOUNTER (OUTPATIENT)
Dept: NUCLEAR MEDICINE | Facility: HOSPITAL | Age: 58
Discharge: HOME | End: 2018-08-31
Attending: INTERNAL MEDICINE
Payer: COMMERCIAL

## 2018-08-31 DIAGNOSIS — Z12.11 SPECIAL SCREENING FOR MALIGNANT NEOPLASMS, COLON: ICD-10-CM

## 2018-08-31 PROCEDURE — A9537 TC99M MEBROFENIN: HCPCS

## 2018-08-31 PROCEDURE — 78227 HEPATOBIL SYST IMAGE W/DRUG: CPT

## 2018-08-31 RX ADMIN — SINCALIDE 1.2 MCG: 5 INJECTION, POWDER, LYOPHILIZED, FOR SOLUTION INTRAVENOUS at 12:46

## 2018-10-12 DIAGNOSIS — G89.29 OTHER CHRONIC PAIN: ICD-10-CM

## 2018-10-12 RX ORDER — GABAPENTIN 300 MG/1
CAPSULE ORAL
Qty: 90 CAPSULE | Refills: 0 | Status: SHIPPED | OUTPATIENT
Start: 2018-10-12 | End: 2018-11-02 | Stop reason: SDUPTHER

## 2018-11-02 DIAGNOSIS — G89.29 OTHER CHRONIC PAIN: ICD-10-CM

## 2018-11-05 DIAGNOSIS — I10 ESSENTIAL HYPERTENSION: Primary | ICD-10-CM

## 2018-11-05 DIAGNOSIS — G89.29 OTHER CHRONIC PAIN: ICD-10-CM

## 2018-11-05 RX ORDER — GABAPENTIN 300 MG/1
CAPSULE ORAL
Qty: 90 CAPSULE | Refills: 0
Start: 2018-11-05 | End: 2018-11-13 | Stop reason: SDUPTHER

## 2018-11-05 RX ORDER — MELOXICAM 7.5 MG/1
7.5 TABLET ORAL DAILY
Qty: 30 TABLET | Refills: 0
Start: 2018-11-05 | End: 2018-11-13 | Stop reason: CLARIF

## 2018-11-05 RX ORDER — MELOXICAM 15 MG/1
TABLET ORAL
Qty: 30 TABLET | Refills: 5 | Status: SHIPPED | OUTPATIENT
Start: 2018-11-05 | End: 2018-11-13 | Stop reason: SDUPTHER

## 2018-11-05 RX ORDER — AMLODIPINE BESYLATE 10 MG/1
TABLET ORAL
Qty: 90 TABLET | Refills: 3 | Status: SHIPPED | OUTPATIENT
Start: 2018-11-05 | End: 2018-11-13 | Stop reason: CLARIF

## 2018-11-13 ENCOUNTER — OFFICE VISIT (OUTPATIENT)
Dept: FAMILY MEDICINE CLINIC | Facility: HOSPITAL | Age: 58
End: 2018-11-13
Payer: COMMERCIAL

## 2018-11-13 VITALS
RESPIRATION RATE: 16 BRPM | SYSTOLIC BLOOD PRESSURE: 158 MMHG | BODY MASS INDEX: 23.6 KG/M2 | DIASTOLIC BLOOD PRESSURE: 88 MMHG | WEIGHT: 125 LBS | HEIGHT: 61 IN | HEART RATE: 95 BPM

## 2018-11-13 DIAGNOSIS — E78.00 HYPERCHOLESTEROLEMIA: ICD-10-CM

## 2018-11-13 DIAGNOSIS — G47.419 NARCOLEPSY WITHOUT CATAPLEXY: ICD-10-CM

## 2018-11-13 DIAGNOSIS — F41.9 ANXIETY: ICD-10-CM

## 2018-11-13 DIAGNOSIS — I10 BENIGN ESSENTIAL HTN: Primary | ICD-10-CM

## 2018-11-13 DIAGNOSIS — G89.29 OTHER CHRONIC PAIN: ICD-10-CM

## 2018-11-13 DIAGNOSIS — Z12.2 ENCOUNTER FOR SCREENING FOR LUNG CANCER: ICD-10-CM

## 2018-11-13 DIAGNOSIS — K21.9 GASTROESOPHAGEAL REFLUX DISEASE, ESOPHAGITIS PRESENCE NOT SPECIFIED: ICD-10-CM

## 2018-11-13 DIAGNOSIS — Z72.0 TOBACCO ABUSE: ICD-10-CM

## 2018-11-13 DIAGNOSIS — R10.9 ABDOMINAL PAIN, UNSPECIFIED ABDOMINAL LOCATION: ICD-10-CM

## 2018-11-13 DIAGNOSIS — K44.9 HIATAL HERNIA: ICD-10-CM

## 2018-11-13 PROBLEM — M16.0 PRIMARY OSTEOARTHRITIS OF BOTH HIPS: Status: ACTIVE | Noted: 2018-11-13

## 2018-11-13 PROBLEM — M25.422 ELBOW SWELLING, LEFT: Status: RESOLVED | Noted: 2018-07-10 | Resolved: 2018-11-13

## 2018-11-13 PROBLEM — K85.90 ACUTE PANCREATITIS: Status: RESOLVED | Noted: 2018-07-23 | Resolved: 2018-11-13

## 2018-11-13 PROBLEM — R10.13 EPIGASTRIC PAIN: Status: RESOLVED | Noted: 2018-07-23 | Resolved: 2018-11-13

## 2018-11-13 PROBLEM — R19.8 ALTERED BOWEL FUNCTION: Status: RESOLVED | Noted: 2018-03-14 | Resolved: 2018-11-13

## 2018-11-13 PROBLEM — M70.22 OLECRANON BURSITIS OF LEFT ELBOW: Status: RESOLVED | Noted: 2018-07-10 | Resolved: 2018-11-13

## 2018-11-13 PROBLEM — R63.4 ABNORMAL WEIGHT LOSS: Status: RESOLVED | Noted: 2018-01-19 | Resolved: 2018-11-13

## 2018-11-13 PROBLEM — L60.0 INGROWN TOENAIL WITHOUT INFECTION: Status: RESOLVED | Noted: 2018-03-14 | Resolved: 2018-11-13

## 2018-11-13 PROCEDURE — 99214 OFFICE O/P EST MOD 30 MIN: CPT | Performed by: INTERNAL MEDICINE

## 2018-11-13 RX ORDER — ACETAMINOPHEN AND CODEINE PHOSPHATE 300; 30 MG/1; MG/1
1 TABLET ORAL 2 TIMES DAILY PRN
Qty: 60 TABLET | Refills: 0 | Status: SHIPPED | OUTPATIENT
Start: 2018-11-13 | End: 2018-11-23 | Stop reason: SDUPTHER

## 2018-11-13 RX ORDER — AMLODIPINE BESYLATE 5 MG/1
5 TABLET ORAL DAILY
Qty: 30 TABLET | Refills: 5 | Status: SHIPPED | OUTPATIENT
Start: 2018-11-13 | End: 2018-11-13 | Stop reason: SDUPTHER

## 2018-11-13 RX ORDER — GABAPENTIN 300 MG/1
CAPSULE ORAL
Qty: 90 CAPSULE | Refills: 5 | Status: SHIPPED | OUTPATIENT
Start: 2018-11-13 | End: 2019-05-07 | Stop reason: SDUPTHER

## 2018-11-13 RX ORDER — AMLODIPINE BESYLATE 5 MG/1
5 TABLET ORAL DAILY
Qty: 90 TABLET | Refills: 3 | Status: SHIPPED | OUTPATIENT
Start: 2018-11-13 | End: 2019-05-14 | Stop reason: SDUPTHER

## 2018-11-13 RX ORDER — LISINOPRIL 10 MG/1
10 TABLET ORAL DAILY
Qty: 90 TABLET | Refills: 3 | Status: SHIPPED | OUTPATIENT
Start: 2018-11-13 | End: 2019-11-14 | Stop reason: ALTCHOICE

## 2018-11-13 RX ORDER — MELOXICAM 15 MG/1
15 TABLET ORAL DAILY
Qty: 30 TABLET | Refills: 5 | Status: SHIPPED | OUTPATIENT
Start: 2018-11-13 | End: 2019-09-06 | Stop reason: SDUPTHER

## 2018-11-13 RX ORDER — OMEPRAZOLE 40 MG/1
CAPSULE, DELAYED RELEASE ORAL
Qty: 30 CAPSULE | Refills: 5 | Status: SHIPPED | OUTPATIENT
Start: 2018-11-13 | End: 2019-06-06 | Stop reason: SDUPTHER

## 2018-11-13 RX ORDER — LORAZEPAM 0.5 MG/1
0.5 TABLET ORAL 2 TIMES DAILY
Qty: 60 TABLET | Refills: 3 | Status: SHIPPED | OUTPATIENT
Start: 2018-11-13 | End: 2019-05-14 | Stop reason: ALTCHOICE

## 2018-11-13 NOTE — ASSESSMENT & PLAN NOTE
Having increased issues- does not feel focused klonipin- had prior rx for that on hand     Son has helath issues and is having injections with dr Bhavna Vilchis today- will give limited rx for ativan

## 2018-11-13 NOTE — ASSESSMENT & PLAN NOTE
To see ortho  Will give limited rx for tylenol #3- is her busy season at work and unable to take time off

## 2018-11-13 NOTE — PROGRESS NOTES
Assessment/Plan:             Problem List Items Addressed This Visit        Digestive    Hiatal hernia     Is on omeprazole- to see gi in 6 months         Relevant Medications    omeprazole (PriLOSEC) 40 MG capsule       Cardiovascular and Mediastinum    Benign essential HTN - Primary     Has increased bp today- had been cut from 10 to 5 mg daily  Will add low dose lisinopril         Relevant Medications    lisinopril (ZESTRIL) 10 mg tablet    amLODIPine (NORVASC) 5 mg tablet       Other    Narcolepsy without cataplexy     Improved - no recurrent symptoms after being off provigil         Hypercholesterolemia     Using omega 3 from to your helath and improving on labs in June         Chronic pain    Relevant Medications    gabapentin (NEURONTIN) 300 mg capsule    meloxicam (MOBIC) 15 mg tablet    Anxiety     Having increased issues- does not feel focused klonipin- had prior rx for that on hand  Son has helath issues and is having injections with dr Bhavna Vilchis today- will give limited rx for ativan         Relevant Medications    LORazepam (ATIVAN) 0 5 mg tablet      Other Visit Diagnoses     Gastroesophageal reflux disease, esophagitis presence not specified        Relevant Medications    omeprazole (PriLOSEC) 40 MG capsule            Subjective:      Patient ID: La Floyd is a 66 y o  female    1  Gi issues- feeling much better after admit for pancreatitis-in July - had ductal dilation  To do colonsocopy every 5 years- in 3 years- had polyp        The following portions of the patient's history were reviewed and updated as appropriate: allergies, current medications and problem list      Review of Systems   Gastrointestinal: Negative for abdominal distention and diarrhea  Genitourinary: Negative for dysuria  Neurological: Negative for light-headedness  All other systems reviewed and are negative          Objective:      Current Outpatient Prescriptions:     acetaminophen-codeine (TYLENOL/CODEINE #3) 300-30 MG per tablet, Take 1 tablet by mouth 3 (three) times a day with meals, Disp: 90 tablet, Rfl: 2    amLODIPine (NORVASC) 5 mg tablet, Take 1 tablet (5 mg total) by mouth daily Decreased dose, Disp: 90 tablet, Rfl: 3    Cholecalciferol (VITAMIN D3) 5000 units CAPS, Take by mouth daily, Disp: , Rfl:     Cranberry 200 MG CAPS, Take 1 capsule by mouth daily, Disp: , Rfl:     fluocinonide (LIDEX) 0 05 % ointment, Apply topically 2 (two) times a day, Disp: , Rfl:     fluticasone (FLONASE) 50 mcg/act nasal spray, 2 Squirts into each nostril, Disp: , Rfl:     gabapentin (NEURONTIN) 300 mg capsule, Take 1 capsule po daily and 2 caps at night, Disp: 90 capsule, Rfl: 5    loratadine-pseudoephedrine (CLARITIN-D 12 HOUR) 5-120 mg per tablet, Take 1 tablet by mouth every 12 (twelve) hours as needed, Disp: , Rfl:     meloxicam (MOBIC) 15 mg tablet, Take 1 tablet (15 mg total) by mouth daily, Disp: 30 tablet, Rfl: 5    Multiple Vitamin tablet, Take 1 tablet by mouth daily, Disp: , Rfl:     omeprazole (PriLOSEC) 40 MG capsule, Takes 1 tab daily, Disp: 30 capsule, Rfl: 5    lisinopril (ZESTRIL) 10 mg tablet, Take 1 tablet (10 mg total) by mouth daily, Disp: 90 tablet, Rfl: 3    LORazepam (ATIVAN) 0 5 mg tablet, Take 1 tablet (0 5 mg total) by mouth 2 (two) times a day, Disp: 60 tablet, Rfl: 3    Blood pressure 158/88, pulse 95, resp  rate 16, height 5' 1" (1 549 m), weight 56 7 kg (125 lb), not currently breastfeeding  Physical Exam   Constitutional: She appears well-developed and well-nourished  HENT:   Head: Normocephalic  Right Ear: External ear normal    Left Ear: External ear normal    Nose: Nose normal    Eyes: Pupils are equal, round, and reactive to light  Right eye exhibits no discharge  Left eye exhibits no discharge  Neck: No JVD present  No thyromegaly present  Cardiovascular: Normal rate, regular rhythm and normal heart sounds  Exam reveals no friction rub      No murmur heard   Pulmonary/Chest: Effort normal and breath sounds normal  No respiratory distress  She has no wheezes  She has no rales  Abdominal: Soft  Bowel sounds are normal  She exhibits no distension  Musculoskeletal: Normal range of motion  Tenderness in left lateral hip greater than right   Neurological: She is alert  She displays normal reflexes  No cranial nerve deficit  Skin: Skin is warm and dry  Nursing note and vitals reviewed

## 2018-11-23 DIAGNOSIS — M79.10 MYALGIA: Primary | ICD-10-CM

## 2018-11-23 DIAGNOSIS — R10.9 ABDOMINAL PAIN, UNSPECIFIED ABDOMINAL LOCATION: ICD-10-CM

## 2018-11-23 DIAGNOSIS — F41.9 ANXIETY: Primary | ICD-10-CM

## 2018-11-23 RX ORDER — CLONAZEPAM 0.5 MG/1
TABLET ORAL
Qty: 60 TABLET | Refills: 2 | OUTPATIENT
Start: 2018-11-23

## 2018-11-23 RX ORDER — ACETAMINOPHEN AND CODEINE PHOSPHATE 300; 30 MG/1; MG/1
TABLET ORAL
Qty: 60 TABLET | Refills: 0 | Status: SHIPPED | OUTPATIENT
Start: 2018-11-23 | End: 2019-05-14 | Stop reason: ALTCHOICE

## 2018-11-23 RX ORDER — MELOXICAM 7.5 MG/1
TABLET ORAL
Qty: 30 TABLET | Refills: 0 | Status: SHIPPED | OUTPATIENT
Start: 2018-11-23 | End: 2019-05-14 | Stop reason: ALTCHOICE

## 2018-11-27 ENCOUNTER — OFFICE VISIT (OUTPATIENT)
Dept: FAMILY MEDICINE CLINIC | Facility: HOSPITAL | Age: 58
End: 2018-11-27
Payer: COMMERCIAL

## 2018-11-27 VITALS
SYSTOLIC BLOOD PRESSURE: 118 MMHG | HEIGHT: 61 IN | BODY MASS INDEX: 23.41 KG/M2 | OXYGEN SATURATION: 96 % | TEMPERATURE: 98.8 F | HEART RATE: 84 BPM | WEIGHT: 124 LBS | DIASTOLIC BLOOD PRESSURE: 72 MMHG

## 2018-11-27 DIAGNOSIS — J40 TRACHEOBRONCHITIS: ICD-10-CM

## 2018-11-27 DIAGNOSIS — R05.9 COUGH: Primary | ICD-10-CM

## 2018-11-27 PROCEDURE — 3008F BODY MASS INDEX DOCD: CPT | Performed by: INTERNAL MEDICINE

## 2018-11-27 PROCEDURE — 99213 OFFICE O/P EST LOW 20 MIN: CPT | Performed by: INTERNAL MEDICINE

## 2018-11-27 RX ORDER — AZITHROMYCIN 250 MG/1
250 TABLET, FILM COATED ORAL DAILY
Qty: 6 TABLET | Refills: 0 | Status: SHIPPED | OUTPATIENT
Start: 2018-11-27 | End: 2018-12-02

## 2018-11-27 RX ORDER — CLONAZEPAM 0.5 MG/1
TABLET ORAL
Refills: 0 | COMMUNITY
Start: 2018-11-08 | End: 2019-05-14 | Stop reason: ALTCHOICE

## 2018-11-27 RX ORDER — CLOBETASOL PROPIONATE 0.5 MG/G
CREAM TOPICAL
Refills: 0 | COMMUNITY
Start: 2018-11-23 | End: 2019-11-14

## 2018-11-27 NOTE — PROGRESS NOTES
Assessment/Plan:             Problem List Items Addressed This Visit     None      Visit Diagnoses     Cough    -  Primary            Subjective:      Patient ID: Kwame Sherwood is a 66 y o  female    1  Cough and low grade fever - started 2 days ago with cough with tickle in back of throat- son had higher fevers and seen yesterday here  Had temp to 100 this am  Some headache and diarrhea this am  No abdominal pain or n/v  The following portions of the patient's history were reviewed and updated as appropriate: allergies, current medications and problem list      Review of Systems   Constitutional: Positive for fatigue and fever  HENT: Positive for ear pain, postnasal drip and sore throat  Negative for sinus pain  Respiratory: Positive for cough and chest tightness  Negative for wheezing  Cardiovascular: Negative for chest pain, palpitations and leg swelling           Objective:      Current Outpatient Prescriptions:     acetaminophen-codeine (TYLENOL #3) 300-30 mg per tablet, take 1 tablet by mouth twice a day if needed for MODERATE PAIN, Disp: 60 tablet, Rfl: 0    amLODIPine (NORVASC) 5 mg tablet, Take 1 tablet (5 mg total) by mouth daily Decreased dose, Disp: 90 tablet, Rfl: 3    Cholecalciferol (VITAMIN D3) 5000 units CAPS, Take by mouth daily, Disp: , Rfl:     clobetasol (TEMOVATE) 0 05 % cream, , Disp: , Rfl: 0    clonazePAM (KlonoPIN) 0 5 mg tablet, , Disp: , Rfl: 0    Cranberry 200 MG CAPS, Take 1 capsule by mouth daily, Disp: , Rfl:     fluocinonide (LIDEX) 0 05 % ointment, Apply topically 2 (two) times a day, Disp: , Rfl:     fluticasone (FLONASE) 50 mcg/act nasal spray, 2 Squirts into each nostril, Disp: , Rfl:     gabapentin (NEURONTIN) 300 mg capsule, Take 1 capsule po daily and 2 caps at night, Disp: 90 capsule, Rfl: 5    lisinopril (ZESTRIL) 10 mg tablet, Take 1 tablet (10 mg total) by mouth daily, Disp: 90 tablet, Rfl: 3    loratadine-pseudoephedrine (CLARITIN-D 12 HOUR) 5-120 mg per tablet, Take 1 tablet by mouth every 12 (twelve) hours as needed, Disp: , Rfl:     LORazepam (ATIVAN) 0 5 mg tablet, Take 1 tablet (0 5 mg total) by mouth 2 (two) times a day, Disp: 60 tablet, Rfl: 3    meloxicam (MOBIC) 15 mg tablet, Take 1 tablet (15 mg total) by mouth daily, Disp: 30 tablet, Rfl: 5    Multiple Vitamin tablet, Take 1 tablet by mouth daily, Disp: , Rfl:     omeprazole (PriLOSEC) 40 MG capsule, Takes 1 tab daily, Disp: 30 capsule, Rfl: 5    meloxicam (MOBIC) 7 5 mg tablet, take 1 tablet by mouth once daily (Patient not taking: Reported on 11/27/2018), Disp: 30 tablet, Rfl: 0    Blood pressure 118/72, pulse 84, temperature 98 8 °F (37 1 °C), temperature source Tympanic, height 5' 1" (1 549 m), weight 56 2 kg (124 lb), SpO2 96 %, not currently breastfeeding  Physical Exam   HENT:   Head: Normocephalic  Left Ear: External ear normal    Small amount of air fluid on right tm, left dull   mild pharyngeal injection   Eyes: Conjunctivae are normal  Right eye exhibits no discharge  Left eye exhibits no discharge  Cardiovascular: Normal rate, regular rhythm and normal heart sounds  Exam reveals no friction rub  No murmur heard  Pulmonary/Chest:   Barky upper airway cough Upper airway rhonchi- no retractions   Abdominal: Soft  Bowel sounds are normal    Nursing note and vitals reviewed

## 2018-11-30 ENCOUNTER — OFFICE VISIT (OUTPATIENT)
Dept: FAMILY MEDICINE CLINIC | Facility: HOSPITAL | Age: 58
End: 2018-11-30
Payer: COMMERCIAL

## 2018-11-30 VITALS
TEMPERATURE: 98.8 F | DIASTOLIC BLOOD PRESSURE: 70 MMHG | BODY MASS INDEX: 23.41 KG/M2 | HEIGHT: 61 IN | SYSTOLIC BLOOD PRESSURE: 122 MMHG | HEART RATE: 77 BPM | WEIGHT: 124 LBS

## 2018-11-30 DIAGNOSIS — J06.9 UPPER RESPIRATORY TRACT INFECTION, UNSPECIFIED TYPE: Primary | ICD-10-CM

## 2018-11-30 PROCEDURE — 99213 OFFICE O/P EST LOW 20 MIN: CPT | Performed by: PHYSICIAN ASSISTANT

## 2018-11-30 PROCEDURE — 3008F BODY MASS INDEX DOCD: CPT | Performed by: PHYSICIAN ASSISTANT

## 2018-11-30 RX ORDER — AMLODIPINE BESYLATE 10 MG/1
TABLET ORAL
Refills: 0 | COMMUNITY
Start: 2018-11-28 | End: 2018-11-30 | Stop reason: DRUGHIGH

## 2018-11-30 NOTE — PROGRESS NOTES
Assessment/Plan:      URI  smoker     Subjective:      Patient ID: Chucho Merrill is a 66 y o  female  66year old white female presents with ongoing cough, dry, worse late in day  Started Zithromax, but still very congestion  Has a runny nose, and post nasal drip (tickle in throat)  Started Lisinopril   few weeks ago  Cough   Associated symptoms include chills, a fever, headaches, postnasal drip and rhinorrhea  Pertinent negatives include no ear pain, sore throat or shortness of breath  Review of Systems   Constitutional: Positive for chills, diaphoresis, fatigue and fever  HENT: Positive for congestion, postnasal drip and rhinorrhea  Negative for ear pain and sore throat  Respiratory: Positive for cough  Negative for shortness of breath  Neurological: Positive for headaches  Negative for dizziness and light-headedness  Objective:      /70   Pulse 77   Temp 98 8 °F (37 1 °C) (Tympanic)   Ht 5' 1" (1 549 m)   Wt 56 2 kg (124 lb)   LMP  (LMP Unknown)   BMI 23 43 kg/m²          Physical Exam   Constitutional: She is oriented to person, place, and time  She appears well-developed and well-nourished  No distress  HENT:   Head: Normocephalic and atraumatic  Right Ear: External ear normal    Left Ear: External ear normal    Nose: Nose normal    Mouth/Throat: Oropharynx is clear and moist  No oropharyngeal exudate  TM erythematous  Eyes: Conjunctivae and EOM are normal  Right eye exhibits no discharge  Left eye exhibits no discharge  No scleral icterus  Pulmonary/Chest: Effort normal and breath sounds normal  No respiratory distress  She has no wheezes  She has no rales  Neurological: She is alert and oriented to person, place, and time  Skin: She is not diaphoretic  Nursing note and vitals reviewed

## 2018-11-30 NOTE — PATIENT INSTRUCTIONS
Continue antibiotics, monitor blood pressure, reduce Lisinopril to qod  Call office next week if sx  Persist    Urged smoke cessation

## 2019-05-07 DIAGNOSIS — G89.29 OTHER CHRONIC PAIN: ICD-10-CM

## 2019-05-07 RX ORDER — GABAPENTIN 300 MG/1
CAPSULE ORAL
Qty: 90 CAPSULE | Refills: 5 | Status: SHIPPED | OUTPATIENT
Start: 2019-05-07 | End: 2019-05-14 | Stop reason: SDUPTHER

## 2019-05-14 ENCOUNTER — OFFICE VISIT (OUTPATIENT)
Dept: FAMILY MEDICINE CLINIC | Facility: HOSPITAL | Age: 59
End: 2019-05-14

## 2019-05-14 VITALS
SYSTOLIC BLOOD PRESSURE: 136 MMHG | BODY MASS INDEX: 24.92 KG/M2 | OXYGEN SATURATION: 98 % | WEIGHT: 132 LBS | HEIGHT: 61 IN | DIASTOLIC BLOOD PRESSURE: 78 MMHG | HEART RATE: 96 BPM

## 2019-05-14 DIAGNOSIS — Z72.0 TOBACCO ABUSE: ICD-10-CM

## 2019-05-14 DIAGNOSIS — I10 BENIGN ESSENTIAL HTN: Primary | ICD-10-CM

## 2019-05-14 DIAGNOSIS — G25.81 RESTLESS LEG SYNDROME: ICD-10-CM

## 2019-05-14 DIAGNOSIS — M47.816 SPONDYLOSIS OF LUMBAR REGION WITHOUT MYELOPATHY OR RADICULOPATHY: ICD-10-CM

## 2019-05-14 DIAGNOSIS — G89.29 OTHER CHRONIC PAIN: ICD-10-CM

## 2019-05-14 DIAGNOSIS — L60.9 NAIL ABNORMALITY: ICD-10-CM

## 2019-05-14 DIAGNOSIS — L40.9 PSORIASIS: ICD-10-CM

## 2019-05-14 DIAGNOSIS — J30.89 NON-SEASONAL ALLERGIC RHINITIS, UNSPECIFIED TRIGGER: ICD-10-CM

## 2019-05-14 PROCEDURE — 99213 OFFICE O/P EST LOW 20 MIN: CPT | Performed by: INTERNAL MEDICINE

## 2019-05-14 RX ORDER — AMLODIPINE BESYLATE 10 MG/1
10 TABLET ORAL DAILY
Qty: 90 TABLET | Refills: 3 | Status: SHIPPED | OUTPATIENT
Start: 2019-05-14 | End: 2019-10-09 | Stop reason: SDUPTHER

## 2019-05-14 RX ORDER — GABAPENTIN 300 MG/1
CAPSULE ORAL
Qty: 120 CAPSULE | Refills: 0 | Status: SHIPPED | OUTPATIENT
Start: 2019-05-14 | End: 2019-06-10 | Stop reason: SDUPTHER

## 2019-06-06 DIAGNOSIS — K21.9 GASTROESOPHAGEAL REFLUX DISEASE, ESOPHAGITIS PRESENCE NOT SPECIFIED: ICD-10-CM

## 2019-06-07 RX ORDER — OMEPRAZOLE 40 MG/1
CAPSULE, DELAYED RELEASE ORAL
Qty: 30 CAPSULE | Refills: 5 | Status: SHIPPED | OUTPATIENT
Start: 2019-06-07 | End: 2019-11-14 | Stop reason: SDUPTHER

## 2019-06-10 DIAGNOSIS — G89.29 OTHER CHRONIC PAIN: ICD-10-CM

## 2019-06-10 RX ORDER — GABAPENTIN 300 MG/1
CAPSULE ORAL
Qty: 120 CAPSULE | Refills: 0 | Status: SHIPPED | OUTPATIENT
Start: 2019-06-10 | End: 2019-06-10 | Stop reason: SDUPTHER

## 2019-06-10 RX ORDER — GABAPENTIN 300 MG/1
CAPSULE ORAL
Qty: 120 CAPSULE | Refills: 0 | Status: SHIPPED | OUTPATIENT
Start: 2019-06-10 | End: 2019-06-28 | Stop reason: SDUPTHER

## 2019-06-28 DIAGNOSIS — L60.9 NAIL ABNORMALITY: ICD-10-CM

## 2019-06-28 DIAGNOSIS — G89.29 OTHER CHRONIC PAIN: ICD-10-CM

## 2019-06-28 RX ORDER — GABAPENTIN 300 MG/1
CAPSULE ORAL
Qty: 120 CAPSULE | Refills: 0 | Status: SHIPPED | OUTPATIENT
Start: 2019-06-28 | End: 2019-07-01 | Stop reason: SDUPTHER

## 2019-07-01 DIAGNOSIS — G89.29 OTHER CHRONIC PAIN: ICD-10-CM

## 2019-07-01 RX ORDER — GABAPENTIN 300 MG/1
CAPSULE ORAL
Qty: 120 CAPSULE | Refills: 0 | Status: SHIPPED | OUTPATIENT
Start: 2019-07-01 | End: 2019-07-31 | Stop reason: SDUPTHER

## 2019-07-23 ENCOUNTER — OFFICE VISIT (OUTPATIENT)
Dept: FAMILY MEDICINE CLINIC | Facility: HOSPITAL | Age: 59
End: 2019-07-23

## 2019-07-23 VITALS
BODY MASS INDEX: 26.06 KG/M2 | HEART RATE: 97 BPM | DIASTOLIC BLOOD PRESSURE: 70 MMHG | HEIGHT: 61 IN | SYSTOLIC BLOOD PRESSURE: 120 MMHG | WEIGHT: 138 LBS

## 2019-07-23 DIAGNOSIS — M25.511 ACUTE PAIN OF RIGHT SHOULDER: Primary | ICD-10-CM

## 2019-07-23 DIAGNOSIS — M75.21 BICEPS TENDINITIS OF RIGHT UPPER EXTREMITY: ICD-10-CM

## 2019-07-23 PROCEDURE — 99213 OFFICE O/P EST LOW 20 MIN: CPT | Performed by: INTERNAL MEDICINE

## 2019-07-23 PROCEDURE — 20610 DRAIN/INJ JOINT/BURSA W/O US: CPT

## 2019-07-23 RX ORDER — METHYLPREDNISOLONE ACETATE 40 MG/ML
40 INJECTION, SUSPENSION INTRA-ARTICULAR; INTRALESIONAL; INTRAMUSCULAR; SOFT TISSUE ONCE
Status: COMPLETED | OUTPATIENT
Start: 2019-07-23 | End: 2019-07-23

## 2019-07-23 RX ADMIN — METHYLPREDNISOLONE ACETATE 40 MG: 40 INJECTION, SUSPENSION INTRA-ARTICULAR; INTRALESIONAL; INTRAMUSCULAR; SOFT TISSUE at 14:46

## 2019-07-23 NOTE — PROGRESS NOTES
Assessment/Plan:       Diagnoses and all orders for this visit:    Acute pain of right shoulder    Biceps tendinitis of right upper extremity  -     Injection tendon sheath ligament single; Future  -     methylPREDNISolone acetate (DEPO-MEDROL) injection 40 mg          All of the above diagnoses have been assessed  Additional COMMENTS/PLAN: on ly R shoulder      Subjective:      Patient ID: Kd Sanchez is a 66 y o  female  HPI     Pain  In the R shoulder  Was painting boards  Hurts when lying on arm at night  The following portions of the patient's history were revised and updated as appropriate: Problem list, allergies, med list, FH, SH, Past medical and surgical histories  Current Outpatient Medications   Medication Sig Dispense Refill    amLODIPine (NORVASC) 10 mg tablet Take 1 tablet (10 mg total) by mouth daily Decreased dose 90 tablet 3    Cholecalciferol (VITAMIN D3) 5000 units CAPS Take by mouth daily      ciclopirox (PENLAC) 8 % solution Apply topically daily at bedtime 6 6 mL 5    clobetasol (TEMOVATE) 0 05 % cream   0    Cranberry 200 MG CAPS Take 1 capsule by mouth daily      fluocinonide (LIDEX) 0 05 % ointment Apply topically 2 (two) times a day      fluticasone (FLONASE) 50 mcg/act nasal spray 2 Squirts into each nostril      gabapentin (NEURONTIN) 300 mg capsule take 1 capsule by mouth daily in AM 1 at noon  and 2 capsules by mouth every  capsule 0    lisinopril (ZESTRIL) 10 mg tablet Take 1 tablet (10 mg total) by mouth daily 90 tablet 3    loratadine-pseudoephedrine (CLARITIN-D 12 HOUR) 5-120 mg per tablet Take 1 tablet by mouth every 12 (twelve) hours as needed      meloxicam (MOBIC) 15 mg tablet Take 1 tablet (15 mg total) by mouth daily 30 tablet 5    Multiple Vitamin tablet Take 1 tablet by mouth daily      omeprazole (PriLOSEC) 40 MG capsule take 1 capsule by mouth daily 30 capsule 5     No current facility-administered medications for this visit  Review of Systems      Objective:    /70 (BP Location: Left arm, Patient Position: Sitting, Cuff Size: Large)   Pulse 97   Ht 5' 1" (1 549 m)   Wt 62 6 kg (138 lb)   LMP  (LMP Unknown)   BMI 26 07 kg/m²     BP Readings from Last 3 Encounters:   07/23/19 120/70   05/14/19 136/78   11/30/18 122/70                  Wt Readings from Last 3 Encounters:   07/23/19 62 6 kg (138 lb)   05/14/19 59 9 kg (132 lb)   11/30/18 56 2 kg (124 lb)         Physical Exam   Musculoskeletal:   Reasonable full range of motion of the shoulder is noted  There is no point tenderness over the subacromial bursa  There is however point tenderness over the insertion point of the bicipital tendon  Under sterile conditions a Betadine prep alcohol and xylocaine anesthesia injected 40 mg Depo-Medrol into the bicipital tendon sheath  No visits with results within 2 Week(s) from this visit  Latest known visit with results is:   Admission on 08/15/2018, Discharged on 08/15/2018   Component Date Value Ref Range Status    EXT Preg Test, Ur 08/15/2018 Negative  Negative Final    Case Report 08/15/2018    Final                    Value:Non-gynecologic Cytology                          Case: [id number]                                  Authorizing Provider:  Crista Mijares MD        Collected:           08/15/2018 1344              Ordering Location:     [facility]        Received:            08/15/2018 [address] [place] [address] Operating Room                                                    Pathologist:           Sonny Abel MD                                                        Specimen:    Brushing, common bile duct                                                                 Final Diagnosis 08/15/2018    Final                    Value: This result contains rich text formatting which cannot be displayed here     Dejah Fairly Description 08/15/2018    Final Value:This result contains rich text formatting which cannot be displayed here   Additional Information 08/15/2018    Final                    Value: This result contains rich text formatting which cannot be displayed here           Abran Andrade MD

## 2019-07-31 DIAGNOSIS — G89.29 OTHER CHRONIC PAIN: ICD-10-CM

## 2019-07-31 RX ORDER — GABAPENTIN 300 MG/1
CAPSULE ORAL
Qty: 120 CAPSULE | Refills: 1 | Status: SHIPPED | OUTPATIENT
Start: 2019-07-31 | End: 2019-09-06 | Stop reason: SDUPTHER

## 2019-09-06 DIAGNOSIS — G89.29 OTHER CHRONIC PAIN: ICD-10-CM

## 2019-09-06 RX ORDER — GABAPENTIN 300 MG/1
CAPSULE ORAL
Qty: 120 CAPSULE | Refills: 1 | Status: SHIPPED | OUTPATIENT
Start: 2019-09-06 | End: 2019-10-09 | Stop reason: SDUPTHER

## 2019-09-06 RX ORDER — MELOXICAM 15 MG/1
15 TABLET ORAL DAILY
Qty: 30 TABLET | Refills: 5 | Status: SHIPPED | OUTPATIENT
Start: 2019-09-06 | End: 2019-11-14 | Stop reason: SDUPTHER

## 2019-10-09 DIAGNOSIS — G89.29 OTHER CHRONIC PAIN: ICD-10-CM

## 2019-10-09 DIAGNOSIS — I10 BENIGN ESSENTIAL HTN: ICD-10-CM

## 2019-10-09 RX ORDER — AMLODIPINE BESYLATE 10 MG/1
10 TABLET ORAL DAILY
Qty: 90 TABLET | Refills: 3 | Status: SHIPPED | OUTPATIENT
Start: 2019-10-09 | End: 2019-11-14 | Stop reason: SDUPTHER

## 2019-10-09 RX ORDER — GABAPENTIN 300 MG/1
CAPSULE ORAL
Qty: 120 CAPSULE | Refills: 1 | Status: SHIPPED | OUTPATIENT
Start: 2019-10-09 | End: 2019-11-14 | Stop reason: SDUPTHER

## 2019-11-14 ENCOUNTER — OFFICE VISIT (OUTPATIENT)
Dept: FAMILY MEDICINE CLINIC | Facility: HOSPITAL | Age: 59
End: 2019-11-14
Payer: COMMERCIAL

## 2019-11-14 VITALS
WEIGHT: 138.6 LBS | HEIGHT: 61 IN | OXYGEN SATURATION: 97 % | BODY MASS INDEX: 26.17 KG/M2 | HEART RATE: 80 BPM | SYSTOLIC BLOOD PRESSURE: 128 MMHG | TEMPERATURE: 97.2 F | DIASTOLIC BLOOD PRESSURE: 70 MMHG

## 2019-11-14 DIAGNOSIS — Z72.0 TOBACCO ABUSE: ICD-10-CM

## 2019-11-14 DIAGNOSIS — K29.30 CHRONIC SUPERFICIAL GASTRITIS WITHOUT BLEEDING: ICD-10-CM

## 2019-11-14 DIAGNOSIS — G89.29 OTHER CHRONIC PAIN: ICD-10-CM

## 2019-11-14 DIAGNOSIS — E66.3 OVERWEIGHT: ICD-10-CM

## 2019-11-14 DIAGNOSIS — K21.9 GASTROESOPHAGEAL REFLUX DISEASE, ESOPHAGITIS PRESENCE NOT SPECIFIED: ICD-10-CM

## 2019-11-14 DIAGNOSIS — Z00.00 ANNUAL PHYSICAL EXAM: Primary | ICD-10-CM

## 2019-11-14 DIAGNOSIS — I10 BENIGN ESSENTIAL HTN: ICD-10-CM

## 2019-11-14 DIAGNOSIS — J45.21 MILD INTERMITTENT REACTIVE AIRWAY DISEASE WITH ACUTE EXACERBATION: ICD-10-CM

## 2019-11-14 DIAGNOSIS — J02.9 ACUTE PHARYNGITIS, UNSPECIFIED ETIOLOGY: ICD-10-CM

## 2019-11-14 DIAGNOSIS — Z12.39 SCREENING FOR BREAST CANCER: ICD-10-CM

## 2019-11-14 DIAGNOSIS — H66.012 NON-RECURRENT ACUTE SUPPURATIVE OTITIS MEDIA OF LEFT EAR WITH SPONTANEOUS RUPTURE OF TYMPANIC MEMBRANE: ICD-10-CM

## 2019-11-14 DIAGNOSIS — K44.9 HIATAL HERNIA: ICD-10-CM

## 2019-11-14 DIAGNOSIS — L30.8 PSORIASIFORM ECZEMA: ICD-10-CM

## 2019-11-14 DIAGNOSIS — Z12.4 SCREENING FOR CERVICAL CANCER: ICD-10-CM

## 2019-11-14 DIAGNOSIS — F41.9 ANXIETY: ICD-10-CM

## 2019-11-14 DIAGNOSIS — M47.816 SPONDYLOSIS OF LUMBAR REGION WITHOUT MYELOPATHY OR RADICULOPATHY: ICD-10-CM

## 2019-11-14 PROCEDURE — 99396 PREV VISIT EST AGE 40-64: CPT | Performed by: INTERNAL MEDICINE

## 2019-11-14 RX ORDER — GABAPENTIN 300 MG/1
CAPSULE ORAL
Qty: 480 CAPSULE | Refills: 3 | Status: SHIPPED | OUTPATIENT
Start: 2019-11-14 | End: 2021-01-21 | Stop reason: SDUPTHER

## 2019-11-14 RX ORDER — FLUOCINONIDE 0.5 MG/G
OINTMENT TOPICAL 2 TIMES DAILY
Qty: 30 G | Refills: 0 | Status: SHIPPED | OUTPATIENT
Start: 2019-11-14 | End: 2020-03-23

## 2019-11-14 RX ORDER — FLUTICASONE PROPIONATE 50 MCG
2 SPRAY, SUSPENSION (ML) NASAL DAILY
Qty: 3 BOTTLE | Refills: 1 | Status: SHIPPED | OUTPATIENT
Start: 2019-11-14 | End: 2020-05-04

## 2019-11-14 RX ORDER — LEVOFLOXACIN 500 MG/1
500 TABLET, FILM COATED ORAL DAILY
Qty: 10 TABLET | Refills: 0 | Status: SHIPPED | OUTPATIENT
Start: 2019-11-14 | End: 2019-11-21

## 2019-11-14 RX ORDER — MELOXICAM 15 MG/1
15 TABLET ORAL DAILY
Qty: 90 TABLET | Refills: 3 | Status: SHIPPED | OUTPATIENT
Start: 2019-11-14 | End: 2020-04-28

## 2019-11-14 RX ORDER — AMLODIPINE BESYLATE 10 MG/1
10 TABLET ORAL DAILY
Qty: 90 TABLET | Refills: 3 | Status: SHIPPED | OUTPATIENT
Start: 2019-11-14 | End: 2021-02-03 | Stop reason: SDUPTHER

## 2019-11-14 RX ORDER — OMEPRAZOLE 40 MG/1
CAPSULE, DELAYED RELEASE ORAL
Qty: 90 CAPSULE | Refills: 3 | Status: SHIPPED | OUTPATIENT
Start: 2019-11-14 | End: 2020-08-11

## 2019-11-14 NOTE — ASSESSMENT & PLAN NOTE
Doing new job  [facility] [employer]- does ok with walking with her job   Continue on gabapentin and meloxicam

## 2019-11-14 NOTE — PATIENT INSTRUCTIONS
Call if ear not improved- may need to see ent for referralJoleen Smoking and Your Health   AMBULATORY CARE:   Risks to your health if you smoke:  Nicotine and other chemicals found in tobacco damage every cell in your body  Even if you are a light smoker, you have an increased risk for cancer, heart disease, and lung disease  If you are pregnant or have diabetes, smoking increases your risk for complications  Benefits to your health if you stop smoking:   · You decrease respiratory symptoms such as coughing, wheezing, and shortness of breath  · You reduce your risk for cancers of the lung, mouth, throat, kidney, bladder, pancreas, stomach, and cervix  If you already have cancer, you increase the benefits of chemotherapy  You also reduce your risk for cancer returning or a second cancer from developing  · You reduce your risk for heart disease, blood clots, heart attack, and stroke  · You reduce your risk for lung infections, and diseases such as pneumonia, asthma, chronic bronchitis, and emphysema  · Your circulation improves  More oxygen can be delivered to your body  If you have diabetes, you lower your risk for complications, such as kidney, artery, and eye diseases  You also lower your risk for nerve damage  Nerve damage can lead to amputations, poor vision, and blindness  · You improve your body's ability to heal and to fight infections  Benefits to the health of others if you stop smoking:  Tobacco is harmful to nonsmokers who breathe in your secondhand smoke  The following are ways the health of others around you may improve when you stop smoking:  · You lower the risks for lung cancer and heart disease in nonsmoking adults  · If you are pregnant, you lower the risk for miscarriage, early delivery, low birth weight, and stillbirth  You also lower your baby's risk for SIDS, obesity, developmental delay, and neurobehavioral problems, such as ADHD       · If you have children, you lower their risk for ear infections, colds, pneumonia, bronchitis, and asthma  For more information and support to stop smoking:   · Smokefree  gov  Phone: 3- 839 - 232-7778  Web Address: www [employer]  Follow up with your healthcare provider as directed:  Write down your questions so you remember to ask them during your visits  © 2017 [address]  Information is for End User's use only and may not be sold, redistributed or otherwise used for commercial purposes  All illustrations and images included in CareNotes® are the copyrighted property of [employer] , [employer]  or Ricardo Corona  The above information is an  only  It is not intended as medical advice for individual conditions or treatments  Talk to your doctor, nurse or pharmacist before following any medical regimen to see if it is safe and effective for you

## 2019-11-29 DIAGNOSIS — K21.9 GASTROESOPHAGEAL REFLUX DISEASE, ESOPHAGITIS PRESENCE NOT SPECIFIED: ICD-10-CM

## 2019-11-29 DIAGNOSIS — G89.29 OTHER CHRONIC PAIN: ICD-10-CM

## 2019-11-29 RX ORDER — OMEPRAZOLE 40 MG/1
CAPSULE, DELAYED RELEASE ORAL
Qty: 30 CAPSULE | Refills: 5 | Status: SHIPPED | OUTPATIENT
Start: 2019-11-29 | End: 2020-01-03 | Stop reason: SDUPTHER

## 2019-11-29 RX ORDER — GABAPENTIN 300 MG/1
CAPSULE ORAL
Qty: 120 CAPSULE | Refills: 1 | Status: SHIPPED | OUTPATIENT
Start: 2019-11-29 | End: 2020-01-03 | Stop reason: SDUPTHER

## 2019-12-12 ENCOUNTER — OFFICE VISIT (OUTPATIENT)
Dept: FAMILY MEDICINE CLINIC | Facility: HOSPITAL | Age: 59
End: 2019-12-12
Payer: COMMERCIAL

## 2019-12-12 VITALS
TEMPERATURE: 99.3 F | HEART RATE: 82 BPM | HEIGHT: 61 IN | WEIGHT: 131 LBS | SYSTOLIC BLOOD PRESSURE: 118 MMHG | DIASTOLIC BLOOD PRESSURE: 70 MMHG | BODY MASS INDEX: 24.73 KG/M2 | OXYGEN SATURATION: 99 %

## 2019-12-12 DIAGNOSIS — J01.01 ACUTE RECURRENT MAXILLARY SINUSITIS: Primary | ICD-10-CM

## 2019-12-12 PROCEDURE — 3008F BODY MASS INDEX DOCD: CPT | Performed by: INTERNAL MEDICINE

## 2019-12-12 PROCEDURE — 99213 OFFICE O/P EST LOW 20 MIN: CPT | Performed by: INTERNAL MEDICINE

## 2019-12-12 RX ORDER — AMOXICILLIN AND CLAVULANATE POTASSIUM 875; 125 MG/1; MG/1
1 TABLET, FILM COATED ORAL EVERY 12 HOURS SCHEDULED
Qty: 14 TABLET | Refills: 0 | Status: SHIPPED | OUTPATIENT
Start: 2019-12-12 | End: 2021-01-28 | Stop reason: SDUPTHER

## 2019-12-12 NOTE — PROGRESS NOTES
Assessment/Plan:    No problem-specific Assessment & Plan notes found for this encounter  Diagnoses and all orders for this visit:    Acute recurrent maxillary sinusitis  -     amoxicillin-clavulanate (AUGMENTIN) 875-125 mg per tablet; Take 1 tablet by mouth every 12 (twelve) hours for 7 days          Subjective:      Patient ID: Zoe Heaton is a 64 y o  female  URI    This is a new problem  Episode onset: 10-14 days  The problem has been unchanged  The maximum temperature recorded prior to her arrival was 100 4 - 100 9 F  Associated symptoms include congestion, coughing (yellowish sputum), diarrhea (at the start) and rhinorrhea  Pertinent negatives include no chest pain or wheezing  Treatments tried: flonase  The treatment provided no relief  The following portions of the patient's history were reviewed and updated as appropriate: current medications, past family history, past medical history, past social history, past surgical history and problem list     Review of Systems   Constitutional: Positive for fever  HENT: Positive for congestion, postnasal drip and rhinorrhea  Respiratory: Positive for cough (yellowish sputum)  Negative for shortness of breath and wheezing  Cardiovascular: Negative for chest pain  Gastrointestinal: Positive for diarrhea (at the start)  Objective:    /70   Pulse 82   Temp 99 3 °F (37 4 °C)   Ht 5' 1" (1 549 m)   Wt 59 4 kg (131 lb)   LMP  (LMP Unknown)   SpO2 99%   BMI 24 75 kg/m²      Physical Exam   Constitutional: She appears well-developed  HENT:   Head: Normocephalic  Right Ear: External ear normal    Left Ear: External ear normal    Mouth/Throat: No oropharyngeal exudate  Purulent nasal discharge in both nostrils   Neck: Neck supple  No thyromegaly present  Cardiovascular: Normal rate and regular rhythm  No murmur heard  Pulmonary/Chest: Effort normal and breath sounds normal  She has no wheezes  She has no rales  Lymphadenopathy:     She has no cervical adenopathy             Pura Crump MD

## 2019-12-12 NOTE — LETTER
December 12, 2019     Patient: Miguel Ángel White   YOB: 1960   Date of Visit: 12/12/2019       To Whom it May Concern:    Miguel Ángel White is under my professional care  She was seen in my office on 12/12/2019  She may return to work on 12/16/2019  If you have any questions or concerns, please don't hesitate to call           Sincerely,          Joselyn Pérez MD        CC: Miguel Ángel Sextona

## 2019-12-25 ENCOUNTER — APPOINTMENT (EMERGENCY)
Dept: RADIOLOGY | Facility: HOSPITAL | Age: 59
End: 2019-12-25
Payer: COMMERCIAL

## 2019-12-25 ENCOUNTER — APPOINTMENT (EMERGENCY)
Dept: CT IMAGING | Facility: HOSPITAL | Age: 59
End: 2019-12-25
Payer: COMMERCIAL

## 2019-12-25 ENCOUNTER — HOSPITAL ENCOUNTER (EMERGENCY)
Facility: HOSPITAL | Age: 59
Discharge: HOME | End: 2019-12-25
Attending: EMERGENCY MEDICINE
Payer: COMMERCIAL

## 2019-12-25 VITALS
BODY MASS INDEX: 26.11 KG/M2 | RESPIRATION RATE: 22 BRPM | WEIGHT: 133 LBS | HEART RATE: 79 BPM | TEMPERATURE: 97.3 F | HEIGHT: 60 IN | SYSTOLIC BLOOD PRESSURE: 135 MMHG | DIASTOLIC BLOOD PRESSURE: 86 MMHG | OXYGEN SATURATION: 96 %

## 2019-12-25 DIAGNOSIS — M54.9 BACK PAIN: ICD-10-CM

## 2019-12-25 DIAGNOSIS — R07.9 CHEST PAIN: Primary | ICD-10-CM

## 2019-12-25 LAB
ALBUMIN SERPL BCP-MCNC: 2.6 G/DL (ref 3.5–5)
ALP SERPL-CCNC: 118 U/L (ref 46–116)
ALT SERPL W P-5'-P-CCNC: 14 U/L (ref 12–78)
ANION GAP SERPL CALCULATED.3IONS-SCNC: 9 MMOL/L (ref 4–13)
AST SERPL W P-5'-P-CCNC: 13 U/L (ref 5–45)
BASOPHILS # BLD AUTO: 0.03 THOUSANDS/ΜL (ref 0–0.1)
BASOPHILS NFR BLD AUTO: 0 % (ref 0–1)
BILIRUB SERPL-MCNC: 0.2 MG/DL (ref 0.2–1)
BUN SERPL-MCNC: 15 MG/DL (ref 5–25)
CALCIUM SERPL-MCNC: 8.9 MG/DL (ref 8.3–10.1)
CHLORIDE SERPL-SCNC: 106 MMOL/L (ref 100–108)
CO2 SERPL-SCNC: 25 MMOL/L (ref 21–32)
CREAT SERPL-MCNC: 0.66 MG/DL (ref 0.6–1.3)
D DIMER PPP FEU-MCNC: 1.07 UG/ML FEU
EOSINOPHIL # BLD AUTO: 0.36 THOUSAND/ΜL (ref 0–0.61)
EOSINOPHIL NFR BLD AUTO: 4 % (ref 0–6)
ERYTHROCYTE [DISTWIDTH] IN BLOOD BY AUTOMATED COUNT: 13.4 % (ref 11.6–15.1)
GFR SERPL CREATININE-BSD FRML MDRD: 98 ML/MIN/1.73SQ M
GLUCOSE SERPL-MCNC: 99 MG/DL (ref 65–140)
HCT VFR BLD AUTO: 33.4 % (ref 34.8–46.1)
HGB BLD-MCNC: 10.7 G/DL (ref 11.5–15.4)
IMM GRANULOCYTES # BLD AUTO: 0.05 THOUSAND/UL (ref 0–0.2)
IMM GRANULOCYTES NFR BLD AUTO: 1 % (ref 0–2)
LIPASE SERPL-CCNC: 140 U/L (ref 73–393)
LYMPHOCYTES # BLD AUTO: 1.42 THOUSANDS/ΜL (ref 0.6–4.47)
LYMPHOCYTES NFR BLD AUTO: 14 % (ref 14–44)
MCH RBC QN AUTO: 32.1 PG (ref 26.8–34.3)
MCHC RBC AUTO-ENTMCNC: 32 G/DL (ref 31.4–37.4)
MCV RBC AUTO: 100 FL (ref 82–98)
MONOCYTES # BLD AUTO: 0.48 THOUSAND/ΜL (ref 0.17–1.22)
MONOCYTES NFR BLD AUTO: 5 % (ref 4–12)
NEUTROPHILS # BLD AUTO: 7.98 THOUSANDS/ΜL (ref 1.85–7.62)
NEUTS SEG NFR BLD AUTO: 76 % (ref 43–75)
NRBC BLD AUTO-RTO: 0 /100 WBCS
PLATELET # BLD AUTO: 319 THOUSANDS/UL (ref 149–390)
PMV BLD AUTO: 9.8 FL (ref 8.9–12.7)
POTASSIUM SERPL-SCNC: 4.3 MMOL/L (ref 3.5–5.3)
PROT SERPL-MCNC: 6.5 G/DL (ref 6.4–8.2)
RBC # BLD AUTO: 3.33 MILLION/UL (ref 3.81–5.12)
SODIUM SERPL-SCNC: 140 MMOL/L (ref 136–145)
TROPONIN I SERPL-MCNC: <0.02 NG/ML
WBC # BLD AUTO: 10.32 THOUSAND/UL (ref 4.31–10.16)

## 2019-12-25 PROCEDURE — 36415 COLL VENOUS BLD VENIPUNCTURE: CPT | Performed by: PHYSICIAN ASSISTANT

## 2019-12-25 PROCEDURE — 71275 CT ANGIOGRAPHY CHEST: CPT

## 2019-12-25 PROCEDURE — 80053 COMPREHEN METABOLIC PANEL: CPT | Performed by: PHYSICIAN ASSISTANT

## 2019-12-25 PROCEDURE — 99285 EMERGENCY DEPT VISIT HI MDM: CPT | Performed by: PHYSICIAN ASSISTANT

## 2019-12-25 PROCEDURE — 99284 EMERGENCY DEPT VISIT MOD MDM: CPT

## 2019-12-25 PROCEDURE — 84484 ASSAY OF TROPONIN QUANT: CPT | Performed by: PHYSICIAN ASSISTANT

## 2019-12-25 PROCEDURE — 85379 FIBRIN DEGRADATION QUANT: CPT | Performed by: PHYSICIAN ASSISTANT

## 2019-12-25 PROCEDURE — 93005 ELECTROCARDIOGRAM TRACING: CPT

## 2019-12-25 PROCEDURE — 71046 X-RAY EXAM CHEST 2 VIEWS: CPT

## 2019-12-25 PROCEDURE — 85025 COMPLETE CBC W/AUTO DIFF WBC: CPT | Performed by: PHYSICIAN ASSISTANT

## 2019-12-25 PROCEDURE — 83690 ASSAY OF LIPASE: CPT | Performed by: PHYSICIAN ASSISTANT

## 2019-12-25 RX ORDER — METHOCARBAMOL 500 MG/1
500 TABLET, FILM COATED ORAL 2 TIMES DAILY
Qty: 20 TABLET | Refills: 0 | Status: SHIPPED | OUTPATIENT
Start: 2019-12-25 | End: 2020-01-03 | Stop reason: SDUPTHER

## 2019-12-25 RX ADMIN — IOHEXOL 100 ML: 350 INJECTION, SOLUTION INTRAVENOUS at 13:33

## 2019-12-25 NOTE — ED PROVIDER NOTES
History  Chief Complaint   Patient presents with    Back Pain     pt presents to ER with back, abdomen and chest pain since the begining of   patient was given medication but hasnt gotten any better     Patient is a 65 y/o F with h/o anxiety, pancreatitis that presents to the ED with chest pain, back pain for 8 weeks  She states the pain is constant  She has seen her PCP for this twice and was started on levaquin and augmentin  She states she has had URI symptoms for the past couple months  She states her  is also sick with similar symptoms  She has nausea, no vomiting, no abdominal pain  She denies SOB  No fevers, chills  History provided by:  Patient  Back Pain   Location:  Thoracic spine  Quality:  Aching  Radiates to:  Does not radiate  Pain severity:  Moderate  Onset quality:  Gradual  Duration:  2 months  Timing:  Constant  Progression:  Unchanged  Chronicity:  New  Context: recent illness    Context: not falling, not recent injury and not twisting    Relieved by:  Nothing  Worsened by: Movement and coughing  Ineffective treatments:  None tried  Associated symptoms: chest pain    Associated symptoms: no abdominal pain, no abdominal swelling, no bladder incontinence, no bowel incontinence, no fever, no leg pain, no numbness and no weakness        Prior to Admission Medications   Prescriptions Last Dose Informant Patient Reported? Taking?    Cholecalciferol (VITAMIN D3) 5000 units CAPS  Self Yes No   Sig: Take by mouth daily   Cranberry 200 MG CAPS  Self Yes No   Sig: Take 1 capsule by mouth daily   Multiple Vitamin tablet  Self Yes No   Sig: Take 1 tablet by mouth daily   amLODIPine (NORVASC) 10 mg tablet   No No   Sig: Take 1 tablet (10 mg total) by mouth daily Decreased dose   fluocinonide (LIDEX) 0 05 % ointment   No No   Sig: Apply topically 2 (two) times a day   fluticasone (FLONASE) 50 mcg/act nasal spray   No No   Si sprays into each nostril daily fluticasone-salmeterol (ADVAIR, WIXELA) 250-50 mcg/dose inhaler   No No   Sig: Inhale 1 puff 2 (two) times a day Rinse mouth after use    gabapentin (NEURONTIN) 300 mg capsule   No No   Sig: take 1 capsule by mouth daily in AM 1 at noon  and 2 capsules by mouth every PM   gabapentin (NEURONTIN) 300 mg capsule   No No   Sig: take 1 capsule by mouth once daily IN THE MORNING 1 CAPSULE AT NOON AND 2 CAPSULES EVERY EVENING   loratadine-pseudoephedrine (CLARITIN-D 12 HOUR) 5-120 mg per tablet  Self Yes No   Sig: Take 1 tablet by mouth every 12 (twelve) hours as needed   meloxicam (MOBIC) 15 mg tablet   No No   Sig: Take 1 tablet (15 mg total) by mouth daily   omeprazole (PriLOSEC) 40 MG capsule   No No   Sig: take 1 capsule by mouth daily   omeprazole (PriLOSEC) 40 MG capsule   No No   Sig: take 1 capsule by mouth daily      Facility-Administered Medications: None       Past Medical History:   Diagnosis Date    Anemia     Anxiety     Bursitis of left elbow     Chronic pain disorder     lower back    Hiatal hernia     Kidney stones     Pancreatitis     Psoriasis     Psychiatric disorder     anxiety    Restless leg syndrome     Tobacco use disorder        Past Surgical History:   Procedure Laterality Date    CYSTOSCOPY      CYSTOSCOPY W/ URETERAL STENT PLACEMENT      NO PAST SURGERIES      NV ERCP DX COLLECTION SPECIMEN BRUSHING/WASHING N/A 8/15/2018    Procedure: ENDOSCOPIC RETROGRADE CHOLANGIOPANCREATOGRAPHY (ERCP); Surgeon: Scott Bess MD;  Location: [facility] OR;  Service: Gastroenterology       Family History   Problem Relation Age of Onset    Mental illness Sister     Other Other         Cardiovascular disease    Hypertension Mother     Cancer Father     Substance Abuse Neg Hx      I have reviewed and agree with the history as documented      Social History     Tobacco Use    Smoking status: Current Every Day Smoker     Packs/day: 1 00     Years: 40 00     Pack years: 40 00     Types: Cigarettes    Smokeless tobacco: Never Used   Substance Use Topics    Alcohol use: No    Drug use: No        Review of Systems   Constitutional: Negative for chills and fever  HENT: Negative  Eyes: Negative for visual disturbance  Respiratory: Negative for cough and shortness of breath  Cardiovascular: Positive for chest pain  Negative for leg swelling  Gastrointestinal: Negative for abdominal pain, bowel incontinence, diarrhea and nausea  Genitourinary: Negative for bladder incontinence  Musculoskeletal: Positive for back pain  Skin: Negative for color change and rash  Neurological: Negative for weakness and numbness  Psychiatric/Behavioral: Negative for confusion  All other systems reviewed and are negative  Physical Exam  Physical Exam   Constitutional: She is oriented to person, place, and time  She appears well-developed and well-nourished  She is active and cooperative  She does not appear ill  No distress  HENT:   Head: Normocephalic and atraumatic  Right Ear: Hearing normal    Left Ear: Hearing normal    Nose: Nose normal    Mouth/Throat: Oropharynx is clear and moist and mucous membranes are normal    Eyes: Conjunctivae are normal    Neck: Normal range of motion  Neck supple  Cardiovascular: Normal rate, regular rhythm and normal heart sounds  No murmur heard  Pulmonary/Chest: Effort normal  She has rhonchi (diffuse)  She exhibits tenderness (right chest wall)  She exhibits no deformity and no swelling  Abdominal: Soft  Normal appearance and bowel sounds are normal  There is no tenderness  Musculoskeletal: Normal range of motion  She exhibits no edema or tenderness  Neurological: She is alert and oriented to person, place, and time  She has normal strength  No sensory deficit  Gait normal    Skin: Skin is warm and dry  No bruising, no ecchymosis and no rash noted  She is not diaphoretic  No erythema  No pallor  Psychiatric: She has a normal mood and affect  Her speech is normal  Cognition and memory are normal    Nursing note and vitals reviewed        Vital Signs  ED Triage Vitals   Temperature Pulse Respirations Blood Pressure SpO2   12/25/19 1116 12/25/19 1116 12/25/19 1116 12/25/19 1300 12/25/19 1116   (!) 97 3 °F (36 3 °C) 96 19 134/78 98 %      Temp src Heart Rate Source Patient Position - Orthostatic VS BP Location FiO2 (%)   -- 12/25/19 1116 12/25/19 1345 12/25/19 1345 --    Monitor Lying Right arm       Pain Score       12/25/19 1116       5           Vitals:    12/25/19 1300 12/25/19 1345 12/25/19 1400 12/25/19 1430   BP: 134/78 133/94 126/69 135/86   Pulse: 76 77 79 79   Patient Position - Orthostatic VS:  Lying Lying Lying         Visual Acuity      ED Medications  Medications   iohexol (OMNIPAQUE) 350 MG/ML injection (MULTI-DOSE) 100 mL (100 mL Intravenous Given 12/25/19 1333)       Diagnostic Studies  Results Reviewed     Procedure Component Value Units Date/Time    D-Dimer [[id number]]  (Abnormal) Collected:  12/25/19 1143    Lab Status:  Final result Specimen:  Blood from Arm, Right Updated:  12/25/19 1249     D-Dimer, Quant 1 07 ug/ml FEU     Troponin I [[id number]]  (Normal) Collected:  12/25/19 1143    Lab Status:  Final result Specimen:  Blood from Arm, Right Updated:  12/25/19 1210     Troponin I <0 02 ng/mL     Comprehensive metabolic panel [[id number]]  (Abnormal) Collected:  12/25/19 1143    Lab Status:  Final result Specimen:  Blood from Arm, Right Updated:  12/25/19 1208     Sodium 140 mmol/L      Potassium 4 3 mmol/L      Chloride 106 mmol/L      CO2 25 mmol/L      ANION GAP 9 mmol/L      BUN 15 mg/dL      Creatinine 0 66 mg/dL      Glucose 99 mg/dL      Calcium 8 9 mg/dL      AST 13 U/L      ALT 14 U/L      Alkaline Phosphatase 118 U/L      Total Protein 6 5 g/dL      Albumin 2 6 g/dL      Total Bilirubin 0 20 mg/dL      eGFR 98 ml/min/1 73sq m     Narrative:       [place] guidelines for Chronic Kidney Disease (CKD):   Stage 1 with normal or high GFR (GFR > 90 mL/min/1 73 square meters)    Stage 2 Mild CKD (GFR = 60-89 mL/min/1 73 square meters)    Stage 3A Moderate CKD (GFR = 45-59 mL/min/1 73 square meters)    Stage 3B Moderate CKD (GFR = 30-44 mL/min/1 73 square meters)    Stage 4 Severe CKD (GFR = 15-29 mL/min/1 73 square meters)    Stage 5 End Stage CKD (GFR <15 mL/min/1 73 square meters)  Note: GFR calculation is accurate only with a steady state creatinine    Lipase [[id number]]  (Normal) Collected:  12/25/19 1143    Lab Status:  Final result Specimen:  Blood from Arm, Right Updated:  12/25/19 1208     Lipase 140 u/L     CBC and differential [[id number]]  (Abnormal) Collected:  12/25/19 1143    Lab Status:  Final result Specimen:  Blood from Arm, Right Updated:  12/25/19 1149     WBC 10 32 Thousand/uL      RBC 3 33 Million/uL      Hemoglobin 10 7 g/dL      Hematocrit 33 4 %       fL      MCH 32 1 pg      MCHC 32 0 g/dL      RDW 13 4 %      MPV 9 8 fL      Platelets 760 Thousands/uL      nRBC 0 /100 WBCs      Neutrophils Relative 76 %      Immat GRANS % 1 %      Lymphocytes Relative 14 %      Monocytes Relative 5 %      Eosinophils Relative 4 %      Basophils Relative 0 %      Neutrophils Absolute 7 98 Thousands/µL      Immature Grans Absolute 0 05 Thousand/uL      Lymphocytes Absolute 1 42 Thousands/µL      Monocytes Absolute 0 48 Thousand/µL      Eosinophils Absolute 0 36 Thousand/µL      Basophils Absolute 0 03 Thousands/µL                  CTA ED chest PE study   Final Result by Radha Jamil MD (12/25 2387)      No pulmonary embolism or evidence of any other acute abnormality in the chest       Workstation performed: [id number]         XR chest 2 views   ED Interpretation by Chey Canales PA-C (12/25 1222)   No acute abnormalities  Final Result by Radha Jamil MD (12/25 5826)      No acute cardiopulmonary disease        Workstation performed: [id number]                    Procedures  ECG 12 Lead Documentation Only  Date/Time: 12/25/2019 12:09 PM  Performed by: Sae Stroud PA-C  Authorized by: Sae Stroud PA-C     Indications / Diagnosis:  80  Patient location:  ED  Previous ECG:     Previous ECG:  Compared to current    Similarity:  No change  Rate:     ECG rate:  81    ECG rate assessment: normal    Rhythm:     Rhythm: sinus rhythm    QRS:     QRS axis:  Left  Conduction:     Conduction: normal    ST segments:     ST segments:  Normal  T waves:     T waves: normal               ED Course         HEART Risk Score      Most Recent Value   History  0 Filed at: 12/25/2019 1527   ECG  0 Filed at: 12/25/2019 1527   Age  1 Filed at: 12/25/2019 1527   Risk Factors  1 Filed at: 12/25/2019 1527   Troponin  0 Filed at: 12/25/2019 1527   Heart Score Risk Calculator   History  0 Filed at: 12/25/2019 1527   ECG  0 Filed at: 12/25/2019 1527   Age  1 Filed at: 12/25/2019 1527   Risk Factors  1 Filed at: 12/25/2019 1527   Troponin  0 Filed at: 12/25/2019 1527   HEART Score  2 Filed at: 12/25/2019 1527   HEART Score  2 Filed at: 12/25/2019 [address]                      Wells' Criteria for PE      Most Recent Value   Wells' Criteria for PE   Clinical signs and symptoms of DVT  0 Filed at: 12/25/2019 1311   PE is primary diagnosis or equally likely  0 Filed at: 12/25/2019 1311   HR >100  0 Filed at: 12/25/2019 1311   Immobilization at least 3 days or Surgery in the previous 4 weeks  0 Filed at: 12/25/2019 1311   Previous, objectively diagnosed PE or DVT  0 Filed at: 12/25/2019 1311   Hemoptysis  0 Filed at: 12/25/2019 1311   Malignancy with treatment within 6 months or palliative  0 Filed at: 12/25/2019 1311   Wells' Criteria Total  0 Filed at: 12/25/2019 1311            MDM  Number of Diagnoses or Management Options  Back pain: established and worsening  Chest pain: established and worsening  Diagnosis management comments: Patient with chest pain and back pain x 8 weeks, will order labs, CXR, to r/o cardiac or pulmonary disease  No abnormalities on labs or CT scan, patient aware of dilated bile duct  Most likely muscular will prescribe muscle relaxant  Amount and/or Complexity of Data Reviewed  Clinical lab tests: ordered and reviewed  Tests in the radiology section of CPT®: ordered and reviewed    Patient Progress  Patient progress: stable        Disposition  Final diagnoses:   Chest pain   Back pain     Time reflects when diagnosis was documented in both MDM as applicable and the Disposition within this note     Time User Action Codes Description Comment    12/25/2019  2:33 PM Mele Vizcarra Add [R07 9] Chest pain     12/25/2019  2:33 PM Mele Vizcarra Add [M54 9] Back pain       ED Disposition     ED Disposition Condition Date/Time Comment    Discharge Stable Wed Dec 25, 2019  2:33 PM Adilia Infante discharge to home/self care              Follow-up Information     Follow up With Specialties Details Why Sergey Bynum [address], DO Internal Medicine Call in 1 week For recheck [place]  [address]  [phone]            Discharge Medication List as of 12/25/2019  2:34 PM      START taking these medications    Details   methocarbamol (ROBAXIN) 500 mg tablet Take 1 tablet (500 mg total) by mouth 2 (two) times a day, Starting Wed 12/25/2019, Print         CONTINUE these medications which have NOT CHANGED    Details   amLODIPine (NORVASC) 10 mg tablet Take 1 tablet (10 mg total) by mouth daily Decreased dose, Starting Thu 11/14/2019, Normal      Cholecalciferol (VITAMIN D3) 5000 units CAPS Take by mouth daily, Starting Thu 9/7/2017, Historical Med      Cranberry 200 MG CAPS Take 1 capsule by mouth daily, Starting Mon 5/2/2016, Historical Med      fluocinonide (LIDEX) 0 05 % ointment Apply topically 2 (two) times a day, Starting Thu 11/14/2019, Normal      fluticasone (FLONASE) 50 mcg/act nasal spray 2 sprays into each nostril daily, Starting Thu 11/14/2019, Normal fluticasone-salmeterol (ADVAIR, [place]) 250-50 mcg/dose inhaler Inhale 1 puff 2 (two) times a day Rinse mouth after use , Starting Thu 11/14/2019, Normal      !! gabapentin (NEURONTIN) 300 mg capsule take 1 capsule by mouth daily in AM 1 at noon  and 2 capsules by mouth every PM, Normal      !! gabapentin (NEURONTIN) 300 mg capsule take 1 capsule by mouth once daily IN THE MORNING 1 CAPSULE AT NOON AND 2 CAPSULES EVERY EVENING, Normal      loratadine-pseudoephedrine (CLARITIN-D 12 HOUR) 5-120 mg per tablet Take 1 tablet by mouth every 12 (twelve) hours as needed, Starting Mon 12/8/2014, Historical Med      meloxicam (MOBIC) 15 mg tablet Take 1 tablet (15 mg total) by mouth daily, Starting Thu 11/14/2019, Normal      Multiple Vitamin tablet Take 1 tablet by mouth daily, Starting Mon 5/2/2016, Historical Med      !! omeprazole (PriLOSEC) 40 MG capsule take 1 capsule by mouth daily, Normal      !! omeprazole (PriLOSEC) 40 MG capsule take 1 capsule by mouth daily, Normal       !! - Potential duplicate medications found  Please discuss with provider  No discharge procedures on file      ED Provider  Electronically Signed by           Artie Raymond PA-C  12/25/19 0959

## 2019-12-25 NOTE — DISCHARGE INSTRUCTIONS
Rest, heating pad to back  Continue tylenol as needed for pain  Take robaxin 3 times a day as needed for pain  Follow up with family doctor if no improvement in 4-5 days

## 2019-12-26 LAB
ATRIAL RATE: 81 BPM
P AXIS: 65 DEGREES
PR INTERVAL: 116 MS
QRS AXIS: -40 DEGREES
QRSD INTERVAL: 82 MS
QT INTERVAL: 368 MS
QTC INTERVAL: 427 MS
T WAVE AXIS: 52 DEGREES
VENTRICULAR RATE: 81 BPM

## 2019-12-26 PROCEDURE — 93010 ELECTROCARDIOGRAM REPORT: CPT | Performed by: INTERNAL MEDICINE

## 2020-01-03 ENCOUNTER — OFFICE VISIT (OUTPATIENT)
Dept: FAMILY MEDICINE CLINIC | Facility: HOSPITAL | Age: 60
End: 2020-01-03
Payer: COMMERCIAL

## 2020-01-03 VITALS
HEIGHT: 60 IN | HEART RATE: 80 BPM | BODY MASS INDEX: 26.19 KG/M2 | WEIGHT: 133.4 LBS | DIASTOLIC BLOOD PRESSURE: 66 MMHG | SYSTOLIC BLOOD PRESSURE: 100 MMHG

## 2020-01-03 DIAGNOSIS — Z72.0 TOBACCO ABUSE: ICD-10-CM

## 2020-01-03 DIAGNOSIS — R10.32 LLQ ABDOMINAL PAIN: Primary | ICD-10-CM

## 2020-01-03 DIAGNOSIS — M46.1 SACROILIAC INFLAMMATION (HCC): ICD-10-CM

## 2020-01-03 DIAGNOSIS — Z11.4 SCREENING FOR HIV (HUMAN IMMUNODEFICIENCY VIRUS): ICD-10-CM

## 2020-01-03 DIAGNOSIS — I10 BENIGN ESSENTIAL HTN: ICD-10-CM

## 2020-01-03 DIAGNOSIS — E78.00 HYPERCHOLESTEROLEMIA: ICD-10-CM

## 2020-01-03 DIAGNOSIS — M47.816 SPONDYLOSIS OF LUMBAR REGION WITHOUT MYELOPATHY OR RADICULOPATHY: ICD-10-CM

## 2020-01-03 DIAGNOSIS — M54.9 BACK PAIN: ICD-10-CM

## 2020-01-03 DIAGNOSIS — D64.9 ANEMIA, UNSPECIFIED TYPE: ICD-10-CM

## 2020-01-03 DIAGNOSIS — Z11.59 NEED FOR HEPATITIS C SCREENING TEST: ICD-10-CM

## 2020-01-03 PROCEDURE — 99214 OFFICE O/P EST MOD 30 MIN: CPT | Performed by: INTERNAL MEDICINE

## 2020-01-03 RX ORDER — METHOCARBAMOL 500 MG/1
500 TABLET, FILM COATED ORAL 2 TIMES DAILY
Qty: 60 TABLET | Refills: 0 | Status: SHIPPED | OUTPATIENT
Start: 2020-01-03 | End: 2020-01-31 | Stop reason: SDUPTHER

## 2020-01-03 NOTE — PROGRESS NOTES
Assessment/Plan:             Problem List Items Addressed This Visit        Cardiovascular and Mediastinum    Benign essential HTN    Relevant Orders    Lipid Panel with Direct LDL reflex    Microalbumin / creatinine urine ratio       Musculoskeletal and Integument    Spondylosis of lumbar region without myelopathy or radiculopathy       Other    Hypercholesterolemia    Relevant Orders    Lipid Panel with Direct LDL reflex    Microalbumin / creatinine urine ratio    Anemia    Relevant Orders    Iron Panel (Includes Ferritin, Iron Sat%, Iron, and TIBC)    Tobacco abuse      Other Visit Diagnoses     LLQ abdominal pain    -  Primary    Relevant Orders    CT abdomen pelvis w contrast    CBC and differential    Comprehensive metabolic panel    Ambulatory referral to Gastroenterology    Ambulatory referral to Obstetrics / Gynecology    Need for hepatitis C screening test        Relevant Orders    Hepatitis C antibody    Screening for HIV (human immunodeficiency virus)        Relevant Orders    HIV 1/2 AG-AB combo    Sacroiliac inflammation ([facility] [address] )        Relevant Orders    Ambulatory referral to Pain Management    Back pain        Relevant Medications    methocarbamol (ROBAXIN) 500 mg tablet            Subjective:      Patient ID: Jesse Nesbitt is a 64 y o  female    1  Abdominal symptoms- was in ER on 12/25/19-had pain over 3 weeks prior to that- In ER  had cta of chest as she had right sided chest pain- negative for pe-(still some upper right chest wall pain)also has change in BM with some bloating after eating -  Reports that has bm about every other day and now looser at times  No blood or dark stools noted  Has known dilated bile duct with ercp on august 2018- felt better after that study was done  Now having some heart burn and gassy feeling- more flatus  Taking Alieve 3 per day for back pain  Discussed stopping that for now and is on prilosec 40 mg daily  Last seen by  GI in 2018- no new foods or supplements  Works 2 to 10 30 pm- flares later in night  2  Low back  Pain- shoot up whole back- having some lower thoracic pain also  Has had a hx of kidney stones  3  Respiratory issues- had 2 course of antibiotics-still has some cough- no discolored phlegm      The following portions of the patient's history were reviewed and updated as appropriate: allergies, current medications and problem list      Review of Systems   HENT: Positive for congestion  Respiratory: Positive for cough  Still smoking 1ppd   Gastrointestinal: Positive for abdominal pain  Negative for abdominal distention  Musculoskeletal: Positive for back pain  All other systems reviewed and are negative          Objective:      Current Outpatient Medications:     amLODIPine (NORVASC) 10 mg tablet, Take 1 tablet (10 mg total) by mouth daily Decreased dose, Disp: 90 tablet, Rfl: 3    Cholecalciferol (VITAMIN D3) 5000 units CAPS, Take by mouth daily, Disp: , Rfl:     Cranberry 200 MG CAPS, Take 1 capsule by mouth daily, Disp: , Rfl:     fluocinonide (LIDEX) 0 05 % ointment, Apply topically 2 (two) times a day, Disp: 30 g, Rfl: 0    fluticasone (FLONASE) 50 mcg/act nasal spray, 2 sprays into each nostril daily, Disp: 3 Bottle, Rfl: 1    gabapentin (NEURONTIN) 300 mg capsule, take 1 capsule by mouth daily in AM 1 at noon  and 2 capsules by mouth every PM, Disp: 480 capsule, Rfl: 3    loratadine-pseudoephedrine (CLARITIN-D 12 HOUR) 5-120 mg per tablet, Take 1 tablet by mouth every 12 (twelve) hours as needed, Disp: , Rfl:     meloxicam (MOBIC) 15 mg tablet, Take 1 tablet (15 mg total) by mouth daily, Disp: 90 tablet, Rfl: 3    methocarbamol (ROBAXIN) 500 mg tablet, Take 1 tablet (500 mg total) by mouth 2 (two) times a day, Disp: 60 tablet, Rfl: 0    Multiple Vitamin tablet, Take 1 tablet by mouth daily, Disp: , Rfl:     omeprazole (PriLOSEC) 40 MG capsule, take 1 capsule by mouth daily, Disp: 90 capsule, Rfl: 3    fluticasone-salmeterol (Chelo Schwab) 250-50 mcg/dose inhaler, Inhale 1 puff 2 (two) times a day Rinse mouth after use , Disp: 1 Inhaler, Rfl: 2    Blood pressure 100/66, pulse 80, height 5' (1 524 m), weight 60 5 kg (133 lb 6 4 oz), not currently breastfeeding  Physical Exam   Constitutional: She is oriented to person, place, and time  She appears well-developed and well-nourished  No distress  HENT:   Head: Normocephalic  Right Ear: External ear normal    Left Ear: External ear normal    Eyes: Right eye exhibits no discharge  Left eye exhibits no discharge  Cardiovascular: Normal rate and regular rhythm  Exam reveals no friction rub  No murmur heard  Pulmonary/Chest: She exhibits tenderness  Upper airway cough- bases are clear   chest wall tenderness on right costochondral   Abdominal: Soft  Bowel sounds are normal  She exhibits no distension  There is tenderness  midepigastric and llq tenderness   Musculoskeletal: She exhibits tenderness  She exhibits no edema  Right si tenderness- lower lumbar spasms   Lymphadenopathy:     She has no cervical adenopathy  Neurological: She is alert and oriented to person, place, and time  Skin: No erythema  Psychiatric: She has a normal mood and affect  Her behavior is normal  Judgment and thought content normal    Nursing note and vitals reviewed

## 2020-01-06 LAB
ALBUMIN SERPL-MCNC: 3.3 G/DL (ref 3.6–5.1)
ALBUMIN/GLOB SERPL: 1.4 (CALC) (ref 1–2.5)
ALP SERPL-CCNC: 79 U/L (ref 33–130)
ALT SERPL-CCNC: 9 U/L (ref 6–29)
AST SERPL-CCNC: 12 U/L (ref 10–35)
BASOPHILS # BLD AUTO: 22 CELLS/UL (ref 0–200)
BASOPHILS NFR BLD AUTO: 0.4 %
BILIRUB SERPL-MCNC: 0.2 MG/DL (ref 0.2–1.2)
BUN SERPL-MCNC: 18 MG/DL (ref 7–25)
BUN/CREAT SERPL: ABNORMAL (CALC) (ref 6–22)
CALCIUM SERPL-MCNC: 8.4 MG/DL (ref 8.6–10.4)
CHLORIDE SERPL-SCNC: 111 MMOL/L (ref 98–110)
CHOLEST SERPL-MCNC: 134 MG/DL
CHOLEST/HDLC SERPL: 2.8 (CALC)
CO2 SERPL-SCNC: 24 MMOL/L (ref 20–32)
CREAT SERPL-MCNC: 0.76 MG/DL (ref 0.5–1.05)
EOSINOPHIL # BLD AUTO: 308 CELLS/UL (ref 15–500)
EOSINOPHIL NFR BLD AUTO: 5.5 %
ERYTHROCYTE [DISTWIDTH] IN BLOOD BY AUTOMATED COUNT: 13.1 % (ref 11–15)
FERRITIN SERPL-MCNC: 11 NG/ML (ref 16–232)
GLOBULIN SER CALC-MCNC: 2.3 G/DL (CALC) (ref 1.9–3.7)
GLUCOSE SERPL-MCNC: 83 MG/DL (ref 65–99)
HCT VFR BLD AUTO: 28.5 % (ref 35–45)
HCV AB S/CO SERPL IA: 0.12
HCV AB SERPL QL IA: NORMAL
HDLC SERPL-MCNC: 48 MG/DL
HGB BLD-MCNC: 9.1 G/DL (ref 11.7–15.5)
HIV 1+2 AB+HIV1 P24 AG SERPL QL IA: NORMAL
IRON SATN MFR SERPL: 6 % (CALC) (ref 16–45)
IRON SERPL-MCNC: 20 MCG/DL (ref 45–160)
LDLC SERPL CALC-MCNC: 66 MG/DL (CALC)
LYMPHOCYTES # BLD AUTO: 1260 CELLS/UL (ref 850–3900)
LYMPHOCYTES NFR BLD AUTO: 22.5 %
MCH RBC QN AUTO: 31.1 PG (ref 27–33)
MCHC RBC AUTO-ENTMCNC: 31.9 G/DL (ref 32–36)
MCV RBC AUTO: 97.3 FL (ref 80–100)
MONOCYTES # BLD AUTO: 218 CELLS/UL (ref 200–950)
MONOCYTES NFR BLD AUTO: 3.9 %
NEUTROPHILS # BLD AUTO: 3791 CELLS/UL (ref 1500–7800)
NEUTROPHILS NFR BLD AUTO: 67.7 %
NONHDLC SERPL-MCNC: 86 MG/DL (CALC)
PLATELET # BLD AUTO: 371 THOUSAND/UL (ref 140–400)
PMV BLD REES-ECKER: 10.1 FL (ref 7.5–12.5)
POTASSIUM SERPL-SCNC: 4.2 MMOL/L (ref 3.5–5.3)
PROT SERPL-MCNC: 5.6 G/DL (ref 6.1–8.1)
RBC # BLD AUTO: 2.93 MILLION/UL (ref 3.8–5.1)
SL AMB EGFR AFRICAN AMERICAN: 101 ML/MIN/1.73M2
SL AMB EGFR NON AFRICAN AMERICAN: 87 ML/MIN/1.73M2
SODIUM SERPL-SCNC: 142 MMOL/L (ref 135–146)
TIBC SERPL-MCNC: 329 MCG/DL (CALC) (ref 250–450)
TRIGL SERPL-MCNC: 123 MG/DL
WBC # BLD AUTO: 5.6 THOUSAND/UL (ref 3.8–10.8)

## 2020-01-07 ENCOUNTER — TELEPHONE (OUTPATIENT)
Dept: FAMILY MEDICINE CLINIC | Facility: HOSPITAL | Age: 60
End: 2020-01-07

## 2020-01-07 ENCOUNTER — TELEPHONE (OUTPATIENT)
Dept: HEMATOLOGY ONCOLOGY | Facility: CLINIC | Age: 60
End: 2020-01-07

## 2020-01-07 DIAGNOSIS — D64.9 ANEMIA, UNSPECIFIED TYPE: Primary | ICD-10-CM

## 2020-01-07 NOTE — TELEPHONE ENCOUNTER
----- Message from Kiera Hardy DO sent at 1/6/2020  5:55 PM EST -----  Labs show lo iron levels- have her set up to see Dr moses-hematology- may need iron infusions

## 2020-01-07 NOTE — TELEPHONE ENCOUNTER
New Patient Encounter    New Patient Intake Form   Patient Details:  Santos Albright  1960  [id number]    Background Information:  [facility] [place] starts by opening a telephone encounter and gathering the following information   Who is calling to schedule? If not self, relationship to patient? Self   Referring Provider Dr Guido Cesar   What is the diagnosis? Anemia, unspecified type   Is this diagnosis confirmed Yes   Is there a confirmed diagnosis from a biopsy/tissue reviewed by pathology? No   Is there any prior history of Cancer? No   If yes, please explain N/A   When was the diagnosis? January 2020   Is patient aware of diagnosis? Yes   Reason for visit? NP DX   Have you had any testing done? If so: when, where? Yes   Are records in [facility]? yes   Was the patient told to bring a disk? no   Scheduling Information:   Preferred [place]:  [facility]     Requesting Specific Provider? N/A   Are there any dates/time the patient cannot be seen? Pt can not be see in the afternoon      Miscellaneous: N/A   After completing the above information, please route to Financial Counselor and the appropriate Nurse Navigator for review

## 2020-01-27 ENCOUNTER — HOSPITAL ENCOUNTER (OUTPATIENT)
Dept: CT IMAGING | Facility: HOSPITAL | Age: 60
Discharge: HOME | End: 2020-01-27
Attending: INTERNAL MEDICINE
Payer: COMMERCIAL

## 2020-01-27 DIAGNOSIS — R10.32 LLQ ABDOMINAL PAIN: ICD-10-CM

## 2020-01-27 PROCEDURE — 74177 CT ABD & PELVIS W/CONTRAST: CPT

## 2020-01-27 RX ADMIN — IOHEXOL 100 ML: 350 INJECTION, SOLUTION INTRAVENOUS at 13:06

## 2020-01-29 ENCOUNTER — CONSULT (OUTPATIENT)
Dept: HEMATOLOGY ONCOLOGY | Facility: HOSPITAL | Age: 60
End: 2020-01-29
Payer: COMMERCIAL

## 2020-01-29 VITALS
SYSTOLIC BLOOD PRESSURE: 134 MMHG | OXYGEN SATURATION: 98 % | HEART RATE: 89 BPM | RESPIRATION RATE: 16 BRPM | HEIGHT: 60 IN | BODY MASS INDEX: 26.05 KG/M2 | DIASTOLIC BLOOD PRESSURE: 68 MMHG | TEMPERATURE: 98 F

## 2020-01-29 DIAGNOSIS — D64.9 ANEMIA, UNSPECIFIED TYPE: Primary | ICD-10-CM

## 2020-01-29 PROBLEM — D50.0 IRON DEFICIENCY ANEMIA DUE TO CHRONIC BLOOD LOSS: Status: ACTIVE | Noted: 2020-01-29

## 2020-01-29 PROCEDURE — 99205 OFFICE O/P NEW HI 60 MIN: CPT | Performed by: INTERNAL MEDICINE

## 2020-01-29 RX ORDER — SODIUM CHLORIDE 9 MG/ML
20 INJECTION, SOLUTION INTRAVENOUS ONCE
Status: CANCELLED | OUTPATIENT
Start: 2020-02-04

## 2020-01-29 NOTE — PROGRESS NOTES
Oncology Outpatient Consult Note  Jessie Hewitt 64 y o  female MRN: @ Encounter: [id number]        Date:  1/29/2020        CC:  Iron deficiency anemia      HPI:  Jessie Hewitt is seen for initial consultation 1/29/2020 regarding Newly diagnosed iron deficiency  The patient states she does have a history of iron deficiency in the past  More recently her hemoglobin was low and iron studies revealed a low iron so she was referred to see us  She states she feels slightly fatigued but not too much above baseline  Denies any nausea denies any vomiting denies any diarrhea  The rest of her 14 point review of systems today was negative  The patient's iron was low at 20 with an iron saturation of 6% and a ferritin of 11  Hemoglobin was 9 1  She is going to see her GI physician  She has had a colonoscopy within the last 3 years she states  She has a history of a hiatal hernia and sometimes now does get epigastric discomfort so is going to see them  She did have a CT of the abdomen done the results which are pending  Recent CT of the chest did not show any abnormalities  Test Results:    Imaging: No results found      Labs:   Lab Results   Component Value Date    WBC 5 6 01/04/2020    HGB 9 1 (L) 01/04/2020    HCT 28 5 (L) 01/04/2020    MCV 97 3 01/04/2020     01/04/2020     Lab Results   Component Value Date     09/20/2017    K 4 2 01/04/2020     (H) 01/04/2020    CO2 24 01/04/2020    ANIONGAP 10 06/10/2015    BUN 18 01/04/2020    CREATININE 0 76 01/04/2020    GLUCOSE 96 06/10/2015    CALCIUM 8 4 (L) 01/04/2020    AST 12 01/04/2020    ALT 9 01/04/2020    ALKPHOS 79 01/04/2020    PROT 5 9 (L) 09/20/2017    BILITOT 0 3 09/20/2017    EGFR 98 12/25/2019       Lab Results   Component Value Date    IRON 20 (L) 01/04/2020    TIBC 329 01/04/2020    FERRITIN 11 (L) 01/04/2020       Lab Results   Component Value Date    XANTURZD35 1,700 (H) 07/24/2018           ROS: As stated in history of present illness otherwise her 14 point review of systems today was negative  Active Problems:   Patient Active Problem List   Diagnosis    Restless leg syndrome    Psoriasis    Narcolepsy without cataplexy    Hypercholesterolemia    Chronic pain    Benign essential HTN    Anxiety    Allergic rhinitis    Anemia    Hiatal hernia    Nail abnormality    Primary osteoarthritis of both hips    Tobacco abuse    Spondylosis of lumbar region without myelopathy or radiculopathy    Chronic superficial gastritis       Past Medical History:   Past Medical History:   Diagnosis Date    Anemia     Anxiety     Bursitis of left elbow     Chronic pain disorder     lower back    Hiatal hernia     Kidney stones     Pancreatitis     Psoriasis     Psychiatric disorder     anxiety    Restless leg syndrome     Tobacco use disorder        Surgical History:   Past Surgical History:   Procedure Laterality Date    CYSTOSCOPY      CYSTOSCOPY W/ URETERAL STENT PLACEMENT      NO PAST SURGERIES      NH ERCP DX COLLECTION SPECIMEN BRUSHING/WASHING N/A 8/15/2018    Procedure: ENDOSCOPIC RETROGRADE CHOLANGIOPANCREATOGRAPHY (ERCP); Surgeon: Rosa De Santiago MD;  Location: [facility] OR;  Service: Gastroenterology       Family History:    Family History   Problem Relation Age of Onset    Mental illness Sister     Other Other         Cardiovascular disease    Hypertension Mother     Cancer Father     Substance Abuse Neg Hx        Cancer-related family history includes Cancer in her father      Social History:   Social History     Socioeconomic History    Marital status: /Civil Union     Spouse name: Not on file    Number of children: Not on file    Years of education: Not on file    Highest education level: Not on file   Occupational History    Not on file   Social Needs    Financial resource strain: Not on file    Food insecurity:     Worry: Not on file     Inability: Not on file    Transportation needs: Medical: Not on file     Non-medical: Not on file   Tobacco Use    Smoking status: Current Every Day Smoker     Packs/day: 1 00     Years: 40 00     Pack years: 40 00     Types: Cigarettes    Smokeless tobacco: Never Used   Substance and Sexual Activity    Alcohol use: No    Drug use: No    Sexual activity: Never   Lifestyle    Physical activity:     Days per week: Not on file     Minutes per session: Not on file    Stress: Not on file   Relationships    Social connections:     Talks on phone: Not on file     Gets together: Not on file     Attends Zoroastrianism service: Not on file     Active member of club or organization: Not on file     Attends meetings of clubs or organizations: Not on file     Relationship status: Not on file    Intimate partner violence:     Fear of current or ex partner: Not on file     Emotionally abused: Not on file     Physically abused: Not on file     Forced sexual activity: Not on file   Other Topics Concern    Not on file   Social History Narrative    Lives with     Feels safe at home    Sees dentist occas  No living will  Gets exercise with her job  Current Medications:   Current Outpatient Medications   Medication Sig Dispense Refill    amLODIPine (NORVASC) 10 mg tablet Take 1 tablet (10 mg total) by mouth daily Decreased dose 90 tablet 3    Cholecalciferol (VITAMIN D3) 5000 units CAPS Take by mouth daily      Cranberry 200 MG CAPS Take 1 capsule by mouth daily      fluocinonide (LIDEX) 0 05 % ointment Apply topically 2 (two) times a day 30 g 0    fluticasone (FLONASE) 50 mcg/act nasal spray 2 sprays into each nostril daily 3 Bottle 1    fluticasone-salmeterol (ADVAIR, WIXELA) 250-50 mcg/dose inhaler Inhale 1 puff 2 (two) times a day Rinse mouth after use   1 Inhaler 2    gabapentin (NEURONTIN) 300 mg capsule take 1 capsule by mouth daily in AM 1 at noon  and 2 capsules by mouth every  capsule 3    loratadine-pseudoephedrine (CLARITIN-D 12 HOUR) 5-120 mg per tablet Take 1 tablet by mouth every 12 (twelve) hours as needed      meloxicam (MOBIC) 15 mg tablet Take 1 tablet (15 mg total) by mouth daily 90 tablet 3    methocarbamol (ROBAXIN) 500 mg tablet Take 1 tablet (500 mg total) by mouth 2 (two) times a day 60 tablet 0    Multiple Vitamin tablet Take 1 tablet by mouth daily      omeprazole (PriLOSEC) 40 MG capsule take 1 capsule by mouth daily 90 capsule 3     No current facility-administered medications for this visit  Allergies: Allergies   Allergen Reactions    Pollen Extract     Tree Extract          Physical Exam:    Body surface area is 1 57 meters squared  Wt Readings from Last 3 Encounters:   01/03/20 60 5 kg (133 lb 6 4 oz)   12/25/19 60 3 kg (133 lb)   12/12/19 59 4 kg (131 lb)        Temp Readings from Last 3 Encounters:   01/29/20 98 °F (36 7 °C) (Tympanic)   12/25/19 (!) 97 3 °F (36 3 °C)   12/12/19 99 3 °F (37 4 °C)        BP Readings from Last 3 Encounters:   01/29/20 134/68   01/03/20 100/66   12/25/19 135/86         Pulse Readings from Last 3 Encounters:   01/29/20 89   01/03/20 80   12/25/19 79       Physical Exam     Constitutional   General appearance: No acute distress, well appearing and well nourished  Eyes   Conjunctiva and lids: No swelling, erythema or discharge  Pupils and irises: Equal, round and reactive to light  Ears, Nose, Mouth, and Throat   External inspection of ears and nose: Normal     Nasal mucosa, septum, and turbinates: Normal without edema or erythema  Oropharynx: Normal with no erythema, edema, exudate or lesions  Pulmonary   Respiratory effort: No increased work of breathing or signs of respiratory distress  Auscultation of lungs: Clear to auscultation  Cardiovascular   Palpation of heart: Normal PMI, no thrills  Auscultation of heart: Normal rate and rhythm, normal S1 and S2, without murmurs      Examination of extremities for edema and/or varicosities: Normal     Carotid pulses: Normal     Abdomen   Abdomen: Non-tender, no masses  Liver and spleen: No hepatomegaly or splenomegaly  Lymphatic   Palpation of lymph nodes in neck: No lymphadenopathy  Musculoskeletal   Gait and station: Normal     Digits and nails: Normal without clubbing or cyanosis  Inspection/palpation of joints, bones, and muscles: Normal     Skin   Skin and subcutaneous tissue: Normal without rashes or lesions  Neurologic   Cranial nerves: Cranial nerves 2-12 intact  Sensation: No sensory loss  Psychiatric   Orientation to person, place, and time: Normal     Mood and affect: Normal           Assessment/ Plan:    The patient is a pleasant 61-year-old female who was referred to see us for iron deficiency  She is anemic  Does feel a little fatigue  At this point she is going to see her GI physicians and does have a CT scan of the abdomen and pelvis pending  I will advise 10 doses of Venofer  This will be 200 mg IV twice a week for a total of 10 doses  I will repeat her blood work and see her back in approximately 5 months  Until then if she has any questions she will call our office  I went over the risks benefits and alternatives of IV iron and even discussed oral iron  She is not interested in oral iron as she has been on it before and it can cause side effects  She wishes to get her iron resolved quickly so we will arrange for infusions  She does understand the risk of recurrent deficiency so will come back to see us in 5 months with blood work  Goals and Barriers:  Current Goal:  Prolong Survival from Iron deficiency  Barriers: None  Patient's Capacity to Self Care:  Patient  able to self care      Portions of the record may have been created with voice recognition software   Occasional wrong word or "sound a like" substitutions may have occurred due to the inherent limitations of voice recognition software   Read the chart carefully and recognize, using context, where substitutions have occurred

## 2020-01-31 ENCOUNTER — OFFICE VISIT (OUTPATIENT)
Dept: FAMILY MEDICINE CLINIC | Facility: HOSPITAL | Age: 60
End: 2020-01-31
Payer: COMMERCIAL

## 2020-01-31 VITALS
DIASTOLIC BLOOD PRESSURE: 68 MMHG | WEIGHT: 130.8 LBS | HEART RATE: 82 BPM | BODY MASS INDEX: 25.68 KG/M2 | OXYGEN SATURATION: 99 % | HEIGHT: 60 IN | SYSTOLIC BLOOD PRESSURE: 122 MMHG

## 2020-01-31 DIAGNOSIS — I10 BENIGN ESSENTIAL HTN: ICD-10-CM

## 2020-01-31 DIAGNOSIS — G47.419 NARCOLEPSY WITHOUT CATAPLEXY: ICD-10-CM

## 2020-01-31 DIAGNOSIS — D50.8 IRON DEFICIENCY ANEMIA SECONDARY TO INADEQUATE DIETARY IRON INTAKE: Primary | Chronic | ICD-10-CM

## 2020-01-31 DIAGNOSIS — G89.4 CHRONIC PAIN SYNDROME: ICD-10-CM

## 2020-01-31 DIAGNOSIS — Z72.0 TOBACCO ABUSE: ICD-10-CM

## 2020-01-31 DIAGNOSIS — M54.9 BACK PAIN: ICD-10-CM

## 2020-01-31 DIAGNOSIS — D50.9 IRON DEFICIENCY ANEMIA, UNSPECIFIED IRON DEFICIENCY ANEMIA TYPE: ICD-10-CM

## 2020-01-31 DIAGNOSIS — L40.9 PSORIASIS: ICD-10-CM

## 2020-01-31 DIAGNOSIS — G25.81 RESTLESS LEG SYNDROME: ICD-10-CM

## 2020-01-31 PROCEDURE — 99214 OFFICE O/P EST MOD 30 MIN: CPT | Performed by: INTERNAL MEDICINE

## 2020-01-31 PROCEDURE — 3078F DIAST BP <80 MM HG: CPT | Performed by: INTERNAL MEDICINE

## 2020-01-31 PROCEDURE — 3074F SYST BP LT 130 MM HG: CPT | Performed by: INTERNAL MEDICINE

## 2020-01-31 PROCEDURE — 3008F BODY MASS INDEX DOCD: CPT | Performed by: INTERNAL MEDICINE

## 2020-01-31 RX ORDER — NICOTINE 21 MG/24HR
1 PATCH, TRANSDERMAL 24 HOURS TRANSDERMAL EVERY 24 HOURS
Qty: 28 PATCH | Refills: 0 | Status: SHIPPED | OUTPATIENT
Start: 2020-01-31 | End: 2020-02-26

## 2020-01-31 RX ORDER — METHOCARBAMOL 500 MG/1
500 TABLET, FILM COATED ORAL 2 TIMES DAILY
Qty: 60 TABLET | Refills: 0 | Status: SHIPPED | OUTPATIENT
Start: 2020-01-31 | End: 2020-02-17 | Stop reason: ALTCHOICE

## 2020-01-31 NOTE — PROGRESS NOTES
Assessment/Plan:             Problem List Items Addressed This Visit        Cardiovascular and Mediastinum    Benign essential HTN      Continue on amlodipine 10 mg daily   well controlled on home readings also            Musculoskeletal and Integument    Psoriasis     Painful splits on hands this winter- wishes to see new dermatologist            Other    Iron deficiency anemia secondary to inadequate dietary iron intake - Primary (Chronic)     Seeing Dr Foss and had recent ct showing normal spleen and abdomen  Had  Colonoscopy approx 3 years ago  Now having infusions- intolerant to po iron supplement         Restless leg syndrome     Getting about 7-8 hours of sleep at night being on the gabapentin  Narcolepsy without cataplexy    Chronic pain     Using mobic and omeprazole- no recent complaints         Anemia    Tobacco abuse     Counseled in cessation- willing to try patch  1ppd plus daily         Relevant Medications    nicotine (NICODERM CQ) 21 mg/24 hr TD 24 hr patch      Other Visit Diagnoses     Back pain        Relevant Medications    methocarbamol (ROBAXIN) 500 mg tablet            Subjective:      Patient ID: Tr Morales is a 64 y o  female     See porch  Ongoing hand and hip pain  Walking frequently during day   reviewed labs earlier this month- has low protein  Lipid shows low ldl- has family hx of cv dx in father=will repeat  In year      The following portions of the patient's history were reviewed and updated as appropriate: allergies, current medications and problem list      Review of Systems   Constitutional: Positive for fatigue  Musculoskeletal: Positive for back pain and joint swelling  Hand cracking with psoriasis and some joint tenderness   All other systems reviewed and are negative          Objective:      Current Outpatient Medications:     amLODIPine (NORVASC) 10 mg tablet, Take 1 tablet (10 mg total) by mouth daily Decreased dose, Disp: 90 tablet, Rfl: 3   Cholecalciferol (VITAMIN D3) 5000 units CAPS, Take by mouth daily, Disp: , Rfl:     Cranberry 200 MG CAPS, Take 1 capsule by mouth daily, Disp: , Rfl:     fluocinonide (LIDEX) 0 05 % ointment, Apply topically 2 (two) times a day, Disp: 30 g, Rfl: 0    fluticasone (FLONASE) 50 mcg/act nasal spray, 2 sprays into each nostril daily, Disp: 3 Bottle, Rfl: 1    fluticasone-salmeterol (ADVAIR, WIXELA) 250-50 mcg/dose inhaler, Inhale 1 puff 2 (two) times a day Rinse mouth after use , Disp: 1 Inhaler, Rfl: 2    gabapentin (NEURONTIN) 300 mg capsule, take 1 capsule by mouth daily in AM 1 at noon  and 2 capsules by mouth every PM, Disp: 480 capsule, Rfl: 3    loratadine-pseudoephedrine (CLARITIN-D 12 HOUR) 5-120 mg per tablet, Take 1 tablet by mouth every 12 (twelve) hours as needed, Disp: , Rfl:     meloxicam (MOBIC) 15 mg tablet, Take 1 tablet (15 mg total) by mouth daily, Disp: 90 tablet, Rfl: 3    methocarbamol (ROBAXIN) 500 mg tablet, Take 1 tablet (500 mg total) by mouth 2 (two) times a day, Disp: 60 tablet, Rfl: 0    Multiple Vitamin tablet, Take 1 tablet by mouth daily, Disp: , Rfl:     omeprazole (PriLOSEC) 40 MG capsule, take 1 capsule by mouth daily, Disp: 90 capsule, Rfl: 3    nicotine (NICODERM CQ) 21 mg/24 hr TD 24 hr patch, Place 1 patch on the skin every 24 hours, Disp: 28 patch, Rfl: 0    Blood pressure 122/68, pulse 82, height 5' (1 524 m), weight 59 3 kg (130 lb 12 8 oz), SpO2 99 %, not currently breastfeeding  Physical Exam   Constitutional: She is oriented to person, place, and time  She appears well-developed and well-nourished  No distress  HENT:   Head: Normocephalic  Right Ear: External ear normal    Left Ear: External ear normal    Mouth/Throat: No oropharyngeal exudate  Eyes: Right eye exhibits no discharge  Left eye exhibits no discharge  Neck: No thyromegaly present  Cardiovascular: Normal rate and regular rhythm  Exam reveals no friction rub     No murmur heard   Pulmonary/Chest: Effort normal and breath sounds normal  No respiratory distress  She has no wheezes  Abdominal: Soft  Bowel sounds are normal  She exhibits no distension  Musculoskeletal: She exhibits no edema, tenderness or deformity  Lymphadenopathy:     She has no cervical adenopathy  Neurological: She is alert and oriented to person, place, and time  No cranial nerve deficit  Skin: No erythema  No pallor  Psychiatric: She has a normal mood and affect  Her behavior is normal  Thought content normal    Nursing note and vitals reviewed

## 2020-01-31 NOTE — ASSESSMENT & PLAN NOTE
Seeing Dr Foss and had recent ct showing normal spleen and abdomen  Had  Colonoscopy approx 3 years ago      Now having infusions- intolerant to po iron supplement

## 2020-02-04 ENCOUNTER — HOSPITAL ENCOUNTER (OUTPATIENT)
Dept: INFUSION CENTER | Facility: HOSPITAL | Age: 60
Discharge: HOME | End: 2020-02-04
Attending: INTERNAL MEDICINE
Payer: COMMERCIAL

## 2020-02-04 VITALS
DIASTOLIC BLOOD PRESSURE: 69 MMHG | SYSTOLIC BLOOD PRESSURE: 118 MMHG | OXYGEN SATURATION: 96 % | HEART RATE: 88 BPM | RESPIRATION RATE: 18 BRPM | TEMPERATURE: 98.2 F

## 2020-02-04 DIAGNOSIS — J45.21 MILD INTERMITTENT REACTIVE AIRWAY DISEASE WITH ACUTE EXACERBATION: ICD-10-CM

## 2020-02-04 DIAGNOSIS — D50.8 IRON DEFICIENCY ANEMIA SECONDARY TO INADEQUATE DIETARY IRON INTAKE: Primary | ICD-10-CM

## 2020-02-04 PROCEDURE — 96365 THER/PROPH/DIAG IV INF INIT: CPT

## 2020-02-04 RX ORDER — SODIUM CHLORIDE 9 MG/ML
20 INJECTION, SOLUTION INTRAVENOUS ONCE
Status: COMPLETED | OUTPATIENT
Start: 2020-02-04 | End: 2020-02-04

## 2020-02-04 RX ORDER — SODIUM CHLORIDE 9 MG/ML
20 INJECTION, SOLUTION INTRAVENOUS ONCE
Status: CANCELLED | OUTPATIENT
Start: 2020-02-05

## 2020-02-04 RX ADMIN — IRON SUCROSE 200 MG: 20 INJECTION, SOLUTION INTRAVENOUS at 09:54

## 2020-02-04 RX ADMIN — SODIUM CHLORIDE 20 ML/HR: 9 INJECTION, SOLUTION INTRAVENOUS at 09:54

## 2020-02-10 ENCOUNTER — APPOINTMENT (EMERGENCY)
Dept: CT IMAGING | Facility: HOSPITAL | Age: 60
DRG: 346 | End: 2020-02-10
Payer: COMMERCIAL

## 2020-02-10 ENCOUNTER — ANESTHESIA (INPATIENT)
Dept: PERIOP | Facility: HOSPITAL | Age: 60
DRG: 346 | End: 2020-02-10
Payer: COMMERCIAL

## 2020-02-10 ENCOUNTER — HOSPITAL ENCOUNTER (INPATIENT)
Facility: HOSPITAL | Age: 60
LOS: 4 days | Discharge: HOME | DRG: 346 | End: 2020-02-14
Attending: EMERGENCY MEDICINE | Admitting: SURGERY
Payer: COMMERCIAL

## 2020-02-10 ENCOUNTER — ANESTHESIA EVENT (INPATIENT)
Dept: PERIOP | Facility: HOSPITAL | Age: 60
DRG: 346 | End: 2020-02-10
Payer: COMMERCIAL

## 2020-02-10 DIAGNOSIS — I10 BENIGN ESSENTIAL HTN: ICD-10-CM

## 2020-02-10 DIAGNOSIS — K56.609 SMALL BOWEL OBSTRUCTION (HCC): ICD-10-CM

## 2020-02-10 DIAGNOSIS — E78.00 HYPERCHOLESTEROLEMIA: ICD-10-CM

## 2020-02-10 DIAGNOSIS — D50.9 IRON DEFICIENCY ANEMIA, UNSPECIFIED IRON DEFICIENCY ANEMIA TYPE: ICD-10-CM

## 2020-02-10 DIAGNOSIS — G89.4 CHRONIC PAIN SYNDROME: ICD-10-CM

## 2020-02-10 DIAGNOSIS — K56.609 BOWEL OBSTRUCTION (HCC): Primary | ICD-10-CM

## 2020-02-10 DIAGNOSIS — F41.9 ANXIETY: ICD-10-CM

## 2020-02-10 LAB
ALBUMIN SERPL BCP-MCNC: 3.5 G/DL (ref 3.5–5)
ALP SERPL-CCNC: 85 U/L (ref 46–116)
ALT SERPL W P-5'-P-CCNC: 19 U/L (ref 12–78)
ANION GAP SERPL CALCULATED.3IONS-SCNC: 8 MMOL/L (ref 4–13)
APTT PPP: 26 SECONDS (ref 23–37)
AST SERPL W P-5'-P-CCNC: 16 U/L (ref 5–45)
BASOPHILS # BLD AUTO: 0.02 THOUSANDS/ΜL (ref 0–0.1)
BASOPHILS NFR BLD AUTO: 0 % (ref 0–1)
BILIRUB SERPL-MCNC: 0.2 MG/DL (ref 0.2–1)
BUN SERPL-MCNC: 12 MG/DL (ref 5–25)
CALCIUM SERPL-MCNC: 9.2 MG/DL (ref 8.3–10.1)
CHLORIDE SERPL-SCNC: 107 MMOL/L (ref 100–108)
CLARITY, POC: CLEAR
CO2 SERPL-SCNC: 30 MMOL/L (ref 21–32)
COLOR, POC: ABNORMAL
CREAT SERPL-MCNC: 0.81 MG/DL (ref 0.6–1.3)
EOSINOPHIL # BLD AUTO: 0.08 THOUSAND/ΜL (ref 0–0.61)
EOSINOPHIL NFR BLD AUTO: 1 % (ref 0–6)
ERYTHROCYTE [DISTWIDTH] IN BLOOD BY AUTOMATED COUNT: 15.2 % (ref 11.6–15.1)
EXT BILIRUBIN, UA: ABNORMAL
EXT BLOOD URINE: ABNORMAL
EXT GLUCOSE, UA: ABNORMAL
EXT KETONES: ABNORMAL
EXT NITRITE, UA: ABNORMAL
EXT PH, UA: 6
EXT PROTEIN, UA: ABNORMAL
EXT SPECIFIC GRAVITY, UA: 1.01
EXT UROBILINOGEN: 0.2
GFR SERPL CREATININE-BSD FRML MDRD: 81 ML/MIN/1.73SQ M
GLUCOSE SERPL-MCNC: 137 MG/DL (ref 65–140)
HCT VFR BLD AUTO: 37.3 % (ref 34.8–46.1)
HGB BLD-MCNC: 11.6 G/DL (ref 11.5–15.4)
IMM GRANULOCYTES # BLD AUTO: 0.04 THOUSAND/UL (ref 0–0.2)
IMM GRANULOCYTES NFR BLD AUTO: 0 % (ref 0–2)
INR PPP: 0.94 (ref 0.84–1.19)
LACTATE SERPL-SCNC: 1.5 MMOL/L (ref 0.5–2)
LIPASE SERPL-CCNC: 126 U/L (ref 73–393)
LYMPHOCYTES # BLD AUTO: 0.81 THOUSANDS/ΜL (ref 0.6–4.47)
LYMPHOCYTES NFR BLD AUTO: 8 % (ref 14–44)
MCH RBC QN AUTO: 31.2 PG (ref 26.8–34.3)
MCHC RBC AUTO-ENTMCNC: 31.1 G/DL (ref 31.4–37.4)
MCV RBC AUTO: 100 FL (ref 82–98)
MONOCYTES # BLD AUTO: 0.29 THOUSAND/ΜL (ref 0.17–1.22)
MONOCYTES NFR BLD AUTO: 3 % (ref 4–12)
NEUTROPHILS # BLD AUTO: 8.43 THOUSANDS/ΜL (ref 1.85–7.62)
NEUTS SEG NFR BLD AUTO: 88 % (ref 43–75)
NRBC BLD AUTO-RTO: 0 /100 WBCS
PLATELET # BLD AUTO: 271 THOUSANDS/UL (ref 149–390)
PMV BLD AUTO: 9.6 FL (ref 8.9–12.7)
POTASSIUM SERPL-SCNC: 3.5 MMOL/L (ref 3.5–5.3)
PROT SERPL-MCNC: 6.9 G/DL (ref 6.4–8.2)
PROTHROMBIN TIME: 12.3 SECONDS (ref 11.6–14.5)
RBC # BLD AUTO: 3.72 MILLION/UL (ref 3.81–5.12)
SODIUM SERPL-SCNC: 145 MMOL/L (ref 136–145)
TROPONIN I SERPL-MCNC: <0.02 NG/ML
WBC # BLD AUTO: 9.67 THOUSAND/UL (ref 4.31–10.16)
WBC # BLD EST: ABNORMAL 10*3/UL

## 2020-02-10 PROCEDURE — 85730 THROMBOPLASTIN TIME PARTIAL: CPT | Performed by: EMERGENCY MEDICINE

## 2020-02-10 PROCEDURE — 83690 ASSAY OF LIPASE: CPT

## 2020-02-10 PROCEDURE — 74177 CT ABD & PELVIS W/CONTRAST: CPT

## 2020-02-10 PROCEDURE — 36415 COLL VENOUS BLD VENIPUNCTURE: CPT

## 2020-02-10 PROCEDURE — 99285 EMERGENCY DEPT VISIT HI MDM: CPT | Performed by: EMERGENCY MEDICINE

## 2020-02-10 PROCEDURE — 99221 1ST HOSP IP/OBS SF/LOW 40: CPT | Performed by: SURGERY

## 2020-02-10 PROCEDURE — 85610 PROTHROMBIN TIME: CPT | Performed by: EMERGENCY MEDICINE

## 2020-02-10 PROCEDURE — 96361 HYDRATE IV INFUSION ADD-ON: CPT

## 2020-02-10 PROCEDURE — 0DJD4ZZ INSPECTION OF LOWER INTESTINAL TRACT, PERCUTANEOUS ENDOSCOPIC APPROACH: ICD-10-PCS | Performed by: SURGERY

## 2020-02-10 PROCEDURE — 80053 COMPREHEN METABOLIC PANEL: CPT

## 2020-02-10 PROCEDURE — 0DC80ZZ EXTIRPATION OF MATTER FROM SMALL INTESTINE, OPEN APPROACH: ICD-10-PCS | Performed by: SURGERY

## 2020-02-10 PROCEDURE — 99285 EMERGENCY DEPT VISIT HI MDM: CPT

## 2020-02-10 PROCEDURE — 96374 THER/PROPH/DIAG INJ IV PUSH: CPT

## 2020-02-10 PROCEDURE — NC001 PR NO CHARGE: Performed by: SURGERY

## 2020-02-10 PROCEDURE — 96376 TX/PRO/DX INJ SAME DRUG ADON: CPT

## 2020-02-10 PROCEDURE — 96375 TX/PRO/DX INJ NEW DRUG ADDON: CPT

## 2020-02-10 PROCEDURE — 93005 ELECTROCARDIOGRAM TRACING: CPT

## 2020-02-10 PROCEDURE — 44020 EXPLORE SMALL INTESTINE: CPT | Performed by: SURGERY

## 2020-02-10 PROCEDURE — 84484 ASSAY OF TROPONIN QUANT: CPT

## 2020-02-10 PROCEDURE — 85025 COMPLETE CBC W/AUTO DIFF WBC: CPT

## 2020-02-10 PROCEDURE — 83605 ASSAY OF LACTIC ACID: CPT | Performed by: EMERGENCY MEDICINE

## 2020-02-10 RX ORDER — HYDROMORPHONE HCL/PF 1 MG/ML
0.5 SYRINGE (ML) INJECTION
Status: DISCONTINUED | OUTPATIENT
Start: 2020-02-10 | End: 2020-02-14 | Stop reason: HOSPADM

## 2020-02-10 RX ORDER — ONDANSETRON 2 MG/ML
4 INJECTION INTRAMUSCULAR; INTRAVENOUS ONCE
Status: COMPLETED | OUTPATIENT
Start: 2020-02-10 | End: 2020-02-10

## 2020-02-10 RX ORDER — KETOROLAC TROMETHAMINE 30 MG/ML
15 INJECTION, SOLUTION INTRAMUSCULAR; INTRAVENOUS EVERY 6 HOURS PRN
Status: DISCONTINUED | OUTPATIENT
Start: 2020-02-10 | End: 2020-02-10

## 2020-02-10 RX ORDER — FLUTICASONE FUROATE AND VILANTEROL 200; 25 UG/1; UG/1
1 POWDER RESPIRATORY (INHALATION)
Status: DISCONTINUED | OUTPATIENT
Start: 2020-02-10 | End: 2020-02-14 | Stop reason: HOSPADM

## 2020-02-10 RX ORDER — MIDAZOLAM HYDROCHLORIDE 2 MG/2ML
INJECTION, SOLUTION INTRAMUSCULAR; INTRAVENOUS AS NEEDED
Status: DISCONTINUED | OUTPATIENT
Start: 2020-02-10 | End: 2020-02-10 | Stop reason: SURG

## 2020-02-10 RX ORDER — LIDOCAINE HYDROCHLORIDE 20 MG/ML
15 SOLUTION OROPHARYNGEAL ONCE
Status: COMPLETED | OUTPATIENT
Start: 2020-02-10 | End: 2020-02-10

## 2020-02-10 RX ORDER — SUCCINYLCHOLINE/SOD CL,ISO/PF 100 MG/5ML
SYRINGE (ML) INTRAVENOUS AS NEEDED
Status: DISCONTINUED | OUTPATIENT
Start: 2020-02-10 | End: 2020-02-10 | Stop reason: SURG

## 2020-02-10 RX ORDER — CEFAZOLIN SODIUM 2 G/50ML
2000 SOLUTION INTRAVENOUS ONCE
Status: COMPLETED | OUTPATIENT
Start: 2020-02-10 | End: 2020-02-10

## 2020-02-10 RX ORDER — FENTANYL CITRATE/PF 50 MCG/ML
25 SYRINGE (ML) INJECTION
Status: DISCONTINUED | OUTPATIENT
Start: 2020-02-10 | End: 2020-02-14 | Stop reason: HOSPADM

## 2020-02-10 RX ORDER — NICOTINE 21 MG/24HR
1 PATCH, TRANSDERMAL 24 HOURS TRANSDERMAL EVERY 24 HOURS
Status: DISCONTINUED | OUTPATIENT
Start: 2020-02-10 | End: 2020-02-14 | Stop reason: HOSPADM

## 2020-02-10 RX ORDER — SODIUM CHLORIDE, SODIUM LACTATE, POTASSIUM CHLORIDE, CALCIUM CHLORIDE 600; 310; 30; 20 MG/100ML; MG/100ML; MG/100ML; MG/100ML
INJECTION, SOLUTION INTRAVENOUS CONTINUOUS PRN
Status: DISCONTINUED | OUTPATIENT
Start: 2020-02-10 | End: 2020-02-10 | Stop reason: SURG

## 2020-02-10 RX ORDER — SODIUM CHLORIDE, SODIUM LACTATE, POTASSIUM CHLORIDE, CALCIUM CHLORIDE 600; 310; 30; 20 MG/100ML; MG/100ML; MG/100ML; MG/100ML
125 INJECTION, SOLUTION INTRAVENOUS CONTINUOUS
Status: DISCONTINUED | OUTPATIENT
Start: 2020-02-10 | End: 2020-02-14 | Stop reason: HOSPADM

## 2020-02-10 RX ORDER — MAGNESIUM HYDROXIDE 1200 MG/15ML
LIQUID ORAL AS NEEDED
Status: DISCONTINUED | OUTPATIENT
Start: 2020-02-10 | End: 2020-02-10 | Stop reason: HOSPADM

## 2020-02-10 RX ORDER — HYDRALAZINE HYDROCHLORIDE 20 MG/ML
5 INJECTION INTRAMUSCULAR; INTRAVENOUS EVERY 6 HOURS PRN
Status: DISCONTINUED | OUTPATIENT
Start: 2020-02-10 | End: 2020-02-14 | Stop reason: HOSPADM

## 2020-02-10 RX ORDER — FENTANYL CITRATE 50 UG/ML
INJECTION, SOLUTION INTRAMUSCULAR; INTRAVENOUS AS NEEDED
Status: DISCONTINUED | OUTPATIENT
Start: 2020-02-10 | End: 2020-02-10 | Stop reason: SURG

## 2020-02-10 RX ORDER — PANTOPRAZOLE SODIUM 40 MG/1
40 INJECTION, POWDER, FOR SOLUTION INTRAVENOUS DAILY
Status: DISCONTINUED | OUTPATIENT
Start: 2020-02-11 | End: 2020-02-14 | Stop reason: HOSPADM

## 2020-02-10 RX ORDER — NEOSTIGMINE METHYLSULFATE 1 MG/ML
INJECTION INTRAVENOUS AS NEEDED
Status: DISCONTINUED | OUTPATIENT
Start: 2020-02-10 | End: 2020-02-10 | Stop reason: SURG

## 2020-02-10 RX ORDER — HEPARIN SODIUM 5000 [USP'U]/ML
5000 INJECTION, SOLUTION INTRAVENOUS; SUBCUTANEOUS EVERY 8 HOURS SCHEDULED
Status: DISCONTINUED | OUTPATIENT
Start: 2020-02-10 | End: 2020-02-11 | Stop reason: SDUPTHER

## 2020-02-10 RX ORDER — ROCURONIUM BROMIDE 10 MG/ML
INJECTION, SOLUTION INTRAVENOUS AS NEEDED
Status: DISCONTINUED | OUTPATIENT
Start: 2020-02-10 | End: 2020-02-10 | Stop reason: SURG

## 2020-02-10 RX ORDER — GLYCOPYRROLATE 0.2 MG/ML
INJECTION INTRAMUSCULAR; INTRAVENOUS AS NEEDED
Status: DISCONTINUED | OUTPATIENT
Start: 2020-02-10 | End: 2020-02-10 | Stop reason: SURG

## 2020-02-10 RX ORDER — DICYCLOMINE HCL 20 MG
20 TABLET ORAL ONCE
Status: COMPLETED | OUTPATIENT
Start: 2020-02-10 | End: 2020-02-10

## 2020-02-10 RX ORDER — HYDROMORPHONE HCL/PF 1 MG/ML
0.5 SYRINGE (ML) INJECTION
Status: DISCONTINUED | OUTPATIENT
Start: 2020-02-10 | End: 2020-02-11

## 2020-02-10 RX ORDER — MORPHINE SULFATE 4 MG/ML
4 INJECTION, SOLUTION INTRAMUSCULAR; INTRAVENOUS ONCE
Status: COMPLETED | OUTPATIENT
Start: 2020-02-10 | End: 2020-02-10

## 2020-02-10 RX ORDER — KETOROLAC TROMETHAMINE 30 MG/ML
15 INJECTION, SOLUTION INTRAMUSCULAR; INTRAVENOUS EVERY 6 HOURS PRN
Status: DISCONTINUED | OUTPATIENT
Start: 2020-02-10 | End: 2020-02-14 | Stop reason: HOSPADM

## 2020-02-10 RX ORDER — MAGNESIUM HYDROXIDE/ALUMINUM HYDROXICE/SIMETHICONE 120; 1200; 1200 MG/30ML; MG/30ML; MG/30ML
30 SUSPENSION ORAL ONCE
Status: COMPLETED | OUTPATIENT
Start: 2020-02-10 | End: 2020-02-10

## 2020-02-10 RX ORDER — PROPOFOL 10 MG/ML
INJECTION, EMULSION INTRAVENOUS AS NEEDED
Status: DISCONTINUED | OUTPATIENT
Start: 2020-02-10 | End: 2020-02-10 | Stop reason: SURG

## 2020-02-10 RX ORDER — ONDANSETRON 2 MG/ML
4 INJECTION INTRAMUSCULAR; INTRAVENOUS EVERY 6 HOURS PRN
Status: DISCONTINUED | OUTPATIENT
Start: 2020-02-10 | End: 2020-02-14 | Stop reason: HOSPADM

## 2020-02-10 RX ADMIN — KETOROLAC TROMETHAMINE 15 MG: 30 INJECTION, SOLUTION INTRAMUSCULAR; INTRAVENOUS at 18:39

## 2020-02-10 RX ADMIN — Medication 100 MG: at 15:50

## 2020-02-10 RX ADMIN — ROCURONIUM BROMIDE 10 MG: 10 INJECTION, SOLUTION INTRAVENOUS at 15:50

## 2020-02-10 RX ADMIN — HEPARIN SODIUM 5000 UNITS: 5000 INJECTION INTRAVENOUS; SUBCUTANEOUS at 21:27

## 2020-02-10 RX ADMIN — CEFAZOLIN SODIUM 2000 MG: 2 SOLUTION INTRAVENOUS at 15:59

## 2020-02-10 RX ADMIN — SODIUM CHLORIDE 1000 ML: 0.9 INJECTION, SOLUTION INTRAVENOUS at 06:45

## 2020-02-10 RX ADMIN — HYDROMORPHONE HYDROCHLORIDE 0.5 MG: 1 INJECTION, SOLUTION INTRAMUSCULAR; INTRAVENOUS; SUBCUTANEOUS at 14:15

## 2020-02-10 RX ADMIN — ROCURONIUM BROMIDE 10 MG: 10 INJECTION, SOLUTION INTRAVENOUS at 16:35

## 2020-02-10 RX ADMIN — FENTANYL CITRATE 100 MCG: 50 INJECTION, SOLUTION INTRAMUSCULAR; INTRAVENOUS at 15:50

## 2020-02-10 RX ADMIN — DICYCLOMINE HYDROCHLORIDE 20 MG: 20 TABLET ORAL at 07:44

## 2020-02-10 RX ADMIN — GLYCOPYRROLATE 0.4 MG: 0.2 INJECTION, SOLUTION INTRAMUSCULAR; INTRAVENOUS at 16:45

## 2020-02-10 RX ADMIN — SODIUM CHLORIDE, SODIUM LACTATE, POTASSIUM CHLORIDE, AND CALCIUM CHLORIDE: .6; .31; .03; .02 INJECTION, SOLUTION INTRAVENOUS at 15:53

## 2020-02-10 RX ADMIN — SODIUM CHLORIDE, SODIUM LACTATE, POTASSIUM CHLORIDE, AND CALCIUM CHLORIDE 125 ML/HR: .6; .31; .03; .02 INJECTION, SOLUTION INTRAVENOUS at 23:18

## 2020-02-10 RX ADMIN — FENTANYL CITRATE 25 MCG: 50 INJECTION, SOLUTION INTRAMUSCULAR; INTRAVENOUS at 17:31

## 2020-02-10 RX ADMIN — SODIUM CHLORIDE, SODIUM LACTATE, POTASSIUM CHLORIDE, AND CALCIUM CHLORIDE: .6; .31; .03; .02 INJECTION, SOLUTION INTRAVENOUS at 15:11

## 2020-02-10 RX ADMIN — FENTANYL CITRATE 25 MCG: 50 INJECTION, SOLUTION INTRAMUSCULAR; INTRAVENOUS at 17:36

## 2020-02-10 RX ADMIN — IOHEXOL 100 ML: 350 INJECTION, SOLUTION INTRAVENOUS at 07:37

## 2020-02-10 RX ADMIN — MIDAZOLAM 2 MG: 1 INJECTION INTRAMUSCULAR; INTRAVENOUS at 15:42

## 2020-02-10 RX ADMIN — ONDANSETRON 4 MG: 2 INJECTION INTRAMUSCULAR; INTRAVENOUS at 10:31

## 2020-02-10 RX ADMIN — PROPOFOL 150 MG: 10 INJECTION, EMULSION INTRAVENOUS at 15:50

## 2020-02-10 RX ADMIN — FENTANYL CITRATE 25 MCG: 50 INJECTION, SOLUTION INTRAMUSCULAR; INTRAVENOUS at 18:04

## 2020-02-10 RX ADMIN — TOPICAL ANESTHETIC 2 SPRAY: 200 SPRAY DENTAL; PERIODONTAL at 10:31

## 2020-02-10 RX ADMIN — FAMOTIDINE 20 MG: 10 INJECTION INTRAVENOUS at 07:44

## 2020-02-10 RX ADMIN — MORPHINE SULFATE 4 MG: 4 INJECTION INTRAVENOUS at 10:31

## 2020-02-10 RX ADMIN — METRONIDAZOLE 500 MG: 500 INJECTION, SOLUTION INTRAVENOUS at 15:38

## 2020-02-10 RX ADMIN — LIDOCAINE HYDROCHLORIDE 15 ML: 20 SOLUTION ORAL; TOPICAL at 10:32

## 2020-02-10 RX ADMIN — FENTANYL CITRATE 50 MCG: 50 INJECTION, SOLUTION INTRAMUSCULAR; INTRAVENOUS at 16:31

## 2020-02-10 RX ADMIN — ONDANSETRON 4 MG: 2 INJECTION INTRAMUSCULAR; INTRAVENOUS at 16:40

## 2020-02-10 RX ADMIN — ONDANSETRON 4 MG: 2 INJECTION INTRAMUSCULAR; INTRAVENOUS at 07:44

## 2020-02-10 RX ADMIN — ROCURONIUM BROMIDE 40 MG: 10 INJECTION, SOLUTION INTRAVENOUS at 16:05

## 2020-02-10 RX ADMIN — NEOSTIGMINE METHYLSULFATE 3 MG: 1 INJECTION INTRAVENOUS at 16:45

## 2020-02-10 RX ADMIN — NICOTINE 1 PATCH: 21 PATCH, EXTENDED RELEASE TRANSDERMAL at 19:22

## 2020-02-10 RX ADMIN — FENTANYL CITRATE 50 MCG: 50 INJECTION, SOLUTION INTRAMUSCULAR; INTRAVENOUS at 16:51

## 2020-02-10 RX ADMIN — HYDROMORPHONE HYDROCHLORIDE 0.5 MG: 1 INJECTION, SOLUTION INTRAMUSCULAR; INTRAVENOUS; SUBCUTANEOUS at 21:27

## 2020-02-10 RX ADMIN — ALUMINUM HYDROXIDE, MAGNESIUM HYDROXIDE, AND SIMETHICONE 30 ML: 200; 200; 20 SUSPENSION ORAL at 07:44

## 2020-02-10 NOTE — ED PROVIDER NOTES
History  Chief Complaint   Patient presents with    Abdominal Pain     Naeem was awoken at 4am today with sever abd pain, last BM normal yesterday  Poor historian      16-year-old female with past medical history of chronic pain presents for evaluation of epigastric and diffuse abdominal pain radiating to her back which started at 04:00 this morning  Patient states she had similar episodes in the past but has never been given a diagnosis  The pain is better when she is laying down, associated with some nausea but no vomiting no diarrhea, no constipation  No chest pain or shortness of breath  She does have a history of hiatal hernia and has had issues with reflux  Recently she states that she came to the emergency department with similar pain on Tamika Day, at that time she was seen for chest pain, workup was unremarkable including PE study  She denies any abdominal surgeries  States that she drank in the past but currently denies alcohol use  Smokes daily denies any other drug use  Prior to Admission Medications   Prescriptions Last Dose Informant Patient Reported? Taking?    Cholecalciferol (VITAMIN D3) 5000 units CAPS  Self Yes No   Sig: Take by mouth daily   Cranberry 200 MG CAPS  Self Yes No   Sig: Take 1 capsule by mouth daily   Multiple Vitamin tablet  Self Yes No   Sig: Take 1 tablet by mouth daily   amLODIPine (NORVASC) 10 mg tablet   No No   Sig: Take 1 tablet (10 mg total) by mouth daily Decreased dose   fluocinonide (LIDEX) 0 05 % ointment   No No   Sig: Apply topically 2 (two) times a day   fluticasone (FLONASE) 50 mcg/act nasal spray   No No   Si sprays into each nostril daily   fluticasone-salmeterol (ADVAIR DISKUS) 250-50 mcg/dose inhaler   No No   Sig: Inhale 1 puff 2 (two) times a day Rinse mouth after use   gabapentin (NEURONTIN) 300 mg capsule   No No   Sig: take 1 capsule by mouth daily in AM 1 at noon  and 2 capsules by mouth every PM   meloxicam (MOBIC) 15 mg tablet No No   Sig: Take 1 tablet (15 mg total) by mouth daily   nicotine (NICODERM CQ) 21 mg/24 hr TD 24 hr patch   No No   Sig: Place 1 patch on the skin every 24 hours   omeprazole (PriLOSEC) 40 MG capsule   No No   Sig: take 1 capsule by mouth daily      Facility-Administered Medications: None       Past Medical History:   Diagnosis Date    Anemia     Anxiety     Bursitis of left elbow     Chronic pain disorder     lower back    Hiatal hernia     Iron deficiency anemia secondary to inadequate dietary iron intake 1/29/2020    Kidney stones     Pancreatitis     Psoriasis     Psychiatric disorder     anxiety    Restless leg syndrome     Tobacco use disorder        Past Surgical History:   Procedure Laterality Date    CYSTOSCOPY      CYSTOSCOPY W/ URETERAL STENT PLACEMENT      NO PAST SURGERIES      ND ERCP DX COLLECTION SPECIMEN BRUSHING/WASHING N/A 8/15/2018    Procedure: ENDOSCOPIC RETROGRADE CHOLANGIOPANCREATOGRAPHY (ERCP); Surgeon: Maile Nageotte, MD;  Location:  MAIN OR;  Service: Gastroenterology    ND LAP,DIAGNOSTIC ABDOMEN N/A 2/10/2020    Procedure: LAPAROSCOPY DIAGNOSTIC, REPAIR ENTEROTOMY, REMOVAL OF BEZOAR;  Surgeon: Michelle Ralph MD;  Location:  MAIN OR;  Service: General       Family History   Problem Relation Age of Onset    Mental illness Sister     Other Other         Cardiovascular disease    Hypertension Mother     Cancer Father     Substance Abuse Neg Hx      I have reviewed and agree with the history as documented  Social History     Tobacco Use    Smoking status: Former Smoker     Types: Cigarettes    Smokeless tobacco: Never Used    Tobacco comment: Attempting to stop   Substance Use Topics    Alcohol use: Never     Frequency: Never     Drinks per session: Patient refused     Binge frequency: Never    Drug use: No        Review of Systems   Constitutional: Negative for appetite change and fever  HENT: Negative for rhinorrhea and sore throat      Eyes: Negative for photophobia and visual disturbance  Respiratory: Negative for cough, chest tightness and wheezing  Cardiovascular: Negative for chest pain, palpitations and leg swelling  Gastrointestinal: Positive for abdominal pain and nausea  Negative for abdominal distention, blood in stool, constipation, diarrhea and vomiting  Genitourinary: Negative for dysuria, flank pain, frequency, hematuria and urgency  Musculoskeletal: Negative for back pain  Skin: Negative for rash  Neurological: Negative for dizziness, weakness and headaches  All other systems reviewed and are negative  Physical Exam  Physical Exam   Constitutional: She is oriented to person, place, and time  She appears well-developed and well-nourished  HENT:   Head: Normocephalic and atraumatic  Eyes: Pupils are equal, round, and reactive to light  EOM are normal    Neck: Normal range of motion  Neck supple  Cardiovascular: Normal rate and regular rhythm  Exam reveals no gallop and no friction rub  No murmur heard  Pulmonary/Chest: Effort normal  She has no wheezes  She has no rales  She exhibits no tenderness  Abdominal: Soft  She exhibits no distension and no mass  There is generalized tenderness  There is no rebound and no guarding  Neurological: She is alert and oriented to person, place, and time  Skin: Skin is warm and dry  Psychiatric: She has a normal mood and affect  Nursing note and vitals reviewed        Vital Signs  ED Triage Vitals [02/10/20 0637]   Temperature Pulse Respirations Blood Pressure SpO2   97 9 °F (36 6 °C) 85 20 144/77 100 %      Temp Source Heart Rate Source Patient Position - Orthostatic VS BP Location FiO2 (%)   Tympanic Monitor Sitting Left arm --      Pain Score       Worst Possible Pain           Vitals:    02/13/20 0803 02/14/20 0041 02/14/20 0731 02/14/20 0753   BP: 146/80 142/76 168/87 147/91   Pulse: 81 76 85 88   Patient Position - Orthostatic VS: Sitting            Visual Acuity      ED Medications  Medications   sodium chloride 0 9 % bolus 1,000 mL (0 mL Intravenous Stopped 2/10/20 0845)   aluminum-magnesium hydroxide-simethicone (MYLANTA) 200-200-20 mg/5 mL oral suspension 30 mL (30 mL Oral Given 2/10/20 0744)   famotidine (PEPCID) injection 20 mg (20 mg Intravenous Given 2/10/20 0744)   ondansetron (ZOFRAN) injection 4 mg (4 mg Intravenous Given 2/10/20 0744)   dicyclomine (BENTYL) tablet 20 mg (20 mg Oral Given 2/10/20 0744)   iohexol (OMNIPAQUE) 350 MG/ML injection (MULTI-DOSE) 100 mL (100 mL Intravenous Given 2/10/20 0737)   benzocaine (HURRICAINE) 20 % mucosal spray 2 spray (2 sprays Mucosal Given 2/10/20 1031)   Lidocaine Viscous HCl (XYLOCAINE) 2 % mucosal solution 15 mL (15 mL Oral Given 2/10/20 1032)   morphine (PF) 4 mg/mL injection 4 mg (4 mg Intravenous Given 2/10/20 1031)   ondansetron (ZOFRAN) injection 4 mg (4 mg Intravenous Given 2/10/20 1031)   ceFAZolin (ANCEF) IVPB (premix) 2,000 mg (2,000 mg Intravenous Given 2/10/20 1559)   metroNIDAZOLE (FLAGYL) IVPB (premix) 500 mg (500 mg Intravenous Given 2/10/20 1538)   potassium chloride 20 mEq IVPB (premix) ( Intravenous Rate/Dose Change 2/13/20 1430)   iron sucrose (VENOFER) 100 mg in sodium chloride 0 9 % 100 mL IVPB (100 mg Intravenous New Bag 2/13/20 1711)       Diagnostic Studies  Results Reviewed     Procedure Component Value Units Date/Time    Basic metabolic panel [[id number]] Collected:  02/11/20 0528    Lab Status:  Final result Specimen:  Blood from Arm, Right Updated:  02/11/20 0659     Sodium 142 mmol/L      Potassium 4 9 mmol/L      Chloride 108 mmol/L      CO2 26 mmol/L      ANION GAP 8 mmol/L      BUN 15 mg/dL      Creatinine 0 74 mg/dL      Glucose 113 mg/dL      Calcium 8 6 mg/dL      eGFR 90 ml/min/1 73sq m     Narrative:       [place] guidelines for Chronic Kidney Disease (CKD):     Stage 1 with normal or high GFR (GFR > 90 mL/min/1 73 square meters)    Stage 2 Mild CKD (GFR = 60-89 mL/min/1 73 square meters)    Stage 3A Moderate CKD (GFR = 45-59 mL/min/1 73 square meters)    Stage 3B Moderate CKD (GFR = 30-44 mL/min/1 73 square meters)    Stage 4 Severe CKD (GFR = 15-29 mL/min/1 73 square meters)    Stage 5 End Stage CKD (GFR <15 mL/min/1 73 square meters)  Note: GFR calculation is accurate only with a steady state creatinine    CBC (With Platelets) [[id number]]  (Abnormal) Collected:  02/11/20 0528    Lab Status:  Final result Specimen:  Blood from Arm, Right Updated:  02/11/20 0634     WBC 8 89 Thousand/uL      RBC 3 10 Million/uL      Hemoglobin 9 6 g/dL      Hematocrit 31 2 %       fL      MCH 31 0 pg      MCHC 30 8 g/dL      RDW 15 9 %      Platelets 673 Thousands/uL      MPV 11 5 fL     POCT urinalysis dipstick [[id number]]  (Abnormal) Resulted:  02/10/20 0859    Lab Status:  Final result Updated:  02/10/20 0900     Color, UA STRAW     Clarity, UA CLEAR     Glucose, UA (Ref: Negative) NEG     Bilirubin, UA (Ref: Negative) NEG     Ketones, UA (Ref: Negative) NEG     Spec Grav, UA (Ref:1 003-1 030) 1 010     Blood, UA (Ref: Negative) TRACE     pH, UA (Ref: 4 5-8 0) 6 0     Protein, UA (Ref: Negative) NEG     Urobilinogen, UA (Ref: 0 2- 1 0) 0 2      Leukocytes, UA (Ref: Negative) NEG     Nitrite, UA (Ref: Negative) NEG    Lactic acid, plasma [[id number]]  (Normal) Collected:  02/10/20 0716    Lab Status:  Final result Specimen:  Blood from Arm, Right Updated:  02/10/20 0743     LACTIC ACID 1 5 mmol/L     Narrative:       Result may be elevated if tourniquet was used during collection      Protime-INR [[id number]]  (Normal) Collected:  02/10/20 0644    Lab Status:  Final result Specimen:  Blood Updated:  02/10/20 0738     Protime 12 3 seconds      INR 0 94    APTT [[id number]]  (Normal) Collected:  02/10/20 0644    Lab Status:  Final result Specimen:  Blood Updated:  02/10/20 0738     PTT 26 seconds     Troponin I [[id number]]  (Normal) Collected:  02/10/20 0644    Lab Status:  Final result Specimen:  Blood from Arm, Right Updated:  02/10/20 0712     Troponin I <0 02 ng/mL     Comprehensive metabolic panel [[id number]] Collected:  02/10/20 0644    Lab Status:  Final result Specimen:  Blood from Arm, Right Updated:  02/10/20 0710     Sodium 145 mmol/L      Potassium 3 5 mmol/L      Chloride 107 mmol/L      CO2 30 mmol/L      ANION GAP 8 mmol/L      BUN 12 mg/dL      Creatinine 0 81 mg/dL      Glucose 137 mg/dL      Calcium 9 2 mg/dL      AST 16 U/L      ALT 19 U/L      Alkaline Phosphatase 85 U/L      Total Protein 6 9 g/dL      Albumin 3 5 g/dL      Total Bilirubin 0 20 mg/dL      eGFR 81 ml/min/1 73sq m     Narrative:       [place] guidelines for Chronic Kidney Disease (CKD):     Stage 1 with normal or high GFR (GFR > 90 mL/min/1 73 square meters)    Stage 2 Mild CKD (GFR = 60-89 mL/min/1 73 square meters)    Stage 3A Moderate CKD (GFR = 45-59 mL/min/1 73 square meters)    Stage 3B Moderate CKD (GFR = 30-44 mL/min/1 73 square meters)    Stage 4 Severe CKD (GFR = 15-29 mL/min/1 73 square meters)    Stage 5 End Stage CKD (GFR <15 mL/min/1 73 square meters)  Note: GFR calculation is accurate only with a steady state creatinine    Lipase [[id number]]  (Normal) Collected:  02/10/20 0644    Lab Status:  Final result Specimen:  Blood from Arm, Right Updated:  02/10/20 0710     Lipase 126 u/L     CBC and differential [[id number]]  (Abnormal) Collected:  02/10/20 0644    Lab Status:  Final result Specimen:  Blood from Arm, Right Updated:  02/10/20 0651     WBC 9 67 Thousand/uL      RBC 3 72 Million/uL      Hemoglobin 11 6 g/dL      Hematocrit 37 3 %       fL      MCH 31 2 pg      MCHC 31 1 g/dL      RDW 15 2 %      MPV 9 6 fL      Platelets 766 Thousands/uL      nRBC 0 /100 WBCs      Neutrophils Relative 88 %      Immat GRANS % 0 %      Lymphocytes Relative 8 %      Monocytes Relative 3 %      Eosinophils Relative 1 %      Basophils Relative 0 % Neutrophils Absolute 8 43 Thousands/µL      Immature Grans Absolute 0 04 Thousand/uL      Lymphocytes Absolute 0 81 Thousands/µL      Monocytes Absolute 0 29 Thousand/µL      Eosinophils Absolute 0 08 Thousand/µL      Basophils Absolute 0 02 Thousands/µL                  XR abdomen obstruction series   Final Result by Odalis Callahan MD (02/13 1001)      1  Nonobstructive bowel gas pattern  Resolution of previously dilated bowel with passage of contrast entirely within the colon  2   Small left pleural effusion  Workstation performed: [id number]         XR abdomen 1 view kub   Final Result by To Sánchez MD (02/13 9984)      Enteric contrast throughout the small bowel and colon with prominent proximal small bowel loops concerning for partial SBO  Workstation performed: [id number]         CT abdomen pelvis with contrast   ED Interpretation by Salty Sykes DO (02/10 1002)   Abnormal   See below      Final Result by Vu Koo MD (02/10 0801)      Findings suspicious for obstruction and high-grade stenosis/early obstruction of the small bowel with abrupt transition point identified in the right lower quadrant with appearance suspicious for adhesion  Free fluid seen within the mesenteric folds  No loculated collections or free air  Numerous nonobstructing left renal calculi without significant change  Mild bilateral renal cortical scarring  The study was marked in [facility] for immediate notification        Workstation performed: [id number]                    Procedures  Procedures         ED Course  ED Course as of Feb 22 1902   Mon Feb 10, 2020   9286 Procedure Note: EKG  Date/Time: 02/10/20 6:47 AM   Performed by: Ant Penny  Authorized by: Ant Penny  Indications / Diagnosis: Abdominal Pain  ECG reviewed by me, the ED Provider: yes   The EKG demonstrates:  Rhythm: normal sinus  Intervals: normal intervals  Axis: normal axis  QRS/Blocks: normal QRS  ST Changes: No acute ST Changes, no STD/IVY                                       MDM  Number of Diagnoses or Management Options  Diagnosis management comments: 64year old female w/ chronic pain, multiple previous abdominal pain complaints, will obtain bloodwork, ct abdomen and treat symptomatically, re-evaluate        Disposition  Final diagnoses: Bowel obstruction ([facility] [address] )     Time reflects when diagnosis was documented in both MDM as applicable and the Disposition within this note     Time User Action Codes Description Comment    2/10/2020 11:15 AM Giovana Gomez Add [K56 609] Bowel obstruction ([facility] [address] )     2/11/2020  8:58 AM Astl-Adama, Winston Ort Add [K56 609] Small bowel obstruction ([facility] [address] )     2/11/2020  3:38 PM Sanz Hover Add [D50 9] Iron deficiency anemia, unspecified iron deficiency anemia type     2/11/2020  4:05 PM Astl-Adama, Winston Ort Add [I10] Benign essential HTN     2/11/2020  4:05 PM Astl-Adama, Winston Ort Add [E78 00] Hypercholesterolemia     2/11/2020  4:05 PM Astl-Adama, Cleveland Ort Add [F41 9] Anxiety     2/11/2020  4:05 PM Astl-Adama, Cleveland Ort Add [G89 4] Chronic pain syndrome       ED Disposition     ED Disposition Condition Date/Time Comment    Admit Stable Mon Feb 10, 2020 11:15 AM Case was discussed with Dr Hilda Lozoya and the patient's admission status was agreed to be Admission Status: inpatient status to the service of Dr Hilda Lozoya           Follow-up Information    None         Discharge Medication List as of 2/14/2020  1:37 PM      START taking these medications    Details   oxyCODONE-acetaminophen (PERCOCET) 5-325 mg per tablet Take 1 tablet by mouth every 6 (six) hours as needed for severe pain for up to 10 daysMax Daily Amount: 4 tablets, Starting Fri 2/14/2020, Until Mon 2/24/2020, Normal         CONTINUE these medications which have NOT CHANGED    Details   amLODIPine (NORVASC) 10 mg tablet Take 1 tablet (10 mg total) by mouth daily Decreased dose, Starting Thu 11/14/2019, Normal      Cholecalciferol (VITAMIN D3) 5000 units CAPS Take by mouth daily, Starting Thu 9/7/2017, Historical Med      Cranberry 200 MG CAPS Take 1 capsule by mouth daily, Starting Mon 5/2/2016, Historical Med      fluocinonide (LIDEX) 0 05 % ointment Apply topically 2 (two) times a day, Starting Thu 11/14/2019, Normal      fluticasone (FLONASE) 50 mcg/act nasal spray 2 sprays into each nostril daily, Starting Thu 11/14/2019, Normal      fluticasone-salmeterol (ADVAIR DISKUS) 250-50 mcg/dose inhaler Inhale 1 puff 2 (two) times a day Rinse mouth after use, Starting Tue 2/4/2020, Until Thu 3/5/2020, Normal      gabapentin (NEURONTIN) 300 mg capsule take 1 capsule by mouth daily in AM 1 at noon  and 2 capsules by mouth every PM, Normal      meloxicam (MOBIC) 15 mg tablet Take 1 tablet (15 mg total) by mouth daily, Starting Thu 11/14/2019, Normal      Multiple Vitamin tablet Take 1 tablet by mouth daily, Starting Mon 5/2/2016, Historical Med      nicotine (NICODERM CQ) 21 mg/24 hr TD 24 hr patch Place 1 patch on the skin every 24 hours, Starting Fri 1/31/2020, Normal      omeprazole (PriLOSEC) 40 MG capsule take 1 capsule by mouth daily, Normal      loratadine-pseudoephedrine (CLARITIN-D 12 HOUR) 5-120 mg per tablet Take 1 tablet by mouth every 12 (twelve) hours as needed, Starting Mon 12/8/2014, Historical Med      methocarbamol (ROBAXIN) 500 mg tablet Take 1 tablet (500 mg total) by mouth 2 (two) times a day, Starting Fri 1/31/2020, Print           Outpatient Discharge Orders   Lifting restrictions     No strenuous exercise     No driving until     Call provider for:  severe uncontrolled pain     Call provider for:  redness, tenderness, or signs of infection (pain, swelling, redness, odor or green/yellow discharge around incision site)     Call provider for:  persistent nausea or vomiting     No dressing needed       ED Provider  Electronically Signed by           Annie Streeter MD  02/22/20 3385

## 2020-02-10 NOTE — ED NOTES
Patient transported to [facility], [facility]  02/10/20 4817

## 2020-02-10 NOTE — ED NOTES
Spoke to lab they will add PT/INR to blue top sent down with white label  Report received from Jessika Barker, [facility]  Pt in bed with  at bedside   Call bell within reach      Ca Almonte, RN  02/10/20 0965

## 2020-02-10 NOTE — INTERIM OP NOTE
LAPAROSCOPY DIAGNOSTIC, REPAIR ENTEROTOMY, REMOVAL OF BEZOAR  Postoperative Note  PATIENT NAME: Judy Sotelo  : 1960  MRN: [id number]  [facility] OR ROOM 03    Surgery Date: 2/10/2020    Preop Diagnosis:  Bowel obstruction ([facility] [address] ) [K56 609]    Post-Op Diagnosis Codes:     Small Bowel obstruction ([facility] [address] ) [K56 609]    Procedure(s) (LRB):  LAPAROSCOPY DIAGNOSTIC, REPAIR ENTEROTOMY, REMOVAL OF BEZOAR (N/A)    Surgeon(s) and Role:     * Delvin Primrose, MD - Primary     * Angy Powell PA-C - Assisting    Specimens:  * No specimens in log *    Estimated Blood Loss:   Minimal    Anesthesia Type:   General ETA    Findings:   bezoar  Complications:   None    SIGNATURE: Que Pena PA-C   DATE: February 10, 2020   TIME: 5:08 PM

## 2020-02-10 NOTE — ANESTHESIA PREPROCEDURE EVALUATION
Review of Systems/Medical History  Patient summary reviewed  Chart reviewed      Cardiovascular  Hyperlipidemia, Hypertension ,    Pulmonary       GI/Hepatic     Hiatal hernia,             Endo/Other     GYN       Hematology  Anemia ,     Musculoskeletal    Arthritis     Neurology   Psychology   Anxiety,              Physical Exam    Airway    Mallampati score: II  TM Distance: >3 FB  Neck ROM: full     Dental   upper dentures,     Cardiovascular  Rhythm: regular, Rate: normal, Cardiovascular exam normal    Pulmonary  Pulmonary exam normal Breath sounds clear to auscultation,     Other Findings        Anesthesia Plan  ASA Score- 3 Emergent    Anesthesia Type- general with ASA Monitors  Additional Monitors:   Airway Plan: ETT  Comment: Benefits and risks of planned anesthetic discussed with patient, and agrees to proceed  I, Dr Alex Murray, the attending anesthesiologist, have personally seen and evaluated the patient prior to anesthetic care  I have reviewed the preanesthetic record, and other medical records if appropriate to the anesthetic care  If a CRNA is involved in the case, I have reviewed the CRNA assessment, if present, and agree  The patient is in a suitable condition to proceed with my formulated anesthetic plan        Plan Factors-    Induction- intravenous and rapid sequence induction  Postoperative Plan- Plan for postoperative opioid use  Informed Consent- Anesthetic plan and risks discussed with patient

## 2020-02-10 NOTE — H&P
H&P Exam - General Surgery   Moises Flores 64 y o  female MRN: [id number]  Unit/Bed#: ED 09 Encounter: [id number]    Assessment/Plan     SBO  With no previous history of abdominal surgeries  Per CT scan high grade SBO with transition point in the right lower quadrant  NGT in place to low continuous suction  Pain control  IVF  Plan for OR today for diagnostic laparoscopy, possible lysis of adhesions, possible small bowel resection    Iron deficiency anemia  Colonoscopy 3 years ago  CT scan as recently as 1/27 with no acute findings  Getting IV iron supplements at infusion center  Hgb 11 6 today monitor and transfuse as needed    HTN  Controlled with home meds  Hold po meds with NGT and SBO  Monitor    History of Present Illness   HPI:  Moises Flores is a 64 y o  female who presents with several years of intermittent abdominal pain  She has been worked up by PCP and GI with colonoscopy and ct scans and no cause of her abdominal pain had previously been found  She awoke this am at 4am with severe abdominal pain and quickly developed associated nausea and vomiting  She reports last bm was yesterday and was normal for her  She denies changes to bowel habits, bloody stool, food intolerances, fevers, chills, cp, or sob  Review of Systems   Constitutional: Positive for appetite change  Negative for activity change, chills, fatigue and fever  HENT: Negative  Respiratory: Negative  Cardiovascular: Negative  Gastrointestinal: Positive for abdominal distention, abdominal pain, nausea and vomiting  Negative for blood in stool, constipation and diarrhea  Genitourinary: Negative for difficulty urinating, dysuria, flank pain, frequency and urgency  Neurological: Negative  Psychiatric/Behavioral: Negative          Historical Information   Past Medical History:   Diagnosis Date    Anemia     Anxiety     Bursitis of left elbow     Chronic pain disorder     lower back    Hiatal hernia     Iron deficiency anemia secondary to inadequate dietary iron intake 2020    Kidney stones     Pancreatitis     Psoriasis     Psychiatric disorder     anxiety    Restless leg syndrome     Tobacco use disorder      Past Surgical History:   Procedure Laterality Date    CYSTOSCOPY      CYSTOSCOPY W/ URETERAL STENT PLACEMENT      NO PAST SURGERIES      AR ERCP DX COLLECTION SPECIMEN BRUSHING/WASHING N/A 8/15/2018    Procedure: ENDOSCOPIC RETROGRADE CHOLANGIOPANCREATOGRAPHY (ERCP); Surgeon: Stuart Guerrero MD;  Location: [facility] OR;  Service: Gastroenterology     Social History   Social History     Substance and Sexual Activity   Alcohol Use No     Social History     Substance and Sexual Activity   Drug Use No     Social History     Tobacco Use   Smoking Status Current Every Day Smoker    Packs/day: 1 00    Years: 40 00    Pack years: 40 00    Types: Cigarettes   Smokeless Tobacco Never Used     Family History: non-contributory    Meds/Allergies   PTA meds:   Prior to Admission Medications   Prescriptions Last Dose Informant Patient Reported? Taking?    Cholecalciferol (VITAMIN D3) 5000 units CAPS  Self Yes No   Sig: Take by mouth daily   Cranberry 200 MG CAPS  Self Yes No   Sig: Take 1 capsule by mouth daily   Multiple Vitamin tablet  Self Yes No   Sig: Take 1 tablet by mouth daily   amLODIPine (NORVASC) 10 mg tablet   No No   Sig: Take 1 tablet (10 mg total) by mouth daily Decreased dose   fluocinonide (LIDEX) 0 05 % ointment   No No   Sig: Apply topically 2 (two) times a day   fluticasone (FLONASE) 50 mcg/act nasal spray   No No   Si sprays into each nostril daily   fluticasone-salmeterol (ADVAIR DISKUS) 250-50 mcg/dose inhaler   No No   Sig: Inhale 1 puff 2 (two) times a day Rinse mouth after use   gabapentin (NEURONTIN) 300 mg capsule   No No   Sig: take 1 capsule by mouth daily in AM 1 at noon  and 2 capsules by mouth every PM   loratadine-pseudoephedrine (CLARITIN-D 12 HOUR) 5-120 mg per tablet  Self Yes No Sig: Take 1 tablet by mouth every 12 (twelve) hours as needed   meloxicam (MOBIC) 15 mg tablet   No No   Sig: Take 1 tablet (15 mg total) by mouth daily   methocarbamol (ROBAXIN) 500 mg tablet   No No   Sig: Take 1 tablet (500 mg total) by mouth 2 (two) times a day   nicotine (NICODERM CQ) 21 mg/24 hr TD 24 hr patch   No No   Sig: Place 1 patch on the skin every 24 hours   omeprazole (PriLOSEC) 40 MG capsule   No No   Sig: take 1 capsule by mouth daily      Facility-Administered Medications: None     Allergies   Allergen Reactions    Pollen Extract     Tree Extract        Objective   First Vitals:   Blood Pressure: 144/77 (02/10/20 0637)  Pulse: 85 (02/10/20 0637)  Temperature: 97 9 °F (36 6 °C) (02/10/20 0637)  Temp Source: Tympanic (02/10/20 1721)  Respirations: 20 (02/10/20 0637)  SpO2: 100 % (02/10/20 0637)    Current Vitals:   Blood Pressure: 134/75 (02/10/20 1107)  Pulse: 92 (02/10/20 1107)  Temperature: 97 9 °F (36 6 °C) (02/10/20 0637)  Temp Source: Tympanic (02/10/20 0509)  Respirations: 20 (02/10/20 1107)  SpO2: 95 % (02/10/20 1107)    No intake or output data in the 24 hours ending 02/10/20 1326    Invasive Devices     Peripheral Intravenous Line            Peripheral IV 02/10/20 Right Antecubital less than 1 day          Drain            NG/OG/Enteral Tube Nasogastric 14 Fr Left mouth less than 1 day                Physical Exam   Constitutional: She is oriented to person, place, and time  She appears well-developed and well-nourished  She appears distressed  HENT:   Head: Normocephalic and atraumatic  Eyes: Pupils are equal, round, and reactive to light  EOM are normal    Neck: Normal range of motion  Neck supple  Cardiovascular: Normal rate, regular rhythm and normal heart sounds  Exam reveals no gallop and no friction rub  No murmur heard  Pulmonary/Chest: Effort normal and breath sounds normal  She has no wheezes  She has no rales  Abdominal: Soft  She exhibits distension   Bowel sounds are absent  There is generalized tenderness (mild)  There is no rigidity, no rebound and no guarding  Musculoskeletal: Normal range of motion  Neurological: She is alert and oriented to person, place, and time  Skin: Skin is warm and dry  Capillary refill takes less than 2 seconds  Lab Results:   CBC:   Lab Results   Component Value Date    WBC 9 67 02/10/2020    HGB 11 6 02/10/2020    HCT 37 3 02/10/2020     (H) 02/10/2020     02/10/2020    MCH 31 2 02/10/2020    MCHC 31 1 (L) 02/10/2020    RDW 15 2 (H) 02/10/2020    MPV 9 6 02/10/2020    NRBC 0 02/10/2020   , CMP:   Lab Results   Component Value Date    SODIUM 145 02/10/2020    K 3 5 02/10/2020     02/10/2020    CO2 30 02/10/2020    BUN 12 02/10/2020    CREATININE 0 81 02/10/2020    CALCIUM 9 2 02/10/2020    AST 16 02/10/2020    ALT 19 02/10/2020    ALKPHOS 85 02/10/2020    EGFR 81 02/10/2020     Imaging:  CT abdomen/pelvis 2/10  "Findings suspicious for obstruction and high-grade stenosis/early obstruction of the small bowel with abrupt transition point identified in the right lower quadrant with appearance suspicious for adhesion  Free fluid seen within the mesenteric folds  No loculated collections or free air      Numerous nonobstructing left renal calculi without significant change  Mild bilateral renal cortical scarring "    EKG, Pathology, and Other Studies: I have personally reviewed pertinent reports        Code Status: Prior

## 2020-02-10 NOTE — ED CARE HANDOFF
Emergency Department Sign Out Note        Sign out and transfer of care from Dr Kelle Camacho  See Separate Emergency Department note  The patient, Avis Dominguez, was evaluated by the previous provider for abdominal pain      Workup Completed:  Labs Reviewed   CBC AND DIFFERENTIAL - Abnormal       Result Value Ref Range Status    WBC 9 67  4 31 - 10 16 Thousand/uL Final    RBC 3 72 (*) 3 81 - 5 12 Million/uL Final    Hemoglobin 11 6  11 5 - 15 4 g/dL Final    Hematocrit 37 3  34 8 - 46 1 % Final     (*) 82 - 98 fL Final    MCH 31 2  26 8 - 34 3 pg Final    MCHC 31 1 (*) 31 4 - 37 4 g/dL Final    RDW 15 2 (*) 11 6 - 15 1 % Final    MPV 9 6  8 9 - 12 7 fL Final    Platelets 898  982 - 390 Thousands/uL Final    nRBC 0  /100 WBCs Final    Neutrophils Relative 88 (*) 43 - 75 % Final    Immat GRANS % 0  0 - 2 % Final    Lymphocytes Relative 8 (*) 14 - 44 % Final    Monocytes Relative 3 (*) 4 - 12 % Final    Eosinophils Relative 1  0 - 6 % Final    Basophils Relative 0  0 - 1 % Final    Neutrophils Absolute 8 43 (*) 1 85 - 7 62 Thousands/µL Final    Immature Grans Absolute 0 04  0 00 - 0 20 Thousand/uL Final    Lymphocytes Absolute 0 81  0 60 - 4 47 Thousands/µL Final    Monocytes Absolute 0 29  0 17 - 1 22 Thousand/µL Final    Eosinophils Absolute 0 08  0 00 - 0 61 Thousand/µL Final    Basophils Absolute 0 02  0 00 - 0 10 Thousands/µL Final   POCT URINALYSIS DIPSTICK - Abnormal    Color, UA STRAW   Final    Clarity, UA CLEAR   Final    Glucose, UA (Ref: Negative) NEG   Final    Bilirubin, UA (Ref: Negative) NEG   Final    Ketones, UA (Ref: Negative) NEG   Final    Spec Grav, UA (Ref:1 003-1 030) 1 010   Final    Blood, UA (Ref: Negative) TRACE   Final    pH, UA (Ref: 4 5-8 0) 6 0   Final    Protein, UA (Ref: Negative) NEG   Final    Urobilinogen, UA (Ref: 0 2- 1 0) 0 2   Final     Leukocytes, UA (Ref: Negative) NEG   Final    Nitrite, UA (Ref: Negative) NEG   Final   LIPASE - Normal    Lipase 126  73 - 393 u/L Final TROPONIN I - Normal    Troponin I <0 02  <=0 04 ng/mL Final    Comment: 3Autovalidation override  [employer] Chemistry analyzer 99% cutoff is > 0 04 ng/mL in network labs     o cTnI 99% cutoff is useful only when applied to patients in the clinical setting of myocardial ischemia   o cTnI 99% cutoff should be interpreted in the context of clinical history, ECG findings and possibly cardiac imaging to establish correct diagnosis  o cTnI 99% cutoff may be suggestive but clearly not indicative of a coronary event without the clinical setting of myocardial ischemia  PROTIME-INR - Normal    Protime 12 3  11 6 - 14 5 seconds Final    INR 0 94  0 84 - 1 19 Final   APTT - Normal    PTT 26  23 - 37 seconds Final    Comment: Therapeutic Heparin Range =  60-90 seconds   LACTIC ACID, PLASMA - Normal    LACTIC ACID 1 5  0 5 - 2 0 mmol/L Final    Narrative:     Result may be elevated if tourniquet was used during collection  COMPREHENSIVE METABOLIC PANEL    Sodium 340  136 - 145 mmol/L Final    Potassium 3 5  3 5 - 5 3 mmol/L Final    Chloride 107  100 - 108 mmol/L Final    CO2 30  21 - 32 mmol/L Final    ANION GAP 8  4 - 13 mmol/L Final    BUN 12  5 - 25 mg/dL Final    Creatinine 0 81  0 60 - 1 30 mg/dL Final    Comment: Standardized to IDMS reference method    Glucose 137  65 - 140 mg/dL Final    Comment:   If the patient is fasting, the ADA then defines impaired fasting glucose as > 100 mg/dL and diabetes as > or equal to 123 mg/dL  Specimen collection should occur prior to Sulfasalazine administration due to the potential for falsely depressed results  Specimen collection should occur prior to Sulfapyridine administration due to the potential for falsely elevated results  Calcium 9 2  8 3 - 10 1 mg/dL Final    AST 16  5 - 45 U/L Final    Comment:   Specimen collection should occur prior to Sulfasalazine administration due to the potential for falsely depressed results       ALT 19  12 - 78 U/L Final    Comment: Specimen collection should occur prior to Sulfasalazine administration due to the potential for falsely depressed results  Alkaline Phosphatase 85  46 - 116 U/L Final    Total Protein 6 9  6 4 - 8 2 g/dL Final    Albumin 3 5  3 5 - 5 0 g/dL Final    Total Bilirubin 0 20  0 20 - 1 00 mg/dL Final    eGFR 81  ml/min/1 73sq m Final    Narrative:     [place] guidelines for Chronic Kidney Disease (CKD):     Stage 1 with normal or high GFR (GFR > 90 mL/min/1 73 square meters)    Stage 2 Mild CKD (GFR = 60-89 mL/min/1 73 square meters)    Stage 3A Moderate CKD (GFR = 45-59 mL/min/1 73 square meters)    Stage 3B Moderate CKD (GFR = 30-44 mL/min/1 73 square meters)    Stage 4 Severe CKD (GFR = 15-29 mL/min/1 73 square meters)    Stage 5 End Stage CKD (GFR <15 mL/min/1 73 square meters)  Note: GFR calculation is accurate only with a steady state creatinine         ED Course / Workup Pending (followup):       CT abdomen pelvis with contrast   ED Interpretation   Abnormal   See below      Final Result      Findings suspicious for obstruction and high-grade stenosis/early obstruction of the small bowel with abrupt transition point identified in the right lower quadrant with appearance suspicious for adhesion  Free fluid seen within the mesenteric folds  No loculated collections or free air  Numerous nonobstructing left renal calculi without significant change  Mild bilateral renal cortical scarring  The study was marked in [facility] for immediate notification  Workstation performed: [id number]                                   ED Course as of Feb 10 1932   Mon Feb 10, 2020   1023 Contacted surgery to discuss pt   LACTIC ACID: 1 [id number]     Procedures  MDM    Disposition  Final diagnoses:    Bowel obstruction ([address] )     Time reflects when diagnosis was documented in both MDM as applicable and the Disposition within this note     Time User Action Codes Description Comment    2/10/2020 11:15 AM Giovana Gomez Add [K56 [id number]] Bowel obstruction [facility])       ED Disposition     ED Disposition Condition Date/Time Comment    Admit Stable Mon Feb 10, 2020 11:15 AM Case was discussed with Dr Shanel Hui and the patient's admission status was agreed to be Admission Status: inpatient status to the service of Dr Shanel Hui           Follow-up Information    None       Current Discharge Medication List      CONTINUE these medications which have NOT CHANGED    Details   amLODIPine (NORVASC) 10 mg tablet Take 1 tablet (10 mg total) by mouth daily Decreased dose  Qty: 90 tablet, Refills: 3    Associated Diagnoses: Benign essential HTN      Cholecalciferol (VITAMIN D3) 5000 units CAPS Take by mouth daily      Cranberry 200 MG CAPS Take 1 capsule by mouth daily      fluocinonide (LIDEX) 0 05 % ointment Apply topically 2 (two) times a day  Qty: 30 g, Refills: 0    Associated Diagnoses: Psoriasiform eczema      fluticasone (FLONASE) 50 mcg/act nasal spray 2 sprays into each nostril daily  Qty: 3 Bottle, Refills: 1    Associated Diagnoses: Non-recurrent acute suppurative otitis media of left ear with spontaneous rupture of tympanic membrane; Mild intermittent reactive airway disease with acute exacerbation      fluticasone-salmeterol (ADVAIR DISKUS) 250-50 mcg/dose inhaler Inhale 1 puff 2 (two) times a day Rinse mouth after use  Qty: 1 Inhaler, Refills: 5    Associated Diagnoses: Mild intermittent reactive airway disease with acute exacerbation      gabapentin (NEURONTIN) 300 mg capsule take 1 capsule by mouth daily in AM 1 at noon  and 2 capsules by mouth every PM  Qty: 480 capsule, Refills: 3    Associated Diagnoses: Other chronic pain      loratadine-pseudoephedrine (CLARITIN-D 12 HOUR) 5-120 mg per tablet Take 1 tablet by mouth every 12 (twelve) hours as needed      meloxicam (MOBIC) 15 mg tablet Take 1 tablet (15 mg total) by mouth daily  Qty: 90 tablet, Refills: 3    Associated Diagnoses: Other chronic pain methocarbamol (ROBAXIN) 500 mg tablet Take 1 tablet (500 mg total) by mouth 2 (two) times a day  Qty: 60 tablet, Refills: 0    Associated Diagnoses: Back pain      Multiple Vitamin tablet Take 1 tablet by mouth daily      nicotine (NICODERM CQ) 21 mg/24 hr TD 24 hr patch Place 1 patch on the skin every 24 hours  Qty: 28 patch, Refills: 0    Associated Diagnoses: Tobacco abuse      omeprazole (PriLOSEC) 40 MG capsule take 1 capsule by mouth daily  Qty: 90 capsule, Refills: 3    Associated Diagnoses: Gastroesophageal reflux disease, esophagitis presence not specified           No discharge procedures on file         ED Provider  Electronically Signed by     Arvind Bowman DO  02/10/20 2947

## 2020-02-10 NOTE — ED NOTES
Pt needed in PACU at 1530 for procedure     Kacie Wiggins, WENDY  02/10/20 0468 [place], RN  02/10/20 5241

## 2020-02-11 ENCOUNTER — HOSPITAL ENCOUNTER (OUTPATIENT)
Dept: INFUSION CENTER | Facility: HOSPITAL | Age: 60
Discharge: HOME | End: 2020-02-11
Attending: INTERNAL MEDICINE

## 2020-02-11 LAB
ANION GAP SERPL CALCULATED.3IONS-SCNC: 8 MMOL/L (ref 4–13)
BUN SERPL-MCNC: 15 MG/DL (ref 5–25)
CALCIUM SERPL-MCNC: 8.6 MG/DL (ref 8.3–10.1)
CHLORIDE SERPL-SCNC: 108 MMOL/L (ref 100–108)
CO2 SERPL-SCNC: 26 MMOL/L (ref 21–32)
CREAT SERPL-MCNC: 0.74 MG/DL (ref 0.6–1.3)
ERYTHROCYTE [DISTWIDTH] IN BLOOD BY AUTOMATED COUNT: 15.9 % (ref 11.6–15.1)
GFR SERPL CREATININE-BSD FRML MDRD: 90 ML/MIN/1.73SQ M
GLUCOSE SERPL-MCNC: 113 MG/DL (ref 65–140)
HCT VFR BLD AUTO: 31.2 % (ref 34.8–46.1)
HGB BLD-MCNC: 9.6 G/DL (ref 11.5–15.4)
MAGNESIUM SERPL-MCNC: 1.6 MG/DL (ref 1.6–2.6)
MCH RBC QN AUTO: 31 PG (ref 26.8–34.3)
MCHC RBC AUTO-ENTMCNC: 30.8 G/DL (ref 31.4–37.4)
MCV RBC AUTO: 101 FL (ref 82–98)
PHOSPHATE SERPL-MCNC: 5 MG/DL (ref 2.7–4.5)
PLATELET # BLD AUTO: 205 THOUSANDS/UL (ref 149–390)
PMV BLD AUTO: 11.5 FL (ref 8.9–12.7)
POTASSIUM SERPL-SCNC: 4.9 MMOL/L (ref 3.5–5.3)
RBC # BLD AUTO: 3.1 MILLION/UL (ref 3.81–5.12)
SODIUM SERPL-SCNC: 142 MMOL/L (ref 136–145)
WBC # BLD AUTO: 8.89 THOUSAND/UL (ref 4.31–10.16)

## 2020-02-11 PROCEDURE — 99024 POSTOP FOLLOW-UP VISIT: CPT | Performed by: SURGERY

## 2020-02-11 PROCEDURE — 84100 ASSAY OF PHOSPHORUS: CPT | Performed by: PHYSICIAN ASSISTANT

## 2020-02-11 PROCEDURE — C9113 INJ PANTOPRAZOLE SODIUM, VIA: HCPCS | Performed by: PHYSICIAN ASSISTANT

## 2020-02-11 PROCEDURE — 94760 N-INVAS EAR/PLS OXIMETRY 1: CPT

## 2020-02-11 PROCEDURE — 94664 DEMO&/EVAL PT USE INHALER: CPT

## 2020-02-11 PROCEDURE — 80048 BASIC METABOLIC PNL TOTAL CA: CPT | Performed by: PHYSICIAN ASSISTANT

## 2020-02-11 PROCEDURE — 83735 ASSAY OF MAGNESIUM: CPT | Performed by: PHYSICIAN ASSISTANT

## 2020-02-11 PROCEDURE — 85027 COMPLETE CBC AUTOMATED: CPT | Performed by: PHYSICIAN ASSISTANT

## 2020-02-11 RX ORDER — HEPARIN SODIUM 5000 [USP'U]/ML
5000 INJECTION, SOLUTION INTRAVENOUS; SUBCUTANEOUS EVERY 8 HOURS SCHEDULED
Status: DISCONTINUED | OUTPATIENT
Start: 2020-02-11 | End: 2020-02-14 | Stop reason: HOSPADM

## 2020-02-11 RX ADMIN — SODIUM CHLORIDE, SODIUM LACTATE, POTASSIUM CHLORIDE, AND CALCIUM CHLORIDE 125 ML/HR: .6; .31; .03; .02 INJECTION, SOLUTION INTRAVENOUS at 13:27

## 2020-02-11 RX ADMIN — SODIUM CHLORIDE, SODIUM LACTATE, POTASSIUM CHLORIDE, AND CALCIUM CHLORIDE 125 ML/HR: .6; .31; .03; .02 INJECTION, SOLUTION INTRAVENOUS at 05:39

## 2020-02-11 RX ADMIN — HYDROMORPHONE HYDROCHLORIDE 0.5 MG: 1 INJECTION, SOLUTION INTRAMUSCULAR; INTRAVENOUS; SUBCUTANEOUS at 15:00

## 2020-02-11 RX ADMIN — HYDROMORPHONE HYDROCHLORIDE 0.5 MG: 1 INJECTION, SOLUTION INTRAMUSCULAR; INTRAVENOUS; SUBCUTANEOUS at 05:36

## 2020-02-11 RX ADMIN — HEPARIN SODIUM 5000 UNITS: 5000 INJECTION INTRAVENOUS; SUBCUTANEOUS at 21:09

## 2020-02-11 RX ADMIN — FLUTICASONE FUROATE AND VILANTEROL TRIFENATATE 1 PUFF: 200; 25 POWDER RESPIRATORY (INHALATION) at 08:14

## 2020-02-11 RX ADMIN — KETOROLAC TROMETHAMINE 15 MG: 30 INJECTION, SOLUTION INTRAMUSCULAR; INTRAVENOUS at 00:57

## 2020-02-11 RX ADMIN — HYDROMORPHONE HYDROCHLORIDE 0.5 MG: 1 INJECTION, SOLUTION INTRAMUSCULAR; INTRAVENOUS; SUBCUTANEOUS at 02:06

## 2020-02-11 RX ADMIN — KETOROLAC TROMETHAMINE 15 MG: 30 INJECTION, SOLUTION INTRAMUSCULAR; INTRAVENOUS at 11:43

## 2020-02-11 RX ADMIN — PANTOPRAZOLE SODIUM 40 MG: 40 INJECTION, POWDER, FOR SOLUTION INTRAVENOUS at 08:29

## 2020-02-11 RX ADMIN — HYDROMORPHONE HYDROCHLORIDE 0.5 MG: 1 INJECTION, SOLUTION INTRAMUSCULAR; INTRAVENOUS; SUBCUTANEOUS at 21:09

## 2020-02-11 RX ADMIN — HEPARIN SODIUM 5000 UNITS: 5000 INJECTION INTRAVENOUS; SUBCUTANEOUS at 05:18

## 2020-02-11 RX ADMIN — HYDROMORPHONE HYDROCHLORIDE 0.5 MG: 1 INJECTION, SOLUTION INTRAMUSCULAR; INTRAVENOUS; SUBCUTANEOUS at 08:36

## 2020-02-11 RX ADMIN — KETOROLAC TROMETHAMINE 15 MG: 30 INJECTION, SOLUTION INTRAMUSCULAR; INTRAVENOUS at 17:53

## 2020-02-11 RX ADMIN — SODIUM CHLORIDE, SODIUM LACTATE, POTASSIUM CHLORIDE, AND CALCIUM CHLORIDE 125 ML/HR: .6; .31; .03; .02 INJECTION, SOLUTION INTRAVENOUS at 22:42

## 2020-02-11 RX ADMIN — NICOTINE 1 PATCH: 21 PATCH, EXTENDED RELEASE TRANSDERMAL at 14:59

## 2020-02-11 RX ADMIN — HEPARIN SODIUM 5000 UNITS: 5000 INJECTION INTRAVENOUS; SUBCUTANEOUS at 15:04

## 2020-02-11 NOTE — UTILIZATION REVIEW
Initial Clinical Review    Admission: Date/Time/Statement: Admission Orders (From admission, onward)     Ordered        02/10/20 1116  Inpatient Admission  Once                   Orders Placed This Encounter   Procedures    Inpatient Admission     Standing Status:   Standing     Number of Occurrences:   1     Order Specific Question:   Admitting Physician     Answer:   Zan Doty     Order Specific Question:   Level of Care     Answer:   Med Surg [16]     Order Specific Question:   Estimated length of stay     Answer:   More than 2 Midnights     Order Specific Question:   Certification     Answer:   I certify that inpatient services are medically necessary for this patient for a duration of greater than two midnights  See H&P and MD Progress Notes for additional information about the patient's course of treatment  ED Arrival Information     Expected Arrival Acuity Means of Arrival Escorted By Service Admission Type    - 2/10/2020 06:27 Urgent [facility]-General Urgent    Arrival Complaint    Abdominal pain        Chief Complaint   Patient presents with    Abdominal Pain     Patietn was awoken at 4am today with sever abd pain, last BM normal yesterday  Poor historian      Assessment/Plan: this is a 64year old female from home to inpatient due to SBO  Presented due to epigastric and diffuse abdominal pain radiating to back  With associated nausea since 4 am on 2/10/2020  On exam has generalized abdominal tenderness  CT abdomen shows SBO  NGT to low suction, IVF and pain control in progress  Taken to OR    2/10/2020 Procedure - LAPAROSCOPY DIAGNOSTIC, REPAIR ENTEROTOMY, REMOVAL OF BEZOAR  Findings - bezoar    On 2/11/2020 is post operative day one post diagnostic laparoscopy with removal of bezoar and repair of enterotomy  NGT draining thick bilious output  IVF continue pain control continues   H&H 9 6/31 2  With history of iron deficiency anemia, suspect dilutional      ED Triage Vitals [02/10/20 0637]   Temperature Pulse Respirations Blood Pressure SpO2   97 9 °F (36 6 °C) 85 20 144/77 100 %      Temp Source Heart Rate Source Patient Position - Orthostatic VS BP Location FiO2 (%)   Tympanic Monitor Sitting Left arm --      Pain Score       Worst Possible Pain        Wt Readings from Last 1 Encounters:   02/10/20 60 1 kg (132 lb 7 9 oz)     Additional Vital Signs:   02/11/20 07:57:03  99 2 °F (37 3 °C)  94  18  98/60  73  91 %  None (Room air)    Lying   02/11/20 0300  99 9 °F (37 7 °C)  98  20  114/72    90 %         02/10/20 23:32:07  100 2 °F (37 9 °C)  97    113/69  84  96 %         02/10/20 21:08:01  98 °F (36 7 °C)  98    120/76  91  98 %         02/10/20 1810  98 4 °F (36 9 °C)  86  16  145/71    97 %  Nasal cannula       02/10/20 1750  98 5 °F (36 9 °C)  86  13  150/75    95 %  Nasal cannula           Pertinent Labs/Diagnostic Test Results:   2/10/2020 CT abdomen - Findings suspicious for obstruction and high-grade stenosis/early obstruction of the small bowel with abrupt transition point identified in the right lower quadrant with appearance suspicious for adhesion   Free fluid seen within the mesenteric folds     No loculated collections or free air    Numerous nonobstructing left renal calculi without significant change   Mild bilateral renal cortical scarring  Results from last 7 days   Lab Units 02/11/20  0528 02/10/20  0644   WBC Thousand/uL 8 89 9 67   HEMOGLOBIN g/dL 9 6* 11 6   HEMATOCRIT % 31 2* 37 3   PLATELETS Thousands/uL 205 271   NEUTROS ABS Thousands/µL  --  8 43*         Results from last 7 days   Lab Units 02/11/20  0528 02/10/20  0644   SODIUM mmol/L 142 145   POTASSIUM mmol/L 4 9 3 5   CHLORIDE mmol/L 108 107   CO2 mmol/L 26 30   ANION GAP mmol/L 8 8   BUN mg/dL 15 12   CREATININE mg/dL 0 74 0 81   EGFR ml/min/1 73sq m 90 81   CALCIUM mg/dL 8 6 9 2   MAGNESIUM mg/dL 1 6  --    PHOSPHORUS mg/dL 5 0*  --      Results from last 7 days   Lab Units 02/10/20  0644   AST U/L 16   ALT U/L 19   ALK PHOS U/L 85   TOTAL PROTEIN g/dL 6 9   ALBUMIN g/dL 3 5   TOTAL BILIRUBIN mg/dL 0 20     Results from last 7 days   Lab Units 02/11/20  0528 02/10/20  0644   GLUCOSE RANDOM mg/dL 113 137     Results from last 7 days   Lab Units 02/10/20  0644   TROPONIN I ng/mL <0 02     Results from last 7 days   Lab Units 02/10/20  0644   PROTIME seconds 12 3   INR  0 94   PTT seconds 26     Results from last 7 days   Lab Units 02/10/20  0716   LACTIC ACID mmol/L 1 5     Results from last 7 days   Lab Units 02/10/20  0644   LIPASE u/L 126     Results from last 7 days   Lab Units 02/10/20  0859   CLARITY UA  CLEAR   COLOR UA  STRAW   SPEC GRAV US  1 010   PH UA  6 0   GLUCOSE UA  NEG   KETONES UA  NEG   BLOOD UA  TRACE   PROTEIN UA  NEG   NITRITE UA  NEG   BILIRUBIN, UA  NEG   UROBILINOGEN UA  0 2   LEUKOCYTES UA  NEG     ED Treatment:   Medication Administration from 02/10/2020 0627 to 02/10/2020 1519       Date/Time Order Dose Route Action Comments     02/10/2020 0645 sodium chloride 0 9 % bolus 1,000 mL 1,000 mL Intravenous New Bag      02/10/2020 0744 aluminum-magnesium hydroxide-simethicone (MYLANTA) 200-200-20 mg/5 mL oral suspension 30 mL 30 mL Oral Given      02/10/2020 0744 famotidine (PEPCID) injection 20 mg 20 mg Intravenous Given      02/10/2020 0744 ondansetron (ZOFRAN) injection 4 mg 4 mg Intravenous Given      02/10/2020 0744 dicyclomine (BENTYL) tablet 20 mg 20 mg Oral Given      02/10/2020 1031 benzocaine (HURRICAINE) 20 % mucosal spray 2 spray 2 spray Mucosal Given      02/10/2020 1032 Lidocaine Viscous HCl (XYLOCAINE) 2 % mucosal solution 15 mL 15 mL Oral Given      02/10/2020 1031 morphine (PF) 4 mg/mL injection 4 mg 4 mg Intravenous Given      02/10/2020 1031 ondansetron (ZOFRAN) injection 4 mg 4 mg Intravenous Given      02/10/2020 1511 lactated ringers infusion   Intravenous New Bag      02/10/2020 1415 HYDROmorphone (DILAUDID) injection 0 5 mg 0 5 mg Intravenous Given         Past Medical History:   Diagnosis Date    Anemia     Anxiety     Bursitis of left elbow     Chronic pain disorder     lower back    Hiatal hernia     Iron deficiency anemia secondary to inadequate dietary iron intake 1/29/2020    Kidney stones     Pancreatitis     Psoriasis     Psychiatric disorder     anxiety    Restless leg syndrome     Tobacco use disorder      Present on Admission:  **None**      Admitting Diagnosis: Bowel obstruction (HCC) [K56 609]  Abdominal pain [R10 9]  Age/Sex: 64 y o  female  Admission Orders: 2/10/2020 1116 inpatient   Scheduled Medications:  Medications:  fluticasone-vilanterol 1 puff Inhalation Daily   heparin (porcine) 5,000 Units Subcutaneous Q8H [facility]   nicotine 1 patch Transdermal Q24H   pantoprazole 40 mg Intravenous Daily     Continuous IV Infusions:  lactated ringers 125 mL/hr Intravenous Continuous     PRN Meds:  fentaNYL 25 mcg Intravenous Q5 Min PRN   hydrALAZINE 5 mg Intravenous Q6H PRN   HYDROmorphone - used x 5 (1415, 2127, 0206 0536, 0836) 0 5 mg Intravenous Q3H PRN   Ketorolac - used x 2 (2656, 8235) 15 mg Intravenous Q6H PRN   ondansetron 4 mg Intravenous Q6H PRN     NGT to low continuous wall suction  Ambulate  NPO    Network Utilization Review Department  [email or web address]  ATTENTION: Please call with any questions or concerns to [phone] and carefully listen to the prompts so that you are directed to the right person  All voicemails are confidential   Sharyn Means all requests for admission clinical reviews, approved or denied determinations and any other requests to dedicated fax number below belonging to the campus where the patient is receiving treatment   List of dedicated fax numbers for the Facilities:  [address] DENIALS (Administrative/Medical Necessity) [phone]   [address] (Maternity/NICU/Pediatrics) [phone]   Deneise Notice [phone] [place] [phone]   Marianna Mir [phone]   [address] [phone]   [facility]  [phone]   [address] [facility] [address]  [address] [facility] [address] [facility] [phone]

## 2020-02-11 NOTE — NURSING NOTE
Pt slept few short intervals as observed during hrly rounds overnight; PRN Dilaudid reported to be more effective than the Toradol  Assisted OOB to [facility] x2 to void qs; NGT for green drainage; abd incis clean and dry

## 2020-02-11 NOTE — PLAN OF CARE
Problem: Potential for Falls  Goal: Patient will remain free of falls  Description  INTERVENTIONS:  - Assess patient frequently for physical needs  -  Identify cognitive and physical deficits and behaviors that affect risk of falls    -  [place] fall precautions as indicated by assessment   - Educate patient/family on patient safety including physical limitations  - Instruct patient to call for assistance with activity based on assessment  - Modify environment to reduce risk of injury  - Consider OT/PT consult to assist with strengthening/mobility  Outcome: Progressing     Problem: PAIN - ADULT  Goal: Verbalizes/displays adequate comfort level or baseline comfort level  Description  Interventions:  - Encourage patient to monitor pain and request assistance  - Assess pain using appropriate pain scale  - Administer analgesics based on type and severity of pain and evaluate response  - Implement non-pharmacological measures as appropriate and evaluate response  - Consider cultural and social influences on pain and pain management  - Notify physician/advanced practitioner if interventions unsuccessful or patient reports new pain  Outcome: Progressing     Problem: INFECTION - ADULT  Goal: Absence or prevention of progression during hospitalization  Description  INTERVENTIONS:  - Assess and monitor for signs and symptoms of infection  - Monitor lab/diagnostic results  - Monitor all insertion sites, i e  indwelling lines, tubes, and drains  - Monitor endotracheal if appropriate and nasal secretions for changes in amount and color  - [place] appropriate cooling/warming therapies per order  - Administer medications as ordered  - Instruct and encourage patient and family to use good hand hygiene technique  - Identify and instruct in appropriate isolation precautions for identified infection/condition  Outcome: Progressing     Problem: SAFETY ADULT  Goal: Maintain or return to baseline ADL function  Description  INTERVENTIONS:  -  Assess patient's ability to carry out ADLs; assess patient's baseline for ADL function and identify physical deficits which impact ability to perform ADLs (bathing, care of mouth/teeth, toileting, grooming, dressing, etc )  - Assess/evaluate cause of self-care deficits   - Assess range of motion  - Assess patient's mobility; develop plan if impaired  - Assess patient's need for assistive devices and provide as appropriate  - Encourage maximum independence but intervene and supervise when necessary  - Involve family in performance of ADLs  - Assess for home care needs following discharge   - Consider OT consult to assist with ADL evaluation and planning for discharge  - Provide patient education as appropriate  Outcome: Progressing  Goal: Maintain or return mobility status to optimal level  Description  INTERVENTIONS:  - Assess patient's baseline mobility status (ambulation, transfers, stairs, etc )    - Identify cognitive and physical deficits and behaviors that affect mobility  - Identify mobility aids required to assist with transfers and/or ambulation (gait belt, sit-to-stand, lift, walker, cane, etc )  - [place] fall precautions as indicated by assessment  - Record patient progress and toleration of activity level on Mobility SBAR; progress patient to next Phase/Stage  - Instruct patient to call for assistance with activity based on assessment  - Consider rehabilitation consult to assist with strengthening/weightbearing, etc   Outcome: Progressing

## 2020-02-11 NOTE — PLAN OF CARE
Problem: Potential for Falls  Goal: Patient will remain free of falls  Description  INTERVENTIONS:  - Assess patient frequently for physical needs  -  Identify cognitive and physical deficits and behaviors that affect risk of falls    -  [place] fall precautions as indicated by assessment   - Educate patient/family on patient safety including physical limitations  - Instruct patient to call for assistance with activity based on assessment  - Modify environment to reduce risk of injury  - Consider OT/PT consult to assist with strengthening/mobility  Outcome: Progressing     Problem: PAIN - ADULT  Goal: Verbalizes/displays adequate comfort level or baseline comfort level  Description  Interventions:  - Encourage patient to monitor pain and request assistance  - Assess pain using appropriate pain scale  - Administer analgesics based on type and severity of pain and evaluate response  - Implement non-pharmacological measures as appropriate and evaluate response  - Consider cultural and social influences on pain and pain management  - Notify physician/advanced practitioner if interventions unsuccessful or patient reports new pain  Outcome: Progressing     Problem: INFECTION - ADULT  Goal: Absence or prevention of progression during hospitalization  Description  INTERVENTIONS:  - Assess and monitor for signs and symptoms of infection  - Monitor lab/diagnostic results  - Monitor all insertion sites, i e  indwelling lines, tubes, and drains  - Monitor endotracheal if appropriate and nasal secretions for changes in amount and color  - [place] appropriate cooling/warming therapies per order  - Administer medications as ordered  - Instruct and encourage patient and family to use good hand hygiene technique  - Identify and instruct in appropriate isolation precautions for identified infection/condition  Outcome: Progressing     Problem: SAFETY ADULT  Goal: Maintain or return to baseline ADL function  Description  INTERVENTIONS:  -  Assess patient's ability to carry out ADLs; assess patient's baseline for ADL function and identify physical deficits which impact ability to perform ADLs (bathing, care of mouth/teeth, toileting, grooming, dressing, etc )  - Assess/evaluate cause of self-care deficits   - Assess range of motion  - Assess patient's mobility; develop plan if impaired  - Assess patient's need for assistive devices and provide as appropriate  - Encourage maximum independence but intervene and supervise when necessary  - Involve family in performance of ADLs  - Assess for home care needs following discharge   - Consider OT consult to assist with ADL evaluation and planning for discharge  - Provide patient education as appropriate  Outcome: Progressing  Goal: Maintain or return mobility status to optimal level  Description  INTERVENTIONS:  - Assess patient's baseline mobility status (ambulation, transfers, stairs, etc )    - Identify cognitive and physical deficits and behaviors that affect mobility  - Identify mobility aids required to assist with transfers and/or ambulation (gait belt, sit-to-stand, lift, walker, cane, etc )  - [place] fall precautions as indicated by assessment  - Record patient progress and toleration of activity level on Mobility SBAR; progress patient to next Phase/Stage  - Instruct patient to call for assistance with activity based on assessment  - Consider rehabilitation consult to assist with strengthening/weightbearing, etc   Outcome: Progressing

## 2020-02-11 NOTE — SOCIAL WORK
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

## 2020-02-11 NOTE — PROGRESS NOTES
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  -POD#1 status post diagnostic laparoscopy with removal of bezoar and repair of enterotomy  -patient admits to poorly fitted dentures and difficulty chewing her food, nutrition consult for soft diet  -continues with thick bilious NG tube output, continue to monitor  -continue IV fluids  -continue pain control as needed  -encourage out of bed, ambulation, incentive spirometer  -may clamp NG tube for ambulation    Iron deficiency anemia  -hemoglobin slightly decreased today, likely delusional  -continue to monitor hemoglobin  -continue iron regimen as outpatient    HTN  -continue IV control as needed  -restart home meds when taking p o  Subjective/Objective   Chief Complaint:  Abdominal pain    Subjective:  Patient complains of incisional pain  Continues with sick NG tube output  Denies any fevers or chills  Out of bed to bathroom  Using incentive spirometer    Objective:     Blood pressure 98/60, pulse 94, temperature 99 2 °F (37 3 °C), temperature source Oral, resp  rate 18, height 5' (1 524 m), weight 60 1 kg (132 lb 7 9 oz), SpO2 91 %, not currently breastfeeding  ,Body mass index is 25 88 kg/m²        Intake/Output Summary (Last 24 hours) at 2/11/2020 0830  Last data filed at 2/11/2020 0600  Gross per 24 hour   Intake 2685 42 ml   Output 1150 ml   Net 1535 42 ml       Invasive Devices     Peripheral Intravenous Line            Peripheral IV 02/10/20 Right Antecubital 1 day    Peripheral IV 02/10/20 Left Hand less than 1 day          Drain            NG/OG/Enteral Tube Nasogastric 14 Fr Left mouth less than 1 day                Physical Exam:   General appearance: alert and oriented, in no acute distress  Head: Normocephalic, without obvious abnormality, atraumatic, sclerae anicteric, mucous membranes moist  Neck: no JVD and supple, symmetrical, trachea midline  Lungs: clear to auscultation, no wheezes or rales  Heart:   Regular rate and rhythm, S1-S2 normal, no murmur  Abdomen:   Soft, mild distension, tenderness over incision sites, few bowel sounds  Extremities:   No edema, redness or tenderness in the calves or thighs  Skin: Warm, dry; incision sites clean, dry intact  Nursing notes and vital signs reviewed      Lab, Imaging and other studies:  I have personally reviewed pertinent lab results    , CBC:   Lab Results   Component Value Date    WBC 8 89 02/11/2020    HGB 9 6 (L) 02/11/2020    HCT 31 2 (L) 02/11/2020     (H) 02/11/2020     02/11/2020    MCH 31 0 02/11/2020    MCHC 30 8 (L) 02/11/2020    RDW 15 9 (H) 02/11/2020    MPV 11 5 02/11/2020   , CMP:   Lab Results   Component Value Date    SODIUM 142 02/11/2020    K 4 9 02/11/2020     02/11/2020    CO2 26 02/11/2020    BUN 15 02/11/2020    CREATININE 0 74 02/11/2020    CALCIUM 8 6 02/11/2020    EGFR 90 02/11/2020     VTE Pharmacologic Prophylaxis: Heparin  VTE Mechanical Prophylaxis: sequential compression device     Maida Pena PA-C

## 2020-02-12 ENCOUNTER — APPOINTMENT (INPATIENT)
Dept: RADIOLOGY | Facility: HOSPITAL | Age: 60
DRG: 346 | End: 2020-02-12
Payer: COMMERCIAL

## 2020-02-12 LAB
ANION GAP SERPL CALCULATED.3IONS-SCNC: 5 MMOL/L (ref 4–13)
ATRIAL RATE: 82 BPM
BUN SERPL-MCNC: 15 MG/DL (ref 5–25)
CALCIUM SERPL-MCNC: 8.5 MG/DL (ref 8.3–10.1)
CHLORIDE SERPL-SCNC: 110 MMOL/L (ref 100–108)
CO2 SERPL-SCNC: 30 MMOL/L (ref 21–32)
CREAT SERPL-MCNC: 0.64 MG/DL (ref 0.6–1.3)
GFR SERPL CREATININE-BSD FRML MDRD: 99 ML/MIN/1.73SQ M
GLUCOSE SERPL-MCNC: 102 MG/DL (ref 65–140)
MAGNESIUM SERPL-MCNC: 1.7 MG/DL (ref 1.6–2.6)
P AXIS: 59 DEGREES
PHOSPHATE SERPL-MCNC: 3.2 MG/DL (ref 2.7–4.5)
POTASSIUM SERPL-SCNC: 4.1 MMOL/L (ref 3.5–5.3)
PR INTERVAL: 134 MS
QRS AXIS: -1 DEGREES
QRSD INTERVAL: 84 MS
QT INTERVAL: 376 MS
QTC INTERVAL: 439 MS
SODIUM SERPL-SCNC: 145 MMOL/L (ref 136–145)
T WAVE AXIS: 48 DEGREES
VENTRICULAR RATE: 82 BPM

## 2020-02-12 PROCEDURE — 84100 ASSAY OF PHOSPHORUS: CPT | Performed by: PHYSICIAN ASSISTANT

## 2020-02-12 PROCEDURE — 74018 RADEX ABDOMEN 1 VIEW: CPT

## 2020-02-12 PROCEDURE — 94640 AIRWAY INHALATION TREATMENT: CPT

## 2020-02-12 PROCEDURE — 80048 BASIC METABOLIC PNL TOTAL CA: CPT | Performed by: PHYSICIAN ASSISTANT

## 2020-02-12 PROCEDURE — 93010 ELECTROCARDIOGRAM REPORT: CPT | Performed by: INTERNAL MEDICINE

## 2020-02-12 PROCEDURE — 99024 POSTOP FOLLOW-UP VISIT: CPT | Performed by: SURGERY

## 2020-02-12 PROCEDURE — C9113 INJ PANTOPRAZOLE SODIUM, VIA: HCPCS | Performed by: PHYSICIAN ASSISTANT

## 2020-02-12 PROCEDURE — 94760 N-INVAS EAR/PLS OXIMETRY 1: CPT

## 2020-02-12 PROCEDURE — 83735 ASSAY OF MAGNESIUM: CPT | Performed by: PHYSICIAN ASSISTANT

## 2020-02-12 RX ADMIN — HYDROMORPHONE HYDROCHLORIDE 0.5 MG: 1 INJECTION, SOLUTION INTRAMUSCULAR; INTRAVENOUS; SUBCUTANEOUS at 00:23

## 2020-02-12 RX ADMIN — NICOTINE 1 PATCH: 21 PATCH, EXTENDED RELEASE TRANSDERMAL at 13:52

## 2020-02-12 RX ADMIN — HYDROMORPHONE HYDROCHLORIDE 0.5 MG: 1 INJECTION, SOLUTION INTRAMUSCULAR; INTRAVENOUS; SUBCUTANEOUS at 20:54

## 2020-02-12 RX ADMIN — HEPARIN SODIUM 5000 UNITS: 5000 INJECTION INTRAVENOUS; SUBCUTANEOUS at 21:02

## 2020-02-12 RX ADMIN — HYDROMORPHONE HYDROCHLORIDE 0.5 MG: 1 INJECTION, SOLUTION INTRAMUSCULAR; INTRAVENOUS; SUBCUTANEOUS at 13:46

## 2020-02-12 RX ADMIN — KETOROLAC TROMETHAMINE 15 MG: 30 INJECTION, SOLUTION INTRAMUSCULAR; INTRAVENOUS at 01:29

## 2020-02-12 RX ADMIN — Medication 1 SPRAY: at 10:20

## 2020-02-12 RX ADMIN — HYDROMORPHONE HYDROCHLORIDE 0.5 MG: 1 INJECTION, SOLUTION INTRAMUSCULAR; INTRAVENOUS; SUBCUTANEOUS at 06:15

## 2020-02-12 RX ADMIN — HYDROMORPHONE HYDROCHLORIDE 0.5 MG: 1 INJECTION, SOLUTION INTRAMUSCULAR; INTRAVENOUS; SUBCUTANEOUS at 03:38

## 2020-02-12 RX ADMIN — SODIUM CHLORIDE, SODIUM LACTATE, POTASSIUM CHLORIDE, AND CALCIUM CHLORIDE 125 ML/HR: .6; .31; .03; .02 INJECTION, SOLUTION INTRAVENOUS at 16:31

## 2020-02-12 RX ADMIN — PANTOPRAZOLE SODIUM 40 MG: 40 INJECTION, POWDER, FOR SOLUTION INTRAVENOUS at 08:23

## 2020-02-12 RX ADMIN — KETOROLAC TROMETHAMINE 15 MG: 30 INJECTION, SOLUTION INTRAMUSCULAR; INTRAVENOUS at 07:13

## 2020-02-12 RX ADMIN — FLUTICASONE FUROATE AND VILANTEROL TRIFENATATE 1 PUFF: 200; 25 POWDER RESPIRATORY (INHALATION) at 08:30

## 2020-02-12 RX ADMIN — HYDROMORPHONE HYDROCHLORIDE 0.5 MG: 1 INJECTION, SOLUTION INTRAMUSCULAR; INTRAVENOUS; SUBCUTANEOUS at 17:37

## 2020-02-12 RX ADMIN — HEPARIN SODIUM 5000 UNITS: 5000 INJECTION INTRAVENOUS; SUBCUTANEOUS at 13:49

## 2020-02-12 RX ADMIN — HEPARIN SODIUM 5000 UNITS: 5000 INJECTION INTRAVENOUS; SUBCUTANEOUS at 05:30

## 2020-02-12 RX ADMIN — HYDROMORPHONE HYDROCHLORIDE 0.5 MG: 1 INJECTION, SOLUTION INTRAMUSCULAR; INTRAVENOUS; SUBCUTANEOUS at 09:42

## 2020-02-12 NOTE — PROGRESS NOTES
Progress Note - General Surgery   Rebekah Dueñas 64 y o  female MRN: [id number]  Unit/Bed#: -01 Encounter: [id number]    Assessment/Plan:  Small-bowel obstruction secondary to bezoar  -POD#2 status post diagnostic laparoscopy with removal of bezoar and repair of enterotomy  -patient admits to poorly fitted dentures and difficulty chewing her food, nutrition consult for soft diet  -continues with thick bilious NG tube output, continue to monitor, still not passing flatus  -continue IV fluids, NGT  -continue pain control as needed  -encourage patient to continue walking laps around the halls and moving out of bed into the chair, encourage use of incentive spirometer  -may clamp NG tube for ambulation  -await return of bowel function     Iron deficiency anemia  -continue to monitor hemoglobin  -continue iron regimen  -Medicine consult placed for further recommendations     HTN  -continue IV control as needed  -restart home meds when taking p o  Subjective/Objective   Chief Complaint: Abdominal pain    Subjective: Pt continues to complain of pain at the incisional site  Patient requesting clears  NG tube continues to show thick output  Patient states a lot of NG tube output overnight  Denies any fevers/chills  Out of bed walking laps and sitting in chair for several hours a day  Using incentive spirometer  Objective:    Blood pressure 128/63, pulse 88, temperature 97 7 °F (36 5 °C), temperature source Oral, resp  rate 18, height 5' (1 524 m), weight 60 1 kg (132 lb 7 9 oz), SpO2 92 %, not currently breastfeeding  ,Body mass index is 25 88 kg/m²        Intake/Output Summary (Last 24 hours) at 2/12/2020 1011  Last data filed at 2/12/2020 2353  Gross per 24 hour   Intake 2056 25 ml   Output 1250 ml   Net 806 25 ml       Invasive Devices     Peripheral Intravenous Line            Peripheral IV 02/10/20 Left Hand 1 day          Drain            NG/OG/Enteral Tube Nasogastric 14 Fr Left mouth 1 day Physical Exam:   General appearance: alert and oriented, in no acute distress  Head: Normocephalic, without obvious abnormality, atraumatic, sclerae anicteric, mucous membranes moist  Neck: no JVD and supple, symmetrical, trachea midline  Lungs: clear to auscultation, no wheezes or rales  Heart:   Regular rate and rhythm, S1-S2 normal, no murmur  Abdomen:   Soft, tender at incision, decreased bowel sounds, mildly distended  Extremities:   No edema, redness or tenderness in the calves or thighs  Skin: Warm, dry; incision sites clean, dry intact  Nursing notes and vital signs reviewed      Lab, Imaging and other studies:  I have personally reviewed pertinent lab results    , CBC: No results found for: WBC, HGB, HCT, MCV, PLT, ADJUSTEDWBC, MCH, MCHC, RDW, MPV, NRBC, CMP:   Lab Results   Component Value Date    SODIUM 145 02/12/2020    K 4 1 02/12/2020     (H) 02/12/2020    CO2 30 02/12/2020    BUN 15 02/12/2020    CREATININE 0 64 02/12/2020    CALCIUM 8 5 02/12/2020    EGFR 99 02/12/2020     VTE Pharmacologic Prophylaxis: Heparin  VTE Mechanical Prophylaxis: sequential compression device     Jennyfer Pena PA-C

## 2020-02-12 NOTE — PROGRESS NOTES
Pleasant woman  Receptive to   In good spirits  Reports reliable caring support from family  Voiced financial worries around high deductible sam insurance  Her liam is a source of strength and stability but she is not affiliated with a Scientologist  Has new job that she is very grateful for      02/12/20 [facility] Affiliation None at present   Spiritual Beliefs/Perceptions   Concept of God Accepting   God's Role in Disease Other (Comment)  (Positve presence  Source of support )   Relationship with God Close   Support Systems Spouse/significant other;Family members   Stress Factors   Patient Stress Factors Financial concerns; Health changes   Coping Responses   Patient Coping Accepting; Anxiety;Open/discussion   Plan of Care   Assessment Completed by: Unit visit

## 2020-02-12 NOTE — PLAN OF CARE
Problem: Potential for Falls  Goal: Patient will remain free of falls  Description  INTERVENTIONS:  - Assess patient frequently for physical needs  -  Identify cognitive and physical deficits and behaviors that affect risk of falls    -  [place] fall precautions as indicated by assessment   - Educate patient/family on patient safety including physical limitations  - Instruct patient to call for assistance with activity based on assessment  - Modify environment to reduce risk of injury  - Consider OT/PT consult to assist with strengthening/mobility  Outcome: Progressing     Problem: PAIN - ADULT  Goal: Verbalizes/displays adequate comfort level or baseline comfort level  Description  Interventions:  - Encourage patient to monitor pain and request assistance  - Assess pain using appropriate pain scale  - Administer analgesics based on type and severity of pain and evaluate response  - Implement non-pharmacological measures as appropriate and evaluate response  - Consider cultural and social influences on pain and pain management  - Notify physician/advanced practitioner if interventions unsuccessful or patient reports new pain  Outcome: Progressing     Problem: INFECTION - ADULT  Goal: Absence or prevention of progression during hospitalization  Description  INTERVENTIONS:  - Assess and monitor for signs and symptoms of infection  - Monitor lab/diagnostic results  - Monitor all insertion sites, i e  indwelling lines, tubes, and drains  - Monitor endotracheal if appropriate and nasal secretions for changes in amount and color  - [place] appropriate cooling/warming therapies per order  - Administer medications as ordered  - Instruct and encourage patient and family to use good hand hygiene technique  - Identify and instruct in appropriate isolation precautions for identified infection/condition  Outcome: Progressing     Problem: SAFETY ADULT  Goal: Maintain or return to baseline ADL function  Description  INTERVENTIONS:  -  Assess patient's ability to carry out ADLs; assess patient's baseline for ADL function and identify physical deficits which impact ability to perform ADLs (bathing, care of mouth/teeth, toileting, grooming, dressing, etc )  - Assess/evaluate cause of self-care deficits   - Assess range of motion  - Assess patient's mobility; develop plan if impaired  - Assess patient's need for assistive devices and provide as appropriate  - Encourage maximum independence but intervene and supervise when necessary  - Involve family in performance of ADLs  - Assess for home care needs following discharge   - Consider OT consult to assist with ADL evaluation and planning for discharge  - Provide patient education as appropriate  Outcome: Progressing  Goal: Maintain or return mobility status to optimal level  Description  INTERVENTIONS:  - Assess patient's baseline mobility status (ambulation, transfers, stairs, etc )    - Identify cognitive and physical deficits and behaviors that affect mobility  - Identify mobility aids required to assist with transfers and/or ambulation (gait belt, sit-to-stand, lift, walker, cane, etc )  - [place] fall precautions as indicated by assessment  - Record patient progress and toleration of activity level on Mobility SBAR; progress patient to next Phase/Stage  - Instruct patient to call for assistance with activity based on assessment  - Consider rehabilitation consult to assist with strengthening/weightbearing, etc   Outcome: Progressing     Problem: Nutrition/Hydration-ADULT  Goal: Nutrient/Hydration intake appropriate for improving, restoring or maintaining nutritional needs  Description  Monitor and assess patient's nutrition/hydration status for malnutrition  Collaborate with interdisciplinary team and initiate plan and interventions as ordered  Monitor patient's weight and dietary intake as ordered or per policy   Utilize nutrition screening tool and intervene as necessary  Determine patient's food preferences and provide high-protein, high-caloric foods as appropriate       INTERVENTIONS:  - Monitor oral intake, urinary output, labs, and treatment plans  - Assess nutrition and hydration status and recommend course of action  - Evaluate amount of meals eaten  - Assist patient with eating if necessary   - Allow adequate time for meals  - Recommend/ encourage appropriate diets, oral nutritional supplements, and vitamin/mineral supplements  - Order, calculate, and assess calorie counts as needed  - Recommend, monitor, and adjust tube feedings and TPN/PPN based on assessed needs  - Assess need for intravenous fluids  - Provide specific nutrition/hydration education as appropriate  - Include patient/family/caregiver in decisions related to nutrition  Outcome: Progressing

## 2020-02-12 NOTE — PROGRESS NOTES
Pastoral Care Progress Note    2020  Patient: Echo Joya : 1960  Admission Date & Time: 2/10/2020 0636  MRN: [id number] [facility]: [id number]                     Chaplaincy Interventions Utilized:   Empowerment: Encouraged self-care, Normalized experience of patient/family, Provided anxiety containment and Provided chaplaincy education    Exploration: Explored emotional needs & resources, Explored relational needs & resources, Explored spiritual needs & resources and Facilitated story telling        Relationship Building: Cultivated a relationship of care and support, Listened empathically, Hospitality and Provided silent and supportive presence    Ritual: Provided prayer    Chaplaincy Outcomes Achieved:  Debriefed/defused experience and Distress reduced    Spiritual Coping Strategies Utilized:   Spiritual gratitude       [address] None at present   Spiritual Beliefs/Perceptions   Concept of God Accepting   God's Role in Disease Other (Comment)  (Positve presence  Source of support )   Relationship with God Close   Support Systems Spouse/significant other;Family members   Stress Factors   Patient Stress Factors Financial concerns; Health changes   Coping Responses   Patient Coping Accepting; Anxiety;Open/discussion   Plan of Care   Assessment Completed by: Unit visit

## 2020-02-13 ENCOUNTER — APPOINTMENT (INPATIENT)
Dept: RADIOLOGY | Facility: HOSPITAL | Age: 60
DRG: 346 | End: 2020-02-13
Payer: COMMERCIAL

## 2020-02-13 ENCOUNTER — HOSPITAL ENCOUNTER (OUTPATIENT)
Dept: INFUSION CENTER | Facility: HOSPITAL | Age: 60
End: 2020-02-13
Attending: INTERNAL MEDICINE

## 2020-02-13 PROBLEM — E87.6 HYPOKALEMIA: Status: ACTIVE | Noted: 2020-02-13

## 2020-02-13 LAB
ANION GAP SERPL CALCULATED.3IONS-SCNC: 8 MMOL/L (ref 4–13)
BUN SERPL-MCNC: 8 MG/DL (ref 5–25)
CALCIUM SERPL-MCNC: 8 MG/DL (ref 8.3–10.1)
CHLORIDE SERPL-SCNC: 107 MMOL/L (ref 100–108)
CO2 SERPL-SCNC: 26 MMOL/L (ref 21–32)
CREAT SERPL-MCNC: 0.52 MG/DL (ref 0.6–1.3)
GFR SERPL CREATININE-BSD FRML MDRD: 106 ML/MIN/1.73SQ M
GLUCOSE SERPL-MCNC: 78 MG/DL (ref 65–140)
HGB BLD-MCNC: 9.2 G/DL (ref 11.5–15.4)
MAGNESIUM SERPL-MCNC: 1.6 MG/DL (ref 1.6–2.6)
PHOSPHATE SERPL-MCNC: 3.3 MG/DL (ref 2.7–4.5)
POTASSIUM SERPL-SCNC: 2.9 MMOL/L (ref 3.5–5.3)
SODIUM SERPL-SCNC: 141 MMOL/L (ref 136–145)

## 2020-02-13 PROCEDURE — 74022 RADEX COMPL AQT ABD SERIES: CPT

## 2020-02-13 PROCEDURE — 80048 BASIC METABOLIC PNL TOTAL CA: CPT | Performed by: PHYSICIAN ASSISTANT

## 2020-02-13 PROCEDURE — 94640 AIRWAY INHALATION TREATMENT: CPT

## 2020-02-13 PROCEDURE — 99253 IP/OBS CNSLTJ NEW/EST LOW 45: CPT | Performed by: INTERNAL MEDICINE

## 2020-02-13 PROCEDURE — 94760 N-INVAS EAR/PLS OXIMETRY 1: CPT

## 2020-02-13 PROCEDURE — 99024 POSTOP FOLLOW-UP VISIT: CPT | Performed by: SURGERY

## 2020-02-13 PROCEDURE — 83735 ASSAY OF MAGNESIUM: CPT | Performed by: PHYSICIAN ASSISTANT

## 2020-02-13 PROCEDURE — 85018 HEMOGLOBIN: CPT | Performed by: PHYSICIAN ASSISTANT

## 2020-02-13 PROCEDURE — C9113 INJ PANTOPRAZOLE SODIUM, VIA: HCPCS | Performed by: PHYSICIAN ASSISTANT

## 2020-02-13 PROCEDURE — 84100 ASSAY OF PHOSPHORUS: CPT | Performed by: PHYSICIAN ASSISTANT

## 2020-02-13 RX ORDER — POTASSIUM CHLORIDE 14.9 MG/ML
20 INJECTION INTRAVENOUS ONCE
Status: COMPLETED | OUTPATIENT
Start: 2020-02-13 | End: 2020-02-13

## 2020-02-13 RX ORDER — POTASSIUM CHLORIDE 20 MEQ/1
20 TABLET, EXTENDED RELEASE ORAL 2 TIMES DAILY
Status: DISCONTINUED | OUTPATIENT
Start: 2020-02-13 | End: 2020-02-14 | Stop reason: HOSPADM

## 2020-02-13 RX ADMIN — PANTOPRAZOLE SODIUM 40 MG: 40 INJECTION, POWDER, FOR SOLUTION INTRAVENOUS at 08:00

## 2020-02-13 RX ADMIN — KETOROLAC TROMETHAMINE 15 MG: 30 INJECTION, SOLUTION INTRAMUSCULAR; INTRAVENOUS at 21:52

## 2020-02-13 RX ADMIN — SODIUM CHLORIDE, SODIUM LACTATE, POTASSIUM CHLORIDE, AND CALCIUM CHLORIDE 125 ML/HR: .6; .31; .03; .02 INJECTION, SOLUTION INTRAVENOUS at 01:09

## 2020-02-13 RX ADMIN — FLUTICASONE FUROATE AND VILANTEROL TRIFENATATE 1 PUFF: 200; 25 POWDER RESPIRATORY (INHALATION) at 07:27

## 2020-02-13 RX ADMIN — Medication 1 SPRAY: at 10:29

## 2020-02-13 RX ADMIN — HEPARIN SODIUM 5000 UNITS: 5000 INJECTION INTRAVENOUS; SUBCUTANEOUS at 21:56

## 2020-02-13 RX ADMIN — HYDROMORPHONE HYDROCHLORIDE 0.5 MG: 1 INJECTION, SOLUTION INTRAMUSCULAR; INTRAVENOUS; SUBCUTANEOUS at 01:09

## 2020-02-13 RX ADMIN — POTASSIUM CHLORIDE 20 MEQ: 14.9 INJECTION, SOLUTION INTRAVENOUS at 13:50

## 2020-02-13 RX ADMIN — NICOTINE 1 PATCH: 21 PATCH, EXTENDED RELEASE TRANSDERMAL at 13:47

## 2020-02-13 RX ADMIN — HYDROMORPHONE HYDROCHLORIDE 0.5 MG: 1 INJECTION, SOLUTION INTRAMUSCULAR; INTRAVENOUS; SUBCUTANEOUS at 23:57

## 2020-02-13 RX ADMIN — HEPARIN SODIUM 5000 UNITS: 5000 INJECTION INTRAVENOUS; SUBCUTANEOUS at 13:46

## 2020-02-13 RX ADMIN — SODIUM CHLORIDE, SODIUM LACTATE, POTASSIUM CHLORIDE, AND CALCIUM CHLORIDE 125 ML/HR: .6; .31; .03; .02 INJECTION, SOLUTION INTRAVENOUS at 18:50

## 2020-02-13 RX ADMIN — SODIUM CHLORIDE, SODIUM LACTATE, POTASSIUM CHLORIDE, AND CALCIUM CHLORIDE 125 ML/HR: .6; .31; .03; .02 INJECTION, SOLUTION INTRAVENOUS at 10:30

## 2020-02-13 RX ADMIN — HYDROMORPHONE HYDROCHLORIDE 0.5 MG: 1 INJECTION, SOLUTION INTRAMUSCULAR; INTRAVENOUS; SUBCUTANEOUS at 04:12

## 2020-02-13 RX ADMIN — KETOROLAC TROMETHAMINE 15 MG: 30 INJECTION, SOLUTION INTRAMUSCULAR; INTRAVENOUS at 11:31

## 2020-02-13 RX ADMIN — POTASSIUM CHLORIDE 20 MEQ: 1500 TABLET, EXTENDED RELEASE ORAL at 18:44

## 2020-02-13 RX ADMIN — IRON SUCROSE 100 MG: 20 INJECTION, SOLUTION INTRAVENOUS at 17:11

## 2020-02-13 RX ADMIN — HYDROMORPHONE HYDROCHLORIDE 0.5 MG: 1 INJECTION, SOLUTION INTRAMUSCULAR; INTRAVENOUS; SUBCUTANEOUS at 07:59

## 2020-02-13 RX ADMIN — HYDROMORPHONE HYDROCHLORIDE 0.5 MG: 1 INJECTION, SOLUTION INTRAMUSCULAR; INTRAVENOUS; SUBCUTANEOUS at 17:09

## 2020-02-13 RX ADMIN — POTASSIUM CHLORIDE 20 MEQ: 1500 TABLET, EXTENDED RELEASE ORAL at 13:45

## 2020-02-13 RX ADMIN — HEPARIN SODIUM 5000 UNITS: 5000 INJECTION INTRAVENOUS; SUBCUTANEOUS at 05:48

## 2020-02-13 NOTE — PLAN OF CARE
Problem: Potential for Falls  Goal: Patient will remain free of falls  Description  INTERVENTIONS:  - Assess patient frequently for physical needs  -  Identify cognitive and physical deficits and behaviors that affect risk of falls    -  [place] fall precautions as indicated by assessment   - Educate patient/family on patient safety including physical limitations  - Instruct patient to call for assistance with activity based on assessment  - Modify environment to reduce risk of injury  - Consider OT/PT consult to assist with strengthening/mobility  Outcome: Progressing     Problem: PAIN - ADULT  Goal: Verbalizes/displays adequate comfort level or baseline comfort level  Description  Interventions:  - Encourage patient to monitor pain and request assistance  - Assess pain using appropriate pain scale  - Administer analgesics based on type and severity of pain and evaluate response  - Implement non-pharmacological measures as appropriate and evaluate response  - Consider cultural and social influences on pain and pain management  - Notify physician/advanced practitioner if interventions unsuccessful or patient reports new pain  Outcome: Progressing     Problem: INFECTION - ADULT  Goal: Absence or prevention of progression during hospitalization  Description  INTERVENTIONS:  - Assess and monitor for signs and symptoms of infection  - Monitor lab/diagnostic results  - Monitor all insertion sites, i e  indwelling lines, tubes, and drains  - Monitor endotracheal if appropriate and nasal secretions for changes in amount and color  - [place] appropriate cooling/warming therapies per order  - Administer medications as ordered  - Instruct and encourage patient and family to use good hand hygiene technique  - Identify and instruct in appropriate isolation precautions for identified infection/condition  Outcome: Progressing     Problem: SAFETY ADULT  Goal: Maintain or return to baseline ADL function  Description  INTERVENTIONS:  -  Assess patient's ability to carry out ADLs; assess patient's baseline for ADL function and identify physical deficits which impact ability to perform ADLs (bathing, care of mouth/teeth, toileting, grooming, dressing, etc )  - Assess/evaluate cause of self-care deficits   - Assess range of motion  - Assess patient's mobility; develop plan if impaired  - Assess patient's need for assistive devices and provide as appropriate  - Encourage maximum independence but intervene and supervise when necessary  - Involve family in performance of ADLs  - Assess for home care needs following discharge   - Consider OT consult to assist with ADL evaluation and planning for discharge  - Provide patient education as appropriate  Outcome: Progressing  Goal: Maintain or return mobility status to optimal level  Description  INTERVENTIONS:  - Assess patient's baseline mobility status (ambulation, transfers, stairs, etc )    - Identify cognitive and physical deficits and behaviors that affect mobility  - Identify mobility aids required to assist with transfers and/or ambulation (gait belt, sit-to-stand, lift, walker, cane, etc )  - [place] fall precautions as indicated by assessment  - Record patient progress and toleration of activity level on Mobility SBAR; progress patient to next Phase/Stage  - Instruct patient to call for assistance with activity based on assessment  - Consider rehabilitation consult to assist with strengthening/weightbearing, etc   Outcome: Progressing     Problem: Nutrition/Hydration-ADULT  Goal: Nutrient/Hydration intake appropriate for improving, restoring or maintaining nutritional needs  Description  Monitor and assess patient's nutrition/hydration status for malnutrition  Collaborate with interdisciplinary team and initiate plan and interventions as ordered  Monitor patient's weight and dietary intake as ordered or per policy   Utilize nutrition screening tool and intervene as necessary  Determine patient's food preferences and provide high-protein, high-caloric foods as appropriate       INTERVENTIONS:  - Monitor oral intake, urinary output, labs, and treatment plans  - Assess nutrition and hydration status and recommend course of action  - Evaluate amount of meals eaten  - Assist patient with eating if necessary   - Allow adequate time for meals  - Recommend/ encourage appropriate diets, oral nutritional supplements, and vitamin/mineral supplements  - Order, calculate, and assess calorie counts as needed  - Recommend, monitor, and adjust tube feedings and TPN/PPN based on assessed needs  - Assess need for intravenous fluids  - Provide specific nutrition/hydration education as appropriate  - Include patient/family/caregiver in decisions related to nutrition  Outcome: Progressing

## 2020-02-13 NOTE — CONSULTS
INTERNAL MEDICINE CONSULTATION  -01      NAME: Blake Hubbard  AGE: 64 y o  SEX: female  : 1960   MRN: [id number]  ENCOUNTER: [id number]    DATE: 2020  TIME: 1:17 PM    Primary Care Physician:   Kyrie Arguello DO  Reason for consultation:  Medical management  Attending requesting consult: Kwan Johnson MD  Consulting Physician: Meek Arguello DO    Assessment and Plan   Principal Problem:    Small bowel obstruction (HCC)  Active Problems:    Iron deficiency anemia secondary to inadequate dietary iron intake    Hypokalemia    Hypercholesterolemia    Benign essential HTN    Primary osteoarthritis of both hips    Narcolepsy without cataplexy    Hiatal hernia    Tobacco abuse      History of Present Illness     Blake Hubbard is a 64 y o  female who presented to the ER with abdominal fat pain and found to have a bezoar and enterotomy repair she is now postop day 2     Patient is being advanced to clear liquids after her NG was pulled this morning and she seems to be tolerating that fairly well  Has some mild nausea and continued pain at the incisional area  Patient has had recent workup for iron deficiency anemia and as an outpatient had low iron levels and was to be set up for IV Venofer as an outpatient with Dr Unique Estevez  I will start a dose of that today  Her past medical history is significant for hypertension hyperlipidemia and patient will be restarted on her usual meds  She also has hypokalemia today and I will be replacing both IV and p o  And recheck in the a m  Review of Systems   Review of Systems   Constitutional: Negative for chills and fever  Gastrointestinal: Positive for abdominal pain and nausea         Past Medical History     Past Medical History:   Diagnosis Date    Anemia     Anxiety     Bursitis of left elbow     Chronic pain disorder     lower back    Hiatal hernia     Iron deficiency anemia secondary to inadequate dietary iron intake 2020    Kidney stones     Pancreatitis     Psoriasis     Psychiatric disorder     anxiety    Restless leg syndrome     Tobacco use disorder        Past Surgical History     Past Surgical History:   Procedure Laterality Date    CYSTOSCOPY      CYSTOSCOPY W/ URETERAL STENT PLACEMENT      NO PAST SURGERIES      IN ERCP DX COLLECTION SPECIMEN BRUSHING/WASHING N/A 8/15/2018    Procedure: ENDOSCOPIC RETROGRADE CHOLANGIOPANCREATOGRAPHY (ERCP);   Surgeon: Kolton Bloom MD;  Location:  MAIN OR;  Service: Gastroenterology    IN LAP,DIAGNOSTIC ABDOMEN N/A 2/10/2020    Procedure: LAPAROSCOPY DIAGNOSTIC, REPAIR ENTEROTOMY, REMOVAL OF BEZOAR;  Surgeon: Nolan Arreola MD;  Location:  MAIN OR;  Service: General       Social History     Social History     Socioeconomic History    Marital status: /Civil Union     Spouse name: Not on file    Number of children: Not on file    Years of education: Not on file    Highest education level: Not on file   Occupational History    Not on file   Social Needs    Financial resource strain: Not on file    Food insecurity:     Worry: Not on file     Inability: Not on file    Transportation needs:     Medical: Not on file     Non-medical: Not on file   Tobacco Use    Smoking status: Current Every Day Smoker     Packs/day: 1 00     Years: 40 00     Pack years: 40 00     Types: Cigarettes    Smokeless tobacco: Never Used   Substance and Sexual Activity    Alcohol use: Never     Frequency: Never     Drinks per session: Patient refused     Binge frequency: Never    Drug use: No    Sexual activity: Never   Lifestyle    Physical activity:     Days per week: Not on file     Minutes per session: Not on file    Stress: Not on file   Relationships    Social connections:     Talks on phone: Not on file     Gets together: Not on file     Attends Scientologist service: Not on file     Active member of club or organization: Not on file     Attends meetings of clubs or organizations: Not on file Relationship status: Not on file    Intimate partner violence:     Fear of current or ex partner: Not on file     Emotionally abused: Not on file     Physically abused: Not on file     Forced sexual activity: Not on file   Other Topics Concern    Not on file   Social History Narrative    Lives with     Feels safe at home    Sees dentist occas  No living will  Gets exercise with her job  Family History     Family History   Problem Relation Age of Onset    Mental illness Sister     Other Other         Cardiovascular disease    Hypertension Mother     Cancer Father     Substance Abuse Neg Hx        Medications Prior to Admission     Prior to Admission medications    Medication Sig Start Date End Date Taking?  Authorizing Provider   amLODIPine (NORVASC) 10 mg tablet Take 1 tablet (10 mg total) by mouth daily Decreased dose 11/14/19   Ama Adorno,    Cholecalciferol (VITAMIN D3) 5000 units CAPS Take by mouth daily 9/7/17   Historical Provider, MD   Cranberry 200 MG CAPS Take 1 capsule by mouth daily 5/2/16   Historical Provider, MD   fluocinonide (LIDEX) 0 05 % ointment Apply topically 2 (two) times a day  Patient taking differently: Apply topically 2 (two) times a day as needed  11/14/19   Ama Adorno DO   fluticasone (FLONASE) 50 mcg/act nasal spray 2 sprays into each nostril daily 11/14/19   Ama Adorno DO   fluticasone-salmeterol (ADVAIR DISKUS) 250-50 mcg/dose inhaler Inhale 1 puff 2 (two) times a day Rinse mouth after use 2/4/20 3/5/20  Lissette Arguello, DO   gabapentin (NEURONTIN) 300 mg capsule take 1 capsule by mouth daily in AM 1 at noon  and 2 capsules by mouth every PM 11/14/19   Lissette Arguello DO   loratadine-pseudoephedrine (CLARITIN-D 12 HOUR) 5-120 mg per tablet Take 1 tablet by mouth every 12 (twelve) hours as needed 12/8/14   Historical Provider, MD   meloxicam (MOBIC) 15 mg tablet Take 1 tablet (15 mg total) by mouth daily 11/14/19   Lissette Arguello, DO   methocarbamol (ROBAXIN) 500 mg tablet Take 1 tablet (500 mg total) by mouth 2 (two) times a day 1/31/20   Leydi Noel DO   Multiple Vitamin tablet Take 1 tablet by mouth daily 5/2/16   Historical Provider, MD   nicotine (NICODERM CQ) 21 mg/24 hr TD 24 hr patch Place 1 patch on the skin every 24 hours 1/31/20   Leydi Noel DO   omeprazole (PriLOSEC) 40 MG capsule take 1 capsule by mouth daily 11/14/19   Leydi Noel DO       Current Inpatient Medications     Current Facility-Administered Medications   Medication Dose Route Frequency    fentaNYL (SUBLIMAZE) injection 25 mcg  25 mcg Intravenous Q5 Min PRN    fluticasone-vilanterol (BREO ELLIPTA) 200-25 MCG/INH inhaler 1 puff  1 puff Inhalation Daily    heparin (porcine) subcutaneous injection 5,000 Units  5,000 Units Subcutaneous Q8H [address]    hydrALAZINE (APRESOLINE) injection 5 mg  5 mg Intravenous Q6H PRN    HYDROmorphone (DILAUDID) injection 0 5 mg  0 5 mg Intravenous Q3H PRN    iron sucrose (VENOFER) 100 mg in sodium chloride 0 9 % 100 mL IVPB  100 mg Intravenous Once    ketorolac (TORADOL) injection 15 mg  15 mg Intravenous Q6H PRN    lactated ringers infusion  125 mL/hr Intravenous Continuous    nicotine (NICODERM CQ) 21 mg/24 hr TD 24 hr patch 1 patch  1 patch Transdermal Q24H    ondansetron (ZOFRAN) injection 4 mg  4 mg Intravenous Q6H PRN    pantoprazole (PROTONIX) injection 40 mg  40 mg Intravenous Daily    phenol (CHLORASEPTIC) 1 4 % mucosal liquid 1 spray  1 spray Mouth/Throat Q2H PRN    potassium chloride (K-DUR,KLOR-CON) CR tablet 20 mEq  20 mEq Oral BID    potassium chloride 20 mEq IVPB (premix)  20 mEq Intravenous Once       Allergies     Allergies   Allergen Reactions    Pollen Extract     Tree Extract        Objective     Vitals:    02/13/20 0000 02/13/20 0725 02/13/20 0800 02/13/20 0803   BP: 142/83   146/80   BP Location: Right arm   Right arm   Pulse: 92   81   Resp: 17   18   Temp: 97 9 °F (36 6 °C)   97 6 °F (36 4 °C)   TempSrc: Oral   Oral   SpO2: (!) 86% 92% 93% 94%   Weight: Height:         Temp (24hrs), Av 8 °F (36 6 °C), Min:97 6 °F (36 4 °C), Max:97 9 °F (36 6 °C)  Current: Temperature: 97 6 °F (36 4 °C)  Body mass index is 25 88 kg/m²  Intake/Output Summary (Last 24 hours) at 2020 1317  Last data filed at 2020 1030  Gross per 24 hour   Intake 2664 08 ml   Output 1100 ml   Net 1564 08 ml     Invasive Devices     Peripheral Intravenous Line            Peripheral IV 20 Left;Ventral (anterior) Forearm less than 1 day    Peripheral IV 20 Right Antecubital less than 1 day                Physical Exam  Constitutional: Appears well-developed and well-nourished  Appears in no acute distress  HEENT: Normocephalic and atraumatic  No scleral icterus  PERRLA  EOMI B/L  Neck: Neck supple, trachea midline, no JVD  Cardiovascular: +S1, +S2, RRR, no murmur, rub or gallop  Respiratory: CTA B/L, no rales, rhonci or wheezes  Abdominal: mild generalized tenderness no guarding  Extremities: Distal pulses are intact , no cyanosis/clubbing/edema  Neurological: Patient is alert and oriented to person, place, and time  No focal deficits  CN 2-12 intact  Plantars downgoing bilaterally  Speech fluent  Skin: Skin is warm and dry  No obvious lesions  No rashes  Psychiatric: Appropriate mood and affect  Lab Results: I have personally reviewed pertinent reports      CBC:   Results from last 7 days   Lab Units 20  1147 20  0528 02/10/20  0644   WBC Thousand/uL  --  8 89 9 67   RBC Million/uL  --  3 10* 3 72*   HEMOGLOBIN g/dL 9 2* 9 6* 11 6   HEMATOCRIT %  --  31 2* 37 3   MCV fL  --  101* 100*   MCH pg  --  31 0 31 2   MCHC g/dL  --  30 8* 31 1*   RDW %  --  15 9* 15 2*   MPV fL  --  11 5 9 6   PLATELETS Thousands/uL  --  205 271   NRBC AUTO /100 WBCs  --   --  0   NEUTROS PCT %  --   --  88*   LYMPHS PCT %  --   --  8*   MONOS PCT %  --   --  3*   EOS PCT %  --   --  1   BASOS PCT %  --   --  0   NEUTROS ABS Thousands/µL  --   --  8 43*   LYMPHS ABS Thousands/µL  --   --  0 81   MONOS ABS Thousand/µL  --   --  0 29   EOS ABS Thousand/µL  --   --  0 08   , Chemistry Profile:   Results from last 7 days   Lab Units 02/13/20  0548 02/12/20 02/11/20  0528 02/10/20  0644   POTASSIUM mmol/L 2 9* 4 1 4 9 3 5   CHLORIDE mmol/L 107 110* 108 107   CO2 mmol/L 26 30 26 30   BUN mg/dL 8 15 15 12   CREATININE mg/dL 0 52* 0 64 0 74 0 81   CALCIUM mg/dL 8 0* 8 5 8 6 9 2   MAGNESIUM mg/dL 1 6 1 7 1 6  --    PHOSPHORUS mg/dL 3 3 3 2 5 0*  --    AST U/L  --   --   --  16   ALT U/L  --   --   --  19   ALK PHOS U/L  --   --   --  85   EGFR ml/min/1 73sq m 106 99 90 81       Imaging: I have personally reviewed pertinent reports  Xr Abdomen 1 View Kub    Result Date: 2/13/2020  Narrative: ABDOMEN INDICATION:   obs series sbo  Enteric contrast injected via the indwelling NG tube  COMPARISON:  CT abdomen pelvis 2/10/2020 VIEWS:  AP supine Images: 2 FINDINGS: Enteric feeding tube terminates in the distal stomach  Enteric contrast is seen throughout the small bowel and proximal colon  Prominent proximal small bowel loops noted with normalization of the ileum, findings concerning for partial SBO  No discernible free air on this supine study  Upright or left lateral decubitus imaging is more sensitive to detect subtle free air in the appropriate setting  No pathologic calcifications or soft tissue masses  Visualized lung bases are clear  Visualized osseous structures are unremarkable for the patient's age  Impression: Enteric contrast throughout the small bowel and colon with prominent proximal small bowel loops concerning for partial SBO  Workstation performed: [id number]     Xr Abdomen Obstruction Series    Result Date: 2/13/2020  Narrative: OBSTRUCTION SERIES INDICATION:   sbo  COMPARISON:  2/12/2020 EXAM PERFORMED/VIEWS:  XR ABDOMEN OBSTRUCTION SERIES FINDINGS: There is a nonobstructive bowel gas pattern  There is contrast throughout the colon   Nasogastric tube again seen within the stomach  No free air beneath the hemidiaphragms  No pathologic calcifications or soft tissue masses evident  Osseous structures are unremarkable  Examination of the chest reveals a normal cardiomediastinal silhouette  There is a small left pleural effusion  Impression: 1  Nonobstructive bowel gas pattern  Resolution of previously dilated bowel with passage of contrast entirely within the colon  2   Small left pleural effusion  Workstation performed: [id number]     Ct Abdomen Pelvis With Contrast    Result Date: 2/10/2020  Narrative: CT ABDOMEN AND PELVIS WITH IV CONTRAST INDICATION:   abdominal pain  COMPARISON:  1/27/2020 TECHNIQUE:  CT examination of the abdomen and pelvis was performed  Axial, sagittal, and coronal 2D reformatted images were created from the source data and submitted for interpretation  Radiation dose length product (DLP) for this visit:  427 21 mGy-cm   This examination, like all CT scans performed in the [facility], was performed utilizing techniques to minimize radiation dose exposure, including the use of iterative  reconstruction and automated exposure control  IV Contrast:  100 mL of iohexol (OMNIPAQUE) Enteric Contrast:  Enteric contrast was not administered  FINDINGS: ABDOMEN LOWER CHEST:  No clinically significant abnormality identified in the visualized lower chest  LIVER/BILIARY TREE:  Unremarkable  GALLBLADDER:  No calcified gallstones  No pericholecystic inflammatory change  SPLEEN:  Unremarkable  PANCREAS:  Unremarkable  ADRENAL GLANDS:  Unremarkable  KIDNEYS/URETERS:  Bilateral renal cortical scarring, left greater than right  Numerous nonobstructing calculi throughout the left kidney predominating upper and lower pole  STOMACH AND BOWEL:  There is moderate fluid distention of the stomach and small bowel leading to an abrupt transition point where there is equalization of bowel contents    The transition point is seen in the right iliac fossa without discrete mass or twisting of the bowel  The adjacent nondistended small bowel shows mild wall thickening and mild intraluminal fluid  Constellation of findings suspicious for adhesion and high-grade stenosis  Colon within normal limits  APPENDIX:  A normal appendix was visualized  ABDOMINOPELVIC CAVITY: Mild free fluid seen within the folds of the small bowel mesentery  No loculated collections  No free intraperitoneal air  No lymphadenopathy  VESSELS:  Unremarkable for patient's age  PELVIS REPRODUCTIVE ORGANS:  Unremarkable for patient's age  URINARY BLADDER:  Unremarkable  ABDOMINAL WALL/INGUINAL REGIONS:  Unremarkable  OSSEOUS STRUCTURES:  No acute fracture or destructive osseous lesion  Impression: Findings suspicious for obstruction and high-grade stenosis/early obstruction of the small bowel with abrupt transition point identified in the right lower quadrant with appearance suspicious for adhesion  Free fluid seen within the mesenteric folds  No loculated collections or free air  Numerous nonobstructing left renal calculi without significant change  Mild bilateral renal cortical scarring  The study was marked in [facility] for immediate notification  Workstation performed: [id number]     Ct Abdomen Pelvis W Contrast    Result Date: 1/30/2020  Narrative: CT ABDOMEN AND PELVIS WITH IV CONTRAST INDICATION:   R10 32: Left lower quadrant pain  COMPARISON:  CT abdomen/pelvis dated July 22, 2018  TECHNIQUE:  CT examination of the abdomen and pelvis was performed  Axial, sagittal, and coronal 2D reformatted images were created from the source data and submitted for interpretation  Radiation dose length product (DLP) for this visit:  513 47 mGy-cm   This examination, like all CT scans performed in the [facility], was performed utilizing techniques to minimize radiation dose exposure, including the use of iterative  reconstruction and automated exposure control   IV Contrast:  100 mL of iohexol (OMNIPAQUE) Enteric Contrast:  Enteric contrast was administered  FINDINGS: ABDOMEN LOWER CHEST:  Atelectatic changes are noted at the lung bases  LIVER/BILIARY TREE:  Unremarkable  GALLBLADDER:  No calcified gallstones  No pericholecystic inflammatory change  SPLEEN:  Unremarkable  PANCREAS:  Unremarkable  ADRENAL GLANDS:  Unremarkable  KIDNEYS/URETERS:  No hydronephrosis  Again noted are several nonobstructing subcentimeter left-sided intrarenal calculi  STOMACH AND BOWEL:  Unremarkable  APPENDIX:  A normal appendix was visualized  ABDOMINOPELVIC CAVITY:  No ascites or free intraperitoneal air  No lymphadenopathy  VESSELS:  Unremarkable for patient's age  PELVIS REPRODUCTIVE ORGANS:  Unremarkable for patient's age  URINARY BLADDER:  Unremarkable  ABDOMINAL WALL/INGUINAL REGIONS:  Unremarkable  OSSEOUS STRUCTURES:  No acute fracture or destructive osseous lesion  Impression: No acute intra-abdominal abnormality  No free air or free fluid  Several left-sided subcentimeter nonobstructing intrarenal calculi  No hydronephrosis or hydroureter  Workstation performed: [id number]       EKG, Pathology, and Other Studies: I have personally reviewed pertinent reports  Code Status: Level 1 - Full Code  Consult Time  I spent 30 minutes completing consultation on this patient  This involved direct patient contact where I performed a full history and physical, reviewed previous records, and reviewed laboratory and other diagnostic studies      Ayaz Burrell DO  Internal Medicine

## 2020-02-13 NOTE — PROGRESS NOTES
Progress Note - General Surgery  Jonathon Cuadra 64 y o  female MRN: [id number]  Unit/Bed#: -Sylvia Encounter: [id number]    Assessment/Plan:  SBO  POD#2 s/p diagnostic laparoscopy with removal of bezoar and repair of enterotomy  KUB with contrast in the colon and resolved obstructive pattern to bowel gas  D/C NGT, start clear liquids  OOB/AMB  Pain control    Iron deficiency anemia  Monitor hgb drop from 11 - 9 6 post op - will repeat hgb today  Continue iron regimen  Medicine consult pending    HTN  Will d/c IVF once tolerating clear liquids  Restart home meds    Subjective/Objective     Subjective: Reports her abdomen is sore today  She is tolerating clamping of ngt overnight  She is passing flatus without bowel movements  Objective:     Blood pressure 146/80, pulse 81, temperature 97 6 °F (36 4 °C), temperature source Oral, resp  rate 18, height 5' (1 524 m), weight 60 1 kg (132 lb 7 9 oz), SpO2 94 %, not currently breastfeeding  ,Body mass index is 25 88 kg/m²        Intake/Output Summary (Last 24 hours) at 2/13/2020 1127  Last data filed at 2/13/2020 1030  Gross per 24 hour   Intake 2664 08 ml   Output 1250 ml   Net 1414 08 ml       Invasive Devices     Peripheral Intravenous Line            Peripheral IV 02/12/20 Left;Ventral (anterior) Forearm less than 1 day    Peripheral IV 02/12/20 Right Antecubital less than 1 day          Drain            NG/OG/Enteral Tube Nasogastric 14 Fr Left mouth 3 days                Physical Exam: /80 (BP Location: Right arm)   Pulse 81   Temp 97 6 °F (36 4 °C) (Oral)   Resp 18   Ht 5' (1 524 m)   Wt 60 1 kg (132 lb 7 9 oz)   LMP  (LMP Unknown)   SpO2 94%   BMI 25 88 kg/m²   General appearance: alert and oriented, in no acute distress and laying flat in bed  Lungs: clear to auscultation bilaterally and without wheezes, crackles, or rhonchi  Heart: regular rate and rhythm, S1, S2 normal, no murmur, click, rub or gallop  Abdomen: soft, inicisional tenderness, non-distened, diminished bowel sounds Incisions CDI with ahesive intact, minimal periincisional ecchymosis    Lab, Imaging and other studies:  CBC: No results found for: WBC, HGB, HCT, MCV, PLT, ADJUSTEDWBC, MCH, MCHC, RDW, MPV, NRBC, CMP:   Lab Results   Component Value Date    SODIUM 141 02/13/2020    K 2 9 (L) 02/13/2020     02/13/2020    CO2 26 02/13/2020    BUN 8 02/13/2020    CREATININE 0 52 (L) 02/13/2020    CALCIUM 8 0 (L) 02/13/2020    EGFR 106 02/13/2020     VTE Pharmacologic Prophylaxis: Heparin  VTE Mechanical Prophylaxis: sequential compression device

## 2020-02-13 NOTE — PROGRESS NOTES
PT complained of burning/discomfort at IV site during Potassium infusion  Reduced rate to 40ml/hr for comfort

## 2020-02-14 VITALS
OXYGEN SATURATION: 96 % | RESPIRATION RATE: 20 BRPM | TEMPERATURE: 97.7 F | HEIGHT: 60 IN | DIASTOLIC BLOOD PRESSURE: 91 MMHG | SYSTOLIC BLOOD PRESSURE: 147 MMHG | HEART RATE: 88 BPM | BODY MASS INDEX: 26.01 KG/M2 | WEIGHT: 132.5 LBS

## 2020-02-14 LAB
ANION GAP SERPL CALCULATED.3IONS-SCNC: 8 MMOL/L (ref 4–13)
BASOPHILS # BLD AUTO: 0.01 THOUSANDS/ΜL (ref 0–0.1)
BASOPHILS NFR BLD AUTO: 0 % (ref 0–1)
BUN SERPL-MCNC: 6 MG/DL (ref 5–25)
CALCIUM SERPL-MCNC: 8.9 MG/DL (ref 8.3–10.1)
CHLORIDE SERPL-SCNC: 106 MMOL/L (ref 100–108)
CO2 SERPL-SCNC: 26 MMOL/L (ref 21–32)
CREAT SERPL-MCNC: 0.58 MG/DL (ref 0.6–1.3)
EOSINOPHIL # BLD AUTO: 0.13 THOUSAND/ΜL (ref 0–0.61)
EOSINOPHIL NFR BLD AUTO: 3 % (ref 0–6)
ERYTHROCYTE [DISTWIDTH] IN BLOOD BY AUTOMATED COUNT: 14.5 % (ref 11.6–15.1)
GFR SERPL CREATININE-BSD FRML MDRD: 103 ML/MIN/1.73SQ M
GLUCOSE SERPL-MCNC: 96 MG/DL (ref 65–140)
HCT VFR BLD AUTO: 27.9 % (ref 34.8–46.1)
HGB BLD-MCNC: 8.9 G/DL (ref 11.5–15.4)
IMM GRANULOCYTES # BLD AUTO: 0.02 THOUSAND/UL (ref 0–0.2)
IMM GRANULOCYTES NFR BLD AUTO: 1 % (ref 0–2)
LYMPHOCYTES # BLD AUTO: 1.12 THOUSANDS/ΜL (ref 0.6–4.47)
LYMPHOCYTES NFR BLD AUTO: 26 % (ref 14–44)
MCH RBC QN AUTO: 30.9 PG (ref 26.8–34.3)
MCHC RBC AUTO-ENTMCNC: 31.9 G/DL (ref 31.4–37.4)
MCV RBC AUTO: 97 FL (ref 82–98)
MONOCYTES # BLD AUTO: 0.31 THOUSAND/ΜL (ref 0.17–1.22)
MONOCYTES NFR BLD AUTO: 7 % (ref 4–12)
NEUTROPHILS # BLD AUTO: 2.81 THOUSANDS/ΜL (ref 1.85–7.62)
NEUTS SEG NFR BLD AUTO: 63 % (ref 43–75)
NRBC BLD AUTO-RTO: 0 /100 WBCS
PLATELET # BLD AUTO: 192 THOUSANDS/UL (ref 149–390)
PMV BLD AUTO: 9.6 FL (ref 8.9–12.7)
POTASSIUM SERPL-SCNC: 4.1 MMOL/L (ref 3.5–5.3)
RBC # BLD AUTO: 2.88 MILLION/UL (ref 3.81–5.12)
SODIUM SERPL-SCNC: 140 MMOL/L (ref 136–145)
WBC # BLD AUTO: 4.4 THOUSAND/UL (ref 4.31–10.16)

## 2020-02-14 PROCEDURE — 80048 BASIC METABOLIC PNL TOTAL CA: CPT | Performed by: INTERNAL MEDICINE

## 2020-02-14 PROCEDURE — 94760 N-INVAS EAR/PLS OXIMETRY 1: CPT

## 2020-02-14 PROCEDURE — NC001 PR NO CHARGE: Performed by: SURGERY

## 2020-02-14 PROCEDURE — 94664 DEMO&/EVAL PT USE INHALER: CPT

## 2020-02-14 PROCEDURE — C9113 INJ PANTOPRAZOLE SODIUM, VIA: HCPCS | Performed by: PHYSICIAN ASSISTANT

## 2020-02-14 PROCEDURE — 85025 COMPLETE CBC W/AUTO DIFF WBC: CPT | Performed by: PHYSICIAN ASSISTANT

## 2020-02-14 PROCEDURE — 99232 SBSQ HOSP IP/OBS MODERATE 35: CPT | Performed by: INTERNAL MEDICINE

## 2020-02-14 PROCEDURE — 99024 POSTOP FOLLOW-UP VISIT: CPT | Performed by: SURGERY

## 2020-02-14 RX ORDER — OXYCODONE HYDROCHLORIDE AND ACETAMINOPHEN 5; 325 MG/1; MG/1
1 TABLET ORAL EVERY 6 HOURS PRN
Qty: 15 TABLET | Refills: 0 | Status: SHIPPED | OUTPATIENT
Start: 2020-02-14 | End: 2020-02-19 | Stop reason: SDUPTHER

## 2020-02-14 RX ADMIN — PANTOPRAZOLE SODIUM 40 MG: 40 INJECTION, POWDER, FOR SOLUTION INTRAVENOUS at 08:26

## 2020-02-14 RX ADMIN — HYDROMORPHONE HYDROCHLORIDE 0.5 MG: 1 INJECTION, SOLUTION INTRAMUSCULAR; INTRAVENOUS; SUBCUTANEOUS at 12:02

## 2020-02-14 RX ADMIN — SODIUM CHLORIDE, SODIUM LACTATE, POTASSIUM CHLORIDE, AND CALCIUM CHLORIDE 125 ML/HR: .6; .31; .03; .02 INJECTION, SOLUTION INTRAVENOUS at 02:55

## 2020-02-14 RX ADMIN — HYDROMORPHONE HYDROCHLORIDE 0.5 MG: 1 INJECTION, SOLUTION INTRAMUSCULAR; INTRAVENOUS; SUBCUTANEOUS at 03:32

## 2020-02-14 RX ADMIN — HYDROMORPHONE HYDROCHLORIDE 0.5 MG: 1 INJECTION, SOLUTION INTRAMUSCULAR; INTRAVENOUS; SUBCUTANEOUS at 06:38

## 2020-02-14 RX ADMIN — FLUTICASONE FUROATE AND VILANTEROL TRIFENATATE 1 PUFF: 200; 25 POWDER RESPIRATORY (INHALATION) at 08:37

## 2020-02-14 RX ADMIN — HEPARIN SODIUM 5000 UNITS: 5000 INJECTION INTRAVENOUS; SUBCUTANEOUS at 05:03

## 2020-02-14 RX ADMIN — KETOROLAC TROMETHAMINE 15 MG: 30 INJECTION, SOLUTION INTRAMUSCULAR; INTRAVENOUS at 08:37

## 2020-02-14 RX ADMIN — POTASSIUM CHLORIDE 20 MEQ: 1500 TABLET, EXTENDED RELEASE ORAL at 08:26

## 2020-02-14 NOTE — PROGRESS NOTES
Progress Note - General Surgery  Jen Daley 64 y o  female MRN: [id number]  Unit/Bed#: -01 Encounter: [id number]    Assessment/Plan:  SBO secondary to bezoar  POD#3 s/p diagnostic laparoscopy with removal of bezoar repair of enterotomy  KUB with contrast in the colon yesterday  Tolerating house diet without nausea or vomiting  Ambulating without difficulty  DC to home    Iron deficiency anemia  Hemoglobin stable during admission  Continue home iron regimen    HTN  Tolerating home meds      Subjective/Objective   Subjective: Feeling good, tolerating diet without n/v, ambulating without difficulty, tolerating iv pain control    Objective:     Blood pressure 147/91, pulse 88, temperature 97 7 °F (36 5 °C), temperature source Oral, resp  rate 20, height 5' (1 524 m), weight 60 1 kg (132 lb 7 9 oz), SpO2 96 %, not currently breastfeeding  ,Body mass index is 25 88 kg/m²        Intake/Output Summary (Last 24 hours) at 2/14/2020 1307  Last data filed at 2/14/2020 0041  Gross per 24 hour   Intake 990 ml   Output 800 ml   Net 190 ml       Invasive Devices     Peripheral Intravenous Line            Peripheral IV 02/12/20 Left;Ventral (anterior) Forearm 1 day    Peripheral IV 02/12/20 Right Antecubital 1 day                Physical Exam: /91 (BP Location: Right arm)   Pulse 88   Temp 97 7 °F (36 5 °C) (Oral)   Resp 20   Ht 5' (1 524 m)   Wt 60 1 kg (132 lb 7 9 oz)   LMP  (LMP Unknown)   SpO2 96%   BMI 25 88 kg/m²   General appearance: alert and oriented, in no acute distress and sitting up on side of bed  Lungs: clear to auscultation bilaterally and without wheezes, crackles, or rhonchi  Heart: regular rate and rhythm, S1, S2 normal, no murmur, click, rub or gallop  Abdomen: soft, incisional tenderness, non-distended, incision cdi    Lab, Imaging and other studies:  CBC:   Lab Results   Component Value Date    WBC 4 40 02/14/2020    HGB 8 9 (L) 02/14/2020    HCT 27 9 (L) 02/14/2020    MCV 97 02/14/2020  02/14/2020    MCH 30 9 02/14/2020    MCHC 31 9 02/14/2020    RDW 14 5 02/14/2020    MPV 9 6 02/14/2020    NRBC 0 02/14/2020   , CMP:   Lab Results   Component Value Date    SODIUM 140 02/14/2020    K 4 1 02/14/2020     02/14/2020    CO2 26 02/14/2020    BUN 6 02/14/2020    CREATININE 0 58 (L) 02/14/2020    CALCIUM 8 9 02/14/2020    EGFR 103 02/14/2020     VTE Pharmacologic Prophylaxis: Heparin  VTE Mechanical Prophylaxis: sequential compression device

## 2020-02-14 NOTE — PROGRESS NOTES
Progress Note - Sammy Gatica 64 y o  female MRN: [id number]    Unit/Bed#: -01 Encounter: [id number]      Assessment:  Principal Problem:    Small bowel obstruction (HCC)  Active Problems:    Iron deficiency anemia secondary to inadequate dietary iron intake    Hypokalemia    Hypercholesterolemia    Benign essential HTN    Primary osteoarthritis of both hips    Narcolepsy without cataplexy    Hiatal hernia    Tobacco abuse  Resolved Problems:    * No resolved hospital problems  *        Plan:  · sbo with beozar- improving with oral intake- anxious to go home  ·  anemia- iron deficiency- to continue infusions weekly with Dr Perez Parents  · htn- stable  · Hypokalemia- resolved      Subjective:    less abdominal pain- tolerating increased diet- no ha or lightheadedness  ROS  Comprehensive system review negative other than noted above    Objective:     Vitals: Blood pressure 147/91, pulse 88, temperature 97 7 °F (36 5 °C), temperature source Oral, resp  rate 20, height 5' (1 524 m), weight 60 1 kg (132 lb 7 9 oz), SpO2 96 %, not currently breastfeeding  ,Body mass index is 25 88 kg/m²    Current Facility-Administered Medications   Medication Dose Route Frequency Provider Last Rate Last Dose    fentaNYL (SUBLIMAZE) injection 25 mcg  25 mcg Intravenous Q5 Min PRN Kory Rob MD   25 mcg at 02/10/20 1804    fluticasone-vilanterol (BREO ELLIPTA) 200-25 MCG/INH inhaler 1 puff  1 puff Inhalation Daily Zaina Pena PA-C   1 puff at 02/14/20 0837    heparin (porcine) subcutaneous injection 5,000 Units  5,000 Units Subcutaneous [facility] Zaina Pena PA-C   5,000 Units at 02/14/20 0503    hydrALAZINE (APRESOLINE) injection 5 mg  5 mg Intravenous Q6H PRN Coby Pena PA-C        HYDROmorphone (DILAUDID) injection 0 5 mg  0 5 mg Intravenous Q3H PRN Coby Pena PA-C   0 5 mg at 02/14/20 1202    ketorolac (TORADOL) injection 15 mg  15 mg Intravenous Q6H PRN Zaina Pena PA-C   15 mg at 02/14/20 0837    lactated ringers infusion  125 mL/hr Intravenous Continuous Zaina Astl-Adama, PA-C 125 mL/hr at 02/14/20 0255 125 mL/hr at 02/14/20 0255    nicotine (NICODERM CQ) 21 mg/24 hr TD 24 hr patch 1 patch  1 patch Transdermal Q24H Zaina Astl-Adama, PA-C   1 patch at 02/13/20 1347    ondansetron (ZOFRAN) injection 4 mg  4 mg Intravenous Q6H PRN Zaina Astl-Adama, PA-C   4 mg at 02/10/20 1640    pantoprazole (PROTONIX) injection 40 mg  40 mg Intravenous Daily Zaina Astl-Adama, PA-C   40 mg at 02/14/20 0826    phenol (CHLORASEPTIC) 1 4 % mucosal liquid 1 spray  1 spray Mouth/Throat Q2H PRN Zaina Astl-Adama, PA-C   1 spray at 02/13/20 1029    potassium chloride (K-DUR,KLOR-CON) CR tablet 20 mEq  20 mEq Oral BID Lissette Fly, DO   20 mEq at 02/14/20 6957     Medications Prior to Admission   Medication    amLODIPine (NORVASC) 10 mg tablet    Cholecalciferol (VITAMIN D3) 5000 units CAPS    Cranberry 200 MG CAPS    fluocinonide (LIDEX) 0 05 % ointment    fluticasone (FLONASE) 50 mcg/act nasal spray    fluticasone-salmeterol (ADVAIR DISKUS) 250-50 mcg/dose inhaler    gabapentin (NEURONTIN) 300 mg capsule    loratadine-pseudoephedrine (CLARITIN-D 12 HOUR) 5-120 mg per tablet    meloxicam (MOBIC) 15 mg tablet    methocarbamol (ROBAXIN) 500 mg tablet    Multiple Vitamin tablet    nicotine (NICODERM CQ) 21 mg/24 hr TD 24 hr patch    omeprazole (PriLOSEC) 40 MG capsule         Intake/Output Summary (Last 24 hours) at 2/14/2020 1345  Last data filed at 2/14/2020 0041  Gross per 24 hour   Intake 990 ml   Output 800 ml   Net 190 ml       Physical Exam:  General appearance: alert and no distress  Neck: no JVD, supple, symmetrical, trachea midline and thyroid not enlarged, symmetric, no tenderness/mass/nodules  Lungs: clear to auscultation bilaterally  Heart: regular rate and rhythm, S1, S2 normal, no murmur, click, rub or gallop  Abdomen: mild fullness- active bs  Extremities: extremities normal, warm and well-perfused; no cyanosis, clubbing, or edema  Skin: Skin color, texture, turgor normal  No rashes or lesions  Neurologic:  No focal deficts    Lab, Imaging and other studies: I have personally reviewed pertinent reports  Results from last 7 days   Lab Units 02/14/20  0541 02/13/20  1147 02/11/20  0528 02/10/20  0644   WBC Thousand/uL 4 40  --  8 89 9 67   HEMOGLOBIN g/dL 8 9* 9 2* 9 6* 11 6   HEMATOCRIT % 27 9*  --  31 2* 37 3   PLATELETS Thousands/uL 192  --  205 271   NEUTROS PCT % 63  --   --  88*   LYMPHS PCT % 26  --   --  8*   MONOS PCT % 7  --   --  3*   EOS PCT % 3  --   --  1     Results from last 7 days   Lab Units 02/14/20  0541 02/13/20  0548 02/12/20  02/10/20  0644   POTASSIUM mmol/L 4 1 2 9* 4 1   < > 3 5   CHLORIDE mmol/L 106 107 110*   < > 107   CO2 mmol/L 26 26 30   < > 30   BUN mg/dL 6 8 15   < > 12   CREATININE mg/dL 0 58* 0 52* 0 64   < > 0 81   CALCIUM mg/dL 8 9 8 0* 8 5   < > 9 2   ALK PHOS U/L  --   --   --   --  85   ALT U/L  --   --   --   --  19   AST U/L  --   --   --   --  16    < > = values in this interval not displayed  Lab Results   Component Value Date    TROPONINI <0 02 02/10/2020    TROPONINI <0 02 12/25/2019    TROPONINI <0 02 07/22/2018     Results from last 7 days   Lab Units 02/10/20  0644   INR  0 94     No results found for: Maria Elena Frost, SPUTUMCULTUR    Imaging:  No results found for this or any previous visit  Results for orders placed during the hospital encounter of 12/25/19   XR chest 2 views    Narrative CHEST     INDICATION:   chest pain  COMPARISON:  7/22/2018    EXAM PERFORMED/VIEWS:  XR CHEST PA & LATERAL  The frontal view was performed utilizing dual energy radiographic technique  FINDINGS:    Cardiomediastinal silhouette appears unremarkable  The lungs are clear  No pneumothorax or pleural effusion  Osseous structures appear within normal limits for patient age        Impression No acute cardiopulmonary disease  Workstation performed: [id number]         PATIENT CENTERED ROUNDS: I have performed rounds with the nursing staff            Ayaz Burrell DO

## 2020-02-14 NOTE — DISCHARGE INSTRUCTIONS
[facility] Instructions          Dr Kiley WHITE     1  General: Pink Kelp will feel pulling sensations around the wound and/or aches and pains around the incisions  This is normal  Even minor surgery is a change in your body and this is your bodys way of reaction to it  If you have had abdominal surgery, it may help to support the incision with a small pillow or blanket for comfort when moving or coughing  2  Wound care:     Glue - Leave glue alone, it will fall off on its own, no need for an additional dressings    3  Water: You may shower over the wound, unless there are drain tubes left in place  Do not bathe or use a pool or hot tub until cleared by the physician  You may shower right over the staples, glue or Steri-Strips and rinse wound with soapy water but do not scrub incision pat dry when you are done  4  Activity: You may go up and down stairs, walk as much as you are comfortable, but walk at least 3 times each day  If you have had abdominal or hernia surgery, do not lift anything heavier than 15 pounds for at least 2 weeks and nothing more than 25 pounds for weeks 3 and 4, unless cleared by the doctor  5  Diet: You may resume a regular diet  If you had a same-day surgery or overnight stay surgery, you may wish to eat lightly for a few days: soups, crackers, and sandwiches  You may resume a regular diet when ready  6  Medications: Resume all of your previous medications, unless told otherwise by the doctor  Avoid aspirin products for 3-5 days after the date of surgery  You may, at that time, began to take them again  Tylenol and ibuoprofen is always fine, unless you are taking any narcotic pain medication containing Tylenol (such as Percocet, Darvocet, Vicodin, or anything containing acetaminophen)  Do not take Tylenol if you're taking these medications   You do not need to take the narcotic pain medications unless you are having significant pain and discomfort  7  Driving: He will need someone to drive you home on the day of surgery  Do not drive or make any important decisions while on narcotic pain medication or 24 hours and after anesthesia or sedation for surgery  Generally, you may drive when your off all narcotic pain medications  8  Upset Stomach: You may take Maalox, Tums, or similar items for an upset stomach  If your narcotic pain medication causes an upset stomach, do not take it on an empty stomach  Try taking it with at least some crackers or toast      9  Constipation: Patients often experienced constipation after surgery  You may take over-the-counter medication for this, such as Metamucil, Senokot, Dulcolax, milk of magnesia, etc  You may take a suppository unless you have had anorectal surgery such as a procedure on your hemorrhoids  If you experience significant nausea or vomiting after abdominal surgery, call the office before trying any of these medications  10  Call the office: If you are experiencing any of the following, fevers above 101 5°, significant nausea or vomiting, if the wound develops drainage and/or is excessive redness around the wound, or if you have significant diarrhea or other worsening symptoms  11  Pain: You may be given a prescription for pain  This will be given to the hospital, the day of surgery  12  Sexual Activity: You may resume sexual activity when you feel ready and comfortable and your incision is sealed and healed without apparent infection risk      [facility]  Phone: [phone]

## 2020-02-14 NOTE — NURSING NOTE
Pt's IVs removed  Reviewed discharge instructions with patient  Left with  who is providing ride home

## 2020-02-17 ENCOUNTER — TELEPHONE (OUTPATIENT)
Dept: SURGERY | Facility: HOSPITAL | Age: 60
End: 2020-02-17

## 2020-02-17 ENCOUNTER — TRANSITIONAL CARE MANAGEMENT (OUTPATIENT)
Dept: FAMILY MEDICINE CLINIC | Facility: HOSPITAL | Age: 60
End: 2020-02-17

## 2020-02-17 NOTE — DISCHARGE SUMMARY
Discharge Summary - Ashley Hannah 64 y o  female MRN: [id number]    Unit/Bed#: -01 Encounter: [id number]    Admission Date: 2/10/2020     Discharge Date: 2/14/2020    Admitting Diagnosis: Bowel obstruction ([address] ) [K56 609]  Abdominal pain [R10 9]    Secondary Diagnosis:   Past Medical History:   Diagnosis Date    Anemia     Anxiety     Bursitis of left elbow     Chronic pain disorder     lower back    Hiatal hernia     Iron deficiency anemia secondary to inadequate dietary iron intake 1/29/2020    Kidney stones     Pancreatitis     Psoriasis     Psychiatric disorder     anxiety    Restless leg syndrome     Tobacco use disorder        Discharge Diagnosis: Same    Procedures Performed: Procedure(s):  LAPAROSCOPY DIAGNOSTIC, REPAIR ENTEROTOMY, REMOVAL OF BEZOAR    Consults: [address] Course:  Ashley Hannah is a 81-year-old female who presented to [address] [place] [address]  with complaints of abdominal pain associated nausea and vomiting which woke her from sleep  Pain and vomiting got worse and so she presented to the ER  CT scan in the emergency department showed high-grade small-bowel obstruction with abrupt transition point in the right lower quadrant appearance suspicious for adhesions  Patient denied previous history of abdominal surgeries  She is taken to the operating room that day for diagnostic laparoscopy with laparoscopic removal of bezoar and repair of small-bowel enterotomy  She did well postoperatively with early return of bowel function  Her NG tube was discontinued and she was started on a clear liquid diet advanced to a surgical soft which she tolerated with frequent bowel movements  She was discharged home on a soft diet instructions to get better fitting dentures  Disposition: Home in improved condition   Call physician for temperature over 101, wound redness or discharge, vomiting, or intolerance to diet        Discharge Medications:  See after visit summary for reconciled discharge medications provided to patient and family  This text is generated with voice recognition software  There may be translation, syntax,  or grammatical errors  If you have any questions, please contact the dictating provider

## 2020-02-17 NOTE — UTILIZATION REVIEW
Notification of Discharge  This is a Notification of Discharge from our facility [address]  Please be advised that this patient has been discharge from our facility  Below you will find the admission and discharge date and time including the patients disposition  PRESENTATION DATE: 2/10/2020  6:36 AM  OBS ADMISSION DATE:   IP ADMISSION DATE: 2/10/20 1116   DISCHARGE DATE: 2/14/2020  1:51 PM  DISPOSITION: Home/Self Care Home/Self Care   Admission Orders listed below:  Admission Orders (From admission, onward)     Ordered        02/10/20 1116  Inpatient Admission  Once                   Please contact the UR Department if additional information is required to close this patient's authorization/case  [facility]  Network Utilization Review Department  Main: [phone] x carefully listen to the prompts  All voicemails are confidential   [email or web address]  Send all requests for admission clinical reviews, approved or denied determinations and any other requests to dedicated fax number below belonging to the campus where the patient is receiving treatment   List of dedicated fax numbers:  [address] DENIALS (Administrative/Medical Necessity) [phone]   [address] (Maternity/NICU/Pediatrics) [phone]   [facility] [phone]   [facility] [phone]   Aris Vasquez [phone]   [address] [phone]   [facility]  [phone]   [address] [facility] [address]  [address] [facility] [address] [facility] [phone]

## 2020-02-17 NOTE — TELEPHONE ENCOUNTER
Two day post op call  Patient released from hospital   Spoke to patient  Patient says she has a lot of pain  Requested more pain medication  Spoke to doctor who recommended ibuprofen or tylenol for pain relief  Relayed this to patient  Set up post op appointment  Told patient to call if there were any other questions or concerns prior to post op appointment

## 2020-02-19 ENCOUNTER — OFFICE VISIT (OUTPATIENT)
Dept: FAMILY MEDICINE CLINIC | Facility: HOSPITAL | Age: 60
End: 2020-02-19
Payer: COMMERCIAL

## 2020-02-19 VITALS
SYSTOLIC BLOOD PRESSURE: 128 MMHG | HEART RATE: 85 BPM | HEIGHT: 60 IN | DIASTOLIC BLOOD PRESSURE: 72 MMHG | WEIGHT: 128.6 LBS | BODY MASS INDEX: 25.25 KG/M2 | TEMPERATURE: 98.5 F | OXYGEN SATURATION: 98 %

## 2020-02-19 DIAGNOSIS — R50.9 LOW GRADE FEVER: ICD-10-CM

## 2020-02-19 DIAGNOSIS — K56.609 BOWEL OBSTRUCTION (HCC): ICD-10-CM

## 2020-02-19 DIAGNOSIS — I10 BENIGN ESSENTIAL HTN: ICD-10-CM

## 2020-02-19 DIAGNOSIS — K56.609 SMALL BOWEL OBSTRUCTION (HCC): Primary | ICD-10-CM

## 2020-02-19 PROCEDURE — 1111F DSCHRG MED/CURRENT MED MERGE: CPT | Performed by: INTERNAL MEDICINE

## 2020-02-19 PROCEDURE — 99496 TRANSJ CARE MGMT HIGH F2F 7D: CPT | Performed by: INTERNAL MEDICINE

## 2020-02-19 RX ORDER — CEPHALEXIN 500 MG/1
500 CAPSULE ORAL 4 TIMES DAILY
Qty: 28 CAPSULE | Refills: 0 | Status: SHIPPED | OUTPATIENT
Start: 2020-02-19 | End: 2020-02-26

## 2020-02-19 RX ORDER — OXYCODONE HYDROCHLORIDE AND ACETAMINOPHEN 5; 325 MG/1; MG/1
1 TABLET ORAL EVERY 6 HOURS PRN
Qty: 20 TABLET | Refills: 0 | Status: SHIPPED | OUTPATIENT
Start: 2020-02-19 | End: 2020-02-25

## 2020-02-19 NOTE — PATIENT INSTRUCTIONS
Antibiotics 4x day - start today      see Dr Alireza Roth tomorrow- his office will call with time  If worsening of pain- go to ER

## 2020-02-19 NOTE — PROGRESS NOTES
Assessment/Plan:     Problem List Items Addressed This Visit        Digestive    Small bowel obstruction ([address] ) - Primary    Relevant Medications    cephalexin (KEFLEX) 500 mg capsule    oxyCODONE-acetaminophen (PERCOCET) 5-325 mg per tablet       Cardiovascular and Mediastinum    Benign essential HTN      Other Visit Diagnoses     Low grade fever        Bowel obstruction (HCC)        Relevant Medications    oxyCODONE-acetaminophen (PERCOCET) 5-325 mg per tablet           Subjective:     Patient ID: David Barrow is a 64 y o  female    1  Abdominal pain-had surgery on 2/10 for sbo and discharged 2/14/20- had small bm 2 days ago- Now having increased pain and bloating in past 2 days , then   noted redness of incision today  It feels warm to her also and she had low grade fever to 100 3 overnight  Some nausea as she had switched from oxycodone a to ibuprofen as recommended by surgery team  - may be cause of nausea as she took 800 mg  Has been passing flatus    I have conversed with Dr Odalys Parkinson via tiger text and forwarded a picture of incision and will have her started on antibiotics  He will see her for appt tomorrow  2  Tobacco abuse- now using patch      Review of Systems   Constitutional: Positive for fatigue and fever  Negative for chills  HENT: Positive for congestion  Gastrointestinal: Positive for abdominal distention and abdominal pain  Negative for blood in stool  No melena   Musculoskeletal: Negative for arthralgias  Skin: Positive for wound          Redness 1 cm x 2 cm around umbilicus incison with local firmness         Current Outpatient Medications:     amLODIPine (NORVASC) 10 mg tablet, Take 1 tablet (10 mg total) by mouth daily Decreased dose, Disp: 90 tablet, Rfl: 3    Cholecalciferol (VITAMIN D3) 5000 units CAPS, Take by mouth daily, Disp: , Rfl:     Cranberry 200 MG CAPS, Take 1 capsule by mouth daily, Disp: , Rfl:     fluocinonide (LIDEX) 0 05 % ointment, Apply topically 2 (two) times a day, Disp: 30 g, Rfl: 0    fluticasone (FLONASE) 50 mcg/act nasal spray, 2 sprays into each nostril daily, Disp: 3 Bottle, Rfl: 1    fluticasone-salmeterol (ADVAIR DISKUS) 250-50 mcg/dose inhaler, Inhale 1 puff 2 (two) times a day Rinse mouth after use, Disp: 1 Inhaler, Rfl: 5    gabapentin (NEURONTIN) 300 mg capsule, take 1 capsule by mouth daily in AM 1 at noon  and 2 capsules by mouth every PM, Disp: 480 capsule, Rfl: 3    meloxicam (MOBIC) 15 mg tablet, Take 1 tablet (15 mg total) by mouth daily, Disp: 90 tablet, Rfl: 3    Multiple Vitamin tablet, Take 1 tablet by mouth daily, Disp: , Rfl:     nicotine (NICODERM CQ) 21 mg/24 hr TD 24 hr patch, Place 1 patch on the skin every 24 hours, Disp: 28 patch, Rfl: 0    omeprazole (PriLOSEC) 40 MG capsule, take 1 capsule by mouth daily, Disp: 90 capsule, Rfl: 3    cephalexin (KEFLEX) 500 mg capsule, Take 1 capsule (500 mg total) by mouth 4 (four) times a day for 7 days, Disp: 28 capsule, Rfl: 0    oxyCODONE-acetaminophen (PERCOCET) 5-325 mg per tablet, Take 1 tablet by mouth every 6 (six) hours as needed for severe painMax Daily Amount: 4 tablets, Disp: 20 tablet, Rfl: 0    Objective:    Physical Exam   Constitutional: She is oriented to person, place, and time  She appears well-developed  She appears distressed  Uncomfortable with sitting   HENT:   Head: Normocephalic  Nose: Nose normal    Neck: No thyromegaly present  Cardiovascular: Normal rate and regular rhythm  Pulmonary/Chest: Effort normal and breath sounds normal  No respiratory distress  Abdominal: She exhibits distension  There is tenderness  Redness inperiumbilical region with firmness   Lymphadenopathy:     She has no cervical adenopathy  Neurological: She is alert and oriented to person, place, and time  Skin: There is erythema  Nursing note and vitals reviewed        Vitals:    02/19/20 0822   BP: 128/72   Pulse: 85   Temp: 98 5 °F (36 9 °C)   SpO2: 98%       Transitional Care Management Review:  During the TCM phone call patient stated:         TCM Call (since 1/19/2020)     Patient was hospitialized at  [facility]; Other (comment)    [address]     Date of Admission  02/10/20    Date of discharge  02/14/20    Diagnosis  admitting dx--Bowel Obstruction, d/c dx--same    Disposition  Home    Were the patients medications reviewed and updated  Yes    Current Symptoms  Incisional pain    Incisional pain severity  Moderate      TCM Call (since 1/19/2020)     Should patient be enrolled in anticoag monitoring? No    Scheduled for follow up? Yes    I have advised the patient to call PCP with any new or worsening symptoms  Vicki Aguirre MA [facility]    Comments  I spoke to pt  She is doing ok, although in alot of pain  She put a call into the surgeons office--Dr Jonda Cooks and spoke to [facility] who will talk to  about a refill on her pain med  There were no other issues  Meds reviewed and chart updated    Sent to front staff to schedule hosp f/u   Yenifer Robles,

## 2020-02-20 ENCOUNTER — HOSPITAL ENCOUNTER (OUTPATIENT)
Facility: HOSPITAL | Age: 60
Setting detail: OBSERVATION
Discharge: HOME | End: 2020-02-20
Attending: EMERGENCY MEDICINE | Admitting: SURGERY
Payer: COMMERCIAL

## 2020-02-20 ENCOUNTER — OFFICE VISIT (OUTPATIENT)
Dept: SURGERY | Facility: HOSPITAL | Age: 60
End: 2020-02-20

## 2020-02-20 ENCOUNTER — DOCUMENTATION (OUTPATIENT)
Dept: FAMILY MEDICINE CLINIC | Facility: HOSPITAL | Age: 60
End: 2020-02-20

## 2020-02-20 ENCOUNTER — APPOINTMENT (EMERGENCY)
Dept: RADIOLOGY | Facility: HOSPITAL | Age: 60
End: 2020-02-20
Payer: COMMERCIAL

## 2020-02-20 ENCOUNTER — APPOINTMENT (EMERGENCY)
Dept: CT IMAGING | Facility: HOSPITAL | Age: 60
End: 2020-02-20
Payer: COMMERCIAL

## 2020-02-20 VITALS
WEIGHT: 128 LBS | OXYGEN SATURATION: 96 % | BODY MASS INDEX: 25 KG/M2 | RESPIRATION RATE: 18 BRPM | DIASTOLIC BLOOD PRESSURE: 65 MMHG | HEART RATE: 78 BPM | SYSTOLIC BLOOD PRESSURE: 123 MMHG | TEMPERATURE: 97.5 F

## 2020-02-20 VITALS
HEIGHT: 60 IN | HEART RATE: 78 BPM | WEIGHT: 126.6 LBS | DIASTOLIC BLOOD PRESSURE: 79 MMHG | BODY MASS INDEX: 24.85 KG/M2 | TEMPERATURE: 99.4 F | SYSTOLIC BLOOD PRESSURE: 124 MMHG

## 2020-02-20 DIAGNOSIS — T81.49XA INCISIONAL INFECTION: Primary | ICD-10-CM

## 2020-02-20 DIAGNOSIS — K56.7 ILEUS (HCC): ICD-10-CM

## 2020-02-20 DIAGNOSIS — S30.1XXA ABDOMINAL WALL SEROMA: Primary | ICD-10-CM

## 2020-02-20 LAB
ALBUMIN SERPL BCP-MCNC: 2.8 G/DL (ref 3.5–5)
ALP SERPL-CCNC: 142 U/L (ref 46–116)
ALT SERPL W P-5'-P-CCNC: 30 U/L (ref 12–78)
ANION GAP SERPL CALCULATED.3IONS-SCNC: 8 MMOL/L (ref 4–13)
AST SERPL W P-5'-P-CCNC: 32 U/L (ref 5–45)
BASOPHILS # BLD AUTO: 0.02 THOUSANDS/ΜL (ref 0–0.1)
BASOPHILS NFR BLD AUTO: 0 % (ref 0–1)
BILIRUB SERPL-MCNC: 0.2 MG/DL (ref 0.2–1)
BUN SERPL-MCNC: 9 MG/DL (ref 5–25)
CALCIUM SERPL-MCNC: 8.6 MG/DL (ref 8.3–10.1)
CHLORIDE SERPL-SCNC: 105 MMOL/L (ref 100–108)
CO2 SERPL-SCNC: 26 MMOL/L (ref 21–32)
CREAT SERPL-MCNC: 0.74 MG/DL (ref 0.6–1.3)
EOSINOPHIL # BLD AUTO: 0.1 THOUSAND/ΜL (ref 0–0.61)
EOSINOPHIL NFR BLD AUTO: 1 % (ref 0–6)
ERYTHROCYTE [DISTWIDTH] IN BLOOD BY AUTOMATED COUNT: 14.9 % (ref 11.6–15.1)
GFR SERPL CREATININE-BSD FRML MDRD: 90 ML/MIN/1.73SQ M
GLUCOSE SERPL-MCNC: 88 MG/DL (ref 65–140)
HCT VFR BLD AUTO: 36 % (ref 34.8–46.1)
HGB BLD-MCNC: 10.9 G/DL (ref 11.5–15.4)
IMM GRANULOCYTES # BLD AUTO: 0.03 THOUSAND/UL (ref 0–0.2)
IMM GRANULOCYTES NFR BLD AUTO: 0 % (ref 0–2)
LACTATE SERPL-SCNC: 0.5 MMOL/L (ref 0.5–2)
LYMPHOCYTES # BLD AUTO: 1.52 THOUSANDS/ΜL (ref 0.6–4.47)
LYMPHOCYTES NFR BLD AUTO: 17 % (ref 14–44)
MCH RBC QN AUTO: 29.3 PG (ref 26.8–34.3)
MCHC RBC AUTO-ENTMCNC: 30.3 G/DL (ref 31.4–37.4)
MCV RBC AUTO: 97 FL (ref 82–98)
MONOCYTES # BLD AUTO: 0.42 THOUSAND/ΜL (ref 0.17–1.22)
MONOCYTES NFR BLD AUTO: 5 % (ref 4–12)
NEUTROPHILS # BLD AUTO: 6.64 THOUSANDS/ΜL (ref 1.85–7.62)
NEUTS SEG NFR BLD AUTO: 77 % (ref 43–75)
PLATELET # BLD AUTO: 370 THOUSANDS/UL (ref 149–390)
PMV BLD AUTO: 10.1 FL (ref 8.9–12.7)
POTASSIUM SERPL-SCNC: 3.8 MMOL/L (ref 3.5–5.3)
PROT SERPL-MCNC: 7.1 G/DL (ref 6.4–8.2)
RBC # BLD AUTO: 3.72 MILLION/UL (ref 3.81–5.12)
SODIUM SERPL-SCNC: 139 MMOL/L (ref 136–145)
WBC # BLD AUTO: 8.73 THOUSAND/UL (ref 4.31–10.16)

## 2020-02-20 PROCEDURE — 80053 COMPREHEN METABOLIC PANEL: CPT | Performed by: EMERGENCY MEDICINE

## 2020-02-20 PROCEDURE — 99024 POSTOP FOLLOW-UP VISIT: CPT | Performed by: SURGERY

## 2020-02-20 PROCEDURE — 71046 X-RAY EXAM CHEST 2 VIEWS: CPT

## 2020-02-20 PROCEDURE — 3074F SYST BP LT 130 MM HG: CPT | Performed by: SURGERY

## 2020-02-20 PROCEDURE — 87040 BLOOD CULTURE FOR BACTERIA: CPT | Performed by: EMERGENCY MEDICINE

## 2020-02-20 PROCEDURE — 36415 COLL VENOUS BLD VENIPUNCTURE: CPT | Performed by: EMERGENCY MEDICINE

## 2020-02-20 PROCEDURE — 83605 ASSAY OF LACTIC ACID: CPT | Performed by: EMERGENCY MEDICINE

## 2020-02-20 PROCEDURE — 74177 CT ABD & PELVIS W/CONTRAST: CPT

## 2020-02-20 PROCEDURE — 99285 EMERGENCY DEPT VISIT HI MDM: CPT

## 2020-02-20 PROCEDURE — 85025 COMPLETE CBC W/AUTO DIFF WBC: CPT | Performed by: EMERGENCY MEDICINE

## 2020-02-20 PROCEDURE — 3078F DIAST BP <80 MM HG: CPT | Performed by: SURGERY

## 2020-02-20 PROCEDURE — 1111F DSCHRG MED/CURRENT MED MERGE: CPT | Performed by: SURGERY

## 2020-02-20 PROCEDURE — 99285 EMERGENCY DEPT VISIT HI MDM: CPT | Performed by: EMERGENCY MEDICINE

## 2020-02-20 PROCEDURE — NS001 PR NO SIGNATURE OR ATTESTATION: Performed by: PHYSICIAN ASSISTANT

## 2020-02-20 RX ORDER — SODIUM CHLORIDE 9 MG/ML
75 INJECTION, SOLUTION INTRAVENOUS CONTINUOUS
Status: DISCONTINUED | OUTPATIENT
Start: 2020-02-20 | End: 2020-02-20 | Stop reason: HOSPADM

## 2020-02-20 RX ADMIN — IOHEXOL 100 ML: 350 INJECTION, SOLUTION INTRAVENOUS at 03:55

## 2020-02-20 RX ADMIN — Medication 3 G: at 06:28

## 2020-02-20 NOTE — H&P
HPI:   61-year-old female presented to the emergency room with complaints of mild abdominal pain and wound drainage  She recently underwent and diagnostic laparoscopy, repair enterotomy, and removal of bezoar  She had a CT scan done in the emergency room showing subcutaneous fluid collection could represent a seroma or abscess, and mildly prominent loops of small bowel however no discrete transition point is seen  Currently she denies any nausea or vomiting, she had 4 bowel movements last evening,and is passing gas  She does has some vague right lower quadrant abdominal pain  Vital signs are stable, lactic acid within normal range and white blood cell count normal   She was seen by Dr Dolores Fuentes in the office yesterday started on outpatient antibiotics and has an appointment with Dr Mahesh Still in the office this morning at 8:45 a m  She is to be discharged home and follow up with Dr Mahesh Still       Wt Readings from Last 3 Encounters:   02/20/20 58 1 kg (128 lb)   02/19/20 58 3 kg (128 lb 9 6 oz)   02/10/20 60 1 kg (132 lb 7 9 oz)     Temp Readings from Last 3 Encounters:   02/20/20 97 5 °F (36 4 °C) (Temporal)   02/19/20 98 5 °F (36 9 °C) (Tympanic)   02/14/20 97 7 °F (36 5 °C) (Oral)     BP Readings from Last 3 Encounters:   02/20/20 123/65   02/19/20 128/72   02/14/20 147/91     Pulse Readings from Last 3 Encounters:   02/20/20 78   02/19/20 85   02/14/20 88     Chest: CTABL  HEART: RRR  ABDOMEN: soft, +BS, mildly tender to palpation RLQ, Midline incision +erythema upper portion firm with serosanguinous drainage  Extremities: without edema    Lab Results   Component Value Date    WBC 8 73 02/20/2020    HGB 10 9 (L) 02/20/2020    HCT 36 0 02/20/2020    MCV 97 02/20/2020     02/20/2020     Lab Results   Component Value Date    SODIUM 139 02/20/2020    K 3 8 02/20/2020     02/20/2020    CO2 26 02/20/2020    BUN 9 02/20/2020    CREATININE 0 74 02/20/2020    GLUC 88 02/20/2020    CALCIUM 8 6 02/20/2020 CT ABDOMEN AND PELVIS WITH IV CONTRAST     INDICATION:   post op wound infection      COMPARISON:  CT abdomen and pelvis dated 2/10/2020     TECHNIQUE:  CT examination of the abdomen and pelvis was performed  Axial, sagittal, and coronal 2D reformatted images were created from the source data and submitted for interpretation      Radiation dose length product (DLP) for this visit:  436 mGy-cm   This examination, like all CT scans performed in the [facility], was performed utilizing techniques to minimize radiation dose exposure, including the use of iterative   reconstruction and automated exposure control      IV Contrast:  100 mL of iohexol (OMNIPAQUE)  Enteric Contrast:  Enteric contrast was not administered      FINDINGS:     ABDOMEN     LOWER CHEST:  4 mm nodule right middle lobe (axial image 5, series 2)  Linear atelectasis bilateral lower lobes  Visualized lungs otherwise grossly clear      LIVER/BILIARY TREE:  Mild prominence of the biliary tree redemonstrated; otherwise grossly unremarkable      GALLBLADDER:  Mild to moderate gallbladder distention; otherwise unremarkable     SPLEEN:  Unremarkable      PANCREAS:  Unremarkable      ADRENAL GLANDS:  Unremarkable      KIDNEYS/URETERS:  Tiny nonobstructing stone in the mid portion of the right kidney; otherwise grossly unremarkable  No hydronephrosis  Multiple nonobstructing stones redemonstrated predominantly in the upper and lower pole of the left kidney  Focal   cortical scarring in the upper pole the left kidney  Left kidney appears grossly unremarkable; no hydronephrosis     STOMACH AND BOWEL:  Colon appears grossly unremarkable  Mild to moderate wall thickening involving multiple loops of small bowel in the left upper quadrant, mid abdomen, and lower abdomen, consider infectious or inflammatory enteritis in the appropriate   clinical setting    Mildly prominent loops of small bowel in the left upper quadrant and midabdomen could represent a component of small bowel ileus  Developing small bowel obstruction is considered but no discrete transition point is seen  Correlation   with the patient's symptoms recommended      APPENDIX:  Normal caliber appendix      ABDOMINOPELVIC CAVITY:  Mesenteric edema suspected in the mid and lower abdomen, shotty mesenteric and left retroperitoneal lymph nodes, possibly related to prior intervention  No intraperitoneal free air      VESSELS:  Mild atherosclerosis; no aortic aneurysm      PELVIS     REPRODUCTIVE ORGANS:  Unremarkable for patient's age      URINARY BLADDER:  Unremarkable      ABDOMINAL WALL/INGUINAL REGIONS:  Body wall edema  Subcutaneous fluid collection in the anterior supraumbilical region soft tissues measures approximately 2 4 x 2 1 x 4 2 cm in size could represent seroma or abscess      OSSEOUS STRUCTURES:  Multilevel degenerative changes of the spine, most prominent in the mid and lower lumbar region where there is intervertebral disc space narrowing, vacuum disc phenomenon, and facet joint arthropathy  No acute fracture or   destructive osseous lesion         IMPRESSION:  Body wall edema, most prominent in the anterior lower abdomen/pelvis, superimposed cellulitis could be considered in the appropriate clinical setting      Subcutaneous fluid collection in the anterior supraumbilical region soft tissues measures approximately 2 4 x 2 1 x 4 2 cm in size could represent seroma or abscess      Mild to moderate wall thickening involving multiple loops of small bowel in the left upper quadrant, mid abdomen, and lower abdomen, consider infectious or inflammatory enteritis in the appropriate clinical setting  Mildly prominent loops of small bowel   in the left upper quadrant and midabdomen could represent a component of small bowel ileus  Developing small bowel obstruction is considered but no discrete transition point is seen    Correlation with the patient's symptoms recommended      Mild prominence of the biliary tree redemonstrated, nonspecific  A component of biliary obstruction is considered  Correlation with the patient's symptoms and laboratory values recommended      Bilateral nephrolithiasis; no hydronephrosis     4 mm nodule right middle lobe (axial image 5, series 2)  Based on current Fleischner Society 2017 Guidelines on incidental pulmonary nodule, 12 month follow-up non-contrast chest CT is recommended  A:P   82-year-old female status post diagnostic laparoscopy, repair enterotomy, removal of bezoar  Patient doing well from a postoperative standpoint vital signs are stable and labs are within normal limits  She is tolerating a regular diet and having regular bowel movements  There is some erythema at the top of her incision which may represent cellulitis or a seroma  This can be managed as an outpatient  She does NOT meet admission or observation status criteria    She will be discharged home from the emergency room and is to follow up with Dr Aniceto Munoz as an outpatient as scheduled

## 2020-02-20 NOTE — PROGRESS NOTES
Pt went to ER for infection, seeing surgeon today  Just had TCM yesterday for hosp d/c  I will notify my manager of this    dk

## 2020-02-20 NOTE — ED PROVIDER NOTES
History  Chief Complaint   Patient presents with    Drainage from Incision     pt had surgery last week and went to bed tonight, woke up and felt drainage from surgical site  Site appears red and oozing at this time     65-year-old female presents for evaluation of redness and discharge from her central abdominal incision has been worsening throughout the day today  Patient went to her primary care doctor's office earlier today and was started on Keflex  She states she has taken a total of 4 doses prior to arrival   Patient states that she has an appointment with Dr Mario Stokes for re-evaluation tomorrow; however, came to the emergency department due to clear discharge coming from the wound  She denies any recent fevers or chills  Last bowel movement earlier today  She denies any nausea or vomiting since 02/16/2020  Patient had undergone diagnostic laparoscopy on 02/10/2020 with Dr Mario Stokes for small bowel obstruction during which a bezoar was removed  History provided by:  Patient  Medical Problem   Location:  Central abdominal incision  Quality:  Redness and discharge  Severity:  Moderate  Onset quality:  Gradual  Duration:  1 day  Timing:  Constant  Progression:  Worsening  Chronicity:  New  Relieved by:  Nothing  Worsened by:  Nothing  Ineffective treatments:  4 doses Keflex  Associated symptoms: rash (Redness around incision)    Associated symptoms: no abdominal pain, no chest pain, no congestion, no cough, no diarrhea, no fever, no headaches, no myalgias, no nausea, no rhinorrhea, no shortness of breath, no sore throat and no vomiting        Prior to Admission Medications   Prescriptions Last Dose Informant Patient Reported? Taking?    Cholecalciferol (VITAMIN D3) 5000 units CAPS  Self Yes No   Sig: Take by mouth daily   Cranberry 200 MG CAPS  Self Yes No   Sig: Take 1 capsule by mouth daily   Multiple Vitamin tablet  Self Yes No   Sig: Take 1 tablet by mouth daily   amLODIPine (NORVASC) 10 mg tablet No No   Sig: Take 1 tablet (10 mg total) by mouth daily Decreased dose   cephalexin (KEFLEX) 500 mg capsule   No No   Sig: Take 1 capsule (500 mg total) by mouth 4 (four) times a day for 7 days   fluocinonide (LIDEX) 0 05 % ointment   No No   Sig: Apply topically 2 (two) times a day   fluticasone (FLONASE) 50 mcg/act nasal spray   No No   Si sprays into each nostril daily   fluticasone-salmeterol (ADVAIR DISKUS) 250-50 mcg/dose inhaler   No No   Sig: Inhale 1 puff 2 (two) times a day Rinse mouth after use   gabapentin (NEURONTIN) 300 mg capsule   No No   Sig: take 1 capsule by mouth daily in AM 1 at noon  and 2 capsules by mouth every PM   meloxicam (MOBIC) 15 mg tablet   No No   Sig: Take 1 tablet (15 mg total) by mouth daily   nicotine (NICODERM CQ) 21 mg/24 hr TD 24 hr patch   No No   Sig: Place 1 patch on the skin every 24 hours   omeprazole (PriLOSEC) 40 MG capsule   No No   Sig: take 1 capsule by mouth daily   oxyCODONE-acetaminophen (PERCOCET) 5-325 mg per tablet   No No   Sig: Take 1 tablet by mouth every 6 (six) hours as needed for severe painMax Daily Amount: 4 tablets      Facility-Administered Medications: None       Past Medical History:   Diagnosis Date    Anemia     Anxiety     Bursitis of left elbow     Chronic pain disorder     lower back    Hiatal hernia     Iron deficiency anemia secondary to inadequate dietary iron intake 2020    Kidney stones     Pancreatitis     Psoriasis     Psychiatric disorder     anxiety    Restless leg syndrome     Tobacco use disorder        Past Surgical History:   Procedure Laterality Date    CYSTOSCOPY      CYSTOSCOPY W/ URETERAL STENT PLACEMENT      NO PAST SURGERIES      MT ERCP DX COLLECTION SPECIMEN BRUSHING/WASHING N/A 8/15/2018    Procedure: ENDOSCOPIC RETROGRADE CHOLANGIOPANCREATOGRAPHY (ERCP);   Surgeon: Mannie Victor MD;  Location:  MAIN OR;  Service: Gastroenterology    MT LAP,DIAGNOSTIC ABDOMEN N/A 2/10/2020    Procedure: LAPAROSCOPY DIAGNOSTIC, REPAIR ENTEROTOMY, REMOVAL OF BEZOAR;  Surgeon: Mariaelena Ragsdale MD;  Location:  MAIN OR;  Service: General       Family History   Problem Relation Age of Onset    Mental illness Sister     Other Other         Cardiovascular disease    Hypertension Mother     Cancer Father     Substance Abuse Neg Hx      I have reviewed and agree with the history as documented  Social History     Tobacco Use    Smoking status: Former Smoker     Types: Cigarettes    Smokeless tobacco: Never Used    Tobacco comment: Attempting to stop   Substance Use Topics    Alcohol use: Never     Frequency: Never     Drinks per session: Patient refused     Binge frequency: Never    Drug use: No       Review of Systems   Constitutional: Negative for appetite change, chills and fever  HENT: Negative for congestion, rhinorrhea and sore throat  Respiratory: Negative for cough, chest tightness and shortness of breath  Cardiovascular: Negative for chest pain, palpitations and leg swelling  Gastrointestinal: Negative for abdominal pain, constipation, diarrhea, nausea and vomiting  Genitourinary: Negative for dysuria, frequency and hematuria  Musculoskeletal: Negative for myalgias, neck pain and neck stiffness  Skin: Positive for rash (Redness around incision)  Negative for pallor  Neurological: Negative for syncope, weakness and headaches  All other systems reviewed and are negative  Physical Exam  Physical Exam   Constitutional: She is oriented to person, place, and time  She appears well-developed and well-nourished  Non-toxic appearance  No distress  HENT:   Head: Normocephalic and atraumatic  Eyes: Pupils are equal, round, and reactive to light  Conjunctivae and EOM are normal    Neck: Normal range of motion  Neck supple  No tracheal deviation present  No thyromegaly present  Cardiovascular: Normal rate, regular rhythm, normal heart sounds and intact distal pulses  Pulmonary/Chest: Effort normal and breath sounds normal    Abdominal: Soft  Bowel sounds are normal  She exhibits distension  There is tenderness in the periumbilical area  There is no rigidity, no rebound and no guarding  Lymphadenopathy:     She has no cervical adenopathy  Neurological: She is alert and oriented to person, place, and time  Skin: Skin is warm and dry  She is not diaphoretic  Erythema and induration extending approximately 2 cm from central trocar incision site  Serous drainage from the site  Surrounding area tender to palpation  See attached image  Nursing note and vitals reviewed  Vital Signs  ED Triage Vitals   Temperature Pulse Respirations Blood Pressure SpO2   02/20/20 0211 02/20/20 0211 02/20/20 0211 02/20/20 0211 02/20/20 0211   97 5 °F (36 4 °C) 78 18 131/82 99 %      Temp Source Heart Rate Source Patient Position - Orthostatic VS BP Location FiO2 (%)   02/20/20 0207 02/20/20 0211 02/20/20 0211 02/20/20 0211 --   Tympanic Monitor Lying Right arm       Pain Score       02/20/20 0211       No Pain           Vitals:    02/20/20 0400 02/20/20 0602 02/20/20 0611 02/20/20 0815   BP: 127/62 121/66 118/68 123/65   Pulse: 76 76 74 78   Patient Position - Orthostatic VS: Lying Lying           Visual Acuity      ED Medications  Medications   iohexol (OMNIPAQUE) 350 MG/ML injection (SINGLE-DOSE) 100 mL (100 mL Intravenous Given 2/20/20 0355)   ampicillin-sulbactam (UNASYN) 3 g in sodium chloride 0 9 % 100 mL IVPB (0 g Intravenous Stopped 2/20/20 0658)       Diagnostic Studies  Results Reviewed     Procedure Component Value Units Date/Time    Blood culture #1 [[id number]] Collected:  02/20/20 0232    Lab Status:  Preliminary result Specimen:  Blood from Hand, Left Updated:  02/20/20 1301     Blood Culture Received in Microbiology Lab  Culture in Progress      Blood culture #2 [[id number]] Collected:  02/20/20 0232    Lab Status:  Preliminary result Specimen:  Blood from Arm, Right Updated:  02/20/20 1301     Blood Culture Received in Microbiology Lab  Culture in Progress  Lactic acid, plasma [[id number]]  (Normal) Collected:  02/20/20 0232    Lab Status:  Final result Specimen:  Blood from Hand, Left Updated:  02/20/20 0330     LACTIC ACID 0 5 mmol/L     Narrative:       Result may be elevated if tourniquet was used during collection      Comprehensive metabolic panel [[id number]]  (Abnormal) Collected:  02/20/20 0232    Lab Status:  Final result Specimen:  Blood from Hand, Left Updated:  02/20/20 0327     Sodium 139 mmol/L      Potassium 3 8 mmol/L      Chloride 105 mmol/L      CO2 26 mmol/L      ANION GAP 8 mmol/L      BUN 9 mg/dL      Creatinine 0 74 mg/dL      Glucose 88 mg/dL      Calcium 8 6 mg/dL      AST 32 U/L      ALT 30 U/L      Alkaline Phosphatase 142 U/L      Total Protein 7 1 g/dL      Albumin 2 8 g/dL      Total Bilirubin 0 20 mg/dL      eGFR 90 ml/min/1 73sq m     Narrative:       [place] guidelines for Chronic Kidney Disease (CKD):     Stage 1 with normal or high GFR (GFR > 90 mL/min/1 73 square meters)    Stage 2 Mild CKD (GFR = 60-89 mL/min/1 73 square meters)    Stage 3A Moderate CKD (GFR = 45-59 mL/min/1 73 square meters)    Stage 3B Moderate CKD (GFR = 30-44 mL/min/1 73 square meters)    Stage 4 Severe CKD (GFR = 15-29 mL/min/1 73 square meters)    Stage 5 End Stage CKD (GFR <15 mL/min/1 73 square meters)  Note: GFR calculation is accurate only with a steady state creatinine    CBC and differential [[id number]]  (Abnormal) Collected:  02/20/20 0232    Lab Status:  Final result Specimen:  Blood from Hand, Left Updated:  02/20/20 0242     WBC 8 73 Thousand/uL      RBC 3 72 Million/uL      Hemoglobin 10 9 g/dL      Hematocrit 36 0 %      MCV 97 fL      MCH 29 3 pg      MCHC 30 3 g/dL      RDW 14 9 %      MPV 10 1 fL      Platelets 426 Thousands/uL      Neutrophils Relative 77 %      Immat GRANS % 0 %      Lymphocytes Relative 17 % Monocytes Relative 5 %      Eosinophils Relative 1 %      Basophils Relative 0 %      Neutrophils Absolute 6 64 Thousands/µL      Immature Grans Absolute 0 03 Thousand/uL      Lymphocytes Absolute 1 52 Thousands/µL      Monocytes Absolute 0 42 Thousand/µL      Eosinophils Absolute 0 10 Thousand/µL      Basophils Absolute 0 02 Thousands/µL                  CT abdomen pelvis with contrast   Final Result by Raphael Tuttle DO (02/20 3865)   Body wall edema, most prominent in the anterior lower abdomen/pelvis, superimposed cellulitis could be considered in the appropriate clinical setting  Subcutaneous fluid collection in the anterior supraumbilical region soft tissues measures approximately 2 4 x 2 1 x 4 2 cm in size could represent seroma or abscess  Mild to moderate wall thickening involving multiple loops of small bowel in the left upper quadrant, mid abdomen, and lower abdomen, consider infectious or inflammatory enteritis in the appropriate clinical setting  Mildly prominent loops of small bowel    in the left upper quadrant and midabdomen could represent a component of small bowel ileus  Developing small bowel obstruction is considered but no discrete transition point is seen  Correlation with the patient's symptoms recommended  Mild prominence of the biliary tree redemonstrated, nonspecific  A component of biliary obstruction is considered  Correlation with the patient's symptoms and laboratory values recommended  Bilateral nephrolithiasis; no hydronephrosis  4 mm nodule right middle lobe (axial image 5, series 2)  Based on current Fleischner Society 2017 Guidelines on incidental pulmonary nodule, 12 month follow-up non-contrast chest CT is recommended  Other findings as above           Workstation performed: [id number]         XR chest 2 views   ED Interpretation by Huang Tim MD (02/20 1871)   No acute pulmonary pathology      Final Result by Zurdo Nixon MD (02/20 6172)      No acute cardiopulmonary disease  Workstation performed: [id number]                    Procedures  Procedures         ED Course                               MDM  Number of Diagnoses or Management Options  [facility]): new and requires workup  Incisional infection: new and requires workup  Diagnosis management comments: 17-year-old female presents for evaluation of drainage and redness around central incision site after her surgery on 02/10/2020 with Dr Shanda Hurtado  Patient started on Keflex by primary care doctor prior to arrival   She has an appointment with Dr Shanda Hurtado in the morning for evaluation of this complaint  Given abdominal distension and tenderness around the site, states he was obtained to evaluate for extension of infection  CT significant for subcutaneous collection at the incision site as well as ileus  Discussed case with Dr Kya Sorenson of general surgery who recommended admission for observation under Dr Shanda Hurtado and starting Unasyn  Amount and/or Complexity of Data Reviewed  Clinical lab tests: ordered and reviewed  Tests in the radiology section of CPT®: ordered and reviewed    Patient Progress  Patient progress: stable        Disposition  Final diagnoses:   Incisional infection   Ileus ([address] )     Time reflects when diagnosis was documented in both MDM as applicable and the Disposition within this note     Time User Action Codes Description Comment    2/20/2020  5:48 AM Lawson PRUITT Add [T81 [id number]] Incisional infection     2/20/2020  5:49 AM Sharmaine Pedroza Add [K56 7] [facility])       ED Disposition     ED Disposition Condition Date/Time Comment    Admit Stable Thu Feb 20, 2020  5:50 AM Case was discussed with Dr Kya Sorenson of General Surgery and the patient's admission status was agreed to be Admission Status: observation status to the service of Dr Shanda Hurtado           Follow-up Information     Follow up With Specialties Details Why [facility] Joselito Wiggins MD General Surgery Follow up as scheduled this am [address] [address]  [phone]            Discharge Medication List as of 2/20/2020  8:33 AM      CONTINUE these medications which have NOT CHANGED    Details   amLODIPine (NORVASC) 10 mg tablet Take 1 tablet (10 mg total) by mouth daily Decreased dose, Starting Thu 11/14/2019, Normal      cephalexin (KEFLEX) 500 mg capsule Take 1 capsule (500 mg total) by mouth 4 (four) times a day for 7 days, Starting Wed 2/19/2020, Until Wed 2/26/2020, Print      Cholecalciferol (VITAMIN D3) 5000 units CAPS Take by mouth daily, Starting Thu 9/7/2017, Historical Med      Cranberry 200 MG CAPS Take 1 capsule by mouth daily, Starting Mon 5/2/2016, Historical Med      fluocinonide (LIDEX) 0 05 % ointment Apply topically 2 (two) times a day, Starting Thu 11/14/2019, Normal      fluticasone (FLONASE) 50 mcg/act nasal spray 2 sprays into each nostril daily, Starting Thu 11/14/2019, Normal      fluticasone-salmeterol (ADVAIR DISKUS) 250-50 mcg/dose inhaler Inhale 1 puff 2 (two) times a day Rinse mouth after use, Starting Tue 2/4/2020, Until Thu 3/5/2020, Normal      gabapentin (NEURONTIN) 300 mg capsule take 1 capsule by mouth daily in AM 1 at noon  and 2 capsules by mouth every PM, Normal      meloxicam (MOBIC) 15 mg tablet Take 1 tablet (15 mg total) by mouth daily, Starting Thu 11/14/2019, Normal      Multiple Vitamin tablet Take 1 tablet by mouth daily, Starting Mon 5/2/2016, Historical Med      nicotine (NICODERM CQ) 21 mg/24 hr TD 24 hr patch Place 1 patch on the skin every 24 hours, Starting Fri 1/31/2020, Normal      omeprazole (PriLOSEC) 40 MG capsule take 1 capsule by mouth daily, Normal      oxyCODONE-acetaminophen (PERCOCET) 5-325 mg per tablet Take 1 tablet by mouth every 6 (six) hours as needed for severe painMax Daily Amount: 4 tablets, Starting Wed 2/19/2020, Normal           Outpatient Discharge Orders   Discharge Diet Activity as tolerated     Lifting restrictions     No strenuous exercise     Call provider for:  persistent nausea or vomiting     Call provider for:  redness, tenderness, or signs of infection (pain, swelling, redness, odor or green/yellow discharge around incision site)     No dressing needed       PDMP Review       Value Time User    PDMP Reviewed  Yes 2/19/2020  8:42 AM Donnell Burrell DO          ED Provider  Electronically Signed by           Michael Hernandes MD  02/21/20 4685

## 2020-02-20 NOTE — UTILIZATION REVIEW
Initial Clinical Review    Admission: Date/Time/Statement: Admission Orders (From admission, onward)     Ordered        02/20/20 0550  Place in Observation (expected length of stay for this patient is less than two midnights)  Once                   Orders Placed This Encounter   Procedures    Place in Observation (expected length of stay for this patient is less than two midnights)     Standing Status:   Standing     Number of Occurrences:   1     Order Specific Question:   Admitting Physician     Answer:   Nicci Mock     Order Specific Question:   Level of Care     Answer:   Med Surg [16]     ED Arrival Information     Expected Arrival Acuity Means of Arrival Escorted By Service Admission Type    - 2/20/2020 01:57 Urgent Walk-In [facility] Surgery-General Urgent    Arrival Complaint    Discharge from incision        Chief Complaint   Patient presents with    Drainage from Incision     pt had surgery last week and went to bed tonight, woke up and felt drainage from surgical site  Site appears red and oozing at this time     Assessment/Plan:  this is a 64year old female form home to ED admitted to observation due to concern for post operative infection  Patient is post diagnostic laparoscopy, repair enterotomy, removal of bezoar on 2/10/2020  Presented due to redness around the incision site  On exam has erythema around periumbilical incision  Wbc 8 73  CT abdomen showed body wall edema, sq fluid collection  Wound culture done  Antibiotics given  Seen by surgery and suspicion for seroma vs infection  To hold antibiotics, wound management with packing       ED Triage Vitals   Temperature Pulse Respirations Blood Pressure SpO2   02/20/20 0211 02/20/20 0211 02/20/20 0211 02/20/20 0211 02/20/20 0211   97 5 °F (36 4 °C) 78 18 131/82 99 %      Temp Source Heart Rate Source Patient Position - Orthostatic VS BP Location FiO2 (%)   02/20/20 0207 02/20/20 0211 02/20/20 0211 02/20/20 0211 -- Tympanic Monitor Lying Right arm       Pain Score       02/20/20 0211       No Pain        Wt Readings from Last 1 Encounters:   02/20/20 57 4 kg (126 lb 9 6 oz)     Additional Vital Signs:   02/20/20 0611  --  74  18  118/68  88  96 %  None (Room air)  --   02/20/20 0602  --  76  18  121/66  --  97 %  None (Room air)  Lying   02/20/20 0400  --  76  18  127/62  89  96 %  None (Room air)  Lying   02/20/20 0355  --  75  18  127/62  --  100 %  None (Room air)         Pertinent Labs/Diagnostic Test Results:   2/20/2020 CT abdomen Body wall edema, most prominent in the anterior lower abdomen/pelvis, superimposed cellulitis could be considered in the appropriate clinical setting  Subcutaneous fluid collection in the anterior supraumbilical region soft tissues measures approximately 2 4 x 2 1 x 4 2 cm in size could represent seroma or abscess  Mild to moderate wall thickening involving multiple loops of small bowel in the left upper quadrant, mid abdomen, and lower abdomen, consider infectious or inflammatory enteritis in the appropriate clinical setting   Mildly prominent loops of small bowel in the left upper quadrant and midabdomen could represent a component of small bowel ileus   Developing small bowel obstruction is considered but no discrete transition point is seen   Correlation with the patient's symptoms recommended  Mild prominence of the biliary tree redemonstrated, nonspecific   A component of biliary obstruction is considered   Correlation with the patient's symptoms and laboratory values recommended  Bilateral nephrolithiasis; no hydronephrosis    4 mm nodule right middle lobe     Results from last 7 days   Lab Units 02/20/20  0232 02/14/20  0541 02/13/20  1147   WBC Thousand/uL 8 73 4 40  --    HEMOGLOBIN g/dL 10 9* 8 9* 9 2*   HEMATOCRIT % 36 0 27 9*  --    PLATELETS Thousands/uL 370 192  --    NEUTROS ABS Thousands/µL 6 64 2 81  --          Results from last 7 days   Lab Units 02/20/20 0232 02/14/20  0541   SODIUM mmol/L 139 140   POTASSIUM mmol/L 3 8 4 1   CHLORIDE mmol/L 105 106   CO2 mmol/L 26 26   ANION GAP mmol/L 8 8   BUN mg/dL 9 6   CREATININE mg/dL 0 74 0 58*   EGFR ml/min/1 73sq m 90 103   CALCIUM mg/dL 8 6 8 9     Results from last 7 days   Lab Units 02/20/20  0232   AST U/L 32   ALT U/L 30   ALK PHOS U/L 142*   TOTAL PROTEIN g/dL 7 1   ALBUMIN g/dL 2 8*   TOTAL BILIRUBIN mg/dL 0 20         Results from last 7 days   Lab Units 02/20/20  0232 02/14/20  0541   GLUCOSE RANDOM mg/dL 88 96       Results from last 7 days   Lab Units 02/20/20  0232   LACTIC ACID mmol/L 0 5     ED Treatment:   Medication Administration from 02/20/2020 0157 to 02/20/2020 9902       Date/Time Order Dose Route Action Comments     02/20/2020 0628 ampicillin-sulbactam (UNASYN) 3 g in sodium chloride 0 9 % 100 mL IVPB 3 g Intravenous New Bag         Past Medical History:   Diagnosis Date    Anemia     Anxiety     Bursitis of left elbow     Chronic pain disorder     lower back    Hiatal hernia     Iron deficiency anemia secondary to inadequate dietary iron intake 1/29/2020    Kidney stones     Pancreatitis     Psoriasis     Psychiatric disorder     anxiety    Restless leg syndrome     Tobacco use disorder      Present on Admission:  **None**      Admitting Diagnosis: Problem involving surgical incision [T81 89XA]  Age/Sex: 64 y o  female  Admission Orders: 2/20/2020 0550 observation   Scheduled Medications: none    Network Utilization Review Department  [email or web address]  ATTENTION: Please call with any questions or concerns to [phone] and carefully listen to the prompts so that you are directed to the right person  All voicemails are confidential   Marya Tavarez all requests for admission clinical reviews, approved or denied determinations and any other requests to dedicated fax number below belonging to the campus where the patient is receiving treatment   List of dedicated fax numbers for the Facilities:  FACILITY NAME UR FAX NUMBER   ADMISSION DENIALS (Administrative/Medical Necessity) [address] [facility] (Maternity/NICU/Pediatrics) [phone]   [facility] [phone]   [facility] [phone]   Arron Candelaria [phone]   [facility] [phone]   [address] [place] [address] [phone]   [facility]  [phone]   [address] [place] [address], [address]  [address] [facility] [address] [facility] [phone]

## 2020-02-20 NOTE — PROGRESS NOTES
Assessment/Plan: Paulina Hamlin is a 64year old female who presents today in post operative state status post diagnostic laparoscopy, repair enterotomy, removal of bezoar performed on 2/10/20  I personally reviewed the CT scan that was done today  Physical exam revealed the erythema is improving, this appears to be seroma and not an infection  The wound was packed with ¼ pain gauze in the office today  At this point I recommend that she stops taking the antibiotics as there is no signs of active infection  Culture was obtain to prove that this is not an infection  Explained to the patient that she should continue to pack the wound daily  Her  was in the room with her and I personally educated him on how to properly  pack the wound  I will see the patient back on Monday for a wound check  Post surgical restrictions: Lifting restrictions of no greater than 15 pounds and no strenuous activity for 2 weeks after surgery  No heavy lifting of greater than 25 pounds for weeks 3 and 4  She should avoid core exercises and heavy weight lifting for 4-6 more weeks  No problem-specific Assessment & Plan notes found for this encounter  There are no diagnoses linked to this encounter  Subjective:      Patient ID: Paulina Hamlin is a 64 y o  female  Paulina Hamlin is a 64year old female who presents today in post operative state status post diagnostic laparoscopy, repair enterotomy, removal of bezoar performed on 2/10/20  Helder Luciano Patient states that the redness is improving  She still complains of abdominal discomfort but overall she is feeling better  She is with her  in the room  Patient went to see her PCP yeserday and was noted she was having redness around the incision site  She was in the ED today because she was worried that this was infectiion         The following portions of the patient's history were reviewed and updated as appropriate: allergies, current medications, past family history, past medical history, past social history, past surgical history and problem list     Review of Systems   Skin: Positive for color change (Redrness, periumbilical incision )  Objective:      /79   Pulse 78   Temp 99 4 °F (37 4 °C)   Ht 5' (1 524 m)   Wt 57 4 kg (126 lb 9 6 oz)   LMP  (LMP Unknown)   BMI 24 72 kg/m²          Physical Exam   Skin:   Erythema around the periumbilical incision  No signs of infection          By signing my name below, Helene Ragsdale, attest that this documentation has been prepared under the direction and in the presence of Emily Bailey MD  Electronically Signed: Danial Rodriguez  2/20/20  Ramos Will, personally performed the services described in this documentation  All medical record entries made by the scribe were at my direction and in my presence  I have reviewed the chart and discharge instructions and agree that the record reflects my personal performance and is accurate and complete   Emily Bailey MD  2/20/20

## 2020-02-21 ENCOUNTER — HOSPITAL ENCOUNTER (OUTPATIENT)
Dept: INFUSION CENTER | Facility: HOSPITAL | Age: 60
End: 2020-02-21
Attending: INTERNAL MEDICINE

## 2020-02-21 NOTE — OP NOTE
LAPAROSCOPY DIAGNOSTIC, REPAIR ENTEROTOMY, REMOVAL OF BEZOAR  Postoperative Note  PATIENT NAME: Kyrie Villalobos  : 1960  MRN: [id number]  [facility] OR ROOM 03    Surgery Date: 2/10/2020    Bowel obstruction [facility]) [K56 609]    Operative Indications:  SBO    Consent:  The risks, benefits, and alternatives to the surgery were discussed with the patient and with the family prior to surgery, personally by Dr Nora Fishman  Common complications particular for this procedure as well as unusual complications were discussed, including but not limited to:  bleeding, wound infection, prolonged wound healing, open wounds, reoperation, leak from the bowel or viscus, leak from the bile duct or injury to adjacent or other organs or blood vessels in the abdomen  If the surgery was laparoscopic, it was discussed that possible open surgery could also occur during that same surgery and is always an option in laparoscopic surgery  A  was used if necessary  The patient expressed understanding of the issues discussed and wished and consented to the procedure to proceed  All questions were answered  Dr Nora Fishman personally discussed the informed consent with this patient  Operative Findings:  Preoop dx: SBP  Post-Op Diagnosis Codes: * Bowel obstruction ([facility] [address] ) [K56 609]  Bezoar  Procedure(s):  LAPAROSCOPY DIAGNOSTIC, REPAIR ENTEROTOMY, REMOVAL OF BEZOAR    Surgeon(s) and Role:     * Brea Berger MD - Primary     * Heri Pena PA-C - Assisting    The Physician Assistant was medically necessary for surgical safety the case including suturing, retraction, and hemostasis  No qualified resident was available  I was present for the entire procedure       Drains:  [REMOVED] NG/OG/Enteral Tube Nasogastric 14 Fr Left mouth (Removed)   Placement Reverification Auscultation 2020  1:09 AM   Site Assessment Clean;Dry 2020  1:09 AM   Status Clamped 2020  1:09 AM   Drainage Appearance Green 2020 10:52 PM   Output (mL) 150 mL 2020 11:45 AM   Number of days: 3       Specimens:  * No specimens in log *    Estimated Blood Loss:   Minimal    Anesthesia Type:   General     Procedure: The patient was seen in the Holding Room  The risks, benefits, complications, treatment options, and expected outcomes were discussed with the patient  The possibilities of reaction to medication, pulmonary aspiration, perforation of viscus, bleeding, recurrent infection, the need for additional procedures, failure to diagnose a condition, and creating a complication requiring transfusion or operation were discussed with the patient  The patient concurred with the proposed plan, giving informed consent  The site of surgery properly noted/marked  The patient was taken to Operating Room, identified as Lakeshia Nair  Staff verified patient name, , and procedure  A Time Out was held and the above information confirmed  The patient was placed supine after induction of a general anesthetic, the abdomen was prepped and draped in the standard fashion  A 5mm trocar was placed into the abdomen using Optiview technique  Additional trocars were placed under direct visualization after insufflation  The abdomen was inspected and there were no adhesions the bowel was run and transition point was noted in the mid small bowel from what appeared to be a bezoar with complete obstruction      A vertical midline incision was created  The abdominal cavity was entered  A wound protector was placed  The bowel was inspected and bezoar was noted  An enterotomy was made with cautery in longitudinal fashion  The bezoar was removed by milking the proximal small bowel until it was completely evacuated  It appeared to be undigested impacted fibrous material  The enterotomy was then closed horizontally with a running 3-0 PDS and 3-0 Silk Lemberts  The abdominal cavity was irrigated  Hemostasis was confirmed   The bowel was returned to its anatomic location  The omentum was placed over the abdominal contents  The midline incision was closed with a running #1 PDS suture, using the 4-1 technique  All skin incisions were closed with 4-0 Monocryl in a subcuticular fashion and surgical glue was applied after the soft tissue was irrigated  Instrument, sponge, and needle counts were correct prior to abdominal closure and at the conclusion of the case  Some portions of this records may have been generated with voice recognition software  There may be translation, syntax,  or grammatical errors  Occasional wrong word or "sound-a-like" substitutions may have occurred due to the inherent limitations of the voice recognition software  Read the chart carefully and recognize, using context, where substations may have occurred  If you have any questions, please contact the dictating provider for clarification or correction, as needed       Complications: None    Condition: Stable to PACU    SIGNATURE: Nafisa Huitron MD   DATE: February 20, 2020   TIME: 10:57 PM

## 2020-02-24 ENCOUNTER — OFFICE VISIT (OUTPATIENT)
Dept: SURGERY | Facility: HOSPITAL | Age: 60
End: 2020-02-24

## 2020-02-24 VITALS — WEIGHT: 128.6 LBS | HEIGHT: 60 IN | TEMPERATURE: 98.6 F | BODY MASS INDEX: 25.25 KG/M2

## 2020-02-24 DIAGNOSIS — Z09 POSTOP CHECK: Primary | ICD-10-CM

## 2020-02-24 PROCEDURE — 3074F SYST BP LT 130 MM HG: CPT | Performed by: SURGERY

## 2020-02-24 PROCEDURE — 3078F DIAST BP <80 MM HG: CPT | Performed by: SURGERY

## 2020-02-24 PROCEDURE — 99024 POSTOP FOLLOW-UP VISIT: CPT | Performed by: SURGERY

## 2020-02-24 PROCEDURE — 1111F DSCHRG MED/CURRENT MED MERGE: CPT | Performed by: SURGERY

## 2020-02-24 NOTE — PROGRESS NOTES
Assessment/Plan: Jonathon Cuadra is a 64year old female who presents today for a wound check  Patient underwent diagnostic laparoscopy, repair enterotomy, removal of bezoar on 2/10/20  Greta Lopez was last seen in the office on 2/20  Culture was negative for infection  Advised her to take stool softener if needed  Patient should continue to pack the wound daily  Will see her in two weeks  She knows to contact the office if any questions or concerns arise  No problem-specific Assessment & Plan notes found for this encounter  There are no diagnoses linked to this encounter  Subjective:      Patient ID: Jonathon Cuadra is a 64 y o  female  Jonathon Cuadra is a 64year old female who presents today for a wound check  Patient underwent diagnostic laparoscopy, repair enterotomy, removal of bezoar on 2/10/20  Greta Lopez was last seen in the office on 2/20  Patient reports that she is doing much better since her last visit  She was in [place] a few days ago and was waking a lot, she felt some discomfort at the end of the day  Patient reports that she has small bowel movements  Her last normal bowel movement was two days ago  The following portions of the patient's history were reviewed and updated as appropriate: allergies, current medications, past family history, past medical history, past social history, past surgical history and problem list     Review of Systems   Skin:        Redness at the umbilicus          Objective:      LMP  (LMP Unknown)          Physical Exam   Skin:   The wound continues to heal well   There is some drainage at the umbilical incision          By signing my name below, I, Sade Boyd, attest that this documentation has been prepared under the direction and in the presence of Nolan Arreola MD  Electronically Signed: Danial Kennedy  2/24/20  Danis Crawford personally performed the services described in this documentation   All medical record entries made by the scribe were at my direction and in my presence  I have reviewed the chart and discharge instructions and agree that the record reflects my personal performance and is accurate and complete   Mariaelena Ragsdale MD  2/24/20

## 2020-02-24 NOTE — LETTER
February 24, 2020     Patient: Grady Daley   YOB: 1960   Date of Visit: 2/24/2020       To Whom it May Concern:    Grady Daley is under my professional care  She was seen in my office on 2/24/2020  She may return to work on 3/10/20 with no lifting restrictions  If you have any questions or concerns, please don't hesitate to call           Sincerely,          Jose M Khan MD        CC: No Recipients

## 2020-02-25 ENCOUNTER — HOSPITAL ENCOUNTER (OUTPATIENT)
Dept: INFUSION CENTER | Facility: HOSPITAL | Age: 60
Discharge: HOME | End: 2020-02-25
Attending: INTERNAL MEDICINE
Payer: COMMERCIAL

## 2020-02-25 VITALS
DIASTOLIC BLOOD PRESSURE: 78 MMHG | TEMPERATURE: 98.3 F | OXYGEN SATURATION: 97 % | SYSTOLIC BLOOD PRESSURE: 122 MMHG | RESPIRATION RATE: 18 BRPM | HEART RATE: 80 BPM

## 2020-02-25 DIAGNOSIS — D50.8 IRON DEFICIENCY ANEMIA SECONDARY TO INADEQUATE DIETARY IRON INTAKE: Primary | ICD-10-CM

## 2020-02-25 LAB
BACTERIA BLD CULT: NORMAL
BACTERIA BLD CULT: NORMAL

## 2020-02-25 PROCEDURE — 96365 THER/PROPH/DIAG IV INF INIT: CPT

## 2020-02-25 RX ORDER — SODIUM CHLORIDE 9 MG/ML
20 INJECTION, SOLUTION INTRAVENOUS ONCE
Status: CANCELLED | OUTPATIENT
Start: 2020-02-25

## 2020-02-25 RX ORDER — SODIUM CHLORIDE 9 MG/ML
20 INJECTION, SOLUTION INTRAVENOUS ONCE
Status: COMPLETED | OUTPATIENT
Start: 2020-02-25 | End: 2020-02-25

## 2020-02-25 RX ADMIN — SODIUM CHLORIDE 20 ML/HR: 9 INJECTION, SOLUTION INTRAVENOUS at 10:09

## 2020-02-25 RX ADMIN — IRON SUCROSE 200 MG: 20 INJECTION, SOLUTION INTRAVENOUS at 10:11

## 2020-02-26 DIAGNOSIS — I10 BENIGN ESSENTIAL HTN: Primary | ICD-10-CM

## 2020-02-26 DIAGNOSIS — Z72.0 TOBACCO ABUSE: ICD-10-CM

## 2020-02-26 RX ORDER — METHOCARBAMOL 500 MG/1
TABLET, FILM COATED ORAL
Qty: 60 TABLET | Refills: 0 | Status: SHIPPED | OUTPATIENT
Start: 2020-02-26 | End: 2020-03-24

## 2020-02-26 RX ORDER — NICOTINE 21 MG/24H
PATCH, EXTENDED RELEASE TRANSDERMAL
Qty: 28 PATCH | Refills: 0 | Status: SHIPPED | OUTPATIENT
Start: 2020-02-26 | End: 2020-03-23

## 2020-02-27 ENCOUNTER — HOSPITAL ENCOUNTER (OUTPATIENT)
Dept: INFUSION CENTER | Facility: HOSPITAL | Age: 60
Discharge: HOME | End: 2020-02-27
Attending: INTERNAL MEDICINE
Payer: COMMERCIAL

## 2020-02-27 VITALS
HEART RATE: 88 BPM | TEMPERATURE: 98.7 F | RESPIRATION RATE: 18 BRPM | DIASTOLIC BLOOD PRESSURE: 73 MMHG | SYSTOLIC BLOOD PRESSURE: 132 MMHG | OXYGEN SATURATION: 98 %

## 2020-02-27 DIAGNOSIS — D50.8 IRON DEFICIENCY ANEMIA SECONDARY TO INADEQUATE DIETARY IRON INTAKE: Primary | ICD-10-CM

## 2020-02-27 PROCEDURE — 96365 THER/PROPH/DIAG IV INF INIT: CPT

## 2020-02-27 RX ORDER — SODIUM CHLORIDE 9 MG/ML
20 INJECTION, SOLUTION INTRAVENOUS ONCE
Status: COMPLETED | OUTPATIENT
Start: 2020-02-27 | End: 2020-02-27

## 2020-02-27 RX ORDER — SODIUM CHLORIDE 9 MG/ML
20 INJECTION, SOLUTION INTRAVENOUS ONCE
Status: CANCELLED | OUTPATIENT
Start: 2020-03-03

## 2020-02-27 RX ADMIN — IRON SUCROSE 200 MG: 20 INJECTION, SOLUTION INTRAVENOUS at 08:59

## 2020-02-27 RX ADMIN — SODIUM CHLORIDE 20 ML/HR: 9 INJECTION, SOLUTION INTRAVENOUS at 08:59

## 2020-03-03 ENCOUNTER — HOSPITAL ENCOUNTER (OUTPATIENT)
Dept: INFUSION CENTER | Facility: HOSPITAL | Age: 60
Discharge: HOME | End: 2020-03-03
Attending: INTERNAL MEDICINE
Payer: COMMERCIAL

## 2020-03-03 VITALS
RESPIRATION RATE: 18 BRPM | DIASTOLIC BLOOD PRESSURE: 74 MMHG | HEART RATE: 82 BPM | OXYGEN SATURATION: 98 % | TEMPERATURE: 97.8 F | SYSTOLIC BLOOD PRESSURE: 114 MMHG

## 2020-03-03 DIAGNOSIS — D50.8 IRON DEFICIENCY ANEMIA SECONDARY TO INADEQUATE DIETARY IRON INTAKE: Primary | ICD-10-CM

## 2020-03-03 PROCEDURE — 96365 THER/PROPH/DIAG IV INF INIT: CPT

## 2020-03-03 RX ORDER — SODIUM CHLORIDE 9 MG/ML
20 INJECTION, SOLUTION INTRAVENOUS ONCE
Status: COMPLETED | OUTPATIENT
Start: 2020-03-03 | End: 2020-03-03

## 2020-03-03 RX ORDER — SODIUM CHLORIDE 9 MG/ML
20 INJECTION, SOLUTION INTRAVENOUS ONCE
Status: CANCELLED | OUTPATIENT
Start: 2020-03-05

## 2020-03-03 RX ADMIN — SODIUM CHLORIDE 20 ML/HR: 9 INJECTION, SOLUTION INTRAVENOUS at 09:25

## 2020-03-03 RX ADMIN — IRON SUCROSE 200 MG: 20 INJECTION, SOLUTION INTRAVENOUS at 09:23

## 2020-03-05 ENCOUNTER — HOSPITAL ENCOUNTER (OUTPATIENT)
Dept: INFUSION CENTER | Facility: HOSPITAL | Age: 60
Discharge: HOME | End: 2020-03-05
Attending: INTERNAL MEDICINE
Payer: COMMERCIAL

## 2020-03-05 VITALS
RESPIRATION RATE: 18 BRPM | OXYGEN SATURATION: 98 % | TEMPERATURE: 97.7 F | SYSTOLIC BLOOD PRESSURE: 121 MMHG | HEART RATE: 77 BPM | DIASTOLIC BLOOD PRESSURE: 72 MMHG

## 2020-03-05 DIAGNOSIS — D50.8 IRON DEFICIENCY ANEMIA SECONDARY TO INADEQUATE DIETARY IRON INTAKE: Primary | ICD-10-CM

## 2020-03-05 PROCEDURE — 96365 THER/PROPH/DIAG IV INF INIT: CPT

## 2020-03-05 RX ORDER — SODIUM CHLORIDE 9 MG/ML
20 INJECTION, SOLUTION INTRAVENOUS ONCE
Status: CANCELLED | OUTPATIENT
Start: 2020-03-10

## 2020-03-05 RX ORDER — SODIUM CHLORIDE 9 MG/ML
20 INJECTION, SOLUTION INTRAVENOUS ONCE
Status: COMPLETED | OUTPATIENT
Start: 2020-03-05 | End: 2020-03-05

## 2020-03-05 RX ADMIN — IRON SUCROSE 200 MG: 20 INJECTION, SOLUTION INTRAVENOUS at 11:06

## 2020-03-05 RX ADMIN — SODIUM CHLORIDE 20 ML/HR: 9 INJECTION, SOLUTION INTRAVENOUS at 11:06

## 2020-03-05 NOTE — PLAN OF CARE
Problem: Knowledge Deficit  Goal: Patient/family/caregiver demonstrates understanding of disease process, treatment plan, medications, and discharge instructions  Description  Complete learning assessment and assess knowledge base  Interventions:  - Provide teaching at level of understanding  - Provide teaching via preferred learning methods  Outcome: Progressing     Problem: Potential for Falls  Goal: Patient will remain free of falls  Description  INTERVENTIONS:  - Assess patient frequently for physical needs  -  Identify cognitive and physical deficits and behaviors that affect risk of falls    -  [place] fall precautions as indicated by assessment   - Educate patient/family on patient safety including physical limitations  - Instruct patient to call for assistance with activity based on assessment  - Modify environment to reduce risk of injury  - Consider OT/PT consult to assist with strengthening/mobility  Outcome: Progressing

## 2020-03-09 ENCOUNTER — OFFICE VISIT (OUTPATIENT)
Dept: SURGERY | Facility: HOSPITAL | Age: 60
End: 2020-03-09

## 2020-03-09 ENCOUNTER — HOSPITAL ENCOUNTER (OUTPATIENT)
Dept: BONE DENSITY | Facility: IMAGING CENTER | Age: 60
Discharge: HOME | End: 2020-03-09
Payer: COMMERCIAL

## 2020-03-09 VITALS — BODY MASS INDEX: 24.92 KG/M2 | HEIGHT: 61 IN | WEIGHT: 132 LBS

## 2020-03-09 VITALS — WEIGHT: 135.2 LBS | BODY MASS INDEX: 26.4 KG/M2 | TEMPERATURE: 99.5 F

## 2020-03-09 DIAGNOSIS — Z09 POSTOP CHECK: ICD-10-CM

## 2020-03-09 DIAGNOSIS — K56.609 SMALL BOWEL OBSTRUCTION (HCC): Primary | ICD-10-CM

## 2020-03-09 DIAGNOSIS — Z12.39 SCREENING FOR BREAST CANCER: ICD-10-CM

## 2020-03-09 PROCEDURE — 1111F DSCHRG MED/CURRENT MED MERGE: CPT | Performed by: SURGERY

## 2020-03-09 PROCEDURE — 99024 POSTOP FOLLOW-UP VISIT: CPT | Performed by: SURGERY

## 2020-03-09 PROCEDURE — 3078F DIAST BP <80 MM HG: CPT | Performed by: SURGERY

## 2020-03-09 PROCEDURE — 77063 BREAST TOMOSYNTHESIS BI: CPT

## 2020-03-09 PROCEDURE — 3075F SYST BP GE 130 - 139MM HG: CPT | Performed by: SURGERY

## 2020-03-09 PROCEDURE — 77067 SCR MAMMO BI INCL CAD: CPT

## 2020-03-09 NOTE — PROGRESS NOTES
Assessment/Plan: Negro Pearson is a 64year old female who presents today for a wound check  She had undergone  diagnostic laparoscopy on 02/10/2020 for small bowel obstruction during which a bezoar was removed  Patient is doing well  Physical exam revealed the incisions are healing well  Patient should stop packing the wound  Aida Gums still complaints of abdominal discomfort and constipation  At this point I will order [place] UGI and will refer her to gastroenterology to further evaluate her symptoms  From a surgical stand point, she has no lifting restrictions at this time  She should avoid core exercises and heavy weight lifting for 4-6 more weeks  The office is going to schedule her for [facility]  She knows to contact the office if she has any questions or concerns  No problem-specific Assessment & Plan notes found for this encounter  There are no diagnoses linked to this encounter  Subjective:      Patient ID: Negro Perason is a 64 y o  female  Negro Pearson is a 64year old female who presents today in post operative state status post diagnostic laparoscopy, repair enterotomy, removal of bezoar performed on 2/10/20  Patient still complains of some abdominal discomfort  and constipation  Patient reports that her bowel movements " are just slow"  She states that she has been seen by GI in the past        The following portions of the patient's history were reviewed and updated as appropriate: allergies, current medications, past family history, past medical history, past social history, past surgical history and problem list     Review of Systems   Gastrointestinal: Positive for abdominal pain and constipation           Objective:      LMP  (LMP Unknown)          Physical Exam   Skin:   Incisions are healing well          By signing my name below, El Mcdaniel, attalbaro that this documentation has been prepared under the direction and in the presence of Angelica Jose MD  Electronically Signed: Danial Gregory  3/9/20  Perfecto Mccollum, personally performed the services described in this documentation  All medical record entries made by the scribe were at my direction and in my presence  I have reviewed the chart and discharge instructions and agree that the record reflects my personal performance and is accurate and complete   Mavis Wilburn MD  3/9/20

## 2020-03-10 ENCOUNTER — HOSPITAL ENCOUNTER (OUTPATIENT)
Dept: INFUSION CENTER | Facility: HOSPITAL | Age: 60
Discharge: HOME | End: 2020-03-10
Attending: INTERNAL MEDICINE
Payer: COMMERCIAL

## 2020-03-10 VITALS
TEMPERATURE: 98.7 F | DIASTOLIC BLOOD PRESSURE: 74 MMHG | OXYGEN SATURATION: 97 % | HEART RATE: 96 BPM | RESPIRATION RATE: 18 BRPM | SYSTOLIC BLOOD PRESSURE: 131 MMHG

## 2020-03-10 DIAGNOSIS — D50.8 IRON DEFICIENCY ANEMIA SECONDARY TO INADEQUATE DIETARY IRON INTAKE: Primary | ICD-10-CM

## 2020-03-10 PROCEDURE — 96365 THER/PROPH/DIAG IV INF INIT: CPT

## 2020-03-10 RX ORDER — SODIUM CHLORIDE 9 MG/ML
20 INJECTION, SOLUTION INTRAVENOUS ONCE
Status: COMPLETED | OUTPATIENT
Start: 2020-03-10 | End: 2020-03-10

## 2020-03-10 RX ORDER — SODIUM CHLORIDE 9 MG/ML
20 INJECTION, SOLUTION INTRAVENOUS ONCE
Status: CANCELLED | OUTPATIENT
Start: 2020-03-10

## 2020-03-10 RX ADMIN — IRON SUCROSE 200 MG: 20 INJECTION, SOLUTION INTRAVENOUS at 10:36

## 2020-03-10 RX ADMIN — SODIUM CHLORIDE 20 ML/HR: 9 INJECTION, SOLUTION INTRAVENOUS at 10:36

## 2020-03-12 ENCOUNTER — HOSPITAL ENCOUNTER (OUTPATIENT)
Dept: INFUSION CENTER | Facility: HOSPITAL | Age: 60
Discharge: HOME | End: 2020-03-12
Attending: INTERNAL MEDICINE
Payer: COMMERCIAL

## 2020-03-12 VITALS
RESPIRATION RATE: 18 BRPM | SYSTOLIC BLOOD PRESSURE: 120 MMHG | OXYGEN SATURATION: 100 % | DIASTOLIC BLOOD PRESSURE: 66 MMHG | TEMPERATURE: 98.4 F | HEART RATE: 84 BPM

## 2020-03-12 DIAGNOSIS — D50.8 IRON DEFICIENCY ANEMIA SECONDARY TO INADEQUATE DIETARY IRON INTAKE: Primary | ICD-10-CM

## 2020-03-12 PROCEDURE — 96365 THER/PROPH/DIAG IV INF INIT: CPT

## 2020-03-12 RX ORDER — SODIUM CHLORIDE 9 MG/ML
20 INJECTION, SOLUTION INTRAVENOUS ONCE
Status: CANCELLED | OUTPATIENT
Start: 2020-03-18

## 2020-03-12 RX ORDER — SODIUM CHLORIDE 9 MG/ML
20 INJECTION, SOLUTION INTRAVENOUS ONCE
Status: COMPLETED | OUTPATIENT
Start: 2020-03-12 | End: 2020-03-12

## 2020-03-12 RX ADMIN — SODIUM CHLORIDE 20 ML/HR: 9 INJECTION, SOLUTION INTRAVENOUS at 09:37

## 2020-03-12 RX ADMIN — IRON SUCROSE 200 MG: 20 INJECTION, SOLUTION INTRAVENOUS at 09:37

## 2020-03-12 NOTE — PROGRESS NOTES
Pt tolerated IV Venofer infusion  Pt left unit ambulatory with steady gait  AVS printed and given to pt

## 2020-03-18 ENCOUNTER — HOSPITAL ENCOUNTER (OUTPATIENT)
Dept: INFUSION CENTER | Facility: HOSPITAL | Age: 60
Discharge: HOME | End: 2020-03-18
Attending: INTERNAL MEDICINE
Payer: COMMERCIAL

## 2020-03-18 VITALS
OXYGEN SATURATION: 97 % | SYSTOLIC BLOOD PRESSURE: 134 MMHG | TEMPERATURE: 98.4 F | HEART RATE: 90 BPM | DIASTOLIC BLOOD PRESSURE: 77 MMHG | RESPIRATION RATE: 16 BRPM

## 2020-03-18 DIAGNOSIS — D50.8 IRON DEFICIENCY ANEMIA SECONDARY TO INADEQUATE DIETARY IRON INTAKE: Primary | ICD-10-CM

## 2020-03-18 PROCEDURE — 96365 THER/PROPH/DIAG IV INF INIT: CPT

## 2020-03-18 RX ORDER — SODIUM CHLORIDE 9 MG/ML
20 INJECTION, SOLUTION INTRAVENOUS ONCE
Status: COMPLETED | OUTPATIENT
Start: 2020-03-18 | End: 2020-03-18

## 2020-03-18 RX ORDER — SODIUM CHLORIDE 9 MG/ML
20 INJECTION, SOLUTION INTRAVENOUS ONCE
Status: CANCELLED | OUTPATIENT
Start: 2020-03-18

## 2020-03-18 RX ADMIN — SODIUM CHLORIDE 20 ML/HR: 9 INJECTION, SOLUTION INTRAVENOUS at 11:07

## 2020-03-18 RX ADMIN — IRON SUCROSE 200 MG: 20 INJECTION, SOLUTION INTRAVENOUS at 11:07

## 2020-03-18 NOTE — PLAN OF CARE
Problem: Knowledge Deficit  Goal: Patient/family/caregiver demonstrates understanding of disease process, treatment plan, medications, and discharge instructions  Description  Complete learning assessment and assess knowledge base  Interventions:  - Provide teaching at level of understanding  - Provide teaching via preferred learning methods  3/18/2020 1112 by Sumit Arita RN  Outcome: Progressing  3/18/2020 1112 by Sumit Arita RN  Outcome: Progressing     Problem: Potential for Falls  Goal: Patient will remain free of falls  Description  INTERVENTIONS:  - Assess patient frequently for physical needs  -  Identify cognitive and physical deficits and behaviors that affect risk of falls    -  [place] fall precautions as indicated by assessment   - Educate patient/family on patient safety including physical limitations  - Instruct patient to call for assistance with activity based on assessment  - Modify environment to reduce risk of injury  - Consider OT/PT consult to assist with strengthening/mobility  3/18/2020 1112 by Sumit Arita RN  Outcome: Progressing  3/18/2020 1112 by Sumit Arita RN  Outcome: Progressing

## 2020-03-18 NOTE — PROGRESS NOTES
Pt tolerated IV Venofer infusion without adverse reaction  Pt left unit ambulatory with steady gait  AVS printed and given to patient

## 2020-03-19 ENCOUNTER — HOSPITAL ENCOUNTER (OUTPATIENT)
Dept: RADIOLOGY | Facility: HOSPITAL | Age: 60
Discharge: HOME | End: 2020-03-19
Attending: SURGERY
Payer: COMMERCIAL

## 2020-03-19 DIAGNOSIS — K56.609 SMALL BOWEL OBSTRUCTION (HCC): ICD-10-CM

## 2020-03-19 PROCEDURE — 74248 X-RAY SM INT F-THRU STD: CPT

## 2020-03-19 PROCEDURE — 74240 X-RAY XM UPR GI TRC 1CNTRST: CPT

## 2020-03-20 ENCOUNTER — HOSPITAL ENCOUNTER (OUTPATIENT)
Dept: INFUSION CENTER | Facility: HOSPITAL | Age: 60
Discharge: HOME | End: 2020-03-20
Attending: INTERNAL MEDICINE
Payer: COMMERCIAL

## 2020-03-20 VITALS
OXYGEN SATURATION: 97 % | RESPIRATION RATE: 16 BRPM | TEMPERATURE: 97.7 F | DIASTOLIC BLOOD PRESSURE: 76 MMHG | SYSTOLIC BLOOD PRESSURE: 132 MMHG | HEART RATE: 79 BPM

## 2020-03-20 DIAGNOSIS — D50.8 IRON DEFICIENCY ANEMIA SECONDARY TO INADEQUATE DIETARY IRON INTAKE: Primary | ICD-10-CM

## 2020-03-20 PROCEDURE — 96365 THER/PROPH/DIAG IV INF INIT: CPT

## 2020-03-20 RX ORDER — SODIUM CHLORIDE 9 MG/ML
20 INJECTION, SOLUTION INTRAVENOUS ONCE
Status: COMPLETED | OUTPATIENT
Start: 2020-03-20 | End: 2020-03-20

## 2020-03-20 RX ORDER — SODIUM CHLORIDE 9 MG/ML
20 INJECTION, SOLUTION INTRAVENOUS ONCE
Status: CANCELLED | OUTPATIENT
Start: 2020-03-25

## 2020-03-20 RX ADMIN — IRON SUCROSE 200 MG: 20 INJECTION, SOLUTION INTRAVENOUS at 10:54

## 2020-03-20 RX ADMIN — SODIUM CHLORIDE 20 ML/HR: 9 INJECTION, SOLUTION INTRAVENOUS at 10:54

## 2020-03-20 NOTE — PLAN OF CARE
Problem: Potential for Falls  Goal: Patient will remain free of falls  Description  INTERVENTIONS:  - Assess patient frequently for physical needs  -  Identify cognitive and physical deficits and behaviors that affect risk of falls  -  [place] fall precautions as indicated by assessment   - Educate patient/family on patient safety including physical limitations  - Instruct patient to call for assistance with activity based on assessment  - Modify environment to reduce risk of injury  - Consider OT/PT consult to assist with strengthening/mobility  Outcome: Progressing     Problem: Knowledge Deficit  Goal: Patient/family/caregiver demonstrates understanding of disease process, treatment plan, medications, and discharge instructions  Description  Complete learning assessment and assess knowledge base    Interventions:  - Provide teaching at level of understanding  - Provide teaching via preferred learning methods  Outcome: Progressing

## 2020-03-22 DIAGNOSIS — Z72.0 TOBACCO ABUSE: ICD-10-CM

## 2020-03-22 DIAGNOSIS — L30.8 PSORIASIFORM ECZEMA: ICD-10-CM

## 2020-03-23 RX ORDER — FLUOCINONIDE 0.5 MG/G
OINTMENT TOPICAL
Qty: 30 G | Refills: 0 | Status: SHIPPED | OUTPATIENT
Start: 2020-03-23 | End: 2020-03-30

## 2020-03-23 RX ORDER — NICOTINE 21 MG/24HR
PATCH, TRANSDERMAL 24 HOURS TRANSDERMAL
Qty: 28 PATCH | Refills: 0 | Status: SHIPPED | OUTPATIENT
Start: 2020-03-23 | End: 2020-04-19

## 2020-03-24 DIAGNOSIS — I10 BENIGN ESSENTIAL HTN: ICD-10-CM

## 2020-03-24 RX ORDER — METHOCARBAMOL 500 MG/1
TABLET, FILM COATED ORAL
Qty: 60 TABLET | Refills: 3 | Status: SHIPPED | OUTPATIENT
Start: 2020-03-24 | End: 2020-07-09

## 2020-03-25 ENCOUNTER — HOSPITAL ENCOUNTER (OUTPATIENT)
Dept: INFUSION CENTER | Facility: HOSPITAL | Age: 60
Discharge: HOME | End: 2020-03-25
Attending: INTERNAL MEDICINE
Payer: COMMERCIAL

## 2020-03-25 VITALS
DIASTOLIC BLOOD PRESSURE: 68 MMHG | SYSTOLIC BLOOD PRESSURE: 135 MMHG | RESPIRATION RATE: 18 BRPM | TEMPERATURE: 97.6 F | HEART RATE: 82 BPM | OXYGEN SATURATION: 98 %

## 2020-03-25 DIAGNOSIS — D50.8 IRON DEFICIENCY ANEMIA SECONDARY TO INADEQUATE DIETARY IRON INTAKE: Primary | ICD-10-CM

## 2020-03-25 PROCEDURE — 96365 THER/PROPH/DIAG IV INF INIT: CPT

## 2020-03-25 RX ORDER — SODIUM CHLORIDE 9 MG/ML
20 INJECTION, SOLUTION INTRAVENOUS ONCE
Status: CANCELLED | OUTPATIENT
Start: 2020-03-26

## 2020-03-25 RX ORDER — SODIUM CHLORIDE 9 MG/ML
20 INJECTION, SOLUTION INTRAVENOUS ONCE
Status: COMPLETED | OUTPATIENT
Start: 2020-03-25 | End: 2020-03-25

## 2020-03-25 RX ADMIN — IRON SUCROSE 200 MG: 20 INJECTION, SOLUTION INTRAVENOUS at 09:33

## 2020-03-25 RX ADMIN — SODIUM CHLORIDE 20 ML/HR: 9 INJECTION, SOLUTION INTRAVENOUS at 09:36

## 2020-03-30 DIAGNOSIS — L30.8 PSORIASIFORM ECZEMA: ICD-10-CM

## 2020-03-30 RX ORDER — FLUOCINONIDE 0.5 MG/G
OINTMENT TOPICAL
Qty: 30 G | Refills: 0 | Status: SHIPPED | OUTPATIENT
Start: 2020-03-30 | End: 2020-08-11

## 2020-04-19 DIAGNOSIS — Z72.0 TOBACCO ABUSE: ICD-10-CM

## 2020-04-19 RX ORDER — NICOTINE 21 MG/24HR
PATCH, TRANSDERMAL 24 HOURS TRANSDERMAL
Qty: 28 PATCH | Refills: 0 | Status: SHIPPED | OUTPATIENT
Start: 2020-04-19 | End: 2020-05-14

## 2020-04-28 ENCOUNTER — HOSPITAL ENCOUNTER (OUTPATIENT)
Dept: RADIOLOGY | Facility: HOSPITAL | Age: 60
Discharge: HOME | End: 2020-04-28
Attending: INTERNAL MEDICINE
Payer: COMMERCIAL

## 2020-04-28 ENCOUNTER — TELEPHONE (OUTPATIENT)
Dept: FAMILY MEDICINE CLINIC | Facility: HOSPITAL | Age: 60
End: 2020-04-28

## 2020-04-28 ENCOUNTER — OFFICE VISIT (OUTPATIENT)
Dept: FAMILY MEDICINE CLINIC | Facility: HOSPITAL | Age: 60
End: 2020-04-28
Payer: COMMERCIAL

## 2020-04-28 VITALS
HEIGHT: 61 IN | BODY MASS INDEX: 24.96 KG/M2 | WEIGHT: 132.2 LBS | HEART RATE: 92 BPM | SYSTOLIC BLOOD PRESSURE: 132 MMHG | OXYGEN SATURATION: 99 % | DIASTOLIC BLOOD PRESSURE: 80 MMHG | TEMPERATURE: 98.3 F

## 2020-04-28 DIAGNOSIS — R22.42 LOCALIZED SWELLING OF LEFT FOOT: Primary | ICD-10-CM

## 2020-04-28 DIAGNOSIS — G89.29 OTHER CHRONIC PAIN: ICD-10-CM

## 2020-04-28 DIAGNOSIS — S92.322A CLOSED DISPLACED FRACTURE OF SECOND METATARSAL BONE OF LEFT FOOT, INITIAL ENCOUNTER: Primary | ICD-10-CM

## 2020-04-28 DIAGNOSIS — R22.42 LOCALIZED SWELLING OF LEFT FOOT: ICD-10-CM

## 2020-04-28 DIAGNOSIS — I10 BENIGN ESSENTIAL HTN: ICD-10-CM

## 2020-04-28 PROBLEM — E87.6 HYPOKALEMIA: Status: RESOLVED | Noted: 2020-02-13 | Resolved: 2020-04-28

## 2020-04-28 PROCEDURE — 3075F SYST BP GE 130 - 139MM HG: CPT | Performed by: INTERNAL MEDICINE

## 2020-04-28 PROCEDURE — 3008F BODY MASS INDEX DOCD: CPT | Performed by: INTERNAL MEDICINE

## 2020-04-28 PROCEDURE — 1036F TOBACCO NON-USER: CPT | Performed by: INTERNAL MEDICINE

## 2020-04-28 PROCEDURE — 73630 X-RAY EXAM OF FOOT: CPT

## 2020-04-28 PROCEDURE — 99213 OFFICE O/P EST LOW 20 MIN: CPT | Performed by: INTERNAL MEDICINE

## 2020-04-28 PROCEDURE — 3079F DIAST BP 80-89 MM HG: CPT | Performed by: INTERNAL MEDICINE

## 2020-04-28 RX ORDER — MELOXICAM 15 MG/1
15 TABLET ORAL DAILY
Qty: 90 TABLET | Refills: 3 | Status: SHIPPED | OUTPATIENT
Start: 2020-04-28 | End: 2021-03-05 | Stop reason: SDUPTHER

## 2020-04-29 ENCOUNTER — OFFICE VISIT (OUTPATIENT)
Dept: OBGYN CLINIC | Facility: CLINIC | Age: 60
End: 2020-04-29
Payer: COMMERCIAL

## 2020-04-29 VITALS
SYSTOLIC BLOOD PRESSURE: 128 MMHG | HEIGHT: 61 IN | WEIGHT: 132 LBS | DIASTOLIC BLOOD PRESSURE: 74 MMHG | BODY MASS INDEX: 24.92 KG/M2 | TEMPERATURE: 98.2 F

## 2020-04-29 DIAGNOSIS — S92.325A CLOSED NONDISPLACED FRACTURE OF SECOND METATARSAL BONE OF LEFT FOOT, INITIAL ENCOUNTER: Primary | ICD-10-CM

## 2020-04-29 PROCEDURE — 1036F TOBACCO NON-USER: CPT | Performed by: ORTHOPAEDIC SURGERY

## 2020-04-29 PROCEDURE — 3008F BODY MASS INDEX DOCD: CPT | Performed by: ORTHOPAEDIC SURGERY

## 2020-04-29 PROCEDURE — 3074F SYST BP LT 130 MM HG: CPT | Performed by: ORTHOPAEDIC SURGERY

## 2020-04-29 PROCEDURE — 28470 CLTX METATARSAL FX WO MNP EA: CPT | Performed by: ORTHOPAEDIC SURGERY

## 2020-04-29 PROCEDURE — 3078F DIAST BP <80 MM HG: CPT | Performed by: ORTHOPAEDIC SURGERY

## 2020-04-29 PROCEDURE — 99213 OFFICE O/P EST LOW 20 MIN: CPT | Performed by: ORTHOPAEDIC SURGERY

## 2020-05-03 DIAGNOSIS — J45.21 MILD INTERMITTENT REACTIVE AIRWAY DISEASE WITH ACUTE EXACERBATION: ICD-10-CM

## 2020-05-03 DIAGNOSIS — H66.012 NON-RECURRENT ACUTE SUPPURATIVE OTITIS MEDIA OF LEFT EAR WITH SPONTANEOUS RUPTURE OF TYMPANIC MEMBRANE: ICD-10-CM

## 2020-05-04 RX ORDER — FLUTICASONE PROPIONATE 50 MCG
SPRAY, SUSPENSION (ML) NASAL
Qty: 48 ML | Refills: 5 | Status: SHIPPED | OUTPATIENT
Start: 2020-05-04

## 2020-05-14 DIAGNOSIS — Z72.0 TOBACCO ABUSE: ICD-10-CM

## 2020-05-14 RX ORDER — NICOTINE 21 MG/24HR
PATCH, TRANSDERMAL 24 HOURS TRANSDERMAL
Qty: 28 PATCH | Refills: 0 | Status: SHIPPED | OUTPATIENT
Start: 2020-05-14 | End: 2020-06-07

## 2020-06-07 DIAGNOSIS — Z72.0 TOBACCO ABUSE: ICD-10-CM

## 2020-06-07 RX ORDER — NICOTINE 21 MG/24HR
PATCH, TRANSDERMAL 24 HOURS TRANSDERMAL
Qty: 28 PATCH | Refills: 0 | Status: SHIPPED | OUTPATIENT
Start: 2020-06-07 | End: 2020-07-05

## 2020-06-09 ENCOUNTER — HOSPITAL ENCOUNTER (OUTPATIENT)
Dept: RADIOLOGY | Facility: HOSPITAL | Age: 60
Discharge: HOME | End: 2020-06-09
Payer: COMMERCIAL

## 2020-06-09 ENCOUNTER — OFFICE VISIT (OUTPATIENT)
Dept: OBGYN CLINIC | Facility: CLINIC | Age: 60
End: 2020-06-09

## 2020-06-09 VITALS
HEIGHT: 61 IN | TEMPERATURE: 98.2 F | WEIGHT: 134 LBS | SYSTOLIC BLOOD PRESSURE: 118 MMHG | DIASTOLIC BLOOD PRESSURE: 80 MMHG | BODY MASS INDEX: 25.3 KG/M2

## 2020-06-09 DIAGNOSIS — S92.335A CLOSED NONDISPLACED FRACTURE OF THIRD METATARSAL BONE OF LEFT FOOT, INITIAL ENCOUNTER: ICD-10-CM

## 2020-06-09 DIAGNOSIS — S92.325S CLOSED NONDISPLACED FRACTURE OF SECOND METATARSAL BONE OF LEFT FOOT, SEQUELA: Primary | ICD-10-CM

## 2020-06-09 DIAGNOSIS — S92.345A CLOSED NONDISPLACED FRACTURE OF FOURTH METATARSAL BONE OF LEFT FOOT, INITIAL ENCOUNTER: ICD-10-CM

## 2020-06-09 PROCEDURE — 99024 POSTOP FOLLOW-UP VISIT: CPT | Performed by: ORTHOPAEDIC SURGERY

## 2020-06-09 PROCEDURE — 3008F BODY MASS INDEX DOCD: CPT | Performed by: ORTHOPAEDIC SURGERY

## 2020-06-09 PROCEDURE — 3079F DIAST BP 80-89 MM HG: CPT | Performed by: ORTHOPAEDIC SURGERY

## 2020-06-09 PROCEDURE — 3074F SYST BP LT 130 MM HG: CPT | Performed by: ORTHOPAEDIC SURGERY

## 2020-06-09 PROCEDURE — 73630 X-RAY EXAM OF FOOT: CPT

## 2020-06-25 LAB
ALBUMIN SERPL-MCNC: 4.1 G/DL (ref 3.6–5.1)
ALBUMIN/GLOB SERPL: 2.2 (CALC) (ref 1–2.5)
ALP SERPL-CCNC: 86 U/L (ref 37–153)
ALT SERPL-CCNC: 15 U/L (ref 6–29)
AST SERPL-CCNC: 15 U/L (ref 10–35)
BASOPHILS # BLD AUTO: 10 CELLS/UL (ref 0–200)
BASOPHILS NFR BLD AUTO: 0.2 %
BILIRUB SERPL-MCNC: 0.3 MG/DL (ref 0.2–1.2)
BUN SERPL-MCNC: 16 MG/DL (ref 7–25)
BUN/CREAT SERPL: ABNORMAL (CALC) (ref 6–22)
CALCIUM SERPL-MCNC: 8.8 MG/DL (ref 8.6–10.4)
CHLORIDE SERPL-SCNC: 110 MMOL/L (ref 98–110)
CO2 SERPL-SCNC: 27 MMOL/L (ref 20–32)
CREAT SERPL-MCNC: 0.82 MG/DL (ref 0.5–1.05)
EOSINOPHIL # BLD AUTO: 128 CELLS/UL (ref 15–500)
EOSINOPHIL NFR BLD AUTO: 2.5 %
ERYTHROCYTE [DISTWIDTH] IN BLOOD BY AUTOMATED COUNT: 13.2 % (ref 11–15)
FERRITIN SERPL-MCNC: 276 NG/ML (ref 16–232)
GLOBULIN SER CALC-MCNC: 1.9 G/DL (CALC) (ref 1.9–3.7)
GLUCOSE SERPL-MCNC: 96 MG/DL (ref 65–99)
HCT VFR BLD AUTO: 40.1 % (ref 35–45)
HGB BLD-MCNC: 13.4 G/DL (ref 11.7–15.5)
IRON SATN MFR SERPL: 36 % (CALC) (ref 16–45)
IRON SERPL-MCNC: 96 MCG/DL (ref 45–160)
LYMPHOCYTES # BLD AUTO: 1816 CELLS/UL (ref 850–3900)
LYMPHOCYTES NFR BLD AUTO: 35.6 %
MCH RBC QN AUTO: 33.3 PG (ref 27–33)
MCHC RBC AUTO-ENTMCNC: 33.4 G/DL (ref 32–36)
MCV RBC AUTO: 99.8 FL (ref 80–100)
METHYLMALONATE SERPL-SCNC: 109 NMOL/L (ref 87–318)
MONOCYTES # BLD AUTO: 321 CELLS/UL (ref 200–950)
MONOCYTES NFR BLD AUTO: 6.3 %
NEUTROPHILS # BLD AUTO: 2825 CELLS/UL (ref 1500–7800)
NEUTROPHILS NFR BLD AUTO: 55.4 %
PLATELET # BLD AUTO: 209 THOUSAND/UL (ref 140–400)
PMV BLD REES-ECKER: 9.8 FL (ref 7.5–12.5)
POTASSIUM SERPL-SCNC: 4.1 MMOL/L (ref 3.5–5.3)
PROT SERPL-MCNC: 6 G/DL (ref 6.1–8.1)
RBC # BLD AUTO: 4.02 MILLION/UL (ref 3.8–5.1)
SL AMB EGFR AFRICAN AMERICAN: 92 ML/MIN/1.73M2
SL AMB EGFR NON AFRICAN AMERICAN: 79 ML/MIN/1.73M2
SODIUM SERPL-SCNC: 141 MMOL/L (ref 135–146)
TIBC SERPL-MCNC: 266 MCG/DL (CALC) (ref 250–450)
WBC # BLD AUTO: 5.1 THOUSAND/UL (ref 3.8–10.8)

## 2020-06-30 ENCOUNTER — TELEPHONE (OUTPATIENT)
Dept: OBGYN CLINIC | Facility: HOSPITAL | Age: 60
End: 2020-06-30

## 2020-06-30 DIAGNOSIS — S92.325D CLOSED NONDISPLACED FRACTURE OF SECOND METATARSAL BONE OF LEFT FOOT WITH ROUTINE HEALING, SUBSEQUENT ENCOUNTER: Primary | ICD-10-CM

## 2020-06-30 DIAGNOSIS — S92.335D CLOSED NONDISPLACED FRACTURE OF THIRD METATARSAL BONE OF LEFT FOOT WITH ROUTINE HEALING, SUBSEQUENT ENCOUNTER: ICD-10-CM

## 2020-06-30 DIAGNOSIS — S92.345D CLOSED NONDISPLACED FRACTURE OF FOURTH METATARSAL BONE OF LEFT FOOT WITH ROUTINE HEALING, SUBSEQUENT ENCOUNTER: ICD-10-CM

## 2020-07-02 ENCOUNTER — TELEPHONE (OUTPATIENT)
Dept: HEMATOLOGY ONCOLOGY | Facility: CLINIC | Age: 60
End: 2020-07-02

## 2020-07-02 NOTE — TELEPHONE ENCOUNTER
Patient called to answer COVIID 19 screening questions which were "Negative" and also reschedule appt the 7/6 10:00 am slot to 8:40 am

## 2020-07-05 DIAGNOSIS — Z72.0 TOBACCO ABUSE: ICD-10-CM

## 2020-07-05 RX ORDER — NICOTINE 21 MG/24HR
PATCH, TRANSDERMAL 24 HOURS TRANSDERMAL
Qty: 28 PATCH | Refills: 0 | Status: SHIPPED | OUTPATIENT
Start: 2020-07-05

## 2020-07-06 ENCOUNTER — OFFICE VISIT (OUTPATIENT)
Dept: HEMATOLOGY ONCOLOGY | Facility: HOSPITAL | Age: 60
End: 2020-07-06
Payer: COMMERCIAL

## 2020-07-06 VITALS
SYSTOLIC BLOOD PRESSURE: 130 MMHG | RESPIRATION RATE: 16 BRPM | TEMPERATURE: 99 F | HEIGHT: 61 IN | BODY MASS INDEX: 25.49 KG/M2 | WEIGHT: 135 LBS | DIASTOLIC BLOOD PRESSURE: 66 MMHG | OXYGEN SATURATION: 98 % | HEART RATE: 104 BPM

## 2020-07-06 DIAGNOSIS — D50.8 IRON DEFICIENCY ANEMIA SECONDARY TO INADEQUATE DIETARY IRON INTAKE: Primary | ICD-10-CM

## 2020-07-06 PROCEDURE — 99214 OFFICE O/P EST MOD 30 MIN: CPT | Performed by: INTERNAL MEDICINE

## 2020-07-06 PROCEDURE — 3078F DIAST BP <80 MM HG: CPT | Performed by: INTERNAL MEDICINE

## 2020-07-06 PROCEDURE — 3008F BODY MASS INDEX DOCD: CPT | Performed by: INTERNAL MEDICINE

## 2020-07-06 PROCEDURE — 3075F SYST BP GE 130 - 139MM HG: CPT | Performed by: INTERNAL MEDICINE

## 2020-07-06 PROCEDURE — 1036F TOBACCO NON-USER: CPT | Performed by: INTERNAL MEDICINE

## 2020-07-06 RX ORDER — TRIAMCINOLONE ACETONIDE 1 MG/G
CREAM TOPICAL
COMMUNITY
Start: 2020-06-13

## 2020-07-06 NOTE — PROGRESS NOTES
Hematology/Oncology Outpatient Follow- up Note  Mavis Bobby 64 y o  female MRN: @ Encounter: [id number]        Date:  7/6/2020    Presenting Complaint/Diagnosis : Iron deficiency anemia    HPI:    Mavis Bobby is seen for initial consultation 1/29/2020 regarding Newly diagnosed iron deficiency  The patient states she does have a history of iron deficiency in the past  More recently her hemoglobin was low and iron studies revealed a low iron so she was referred to see us  She states she feels slightly fatigued but not too much above baseline  Denies any nausea denies any vomiting denies any diarrhea  The rest of her 14 point review of systems today was negative  The patient's iron was low at 20 with an iron saturation of 6% and a ferritin of 11  Hemoglobin was 9 1  Previous Hematologic/ Oncologic History:    Workup  Venofer    Current Hematologic/ Oncologic Treatment:    Observation    Interval History:    The patient returns for follow-up visit  Her iron has returned to normal   Her hemoglobin is now normal   She feels much better  She was in the hospital for what she describes as a bowel obstruction  Regardless this was addressed and she was discharged  She also had some orthopedic issues where she had a fracture of the 2nd metatarsal   She is currently in a boot for this  She otherwise is doing well  Denies any nausea denies any vomiting denies any diarrhea  The rest of her 14 point review of systems today was negative  Test Results:    Imaging: Xr Foot 3+ Vw Left    Result Date: 6/11/2020  Narrative: LEFT FOOT INDICATION:   S92 325S: Nondisplaced fracture of second metatarsal bone, left foot, sequela   COMPARISON:  April 28, 2020 VIEWS:  XR FOOT 3+ VW LEFT FINDINGS: There is a healing fracture noted within the distal aspect of the 2nd metatarsal   In addition there is new callus formation within the distal aspect of the 3rd and 4th metatarsal suggesting additional healing fractures which are nondisplaced  Mild degenerative changes are seen throughout the midfoot  No lytic or blastic osseous lesion  Soft tissues are unremarkable  Impression: Healing metatarsal fractures  Workstation performed: [id number]       Labs:   Lab Results   Component Value Date    WBC 5 1 06/22/2020    HGB 13 4 06/22/2020    HCT 40 1 06/22/2020    MCV 99 8 06/22/2020     06/22/2020     Lab Results   Component Value Date     09/20/2017    K 4 1 06/22/2020     06/22/2020    CO2 27 06/22/2020    ANIONGAP 10 06/10/2015    BUN 16 06/22/2020    CREATININE 0 82 06/22/2020    GLUCOSE 96 06/10/2015    CALCIUM 8 8 06/22/2020    AST 15 06/22/2020    ALT 15 06/22/2020    ALKPHOS 86 06/22/2020    PROT 5 9 (L) 09/20/2017    BILITOT 0 3 09/20/2017    EGFR 90 02/20/2020       Lab Results   Component Value Date    IRON 96 06/22/2020    TIBC 266 06/22/2020    FERRITIN 276 (H) 06/22/2020       Lab Results   Component Value Date    JMIYOKGM36 1,700 (H) 07/24/2018         ROS: As stated in the history of present illness otherwise his 14 point review of systems today was negative        Active Problems:   Patient Active Problem List   Diagnosis    Restless leg syndrome    Psoriasis    Narcolepsy without cataplexy    Hypercholesterolemia    Chronic pain    Benign essential HTN    Anxiety    Allergic rhinitis    Anemia    Hiatal hernia    Nail abnormality    Primary osteoarthritis of both hips    Tobacco abuse    Spondylosis of lumbar region without myelopathy or radiculopathy    Chronic superficial gastritis    Iron deficiency anemia secondary to inadequate dietary iron intake    Small bowel obstruction (HCC)    Closed nondisplaced fracture of second metatarsal bone of left foot    Closed nondisplaced fracture of third metatarsal bone of left foot    Closed nondisplaced fracture of fourth metatarsal bone of left foot       Past Medical History:   Past Medical History:   Diagnosis Date    Anemia     Anxiety  Bursitis of left elbow     Chronic pain disorder     lower back    Hiatal hernia     Iron deficiency anemia secondary to inadequate dietary iron intake 1/29/2020    Kidney stones     Pancreatitis     Psoriasis     Psychiatric disorder     anxiety    Restless leg syndrome     Tobacco use disorder        Surgical History:   Past Surgical History:   Procedure Laterality Date    CYSTOSCOPY      CYSTOSCOPY W/ URETERAL STENT PLACEMENT      NO PAST SURGERIES      ME ERCP DX COLLECTION SPECIMEN BRUSHING/WASHING N/A 8/15/2018    Procedure: ENDOSCOPIC RETROGRADE CHOLANGIOPANCREATOGRAPHY (ERCP); Surgeon: Jaskaran Medrano MD;  Location:  MAIN OR;  Service: Gastroenterology    ME LAP,DIAGNOSTIC ABDOMEN N/A 2/10/2020    Procedure: LAPAROSCOPY DIAGNOSTIC, REPAIR ENTEROTOMY, REMOVAL OF BEZOAR;  Surgeon: Jone Brown MD;  Location:  MAIN OR;  Service: General       Family History:    Family History   Problem Relation Age of Onset    Mental illness Sister     Other Other         Cardiovascular disease    Hypertension Mother     Cancer Father     Substance Abuse Neg Hx        Cancer-related family history includes Cancer in her father      Social History:   Social History     Socioeconomic History    Marital status: /Civil Union     Spouse name: Not on file    Number of children: Not on file    Years of education: Not on file    Highest education level: Not on file   Occupational History    Not on file   Social Needs    Financial resource strain: Not on file    Food insecurity:     Worry: Not on file     Inability: Not on file    Transportation needs:     Medical: Not on file     Non-medical: Not on file   Tobacco Use    Smoking status: Former Smoker     Types: Cigarettes    Smokeless tobacco: Never Used    Tobacco comment: Attempting to stop   Substance and Sexual Activity    Alcohol use: Never     Frequency: Never     Drinks per session: Patient refused     Binge frequency: Never  Drug use: No    Sexual activity: Never   Lifestyle    Physical activity:     Days per week: Not on file     Minutes per session: Not on file    Stress: Not on file   Relationships    Social connections:     Talks on phone: Not on file     Gets together: Not on file     Attends Muslim service: Not on file     Active member of club or organization: Not on file     Attends meetings of clubs or organizations: Not on file     Relationship status: Not on file    Intimate partner violence:     Fear of current or ex partner: Not on file     Emotionally abused: Not on file     Physically abused: Not on file     Forced sexual activity: Not on file   Other Topics Concern    Not on file   Social History Narrative    Lives with     Feels safe at home    Sees dentist occas  No living will  Gets exercise with her job          Current Medications:   Current Outpatient Medications   Medication Sig Dispense Refill    amLODIPine (NORVASC) 10 mg tablet Take 1 tablet (10 mg total) by mouth daily Decreased dose 90 tablet 3    Cholecalciferol (VITAMIN D3) 5000 units CAPS Take by mouth daily      Cranberry 200 MG CAPS Take 1 capsule by mouth daily      fluocinonide (LIDEX) 0 05 % ointment APPLY TO AFFECTED AREA TWICE A DAY 30 g 0    fluticasone (FLONASE) 50 mcg/act nasal spray SPRAY 2 SPRAYS INTO EACH NOSTRIL EVERY DAY 48 mL 5    gabapentin (NEURONTIN) 300 mg capsule take 1 capsule by mouth daily in AM 1 at noon  and 2 capsules by mouth every  capsule 3    meloxicam (MOBIC) 15 mg tablet Take 1 tablet (15 mg total) by mouth daily 90 tablet 3    methocarbamol (ROBAXIN) 500 mg tablet TAKE 1 TABLET BY MOUTH TWICE A DAY 60 tablet 3    Multiple Vitamin tablet Take 1 tablet by mouth daily      nicotine (NICODERM CQ) 21 mg/24 hr TD 24 hr patch PLACE 1 PATCH ON THE SKIN EVERY 24 HOURS 28 patch 0    omeprazole (PriLOSEC) 40 MG capsule take 1 capsule by mouth daily 90 capsule 3    Probiotic Product (PROBIOTIC-10 ULTIMATE PO) Take by mouth      triamcinolone (KENALOG) 0 1 % cream APPLY TO AFFECTED AREAS TWICE A DAY FOR 2 WEEKS AND THEN AS NEEDED      fluticasone-salmeterol (ADVAIR DISKUS) 250-50 mcg/dose inhaler Inhale 1 puff 2 (two) times a day Rinse mouth after use 1 Inhaler 5     No current facility-administered medications for this visit  Allergies: Allergies   Allergen Reactions    Pollen Extract     Tree Extract        Physical Exam:    Body surface area is 1 6 meters squared  Wt Readings from Last 3 Encounters:   07/06/20 61 2 kg (135 lb)   06/09/20 60 8 kg (134 lb)   04/29/20 59 9 kg (132 lb)        Temp Readings from Last 3 Encounters:   07/06/20 99 °F (37 2 °C) (Tympanic)   06/09/20 98 2 °F (36 8 °C)   04/29/20 98 2 °F (36 8 °C)        BP Readings from Last 3 Encounters:   07/06/20 130/66   06/09/20 118/80   04/29/20 128/74         Pulse Readings from Last 3 Encounters:   07/06/20 104   04/28/20 92   03/25/20 82        Physical Exam     Constitutional   General appearance: No acute distress, well appearing and well nourished  Eyes   Conjunctiva and lids: No swelling, erythema or discharge  Pupils and irises: Equal, round and reactive to light  Ears, Nose, Mouth, and Throat   External inspection of ears and nose: Normal     Nasal mucosa, septum, and turbinates: Normal without edema or erythema  Oropharynx: Normal with no erythema, edema, exudate or lesions  Pulmonary   Respiratory effort: No increased work of breathing or signs of respiratory distress  Auscultation of lungs: Clear to auscultation  Cardiovascular   Palpation of heart: Normal PMI, no thrills  Auscultation of heart: Normal rate and rhythm, normal S1 and S2, without murmurs  Examination of extremities for edema and/or varicosities: Normal     Carotid pulses: Normal     Abdomen   Abdomen: Non-tender, no masses  Liver and spleen: No hepatomegaly or splenomegaly      Lymphatic   Palpation of lymph nodes in neck: No lymphadenopathy  Musculoskeletal   Gait and station: Normal     Digits and nails: Normal without clubbing or cyanosis  Inspection/palpation of joints, bones, and muscles: Normal     Skin   Skin and subcutaneous tissue: Normal without rashes or lesions  Neurologic   Cranial nerves: Cranial nerves 2-12 intact  Sensation: No sensory loss  Psychiatric   Orientation to person, place, and time: Normal     Mood and affect: Normal         Assessment / Plan:    The patient is a pleasant 24-year-old female was referred to see us for iron deficiency anemia  We gave her 10 doses of Venofer and her numbers have corrected  She was in the hospital for a bowel obstruction and did have an exploratory surgery  I see no pathology from this surgery  She also had of fracture of her toe for which she is in a boot  She is otherwise doing well  As far as her iron deficiency is concerned this has resolved and I advised her to take 1 iron pill a day and see me back in 4 months  Until then if she has any questions she will call our office  She understands the risk of recurrent iron deficiency anemia and if she starts having symptoms again she will see us sooner  Goals and Barriers:  Current Goal:  Prolong Survival from iron deficiency anemia  Barriers: None  Patient's Capacity to Self Care:  Patient able to self care  Portions of the record may have been created with voice recognition software   Occasional wrong word or "sound a like" substitutions may have occurred due to the inherent limitations of voice recognition software   Read the chart carefully and recognize, using context, where substitutions have occurred

## 2020-07-09 DIAGNOSIS — I10 BENIGN ESSENTIAL HTN: ICD-10-CM

## 2020-07-09 RX ORDER — METHOCARBAMOL 500 MG/1
TABLET, FILM COATED ORAL
Qty: 60 TABLET | Refills: 3 | Status: SHIPPED | OUTPATIENT
Start: 2020-07-09 | End: 2020-10-27

## 2020-07-22 ENCOUNTER — OFFICE VISIT (OUTPATIENT)
Dept: OBGYN CLINIC | Facility: CLINIC | Age: 60
End: 2020-07-22

## 2020-07-22 ENCOUNTER — APPOINTMENT (OUTPATIENT)
Dept: RADIOLOGY | Facility: CLINIC | Age: 60
End: 2020-07-22
Payer: COMMERCIAL

## 2020-07-22 VITALS
TEMPERATURE: 97.6 F | HEIGHT: 61 IN | WEIGHT: 131 LBS | SYSTOLIC BLOOD PRESSURE: 120 MMHG | BODY MASS INDEX: 24.73 KG/M2 | DIASTOLIC BLOOD PRESSURE: 70 MMHG

## 2020-07-22 DIAGNOSIS — S92.325D CLOSED NONDISPLACED FRACTURE OF SECOND METATARSAL BONE OF LEFT FOOT WITH ROUTINE HEALING, SUBSEQUENT ENCOUNTER: Primary | ICD-10-CM

## 2020-07-22 DIAGNOSIS — S92.345D CLOSED NONDISPLACED FRACTURE OF FOURTH METATARSAL BONE OF LEFT FOOT WITH ROUTINE HEALING, SUBSEQUENT ENCOUNTER: ICD-10-CM

## 2020-07-22 DIAGNOSIS — S92.325D CLOSED NONDISPLACED FRACTURE OF SECOND METATARSAL BONE OF LEFT FOOT WITH ROUTINE HEALING, SUBSEQUENT ENCOUNTER: ICD-10-CM

## 2020-07-22 DIAGNOSIS — S92.335D CLOSED NONDISPLACED FRACTURE OF THIRD METATARSAL BONE OF LEFT FOOT WITH ROUTINE HEALING, SUBSEQUENT ENCOUNTER: ICD-10-CM

## 2020-07-22 PROCEDURE — 73630 X-RAY EXAM OF FOOT: CPT

## 2020-07-22 PROCEDURE — 3074F SYST BP LT 130 MM HG: CPT | Performed by: ORTHOPAEDIC SURGERY

## 2020-07-22 PROCEDURE — 3008F BODY MASS INDEX DOCD: CPT | Performed by: ORTHOPAEDIC SURGERY

## 2020-07-22 PROCEDURE — 99024 POSTOP FOLLOW-UP VISIT: CPT | Performed by: ORTHOPAEDIC SURGERY

## 2020-07-22 PROCEDURE — 3078F DIAST BP <80 MM HG: CPT | Performed by: ORTHOPAEDIC SURGERY

## 2020-07-22 NOTE — PROGRESS NOTES
Assessment:     1  Closed nondisplaced fracture of second metatarsal bone of left foot with routine healing, subsequent encounter    2  Closed nondisplaced fracture of third metatarsal bone of left foot with routine healing, subsequent encounter    3  Closed nondisplaced fracture of fourth metatarsal bone of left foot with routine healing, subsequent encounter        Plan:     Problem List Items Addressed This Visit        Musculoskeletal and Integument    Closed nondisplaced fracture of second metatarsal bone of left foot - Primary    Relevant Orders    XR foot 3+ vw left    Ambulatory referral to Physical Therapy    Closed nondisplaced fracture of third metatarsal bone of left foot    Relevant Orders    XR foot 3+ vw left    Ambulatory referral to Physical Therapy    Closed nondisplaced fracture of fourth metatarsal bone of left foot    Relevant Orders    XR foot 3+ vw left    Ambulatory referral to Physical Therapy          Findings consistent with left 2nd, 3rd, and 4th metatarsal neck fracture, healing  Discussed findings and treatment options with the patient  I reviewed patient's left foot x-ray with her  I discussed prognosis of her condition  Her ankle symptoms may be related to inactivity and abnormal gait while she has been treated for her foot fracture  I will refer patient to physical therapy to rehabilitate her ankle and foot  Patient may wear regular shoes  I informed patient that the swelling in the forefoot is not unusual after her injury  I will see patient back in 6 weeks for re-evaluation  All patient's questions were answered to her satisfaction  This note is created using dictation transcription  It may contain typographical errors, grammatical errors, improperly dictated words, background noise and other errors  Subjective:     Patient ID: Sherryle Guess is a 64 y o  female  Chief Complaint:  22-year-old female follow-up left foot metatarsal fracture    Patient has been wearing the postop shoe  She still has some swelling in the forefoot  She also has been complaining of some discomfort around the front of her ankle joint especially when she with the CAM walker  She has tingling sensation that radiate up towards the shin  She also has increased discomfort with dorsiflexion of her ankle  She denies increased pain over her forefoot  She is walking without use of any assistive device  Allergy:  Allergies   Allergen Reactions    Pollen Extract     Tree Extract      Medications:  all current active meds have been reviewed  Past Medical History:  Past Medical History:   Diagnosis Date    Anemia     Anxiety     Bursitis of left elbow     Chronic pain disorder     lower back    Hiatal hernia     Iron deficiency anemia secondary to inadequate dietary iron intake 1/29/2020    Kidney stones     Pancreatitis     Psoriasis     Psychiatric disorder     anxiety    Restless leg syndrome     Tobacco use disorder      Past Surgical History:  Past Surgical History:   Procedure Laterality Date    CYSTOSCOPY      CYSTOSCOPY W/ URETERAL STENT PLACEMENT      NO PAST SURGERIES      NE ERCP DX COLLECTION SPECIMEN BRUSHING/WASHING N/A 8/15/2018    Procedure: ENDOSCOPIC RETROGRADE CHOLANGIOPANCREATOGRAPHY (ERCP);   Surgeon: Sandie Mauricio MD;  Location:  MAIN OR;  Service: Gastroenterology    NE LAP,DIAGNOSTIC ABDOMEN N/A 2/10/2020    Procedure: LAPAROSCOPY DIAGNOSTIC, REPAIR ENTEROTOMY, REMOVAL OF BEZOAR;  Surgeon: Lesa Shaw MD;  Location:  MAIN OR;  Service: General     Family History:  Family History   Problem Relation Age of Onset    Mental illness Sister     Other Other         Cardiovascular disease    Hypertension Mother     Cancer Father     Substance Abuse Neg Hx      Social History:  Social History     Substance and Sexual Activity   Alcohol Use Never    Frequency: Never    Drinks per session: Patient refused    Binge frequency: Never     Social History Substance and Sexual Activity   Drug Use No     Social History     Tobacco Use   Smoking Status Former Smoker    Types: Cigarettes   Smokeless Tobacco Never Used   Tobacco Comment    Attempting to stop     Review of Systems   Constitutional: Negative  HENT: Negative  Eyes: Negative  Respiratory: Negative  Cardiovascular: Negative  Gastrointestinal: Negative  Endocrine: Negative  Genitourinary: Negative  Musculoskeletal: Positive for arthralgias (Left ankle) and joint swelling (Left forefoot)  Skin: Negative  Allergic/Immunologic: Negative  Neurological: Negative  Hematological: Negative  Psychiatric/Behavioral: Negative  Objective:  BP Readings from Last 1 Encounters:   07/22/20 120/70      Wt Readings from Last 1 Encounters:   07/22/20 59 4 kg (131 lb)      BMI:   Estimated body mass index is 24 75 kg/m² as calculated from the following:    Height as of this encounter: 5' 1" (1 549 m)  Weight as of this encounter: 59 4 kg (131 lb)  BSA:   Estimated body surface area is 1 58 meters squared as calculated from the following:    Height as of this encounter: 5' 1" (1 549 m)  Weight as of this encounter: 59 4 kg (131 lb)  Physical Exam   Constitutional: She is oriented to person, place, and time  She appears well-developed  HENT:   Head: Normocephalic and atraumatic  Eyes: Conjunctivae and EOM are normal    Neck: Neck supple  Pulmonary/Chest: Effort normal    Neurological: She is alert and oriented to person, place, and time  Skin: Skin is warm  Psychiatric: She has a normal mood and affect  Nursing note and vitals reviewed  Left Ankle Exam     Tenderness   The patient is experiencing no tenderness  Swelling: none    Range of Motion   The patient has normal left ankle ROM  Left ankle dorsiflexion: Discomfort anteriorly       Muscle Strength   Dorsiflexion:  4/5   Plantar flexion:  5/5   Anterior tibial:  4/5   Posterior tibial:  5/5  Peroneal muscle:  4/5    Other   Erythema: absent  Sensation: normal  Pulse: present    Comments:  Mild swelling over the forefoot dorsum  No tenderness with palpation over the 2nd, 3rd, and 4th metatarsal neck            I have personally reviewed pertinent films in PACS and my interpretation is Left foot x-ray show healing 2nd, 3rd, and 4th metatarsal neck fracture

## 2020-07-27 ENCOUNTER — TELEPHONE (OUTPATIENT)
Dept: FAMILY MEDICINE CLINIC | Facility: HOSPITAL | Age: 60
End: 2020-07-27

## 2020-07-27 ENCOUNTER — TELEMEDICINE (OUTPATIENT)
Dept: FAMILY MEDICINE CLINIC | Facility: HOSPITAL | Age: 60
End: 2020-07-27
Payer: COMMERCIAL

## 2020-07-27 VITALS — HEIGHT: 61 IN | BODY MASS INDEX: 24.73 KG/M2 | WEIGHT: 131 LBS | TEMPERATURE: 99.5 F

## 2020-07-27 DIAGNOSIS — Z20.828 EXPOSURE TO SARS-ASSOCIATED CORONAVIRUS: ICD-10-CM

## 2020-07-27 DIAGNOSIS — Z20.828 EXPOSURE TO SARS-ASSOCIATED CORONAVIRUS: Primary | ICD-10-CM

## 2020-07-27 PROCEDURE — 99214 OFFICE O/P EST MOD 30 MIN: CPT | Performed by: PHYSICIAN ASSISTANT

## 2020-07-27 PROCEDURE — U0003 INFECTIOUS AGENT DETECTION BY NUCLEIC ACID (DNA OR RNA); SEVERE ACUTE RESPIRATORY SYNDROME CORONAVIRUS 2 (SARS-COV-2) (CORONAVIRUS DISEASE [COVID-19]), AMPLIFIED PROBE TECHNIQUE, MAKING USE OF HIGH THROUGHPUT TECHNOLOGIES AS DESCRIBED BY CMS-2020-01-R: HCPCS

## 2020-07-27 NOTE — TELEPHONE ENCOUNTER
LDM for pt to stay hydrated as long as she is feeling ok and no symptoms there is no need for testing DD

## 2020-07-27 NOTE — PROGRESS NOTES
COVID-19 Virtual Visit     Assessment/Plan:    Fever-  Exposure to COVID    This virtual check-in was done via [employer] and patient was informed that this is not a secure, HIPAA-complaint platform  She agrees to proceed     Disposition:      I spent 20 minutes directly with the patient during this visit    Encounter provider Arlina Councilman, PA-C    Provider located at [place] [address] [address] [place] [address]    Recent Visits  No visits were found meeting these conditions  Showing recent visits within past 7 days and meeting all other requirements     Today's Visits  Date Type Provider Dept   07/27/20 Telemedicine Arlina Councilman, PA-C [facility]   07/27/20 Telephone [facility]   Showing today's visits and meeting all other requirements     Future Appointments  Date Type Provider Dept   07/27/20 Telephone [address] [facility]   07/27/20 Telemedicine Arlina Councilman, PA-C [facility]   Showing future appointments within next 150 days and meeting all other requirements        Patient agrees to participate in a virtual check in via telephone or video visit instead of presenting to the office to address urgent/immediate medical needs  Patient is aware this is a billable service  After connecting through [employer], the patient was identified by name and date of birth  Padmini Hancock was informed that this was a telemedicine visit and that the exam was being conducted confidentially over secure lines  My office door was closed  No one else was in the room  Padmini Hancock acknowledged consent and understanding of privacy and security of the telemedicine visit  I informed the patient that I have reviewed her record in Epic and presented the opportunity for her to ask any questions regarding the visit today   The patient agreed to participate  Subjective  Simeon Tomlinson is a 64 y o  female who is concerned about COVID-19  She reports fever  She has not experienced SOB or cough  She has had contact with a person who is under investigation for or who is positive for COVID-19 within the last 14 days  She has not been hospitalized recently for fever and/or lower respiratory symptoms  Past Medical History:   Diagnosis Date    Anemia     Anxiety     Bursitis of left elbow     Chronic pain disorder     lower back    Hiatal hernia     Iron deficiency anemia secondary to inadequate dietary iron intake 1/29/2020    Kidney stones     Pancreatitis     Psoriasis     Psychiatric disorder     anxiety    Restless leg syndrome     Tobacco use disorder        Past Surgical History:   Procedure Laterality Date    CYSTOSCOPY      CYSTOSCOPY W/ URETERAL STENT PLACEMENT      NO PAST SURGERIES      MS ERCP DX COLLECTION SPECIMEN BRUSHING/WASHING N/A 8/15/2018    Procedure: ENDOSCOPIC RETROGRADE CHOLANGIOPANCREATOGRAPHY (ERCP);   Surgeon: Jozef Feliciano MD;  Location:  MAIN OR;  Service: Gastroenterology    MS LAP,DIAGNOSTIC ABDOMEN N/A 2/10/2020    Procedure: LAPAROSCOPY DIAGNOSTIC, REPAIR ENTEROTOMY, REMOVAL OF BEZOAR;  Surgeon: Shanique Rogers MD;  Location:  MAIN OR;  Service: General       Current Outpatient Medications   Medication Sig Dispense Refill    amLODIPine (NORVASC) 10 mg tablet Take 1 tablet (10 mg total) by mouth daily Decreased dose 90 tablet 3    Cholecalciferol (VITAMIN D3) 5000 units CAPS Take by mouth daily      Cranberry 200 MG CAPS Take 1 capsule by mouth daily      fluocinonide (LIDEX) 0 05 % ointment APPLY TO AFFECTED AREA TWICE A DAY 30 g 0    fluticasone (FLONASE) 50 mcg/act nasal spray SPRAY 2 SPRAYS INTO EACH NOSTRIL EVERY DAY 48 mL 5    fluticasone-salmeterol (ADVAIR DISKUS) 250-50 mcg/dose inhaler Inhale 1 puff 2 (two) times a day Rinse mouth after use 1 Inhaler 5    gabapentin (NEURONTIN) 300 mg capsule take 1 capsule by mouth daily in AM 1 at noon  and 2 capsules by mouth every  capsule 3    meloxicam (MOBIC) 15 mg tablet Take 1 tablet (15 mg total) by mouth daily 90 tablet 3    methocarbamol (ROBAXIN) 500 mg tablet TAKE 1 TABLET BY MOUTH TWICE A DAY 60 tablet 3    Multiple Vitamin tablet Take 1 tablet by mouth daily      omeprazole (PriLOSEC) 40 MG capsule take 1 capsule by mouth daily 90 capsule 3    Probiotic Product (PROBIOTIC-10 ULTIMATE PO) Take by mouth      triamcinolone (KENALOG) 0 1 % cream APPLY TO AFFECTED AREAS TWICE A DAY FOR 2 WEEKS AND THEN AS NEEDED      nicotine (NICODERM CQ) 21 mg/24 hr TD 24 hr patch PLACE 1 PATCH ON THE SKIN EVERY 24 HOURS (Patient not taking: Reported on 7/27/2020) 28 patch 0     No current facility-administered medications for this visit  Allergies   Allergen Reactions    Pollen Extract     Tree Extract        Review of Systems   Constitutional: Positive for fatigue and fever  Negative for chills and diaphoresis  HENT: Positive for congestion and rhinorrhea  Negative for ear pain and sore throat  Respiratory: Positive for cough  Negative for chest tightness and shortness of breath  Gastrointestinal: Positive for diarrhea  Negative for abdominal pain, nausea and vomiting  3 bowel movements today, last one was diarrhea  Neurological: Positive for headaches  Negative for dizziness and light-headedness  Headaches feels like sinus pressure  Video Exam    Vitals:    07/27/20 1053   Temp: 99 5 °F (37 5 °C)   TempSrc: Oral   Weight: 59 4 kg (131 lb)   Height: 5' 1" (1 549 m)         Milta Gentle appears no distress  Physical Exam     VIRTUAL VISIT DISCLAIMER    Ninfa Pierce acknowledges that she has consented to an online visit or consultation   She understands that the online visit is based solely on information provided by her, and that, in the absence of a face-to-face physical evaluation by the physician, the diagnosis she receives is both limited and provisional in terms of accuracy and completeness  This is not intended to replace a full medical face-to-face evaluation by the physician  Lennox Garber understands and accepts these terms

## 2020-07-28 LAB — SARS-COV-2 RNA SPEC QL NAA+PROBE: NOT DETECTED

## 2020-08-02 DIAGNOSIS — J45.21 MILD INTERMITTENT REACTIVE AIRWAY DISEASE WITH ACUTE EXACERBATION: ICD-10-CM

## 2020-08-06 ENCOUNTER — EVALUATION (OUTPATIENT)
Dept: PHYSICAL THERAPY | Facility: CLINIC | Age: 60
End: 2020-08-06
Payer: COMMERCIAL

## 2020-08-06 DIAGNOSIS — S92.325D CLOSED NONDISPLACED FRACTURE OF SECOND METATARSAL BONE OF LEFT FOOT WITH ROUTINE HEALING, SUBSEQUENT ENCOUNTER: ICD-10-CM

## 2020-08-06 DIAGNOSIS — S92.335D CLOSED NONDISPLACED FRACTURE OF THIRD METATARSAL BONE OF LEFT FOOT WITH ROUTINE HEALING, SUBSEQUENT ENCOUNTER: ICD-10-CM

## 2020-08-06 DIAGNOSIS — S92.345D CLOSED NONDISPLACED FRACTURE OF FOURTH METATARSAL BONE OF LEFT FOOT WITH ROUTINE HEALING, SUBSEQUENT ENCOUNTER: ICD-10-CM

## 2020-08-06 PROCEDURE — 97140 MANUAL THERAPY 1/> REGIONS: CPT

## 2020-08-06 PROCEDURE — 97110 THERAPEUTIC EXERCISES: CPT

## 2020-08-06 PROCEDURE — 97161 PT EVAL LOW COMPLEX 20 MIN: CPT

## 2020-08-06 NOTE — PROGRESS NOTES
PT Evaluation     Today's date: 2020  Patient name: Perri Ramirez  : 1960  MRN: [id number]  Referring provider: Linn Kehr, MD  Dx:   Encounter Diagnosis     ICD-10-CM    1  Closed nondisplaced fracture of second metatarsal bone of left foot with routine healing, subsequent encounter  S92 325D Ambulatory referral to Physical Therapy   2  Closed nondisplaced fracture of third metatarsal bone of left foot with routine healing, subsequent encounter  S92 335D Ambulatory referral to Physical Therapy   3  Closed nondisplaced fracture of fourth metatarsal bone of left foot with routine healing, subsequent encounter  S92 345D Ambulatory referral to Physical Therapy                  Assessment  Assessment details: Perri Ramirez is a 64 y o  female who presents 15 weeks post 3-4 metatarsal fx w/ routine healing  Pt presents w/ pain, decreased strength, decreased ROM, decreased joint mobility and ambulatory dysfunction  Due to these impairments, Patient has difficulty performing a/iadls, recreational activities and work-related activities  Patient has been educated in HEP and role of modalities in pain management  Pt tolerated jt mobs well w/o onset of pain  Patient would benefit from skilled physical therapy to address their aforementioned impairments, improve their level of function and to improve their overall quality of life      Impairments: abnormal gait, abnormal muscle firing, abnormal or restricted ROM, activity intolerance, impaired physical strength, lacks appropriate home exercise program and pain with function    Symptom irritability: lowUnderstanding of Dx/Px/POC: good   Prognosis: good    Goals  Impairment Goals  - Decrease pain less than 2/10 at worst in past 24 hrs in 4 weeks  - Improve L ankle ROM by atleast 10 degrees in 4 weeks  - Increase strength to pain free 5/5 throughout in 4 weeks    Functional Goals  - Pt will ambulate 50' w/o presence of gait deviations in order to return to PLOF in 8 weeks  - Pt will report [facility] of atleast 75% in 8 weeks  - Return to Prior Level of Function in 8 weeks  - Increase Functional Status Measure to: atleast 75 in 8 weeks  - Patient will be independent with HEP in 8 weeks    Plan  Patient would benefit from: skilled PT  Planned modality interventions: TENS, thermotherapy: hydrocollator packs and cryotherapy  Planned therapy interventions: joint mobilization, manual therapy, patient education, postural training, activity modification, body mechanics training, flexibility, functional ROM exercises, graded exercise, home exercise program, neuromuscular re-education, strengthening, stretching, therapeutic activities, therapeutic exercise, Carroll taping and massage  Frequency: 2x week  Duration in weeks: 8  Plan of Care beginning date: 2020  Plan of Care expiration date: 10/1/2020  Treatment plan discussed with: patient        Subjective Evaluation    History of Present Illness  Mechanism of injury: Pt reports she sustained fx of 3rd and 4th metatarsals d/t blunt force trama through dropping can on her foot at work the week of -  Reports she was unable to wear CAM boot d/t ankle pain, wore shoe CAM walker  Was WBAT entire time  Reports foot feels about the same since injury, however, notes less swelling  Works at [employer] in [place], pt has worked full duty throughout injury    Pain  Current pain ratin  At best pain ratin  At worst pain ratin  Location: medial dorsum of foot  Quality: sharp    Treatments  No previous or current treatments  Patient Goals  Patient goal: "Learn exercises so that I can do them on my own "        Objective    AROM:    R  L  Ankle DF calc/5thray  5  10  Ankle PF calc/5thray  60  65  Ankle inversion  28  15  Ankle eversion   25  10    STRENGTH:    R  L    Ankle DF   5/5  5/5  Ankle PF   5/5  *5/5  Ankle inversion  5/5  5/5  Ankle eversoin   5/5  *4+/5      Knee ext   5/5  *5/5  Knee flex   5/5  *5/5  SLS HR #   15  *15      Joint Mobility:   Hypomobile L talocrural jt  Hypomobile L subtalar jt    Tenderness/Palpation:  (-) TTP L tibialis anterior tendon  (-) TTP L  Achilles tendon  (+) TTP medial malleolus  (-) TTP lateral malleolus      Observation:   (-) edema noted through gross observation    Balance: TBA   Romberg  Romberg EC  Romberg foam  Tandem R>L  Tandem L>R  SLS R  SLS L      Function:  Gait: L inversion throughout stance, decreased push off L, absent heel strike L         Precautions: Metatarsal fx 4/23, chronic pain hips/back/neck      Manuals 8/6            Talocrural distr  BMG            Talocrural A-P jt mobs BMG            L ankle PROM all planes BMG                         Neuro Re-Ed             Tandem             SLS             Rhomberg             Ball toss tramp                                                    Ther Ex             HEP: ABCs             HEP:Self talocrural mob seat towel under met head             HEP:             Fitterboard             Gastroc str               Ankle tband 4 way             R hip abd                                                                              Ther Activity                                       Gait Training                                       Modalities

## 2020-08-11 ENCOUNTER — OFFICE VISIT (OUTPATIENT)
Dept: PHYSICAL THERAPY | Facility: CLINIC | Age: 60
End: 2020-08-11
Payer: COMMERCIAL

## 2020-08-11 DIAGNOSIS — S92.335D CLOSED NONDISPLACED FRACTURE OF THIRD METATARSAL BONE OF LEFT FOOT WITH ROUTINE HEALING, SUBSEQUENT ENCOUNTER: ICD-10-CM

## 2020-08-11 DIAGNOSIS — K21.9 GASTROESOPHAGEAL REFLUX DISEASE, ESOPHAGITIS PRESENCE NOT SPECIFIED: ICD-10-CM

## 2020-08-11 DIAGNOSIS — S92.345D CLOSED NONDISPLACED FRACTURE OF FOURTH METATARSAL BONE OF LEFT FOOT WITH ROUTINE HEALING, SUBSEQUENT ENCOUNTER: ICD-10-CM

## 2020-08-11 DIAGNOSIS — S92.325D CLOSED NONDISPLACED FRACTURE OF SECOND METATARSAL BONE OF LEFT FOOT WITH ROUTINE HEALING, SUBSEQUENT ENCOUNTER: Primary | ICD-10-CM

## 2020-08-11 DIAGNOSIS — L30.8 PSORIASIFORM ECZEMA: ICD-10-CM

## 2020-08-11 PROCEDURE — 97110 THERAPEUTIC EXERCISES: CPT | Performed by: PHYSICAL THERAPIST

## 2020-08-11 PROCEDURE — 97140 MANUAL THERAPY 1/> REGIONS: CPT | Performed by: PHYSICAL THERAPIST

## 2020-08-11 RX ORDER — OMEPRAZOLE 40 MG/1
CAPSULE, DELAYED RELEASE ORAL
Qty: 90 CAPSULE | Refills: 3 | Status: SHIPPED | OUTPATIENT
Start: 2020-08-11 | End: 2021-10-05

## 2020-08-11 RX ORDER — FLUOCINONIDE 0.5 MG/G
OINTMENT TOPICAL
Qty: 30 G | Refills: 0 | Status: SHIPPED | OUTPATIENT
Start: 2020-08-11 | End: 2020-11-30 | Stop reason: SDUPTHER

## 2020-08-11 NOTE — PROGRESS NOTES
Daily Note     Today's date: 2020  Patient name: Lynette Donald  : 1960  MRN: [id number]  Referring provider: Juana Hernadez MD  Dx:   Encounter Diagnosis     ICD-10-CM    1  Closed nondisplaced fracture of second metatarsal bone of left foot with routine healing, subsequent encounter  S92 325D    2  Closed nondisplaced fracture of third metatarsal bone of left foot with routine healing, subsequent encounter  S92 335D    3  Closed nondisplaced fracture of fourth metatarsal bone of left foot with routine healing, subsequent encounter  S92 345D                   Subjective: Pt reported that she has been performing her HEP as prescribed  Stated that she continues to ambulate on the outside of her foot due to medial foot pain with typical weight bearing  Objective: See treatment diary below      Assessment: Tolerated treatment well  In order to advance flexibility initiated seated eversion stretch, which she performed well without provocation of pain, added to HEP  Initiated calf strengthening to address weakness, which she performed well without provocation of pain  Patient demonstrated fatigue post treatment, exhibited good technique with therapeutic exercises and would benefit from continued PT  Plan: Continue per plan of care  Precautions: Metatarsal fx , chronic pain hips/back/neck      Manuals            Talocrural distr   BMG JZ           Talocrural A-P jt mobs BMG JZ           L ankle PROM all planes BMG JZ                        Neuro Re-Ed             Tandem             SLS             Rhomberg             Ball toss tramp                                                    Ther Ex             HEP: ABCs             HEP:Self talocrural mob seat towel under met head             HEP:             Fitterboard AP  3x10           Gastroc str   :15x3           Ankle tband Inv  M 3x10           R hip abd s/l   2x10           Calf press  90/ 3x10           Heel raises  2x15 Gastroc stretch step  :15x3           Soleus str box  :15x3           Eversion str   :15x3           Ther Activity                                       Gait Training                                       Modalities  8/11                        CT  4'

## 2020-08-14 ENCOUNTER — OFFICE VISIT (OUTPATIENT)
Dept: PHYSICAL THERAPY | Facility: CLINIC | Age: 60
End: 2020-08-14
Payer: COMMERCIAL

## 2020-08-14 DIAGNOSIS — S92.335D CLOSED NONDISPLACED FRACTURE OF THIRD METATARSAL BONE OF LEFT FOOT WITH ROUTINE HEALING, SUBSEQUENT ENCOUNTER: ICD-10-CM

## 2020-08-14 DIAGNOSIS — S92.325D CLOSED NONDISPLACED FRACTURE OF SECOND METATARSAL BONE OF LEFT FOOT WITH ROUTINE HEALING, SUBSEQUENT ENCOUNTER: Primary | ICD-10-CM

## 2020-08-14 DIAGNOSIS — S92.345D CLOSED NONDISPLACED FRACTURE OF FOURTH METATARSAL BONE OF LEFT FOOT WITH ROUTINE HEALING, SUBSEQUENT ENCOUNTER: ICD-10-CM

## 2020-08-14 PROCEDURE — 97112 NEUROMUSCULAR REEDUCATION: CPT | Performed by: PHYSICAL THERAPIST

## 2020-08-14 PROCEDURE — 97110 THERAPEUTIC EXERCISES: CPT | Performed by: PHYSICAL THERAPIST

## 2020-08-14 PROCEDURE — 97140 MANUAL THERAPY 1/> REGIONS: CPT | Performed by: PHYSICAL THERAPIST

## 2020-08-14 NOTE — PROGRESS NOTES
Daily Note     Today's date: 2020  Patient name: Umu Holloway  : 1960  MRN: [id number]  Referring provider: Adelfo Rios MD  Dx:   Encounter Diagnosis     ICD-10-CM    1  Closed nondisplaced fracture of second metatarsal bone of left foot with routine healing, subsequent encounter  S92 325D    2  Closed nondisplaced fracture of third metatarsal bone of left foot with routine healing, subsequent encounter  S92 335D    3  Closed nondisplaced fracture of fourth metatarsal bone of left foot with routine healing, subsequent encounter  S92 345D                   Subjective: Pt reported that she has been continuing to perform her HEP as prescribed  Objective: See treatment diary below      Assessment: Tolerated treatment well  In order to advance calf strengthening initiated heel raises on step, which she performed well without provocation of pain  In order to advance hip and ankle strengthening for gait, initiated TB side step, which she performed well without provocation of pain  Patient demonstrated fatigue post treatment, exhibited good technique with therapeutic exercises and would benefit from continued PT  Plan: Continue per plan of care  Precautions: Metatarsal fx , chronic pain hips/back/neck      Manuals           Talocrural distr   BMG JZ JZ          Talocrural A-P jt mobs BMG JZ JZ          L ankle PROM all planes BMG JZ JZ                       Neuro Re-Ed            Tandem   :15x3          SLS   :15x3          Rhomberg             Ball toss tramp             Fitter AP   3x15                                    Ther Ex            HEP: ABCs             HEP:Self talocrural mob seat towel under met head             HEP:                          Gastroc str   :15x3           Ankle tband Inv  M 3x10           R hip abd s/l   2x10 3x10          Calf press  90/ 3x10           Heel raises  2x15 Box 3x15          Gastroc stretch step  :15x3 :15x3 Soleus str box  :15x3 :15x3          TB side step   G 3x                                    Eversion str   :15x3 :15x3          Ther Activity                                       Gait Training                                       Modalities  8/11 8/14          Lul 75             CT  4'

## 2020-08-18 ENCOUNTER — APPOINTMENT (OUTPATIENT)
Dept: PHYSICAL THERAPY | Facility: CLINIC | Age: 60
End: 2020-08-18
Payer: COMMERCIAL

## 2020-08-18 ENCOUNTER — OFFICE VISIT (OUTPATIENT)
Dept: FAMILY MEDICINE CLINIC | Facility: HOSPITAL | Age: 60
End: 2020-08-18
Payer: COMMERCIAL

## 2020-08-18 VITALS
OXYGEN SATURATION: 97 % | HEIGHT: 61 IN | TEMPERATURE: 97.6 F | DIASTOLIC BLOOD PRESSURE: 84 MMHG | BODY MASS INDEX: 25.34 KG/M2 | WEIGHT: 134.2 LBS | HEART RATE: 85 BPM | SYSTOLIC BLOOD PRESSURE: 156 MMHG

## 2020-08-18 DIAGNOSIS — D50.8 IRON DEFICIENCY ANEMIA SECONDARY TO INADEQUATE DIETARY IRON INTAKE: Chronic | ICD-10-CM

## 2020-08-18 DIAGNOSIS — I10 BENIGN ESSENTIAL HTN: Primary | ICD-10-CM

## 2020-08-18 DIAGNOSIS — Z12.4 SCREENING FOR CERVICAL CANCER: ICD-10-CM

## 2020-08-18 DIAGNOSIS — H53.9 VISION CHANGES: ICD-10-CM

## 2020-08-18 DIAGNOSIS — Z23 NEED FOR SHINGLES VACCINE: ICD-10-CM

## 2020-08-18 DIAGNOSIS — E78.00 HYPERCHOLESTEROLEMIA: ICD-10-CM

## 2020-08-18 PROCEDURE — 1036F TOBACCO NON-USER: CPT | Performed by: INTERNAL MEDICINE

## 2020-08-18 PROCEDURE — 3077F SYST BP >= 140 MM HG: CPT | Performed by: INTERNAL MEDICINE

## 2020-08-18 PROCEDURE — 99214 OFFICE O/P EST MOD 30 MIN: CPT | Performed by: INTERNAL MEDICINE

## 2020-08-18 PROCEDURE — 3079F DIAST BP 80-89 MM HG: CPT | Performed by: INTERNAL MEDICINE

## 2020-08-18 PROCEDURE — 3008F BODY MASS INDEX DOCD: CPT | Performed by: INTERNAL MEDICINE

## 2020-08-18 RX ORDER — ZOSTER VACCINE RECOMBINANT, ADJUVANTED 50 MCG/0.5
0.5 KIT INTRAMUSCULAR ONCE
Qty: 1 EACH | Refills: 1 | Status: SHIPPED | OUTPATIENT
Start: 2020-08-18 | End: 2020-08-18

## 2020-08-18 NOTE — PROGRESS NOTES
Assessment/Plan:             Problem List Items Addressed This Visit        Cardiovascular and Mediastinum    Benign essential HTN - Primary     On amlodipine 10 mg daily- not much sleep last night- take it at home 1-2 x a weeka nd to call if not improving- had 3 cups of coffee            Other    Iron deficiency anemia secondary to inadequate dietary iron intake (Chronic)     Now improved after 10 infusions- to have repeat labs in november         Hypercholesterolemia     Recent labs in June were stable         Vision changes     Using readers but not strong enough- has seen Dr Gerrie Dandy in past                 Subjective:      Patient ID: La Floyd is a 64 y o  female    1  tob abuse- willing to try patch again- counseled- had quit over 4 months then stopped  2  htn- will monitor closely  3  Left foot mt fractures- now doing pt       The following portions of the patient's history were reviewed and updated as appropriate: allergies, current medications and problem list      Review of Systems   Constitutional: Negative for chills and fever  HENT: Positive for congestion  Respiratory: Negative for cough and shortness of breath  Cardiovascular: Negative for chest pain, palpitations and leg swelling  All other systems reviewed and are negative          Objective:      Current Outpatient Medications:     Advair Diskus 250-50 MCG/DOSE inhaler, INHALE 1 PUFF 2 (TWO) TIMES A DAY RINSE MOUTH AFTER USE, Disp: 1 Inhaler, Rfl: 5    amLODIPine (NORVASC) 10 mg tablet, Take 1 tablet (10 mg total) by mouth daily Decreased dose, Disp: 90 tablet, Rfl: 3    Cholecalciferol (VITAMIN D3) 5000 units CAPS, Take by mouth daily, Disp: , Rfl:     Cranberry 200 MG CAPS, Take 1 capsule by mouth daily, Disp: , Rfl:     fluocinonide (LIDEX) 0 05 % ointment, APPLY TO AFFECTED AREA TWICE A DAY, Disp: 30 g, Rfl: 0    fluticasone (FLONASE) 50 mcg/act nasal spray, SPRAY 2 SPRAYS INTO EACH NOSTRIL EVERY DAY, Disp: 48 mL, Rfl: 5    gabapentin (NEURONTIN) 300 mg capsule, take 1 capsule by mouth daily in AM 1 at noon  and 2 capsules by mouth every PM, Disp: 480 capsule, Rfl: 3    meloxicam (MOBIC) 15 mg tablet, Take 1 tablet (15 mg total) by mouth daily, Disp: 90 tablet, Rfl: 3    methocarbamol (ROBAXIN) 500 mg tablet, TAKE 1 TABLET BY MOUTH TWICE A DAY, Disp: 60 tablet, Rfl: 3    Multiple Vitamin tablet, Take 1 tablet by mouth daily, Disp: , Rfl:     omeprazole (PriLOSEC) 40 MG capsule, TAKE 1 CAPSULE BY MOUTH EVERY DAY, Disp: 90 capsule, Rfl: 3    Probiotic Product (PROBIOTIC-10 ULTIMATE PO), Take by mouth, Disp: , Rfl:     triamcinolone (KENALOG) 0 1 % cream, APPLY TO AFFECTED AREAS TWICE A DAY FOR 2 WEEKS AND THEN AS NEEDED, Disp: , Rfl:     nicotine (NICODERM CQ) 21 mg/24 hr TD 24 hr patch, PLACE 1 PATCH ON THE SKIN EVERY 24 HOURS (Patient not taking: Reported on 7/27/2020), Disp: 28 patch, Rfl: 0    Blood pressure 156/84, pulse 85, temperature 97 6 °F (36 4 °C), temperature source Tympanic, height 5' 1" (1 549 m), weight 60 9 kg (134 lb 3 2 oz), SpO2 97 %, not currently breastfeeding  Physical Exam  Vitals signs and nursing note reviewed  Constitutional:       General: She is not in acute distress  Appearance: She is well-developed  HENT:      Right Ear: External ear normal       Left Ear: External ear normal    Eyes:      General: No scleral icterus  Right eye: No discharge  Left eye: No discharge  Conjunctiva/sclera: Conjunctivae normal    Neck:      Musculoskeletal: Neck supple  Thyroid: No thyromegaly  Cardiovascular:      Rate and Rhythm: Normal rate and regular rhythm  Heart sounds: No murmur  Pulmonary:      Effort: No respiratory distress  Breath sounds: Normal breath sounds  No wheezing or rales  Comments: Mild hoarseness  Abdominal:      General: There is no distension  Palpations: Abdomen is soft  Tenderness: There is no abdominal tenderness  Musculoskeletal:         General: Tenderness present  Comments: Left ankle lateral tenderness   Lymphadenopathy:      Cervical: No cervical adenopathy  Skin:     General: Skin is warm and dry  Findings: No rash  Neurological:      Mental Status: She is alert and oriented to person, place, and time  Cranial Nerves: No cranial nerve deficit  Motor: No weakness or abnormal muscle tone  Psychiatric:         Behavior: Behavior normal          Thought Content:  Thought content normal          Judgment: Judgment normal

## 2020-08-18 NOTE — ASSESSMENT & PLAN NOTE
On amlodipine 10 mg daily- not much sleep last night- take it at home 1-2 x a weeka nd to call if not improving- had 3 cups of coffee

## 2020-08-19 ENCOUNTER — OFFICE VISIT (OUTPATIENT)
Dept: PHYSICAL THERAPY | Facility: CLINIC | Age: 60
End: 2020-08-19
Payer: COMMERCIAL

## 2020-08-19 DIAGNOSIS — S92.345D CLOSED NONDISPLACED FRACTURE OF FOURTH METATARSAL BONE OF LEFT FOOT WITH ROUTINE HEALING, SUBSEQUENT ENCOUNTER: ICD-10-CM

## 2020-08-19 DIAGNOSIS — S92.335D CLOSED NONDISPLACED FRACTURE OF THIRD METATARSAL BONE OF LEFT FOOT WITH ROUTINE HEALING, SUBSEQUENT ENCOUNTER: ICD-10-CM

## 2020-08-19 DIAGNOSIS — S92.325D CLOSED NONDISPLACED FRACTURE OF SECOND METATARSAL BONE OF LEFT FOOT WITH ROUTINE HEALING, SUBSEQUENT ENCOUNTER: Primary | ICD-10-CM

## 2020-08-19 PROCEDURE — 97110 THERAPEUTIC EXERCISES: CPT | Performed by: PHYSICAL THERAPIST

## 2020-08-19 PROCEDURE — 97140 MANUAL THERAPY 1/> REGIONS: CPT | Performed by: PHYSICAL THERAPIST

## 2020-08-19 NOTE — PROGRESS NOTES
Daily Note     Today's date: 2020  Patient name: Fly Collins  : 1960  MRN: [id number]  Referring provider: Fatemeh Mills MD  Dx:   Encounter Diagnosis     ICD-10-CM    1  Closed nondisplaced fracture of second metatarsal bone of left foot with routine healing, subsequent encounter  S92 325D    2  Closed nondisplaced fracture of third metatarsal bone of left foot with routine healing, subsequent encounter  S92 335D    3  Closed nondisplaced fracture of fourth metatarsal bone of left foot with routine healing, subsequent encounter  S92 345D                   Subjective: Pt reported that she has felt an increase in forefoot pain over the past couple days due to increased hours and standing/walking at work  Objective: See treatment diary below      Assessment: Tolerated treatment well  Modified program to focus on mobility/flexibility and decreasing pain and held strengthening  Noted significant iimprovement in flexibility and decreased pain with manual treatment and stranding exercises  Pt would benefit from further skilled outpatient PT  Plan: Continue per plan of care  Precautions: Metatarsal fx , chronic pain hips/back/neck      Manuals          Talocrural distr   BMG JZ JZ JZ         Talocrural A-P jt mobs BMG JZ JZ JZ         L ankle PROM all planes BMG JZ JZ JZ         Metatarsal mobs     JZ         Neuro Re-Ed           Tandem   :15x3          SLS   :15x3          Rhomberg             Ball toss tramp             Fitter AP   3x15                                    Ther Ex            HEP: ABCs             HEP:Self talocrural mob seat towel under met head             HEP:                          Gastroc str   :15x3  :15x5         Ankle tband Inv  M 3x10           R hip abd s/l   2x10 3x10          Calf press  90/ 3x10           Heel raises  2x15 Box 3x15          Gastroc stretch step  :15x3 :15x3 :15x5         Soleus str box  :15x3 :15x3 :15x5         TB side step   G 3x          Inversion stret    :15x5                      Eversion str   :15x3 :15x3 :15x5         Ther Activity                                       Gait Training                                       Modalities  8/11 8/14 8/19         Lul 75             CT  4'

## 2020-08-20 ENCOUNTER — OFFICE VISIT (OUTPATIENT)
Dept: PHYSICAL THERAPY | Facility: CLINIC | Age: 60
End: 2020-08-20
Payer: COMMERCIAL

## 2020-08-20 DIAGNOSIS — S92.325D CLOSED NONDISPLACED FRACTURE OF SECOND METATARSAL BONE OF LEFT FOOT WITH ROUTINE HEALING, SUBSEQUENT ENCOUNTER: Primary | ICD-10-CM

## 2020-08-20 DIAGNOSIS — S92.345D CLOSED NONDISPLACED FRACTURE OF FOURTH METATARSAL BONE OF LEFT FOOT WITH ROUTINE HEALING, SUBSEQUENT ENCOUNTER: ICD-10-CM

## 2020-08-20 DIAGNOSIS — S92.335D CLOSED NONDISPLACED FRACTURE OF THIRD METATARSAL BONE OF LEFT FOOT WITH ROUTINE HEALING, SUBSEQUENT ENCOUNTER: ICD-10-CM

## 2020-08-20 PROCEDURE — 97110 THERAPEUTIC EXERCISES: CPT | Performed by: PHYSICAL THERAPIST

## 2020-08-20 PROCEDURE — 97140 MANUAL THERAPY 1/> REGIONS: CPT | Performed by: PHYSICAL THERAPIST

## 2020-08-20 NOTE — PROGRESS NOTES
Daily Note     Today's date: 2020  Patient name: Claribel Meyer  : 1960  MRN: [id number]  Referring provider: Shyam Wilkerson MD  Dx:   Encounter Diagnosis     ICD-10-CM    1  Closed nondisplaced fracture of second metatarsal bone of left foot with routine healing, subsequent encounter  S92 325D    2  Closed nondisplaced fracture of third metatarsal bone of left foot with routine healing, subsequent encounter  S92 335D    3  Closed nondisplaced fracture of fourth metatarsal bone of left foot with routine healing, subsequent encounter  S92 345D                   Subjective: Pt reporeted that she was able to work for about 3-4 hours without pain yesterday, however stated that the pain did return as the work day progressed  Objective: See treatment diary below      Assessment: Tolerated treatment well  Focused appointment on manual treatment including jt mobs and stretches  Noted improved flexibility and decreased pain upon completion of session  Pt would benefit from further skilled outpatient PT in order to restore her to her PLOF  Plan: Continue per plan of care  Precautions: Metatarsal fx , chronic pain hips/back/neck      Manuals         Talocrural distr   BMG JZ JZ JZ JZ        Talocrural A-P jt mobs BMG JZ JZ JZ JZ        L ankle PROM all planes BMG JZ JZ JZ JZ        Metatarsal mobs     JZ JZ        Neuro Re-Ed          Tandem   :15x3          SLS   :15x3          Rhomberg             Ball toss tramp             Fitter AP   3x15                                    Ther Ex          HEP: ABCs             HEP:Self talocrural mob seat towel under met head             HEP:                          Gastroc str   :15x3  :15x5 :15x5        Ankle tband Inv  M 3x10           R hip abd s/l   2x10 3x10          Calf press  90/ 3x10           Heel raises  2x15 Box 3x15          Gastroc stretch step  :15x3 :15x3 :15x5 :15x5        Soleus str box  :15x3 :15x3 :15x5 :15x5        TB side step   G 3x          Inversion stret    :15x5 :15x5                     Eversion str   :15x3 :15x3 :15x5 :15x5        Ther Activity                                       Gait Training                                       Modalities  8/11 8/14 8/19 8/20        [address]             CT  4'

## 2020-08-24 ENCOUNTER — OFFICE VISIT (OUTPATIENT)
Dept: PHYSICAL THERAPY | Facility: CLINIC | Age: 60
End: 2020-08-24
Payer: COMMERCIAL

## 2020-08-24 DIAGNOSIS — S92.345D CLOSED NONDISPLACED FRACTURE OF FOURTH METATARSAL BONE OF LEFT FOOT WITH ROUTINE HEALING, SUBSEQUENT ENCOUNTER: ICD-10-CM

## 2020-08-24 DIAGNOSIS — S92.335D CLOSED NONDISPLACED FRACTURE OF THIRD METATARSAL BONE OF LEFT FOOT WITH ROUTINE HEALING, SUBSEQUENT ENCOUNTER: ICD-10-CM

## 2020-08-24 DIAGNOSIS — S92.325D CLOSED NONDISPLACED FRACTURE OF SECOND METATARSAL BONE OF LEFT FOOT WITH ROUTINE HEALING, SUBSEQUENT ENCOUNTER: Primary | ICD-10-CM

## 2020-08-24 PROCEDURE — 97140 MANUAL THERAPY 1/> REGIONS: CPT | Performed by: PHYSICAL THERAPIST

## 2020-08-24 PROCEDURE — 97110 THERAPEUTIC EXERCISES: CPT | Performed by: PHYSICAL THERAPIST

## 2020-08-24 NOTE — PROGRESS NOTES
Daily Note     Today's date: 2020  Patient name: Simeon Tomlinson  : 1960  MRN: [id number]  Referring provider: Sandhya Lees MD  Dx:   Encounter Diagnosis     ICD-10-CM    1  Closed nondisplaced fracture of second metatarsal bone of left foot with routine healing, subsequent encounter  S92 325D    2  Closed nondisplaced fracture of third metatarsal bone of left foot with routine healing, subsequent encounter  S92 335D    3  Closed nondisplaced fracture of fourth metatarsal bone of left foot with routine healing, subsequent encounter  S92 345D                   Subjective: Pt reported that she is still working many hours and has increased foot pain  Objective: See treatment diary below      Assessment: Tolerated treatment well  Pt was educated on using her orthopedic shoe for part of the day due to increased pain, and slowly weaning out due to her high number of steps taken every day at work  She went right from using her orthopedic shoe at work 100% of the time to 0% of the time and she walks 20,000-30,000 steps a day  Patient demonstrated fatigue post treatment, exhibited good technique with therapeutic exercises and would benefit from continued PT  Plan: Continue per plan of care  Precautions: Metatarsal fx , chronic pain hips/back/neck      Manuals        Talocrural distr   BMG JZ JZ JZ JZ JZ       Talocrural A-P jt mobs BMG JZ JZ JZ JZ JZ       L ankle PROM all planes BMG JZ JZ JZ JZ JZ       Metatarsal mobs     JZ JZ JZ       Neuro Re-Ed         Tandem   :15x3          SLS   :15x3          Rhomberg             Ball toss tramp             Fitter AP   3x15                                    Ther Ex         HEP: ABCs             HEP:Self talocrural mob seat towel under met head             HEP:                          Gastroc str   :15x3  :15x5 :15x5 :15x5       Ankle tband Inv  M 3x10           R hip abd s/l 2x10 3x10          Calf press  90/ 3x10           Heel raises  2x15 Box 3x15          Gastroc stretch step  :15x3 :15x3 :15x5 :15x5 :15x5       Soleus str box  :15x3 :15x3 :15x5 :15x5 :15x5       TB side step   G 3x          Inversion stret    :15x5 :15x5 :15x5                    Eversion str   :15x3 :15x3 :15x5 :15x5 :15x5       Ther Activity                                       Gait Training                                       Modalities  8/11 8/14 8/19 8/20 8/24       [facility]             CT  4'

## 2020-08-26 ENCOUNTER — OFFICE VISIT (OUTPATIENT)
Dept: PHYSICAL THERAPY | Facility: CLINIC | Age: 60
End: 2020-08-26
Payer: COMMERCIAL

## 2020-08-26 DIAGNOSIS — S92.335D CLOSED NONDISPLACED FRACTURE OF THIRD METATARSAL BONE OF LEFT FOOT WITH ROUTINE HEALING, SUBSEQUENT ENCOUNTER: ICD-10-CM

## 2020-08-26 DIAGNOSIS — S92.345D CLOSED NONDISPLACED FRACTURE OF FOURTH METATARSAL BONE OF LEFT FOOT WITH ROUTINE HEALING, SUBSEQUENT ENCOUNTER: ICD-10-CM

## 2020-08-26 DIAGNOSIS — S92.325D CLOSED NONDISPLACED FRACTURE OF SECOND METATARSAL BONE OF LEFT FOOT WITH ROUTINE HEALING, SUBSEQUENT ENCOUNTER: Primary | ICD-10-CM

## 2020-08-26 PROCEDURE — 97016 VASOPNEUMATIC DEVICE THERAPY: CPT | Performed by: PHYSICAL THERAPIST

## 2020-08-26 PROCEDURE — 97140 MANUAL THERAPY 1/> REGIONS: CPT | Performed by: PHYSICAL THERAPIST

## 2020-08-26 NOTE — PROGRESS NOTES
Daily Note     Today's date: 2020  Patient name: Marya Koyanagi  : 1960  MRN: [id number]  Referring provider: Robbie Jurado MD  Dx:   Encounter Diagnosis     ICD-10-CM    1  Closed nondisplaced fracture of second metatarsal bone of left foot with routine healing, subsequent encounter  S92 325D    2  Closed nondisplaced fracture of third metatarsal bone of left foot with routine healing, subsequent encounter  S92 335D    3  Closed nondisplaced fracture of fourth metatarsal bone of left foot with routine healing, subsequent encounter  S92 345D                   Subjective: Pt reported that she attempted to wear her old work shoe at work yesterday, however was unable due to increase in pain  Said that she walks 30,000 steps a day at work and 4 hours into her shift she starts to have pain  Objective: See treatment diary below      Assessment: Tolerated treatment well  Pt was educated on use of appropriate footwear during work, discussed attempting to wear Jose R's instead of current footwear due to lack of support  Held exercises due to persisting pain in weight bearing  Plan: Continue per plan of care  Precautions: Metatarsal fx , chronic pain hips/back/neck      Manuals       Talocrural distr   BMG JZ JZ JZ JZ JZ JZ      Talocrural A-P jt mobs BMG JZ JZ JZ JZ JZ JZ      L ankle PROM all planes BMG JZ JZ JZ JZ JZ JZ      Metatarsal mobs     JZ JZ JZ JZ      Neuro Re-Ed        Tandem   :15x3          SLS   :15x3          Rhomberg             Ball toss tramp             Fitter AP   3x15                                    Ther Ex        HEP: ABCs             HEP:Self talocrural mob seat towel under met head             HEP:                          Gastroc str   :15x3  :15x5 :15x5 :15x5       Ankle tband Inv  M 3x10           R hip abd s/l   2x10 3x10          Calf press  90/ 3x10           Heel raises 2x15 Box 3x15          Gastroc stretch step  :15x3 :15x3 :15x5 :15x5 :15x5       Soleus str box  :15x3 :15x3 :15x5 :15x5 :15x5       TB side step   G 3x          Inversion stret    :15x5 :15x5 :15x5                    Eversion str   :15x3 :15x3 :15x5 :15x5 :15x5       Ther Activity                                       Gait Training                                       Modalities  8/11 8/14 8/19 8/20 8/24 8/26                   CT  4'     Vaso 10'

## 2020-09-01 ENCOUNTER — OFFICE VISIT (OUTPATIENT)
Dept: PHYSICAL THERAPY | Facility: CLINIC | Age: 60
End: 2020-09-01
Payer: COMMERCIAL

## 2020-09-01 DIAGNOSIS — S92.325D CLOSED NONDISPLACED FRACTURE OF SECOND METATARSAL BONE OF LEFT FOOT WITH ROUTINE HEALING, SUBSEQUENT ENCOUNTER: Primary | ICD-10-CM

## 2020-09-01 DIAGNOSIS — S92.335D CLOSED NONDISPLACED FRACTURE OF THIRD METATARSAL BONE OF LEFT FOOT WITH ROUTINE HEALING, SUBSEQUENT ENCOUNTER: ICD-10-CM

## 2020-09-01 DIAGNOSIS — S92.345D CLOSED NONDISPLACED FRACTURE OF FOURTH METATARSAL BONE OF LEFT FOOT WITH ROUTINE HEALING, SUBSEQUENT ENCOUNTER: ICD-10-CM

## 2020-09-01 PROCEDURE — 97016 VASOPNEUMATIC DEVICE THERAPY: CPT | Performed by: PHYSICAL THERAPIST

## 2020-09-01 PROCEDURE — 97110 THERAPEUTIC EXERCISES: CPT | Performed by: PHYSICAL THERAPIST

## 2020-09-01 PROCEDURE — 97140 MANUAL THERAPY 1/> REGIONS: CPT | Performed by: PHYSICAL THERAPIST

## 2020-09-01 NOTE — PROGRESS NOTES
Daily Note     Today's date: 2020  Patient name: Angel Torres  : 1960  MRN: [id number]  Referring provider: Gwendolyn Joseph MD  Dx:   Encounter Diagnosis     ICD-10-CM    1  Closed nondisplaced fracture of second metatarsal bone of left foot with routine healing, subsequent encounter  S92 325D    2  Closed nondisplaced fracture of third metatarsal bone of left foot with routine healing, subsequent encounter  S92 335D    3  Closed nondisplaced fracture of fourth metatarsal bone of left foot with routine healing, subsequent encounter  S92 345D                   Subjective: Pt reported that she was given a new ankle sleeve with different sneakers and it has improved her pain significantly  Stated that she was able to work for 6 hours without pain, instead of 4  Objective: See treatment diary below      Assessment: Tolerated treatment well  Able to initiate strengthening again due to decreased pain with use of ankle sleeve and new sneakers  Patient demonstrated fatigue post treatment, exhibited good technique with therapeutic exercises and would benefit from continued PT  Plan: Continue per plan of care  Precautions: Metatarsal fx , chronic pain hips/back/neck      Manuals      Talocrural distr   BMG JZ JZ JZ JZ JZ JZ JZ     Talocrural A-P jt mobs BMG JZ JZ JZ JZ JZ JZ JZ     L ankle PROM all planes BMG JZ JZ JZ JZ JZ JZ JZ     Metatarsal mobs     JZ JZ JZ JZ JZ     Neuro Re-Ed       Tandem   :15x3          SLS   :15x3          Rhomberg             Ball toss tramp             Fitter AP   3x15                                    Ther Ex       HEP: ABCs             HEP:Self talocrural mob seat towel under met head             HEP:                          Gastroc str   :15x3  :15x5 :15x5 :15x5  :15x5ea     Ankle tband Inv  M 3x10           R hip abd s/l   2x10 3x10          Calf press  90/ 3x10 Heel raises  2x15 Box 3x15     Box 3x15     Gastroc stretch step  :15x3 :15x3 :15x5 :15x5 :15x5  :15x5     Soleus str box  :15x3 :15x3 :15x5 :15x5 :15x5  :15x5     TB side step   G 3x          Inversion stret    :15x5 :15x5 :15x5       TB eversion        B 3x10     Eversion str   :15x3 :15x3 :15x5 :15x5 :15x5       Ther Activity                                       Gait Training                                       Modalities  8/11 8/14 8/19 8/20 8/24 8/26 9/1                  CT  4'     Vaso 10' Vaso 10'

## 2020-09-02 ENCOUNTER — OFFICE VISIT (OUTPATIENT)
Dept: OBGYN CLINIC | Facility: CLINIC | Age: 60
End: 2020-09-02
Payer: COMMERCIAL

## 2020-09-02 VITALS
SYSTOLIC BLOOD PRESSURE: 145 MMHG | HEIGHT: 61 IN | TEMPERATURE: 97.2 F | WEIGHT: 132 LBS | BODY MASS INDEX: 24.92 KG/M2 | DIASTOLIC BLOOD PRESSURE: 70 MMHG

## 2020-09-02 DIAGNOSIS — S92.335D CLOSED NONDISPLACED FRACTURE OF THIRD METATARSAL BONE OF LEFT FOOT WITH ROUTINE HEALING, SUBSEQUENT ENCOUNTER: Primary | ICD-10-CM

## 2020-09-02 DIAGNOSIS — S92.345D CLOSED NONDISPLACED FRACTURE OF FOURTH METATARSAL BONE OF LEFT FOOT WITH ROUTINE HEALING, SUBSEQUENT ENCOUNTER: ICD-10-CM

## 2020-09-02 DIAGNOSIS — S92.325D CLOSED NONDISPLACED FRACTURE OF SECOND METATARSAL BONE OF LEFT FOOT WITH ROUTINE HEALING, SUBSEQUENT ENCOUNTER: ICD-10-CM

## 2020-09-02 PROCEDURE — 99213 OFFICE O/P EST LOW 20 MIN: CPT | Performed by: ORTHOPAEDIC SURGERY

## 2020-09-02 NOTE — PROGRESS NOTES
Assessment:     1  Closed nondisplaced fracture of third metatarsal bone of left foot with routine healing, subsequent encounter    2  Closed nondisplaced fracture of fourth metatarsal bone of left foot with routine healing, subsequent encounter    3  Closed nondisplaced fracture of second metatarsal bone of left foot with routine healing, subsequent encounter        Plan:     Problem List Items Addressed This Visit        Musculoskeletal and Integument    Closed nondisplaced fracture of second metatarsal bone of left foot    Closed nondisplaced fracture of third metatarsal bone of left foot - Primary    Closed nondisplaced fracture of fourth metatarsal bone of left foot          Findings consistent with left 2nd, 3rd, and 4th metatarsal neck fractures, healed  Discussed findings and treatment options with the patient  She is doing excellent  Recommend continuing physical therapy until completion along with home exercise program   Continue ankle sleeve since that provides relief as well  She has no restrictions  Follow-up as needed if any problems arise  All patient's questions were answered to her satisfaction  Plan discussed with Dr Cheyenne Rain  This note is created using dictation transcription  It may contain typographical errors, grammatical errors, improperly dictated words, background noise and other errors  Subjective:     Patient ID: Angel Torres is a 64 y o  female  Chief Complaint:  17-year-old female follow-up left foot 2nd, 3rd and 4th metatarsal fractures  The injury occurred about 4-5 months ago  She has been attending formal physical therapy and feels significant improvement  She also found a ankle compression sleeve to wear that has provided significant relief as well  She is able to stand for multiple hours with no pain      Allergy:  Allergies   Allergen Reactions    Pollen Extract     Tree Extract      Medications:  all current active meds have been reviewed  Past Medical History:  Past Medical History:   Diagnosis Date    Anemia     Anxiety     Bursitis of left elbow     Chronic pain disorder     lower back    Hiatal hernia     Iron deficiency anemia secondary to inadequate dietary iron intake 1/29/2020    Kidney stones     Pancreatitis     Psoriasis     Psychiatric disorder     anxiety    Restless leg syndrome     Tobacco use disorder      Past Surgical History:  Past Surgical History:   Procedure Laterality Date    CYSTOSCOPY      CYSTOSCOPY W/ URETERAL STENT PLACEMENT      NO PAST SURGERIES      [place] ERCP DX COLLECTION SPECIMEN BRUSHING/WASHING N/A 8/15/2018    Procedure: ENDOSCOPIC RETROGRADE CHOLANGIOPANCREATOGRAPHY (ERCP); Surgeon: Jose Luis Jasso MD;  Location:  MAIN OR;  Service: Gastroenterology    [place] LAP,DIAGNOSTIC ABDOMEN N/A 2/10/2020    Procedure: LAPAROSCOPY DIAGNOSTIC, REPAIR ENTEROTOMY, REMOVAL OF BEZOAR;  Surgeon: Yuni Ordonez MD;  Location:  MAIN OR;  Service: General     Family History:  Family History   Problem Relation Age of Onset    Mental illness Sister     Other Other         Cardiovascular disease    Hypertension Mother     Cancer Father     Substance Abuse Neg Hx      Social History:  Social History     Substance and Sexual Activity   Alcohol Use Never    Frequency: Never    Drinks per session: Patient refused    Binge frequency: Never     Social History     Substance and Sexual Activity   Drug Use No     Social History     Tobacco Use   Smoking Status Former Smoker    Types: Cigarettes   Smokeless Tobacco Never Used   Tobacco Comment    Attempting to stop     Review of Systems   Constitutional: Negative  HENT: Negative  Eyes: Negative  Respiratory: Negative  Cardiovascular: Negative  Gastrointestinal: Negative  Endocrine: Negative  Genitourinary: Negative  Musculoskeletal: Negative for arthralgias (Left ankle) and joint swelling (Left forefoot)  Skin: Negative  Allergic/Immunologic: Negative  Neurological: Negative  Hematological: Negative  Psychiatric/Behavioral: Negative  Objective:  BP Readings from Last 1 Encounters:   09/02/20 145/70      Wt Readings from Last 1 Encounters:   09/02/20 59 9 kg (132 lb)      BMI:   Estimated body mass index is 24 94 kg/m² as calculated from the following:    Height as of this encounter: 5' 1" (1 549 m)  Weight as of this encounter: 59 9 kg (132 lb)  BSA:   Estimated body surface area is 1 58 meters squared as calculated from the following:    Height as of this encounter: 5' 1" (1 549 m)  Weight as of this encounter: 59 9 kg (132 lb)  Physical Exam  Vitals signs and nursing note reviewed  Constitutional:       Appearance: She is well-developed  HENT:      Head: Normocephalic and atraumatic  Eyes:      Conjunctiva/sclera: Conjunctivae normal    Neck:      Musculoskeletal: Neck supple  Pulmonary:      Effort: Pulmonary effort is normal    Skin:     General: Skin is warm  Neurological:      Mental Status: She is alert and oriented to person, place, and time  Left Ankle Exam     Tenderness   The patient is experiencing no tenderness  Swelling: none    Range of Motion   The patient has normal left ankle ROM  Left ankle dorsiflexion: Discomfort anteriorly  Muscle Strength   Dorsiflexion:  5/5   Plantar flexion:  5/5   Posterior tibial:  5/5  Peroneal muscle:  5/5    Other   Erythema: absent  Sensation: normal  Pulse: present    Comments:  Swelling resolved            No new imaging

## 2020-09-03 ENCOUNTER — OFFICE VISIT (OUTPATIENT)
Dept: PHYSICAL THERAPY | Facility: CLINIC | Age: 60
End: 2020-09-03
Payer: COMMERCIAL

## 2020-09-03 DIAGNOSIS — S92.325D CLOSED NONDISPLACED FRACTURE OF SECOND METATARSAL BONE OF LEFT FOOT WITH ROUTINE HEALING, SUBSEQUENT ENCOUNTER: Primary | ICD-10-CM

## 2020-09-03 DIAGNOSIS — S92.335D CLOSED NONDISPLACED FRACTURE OF THIRD METATARSAL BONE OF LEFT FOOT WITH ROUTINE HEALING, SUBSEQUENT ENCOUNTER: ICD-10-CM

## 2020-09-03 DIAGNOSIS — S92.345D CLOSED NONDISPLACED FRACTURE OF FOURTH METATARSAL BONE OF LEFT FOOT WITH ROUTINE HEALING, SUBSEQUENT ENCOUNTER: ICD-10-CM

## 2020-09-03 PROCEDURE — 97140 MANUAL THERAPY 1/> REGIONS: CPT | Performed by: PHYSICAL THERAPIST

## 2020-09-03 PROCEDURE — 97016 VASOPNEUMATIC DEVICE THERAPY: CPT | Performed by: PHYSICAL THERAPIST

## 2020-09-03 PROCEDURE — 97110 THERAPEUTIC EXERCISES: CPT | Performed by: PHYSICAL THERAPIST

## 2020-09-03 NOTE — PROGRESS NOTES
Daily Note     Today's date: 9/3/2020  Patient name: Umu Holloway  : 1960  MRN: [id number]  Referring provider: Adelfo Rios MD  Dx:   Encounter Diagnosis     ICD-10-CM    1  Closed nondisplaced fracture of second metatarsal bone of left foot with routine healing, subsequent encounter  S92 325D    2  Closed nondisplaced fracture of third metatarsal bone of left foot with routine healing, subsequent encounter  S92 335D    3  Closed nondisplaced fracture of fourth metatarsal bone of left foot with routine healing, subsequent encounter  S92 345D                   Subjective: Pt reported that she was able to work for about 6 5-7 hours yesterday without provocation of pain  Objective: See treatment diary below      Assessment: Tolerated treatment well  Demonstrated improved ankle eversion ROM with manual treatment and PROM stretches  Continues to present with limited eversion ROM  Patient demonstrated fatigue post treatment, exhibited good technique with therapeutic exercises and would benefit from continued PT  Plan: Continue per plan of care  Precautions: Metatarsal fx , chronic pain hips/back/neck      Manuals 8/6 8/11 8/14 8/19 8/20 8/24 8/26 9/1 9/3    Talocrural distr   BMG JZ JZ JZ JZ JZ JZ JZ JZ    Talocrural A-P jt mobs BMG JZ JZ JZ JZ JZ JZ JZ JZ    L ankle PROM all planes BMG JZ JZ JZ JZ JZ JZ JZ JZ    Metatarsal mobs     JZ JZ JZ JZ JZ JZ    Neuro Re-Ed  8/11 8/14 8/19 8/20 8/24 8/26 9/1 9/3    Tandem   :15x3          SLS   :15x3          Rhomberg             Ball toss tramp             Fitter AP   3x15                                    Ther Ex  8/11 8/14  8/20 8/24 8/26 9/1 9/3    HEP: ABCs             HEP:Self talocrural mob seat towel under met head             HEP:                          Gastroc str   :15x3  :15x5 :15x5 :15x5  :15x5ea :15x5    Ankle tband Inv  M 3x10           R hip abd s/l   2x10 3x10          Calf press  90/ 3x10           Heel raises  2x15 Box 3x15 Box 3x15 3x15 box    Gastroc stretch step  :15x3 :15x3 :15x5 :15x5 :15x5  :15x5 :15x5    Soleus str box  :15x3 :15x3 :15x5 :15x5 :15x5  :15x5 :15x5    TB side step   G 3x          Inversion stret    :15x5 :15x5 :15x5       TB eversion        B 3x10 B 3x10    Eversion str   :15x3 :15x3 :15x5 :15x5 :15x5       Ther Activity                                       Gait Training                                       Modalities  8/11 8/14 8/19 8/20 8/24 8/26 9/1 9/3                 CT  4'     Vaso 10' Vaso 10' Vaso

## 2020-09-08 ENCOUNTER — OFFICE VISIT (OUTPATIENT)
Dept: PHYSICAL THERAPY | Facility: CLINIC | Age: 60
End: 2020-09-08
Payer: COMMERCIAL

## 2020-09-08 DIAGNOSIS — S92.335D CLOSED NONDISPLACED FRACTURE OF THIRD METATARSAL BONE OF LEFT FOOT WITH ROUTINE HEALING, SUBSEQUENT ENCOUNTER: ICD-10-CM

## 2020-09-08 DIAGNOSIS — S92.345D CLOSED NONDISPLACED FRACTURE OF FOURTH METATARSAL BONE OF LEFT FOOT WITH ROUTINE HEALING, SUBSEQUENT ENCOUNTER: ICD-10-CM

## 2020-09-08 DIAGNOSIS — S92.325D CLOSED NONDISPLACED FRACTURE OF SECOND METATARSAL BONE OF LEFT FOOT WITH ROUTINE HEALING, SUBSEQUENT ENCOUNTER: Primary | ICD-10-CM

## 2020-09-08 PROCEDURE — 97110 THERAPEUTIC EXERCISES: CPT | Performed by: PHYSICAL THERAPIST

## 2020-09-08 PROCEDURE — 97140 MANUAL THERAPY 1/> REGIONS: CPT | Performed by: PHYSICAL THERAPIST

## 2020-09-08 NOTE — PROGRESS NOTES
Daily Note     Today's date: 2020  Patient name: Jose Cortés  : 1960  MRN: [id number]  Referring provider: Kwaku Engle MD  Dx:   Encounter Diagnosis     ICD-10-CM    1  Closed nondisplaced fracture of second metatarsal bone of left foot with routine healing, subsequent encounter  S92 325D    2  Closed nondisplaced fracture of third metatarsal bone of left foot with routine healing, subsequent encounter  S92 335D    3  Closed nondisplaced fracture of fourth metatarsal bone of left foot with routine healing, subsequent encounter  S92 345D                   Subjective: Pt reported that she is feeling "okay today " Noted that she is still having pain in her forefoot and lateral foot by end of work day "      Objective: See treatment diary below      Assessment: Tolerated treatment well  Patient demonstrated fatigue post treatment, exhibited good technique with therapeutic exercises and would benefit from continued PT  Plan: Continue per plan of care  Precautions: Metatarsal fx , chronic pain hips/back/neck      Manuals         9/3 9/8   Talocrural distr           JZ JZ   Talocrural A-P jt mobs         JZ JZ   L ankle PROM all planes         JZ JZ   Metatarsal mobs          JZ JZ   Neuro Re-Ed         9/3 9/8   Tandem             SLS             Rhomberg             Ball toss tramp             Fitter AP                                       Ther Ex         9/3 9/8   HEP: ABCs             HEP:Self talocrural mob seat towel under met head             HEP:                          Gastroc str          :15x5 :15x5   Ankle tband Inv             R hip abd s/l              Calf press             Heel raises         3x15 box 3x15 box   Gastroc stretch step         :15x5 :15x5   Soleus str box         :15x5 :15x5   TB side step             Inversion stret             TB eversion         B 3x10 B 3x10   Eversion str              Ther Activity                                       Gait Training Modalities         9/3 9/8   [facility]             CT         Vaso

## 2020-09-09 ENCOUNTER — TELEPHONE (OUTPATIENT)
Dept: FAMILY MEDICINE CLINIC | Facility: HOSPITAL | Age: 60
End: 2020-09-09

## 2020-09-09 DIAGNOSIS — E78.00 HYPERCHOLESTEROLEMIA: Primary | ICD-10-CM

## 2020-09-10 ENCOUNTER — APPOINTMENT (OUTPATIENT)
Dept: PHYSICAL THERAPY | Facility: CLINIC | Age: 60
End: 2020-09-10
Payer: COMMERCIAL

## 2020-09-15 ENCOUNTER — OFFICE VISIT (OUTPATIENT)
Dept: PHYSICAL THERAPY | Facility: CLINIC | Age: 60
End: 2020-09-15
Payer: COMMERCIAL

## 2020-09-15 DIAGNOSIS — S92.325D CLOSED NONDISPLACED FRACTURE OF SECOND METATARSAL BONE OF LEFT FOOT WITH ROUTINE HEALING, SUBSEQUENT ENCOUNTER: Primary | ICD-10-CM

## 2020-09-15 DIAGNOSIS — S92.335D CLOSED NONDISPLACED FRACTURE OF THIRD METATARSAL BONE OF LEFT FOOT WITH ROUTINE HEALING, SUBSEQUENT ENCOUNTER: ICD-10-CM

## 2020-09-15 DIAGNOSIS — S92.345D CLOSED NONDISPLACED FRACTURE OF FOURTH METATARSAL BONE OF LEFT FOOT WITH ROUTINE HEALING, SUBSEQUENT ENCOUNTER: ICD-10-CM

## 2020-09-15 PROCEDURE — 97110 THERAPEUTIC EXERCISES: CPT | Performed by: PHYSICAL THERAPIST

## 2020-09-15 PROCEDURE — 97140 MANUAL THERAPY 1/> REGIONS: CPT | Performed by: PHYSICAL THERAPIST

## 2020-09-15 NOTE — PROGRESS NOTES
Daily Note     Today's date: 9/15/2020  Patient name: La Floyd  : 1960  MRN: [id number]  Referring provider: Ruddy Wei MD  Dx:   Encounter Diagnosis     ICD-10-CM    1  Closed nondisplaced fracture of second metatarsal bone of left foot with routine healing, subsequent encounter  S92 325D    2  Closed nondisplaced fracture of third metatarsal bone of left foot with routine healing, subsequent encounter  S92 335D    3  Closed nondisplaced fracture of fourth metatarsal bone of left foot with routine healing, subsequent encounter  S92 345D                   Subjective: Pt reported that she has been working overtime at work and because of that has had to walk about 40,000 steps a day, which has increased her pain significantly  Objective: See treatment diary below      Assessment: Tolerated treatment well  Jt mobs and PROM stretches improved pain and flexibility  Required some v/c for form of exercises  Patient demonstrated fatigue post treatment, exhibited good technique with therapeutic exercises and would benefit from continued PT  Plan: Continue per plan of care  Precautions: Metatarsal fx , chronic pain hips/back/neck      Manuals 9/15        9/3 9/8   Talocrural distr   JZ        JZ JZ   Talocrural A-P jt mobs JZ        JZ JZ   L ankle PROM all planes JZ        JZ JZ   Metatarsal mobs  JZ        JZ JZ   Neuro Re-Ed 9/15        9/3 9/8   Tandem :15x3            SLS :15x5            Rhomberg             Ball toss tramp             Fitter AP                                       Ther Ex 9/15        9/3 9/8   HEP: ABCs             HEP:Self talocrural mob seat towel under met head             HEP:                          Gastroc str  :15x5        :15x5 :15x5   Ankle tband Inv             R hip abd s/l              Calf press             Heel raises 3x15        3x15 box 3x15 box   Gastroc stretch step :15x5        :15x5 :15x5   Soleus str box :15x5        :15x5 :15x5   TB side step Inversion stret :15x5            TB eversion         B 3x10 B 3x10   Eversion str  :15x5            Ther Activity 9/15                                      Gait Training                                       Modalities 9/15        9/3 9/8   [address]             CT         Vaso

## 2020-09-17 ENCOUNTER — APPOINTMENT (OUTPATIENT)
Dept: PHYSICAL THERAPY | Facility: CLINIC | Age: 60
End: 2020-09-17
Payer: COMMERCIAL

## 2020-09-22 ENCOUNTER — OFFICE VISIT (OUTPATIENT)
Dept: GASTROENTEROLOGY | Facility: CLINIC | Age: 60
End: 2020-09-22
Payer: COMMERCIAL

## 2020-09-22 ENCOUNTER — APPOINTMENT (OUTPATIENT)
Dept: PHYSICAL THERAPY | Facility: CLINIC | Age: 60
End: 2020-09-22
Payer: COMMERCIAL

## 2020-09-22 VITALS
SYSTOLIC BLOOD PRESSURE: 108 MMHG | DIASTOLIC BLOOD PRESSURE: 68 MMHG | HEIGHT: 61 IN | BODY MASS INDEX: 24.92 KG/M2 | TEMPERATURE: 99.5 F | WEIGHT: 132 LBS

## 2020-09-22 DIAGNOSIS — Z79.1 LONG TERM (CURRENT) USE OF NON-STEROIDAL ANTI-INFLAMMATORIES (NSAID): ICD-10-CM

## 2020-09-22 DIAGNOSIS — Z86.010 PERSONAL HISTORY OF COLONIC POLYPS: ICD-10-CM

## 2020-09-22 DIAGNOSIS — D50.9 IRON DEFICIENCY ANEMIA, UNSPECIFIED IRON DEFICIENCY ANEMIA TYPE: Primary | ICD-10-CM

## 2020-09-22 DIAGNOSIS — F17.200 TOBACCO DEPENDENCE: ICD-10-CM

## 2020-09-22 PROCEDURE — 1036F TOBACCO NON-USER: CPT | Performed by: INTERNAL MEDICINE

## 2020-09-22 PROCEDURE — 3074F SYST BP LT 130 MM HG: CPT | Performed by: INTERNAL MEDICINE

## 2020-09-22 PROCEDURE — 3078F DIAST BP <80 MM HG: CPT | Performed by: INTERNAL MEDICINE

## 2020-09-22 PROCEDURE — 99214 OFFICE O/P EST MOD 30 MIN: CPT | Performed by: INTERNAL MEDICINE

## 2020-09-22 NOTE — PROGRESS NOTES
[facility] - Outpatient Follow-up Note  Lori Mayers 64 y o  female MRN: [id number]  Encounter: [id number]    ASSESSMENT AND PLAN:      1  Iron deficiency anemia, unspecified iron deficiency anemia type  --patient with recent history of iron deficiency anemia  This was diagnosed January this year  Hemoglobin went down to 8 g  Never required transfusion  Did require IV iron  Last endoscopy 2017-patient had some mild gastritis  Colonoscopy done at that time which revealed a rectal polyp  Suspect anemia related to the some type of GI blood loss  Patient is on long-term nonsteroidal anti-inflammatories so this could result in gastric or even small bowel bleeding  Colon neoplasm should be excluded as well  -proceed with EGD and colonoscopy at [facility]  -depending on results may need small-bowel imaging i e  Capsule endoscopy patient did have a small bowel obstruction however which might complicate this procedure    2  Personal history of colonic polyps  -patient did have 1 serrated polyp on colonoscopy February 2017  She would be due for recall in the next 2 years regardless  3  Long term (current) use of non-steroidal anti-inflammatories (nsaid)  -on meloxicam for chronic hip pain-perhaps related to problem number    4  Tobacco dependence  -patient is going on the patch at this time  She is able to quit smoking for a while this late winter in early spring  Seems to term this started at the time of her birthday  Had been smoking upwards of a pack and half cigarettes per day      Followup Appointment:  Pending procedures  ______________________________________________________________________    Chief Complaint   Patient presents with    blockage     slub     HPI:  Patient returns to the office for follow-up visit with respect to her iron deficiency  She was diagnosed early in the year with this problem  She never required transfusion but was somewhat weak and tired  Her hemoglobin when reviewing records went down into the 8 g range  She required hematologic consultation and had multiple iron transfusions  She is also taking iron orally  The patient really has not had major issues with GI symptoms and no overt bleeding  Few weeks back she had fairly severe abdominal pain which was eventually relieved with a bowel movement  Complicating issues the patient did have small bowel obstruction in February this year and required hospitalization  She had laparoscopic lysis of adhesions at that time  Patient's endoscopy in February 2017 revealed some antral gastritis and hiatal hernia  Colonoscopy revealed a rectal polyp which was small  Patient continues to take meloxicam twice a day for chronic hip pain    Historical Information   Past Medical History:   Diagnosis Date    Anemia     Anxiety     Bursitis of left elbow     Chronic pain disorder     lower back    Colon polyp     Hiatal hernia     Iron deficiency anemia secondary to inadequate dietary iron intake 1/29/2020    Kidney stones     Pancreatitis     Psoriasis     Psychiatric disorder     anxiety    Restless leg syndrome     Tobacco use disorder      Past Surgical History:   Procedure Laterality Date    CYSTOSCOPY      CYSTOSCOPY W/ URETERAL STENT PLACEMENT      LAPAROSCOPY      --  for SBO    NO PAST SURGERIES      [place] ERCP DX COLLECTION SPECIMEN BRUSHING/WASHING N/A 8/15/2018    Procedure: ENDOSCOPIC RETROGRADE CHOLANGIOPANCREATOGRAPHY (ERCP);   Surgeon: Jennifer Stroud MD;  Location:  MAIN OR;  Service: Gastroenterology    [place] LAP,DIAGNOSTIC ABDOMEN N/A 2/10/2020    Procedure: LAPAROSCOPY DIAGNOSTIC, REPAIR ENTEROTOMY, REMOVAL OF BEZOAR;  Surgeon: Grant Hand MD;  Location:  MAIN OR;  Service: General     Social History     Substance and Sexual Activity   Alcohol Use Never    Frequency: Never    Drinks per session: Patient refused    Binge frequency: Never     Social History     Substance and Sexual Activity   Drug Use No     Social History     Tobacco Use   Smoking Status Former Smoker    Types: Cigarettes   Smokeless Tobacco Never Used   Tobacco Comment    Attempting to stop     Family History   Problem Relation Age of Onset    Mental illness Sister     Other Other         Cardiovascular disease    Hypertension Mother     Cancer Father     Substance Abuse Neg Hx          Current Outpatient Medications:     Advair Diskus 250-50 MCG/DOSE inhaler    amLODIPine (NORVASC) 10 mg tablet    Cholecalciferol (VITAMIN D3) 5000 units CAPS    Cranberry 200 MG CAPS    fluocinonide (LIDEX) 0 05 % ointment    fluticasone (FLONASE) 50 mcg/act nasal spray    gabapentin (NEURONTIN) 300 mg capsule    meloxicam (MOBIC) 15 mg tablet    methocarbamol (ROBAXIN) 500 mg tablet    Multiple Vitamin tablet    nicotine (NICODERM CQ) 21 mg/24 hr TD 24 hr patch    omeprazole (PriLOSEC) 40 MG capsule    Probiotic Product (PROBIOTIC-10 ULTIMATE PO)    triamcinolone (KENALOG) 0 1 % cream  Allergies   Allergen Reactions    Pollen Extract     Tree Extract      Reviewed medications and allergies and updated as indicated  Musculoskeletal positive for arthritis particularly in the hips  GI please see above  The rest of the review of systems is negative    PHYSICAL EXAM:    Blood pressure 108/68, temperature 99 5 °F (37 5 °C), height 5' 1" (1 549 m), weight 59 9 kg (132 lb), not currently breastfeeding  Body mass index is 24 94 kg/m²  General Appearance: NAD, cooperative, alert  Eyes: Anicteric, conjunctiva pink  ENT:  Normocephalic, atraumatic, normal mucosa  Neck:  Supple, symmetrical, trachea midline  Resp:  Clear to auscultation bilaterally; no rales, rhonchi or wheezing; respirations unlabored   CV:  S1 S2, Regular rate and rhythm; no murmur, rub, or gallop    GI:  Soft, non-tender, non-distended; normal bowel sounds; no masses, no organomegaly -some scar tissue around the umbilicus  Rectal: Deferred  Musculoskeletal: No cyanosis, clubbing or edema  Normal ROM    Skin:  No jaundice, rashes, or lesions   Heme/Lymph: No palpable cervical lymphadenopathy  Psych: Normal affect, good eye contact  Neuro: No gross deficits, AAOx3    Lab Results:   Lab Results   Component Value Date    WBC 5 1 06/22/2020    HGB 13 4 06/22/2020    HCT 40 1 06/22/2020    MCV 99 8 06/22/2020     06/22/2020     Lab Results   Component Value Date     09/20/2017    K 4 1 06/22/2020     06/22/2020    CO2 27 06/22/2020    ANIONGAP 10 06/10/2015    BUN 16 06/22/2020    CREATININE 0 82 06/22/2020    GLUCOSE 96 06/10/2015    CALCIUM 8 8 06/22/2020    AST 15 06/22/2020    ALT 15 06/22/2020    ALKPHOS 86 06/22/2020    PROT 5 9 (L) 09/20/2017    BILITOT 0 3 09/20/2017    EGFR 90 02/20/2020     Lab Results   Component Value Date    IRON 96 06/22/2020    TIBC 266 06/22/2020    FERRITIN 276 (H) 06/22/2020     Lab Results   Component Value Date    LIPASE 126 02/10/2020

## 2020-09-22 NOTE — PATIENT INSTRUCTIONS
[facility] - Outpatient Follow-up Note  Andrez Jacob 64 y o  female MRN: [id number]  Encounter: [id number]    ASSESSMENT AND PLAN:      1  Iron deficiency anemia, unspecified iron deficiency anemia type  --patient with recent history of iron deficiency anemia  This was diagnosed January this year  Hemoglobin went down to 8 g  Never required transfusion  Did require IV iron  Last endoscopy 2017-patient had some mild gastritis  Colonoscopy done at that time which revealed a rectal polyp  Suspect anemia related to the some type of GI blood loss  Patient is on long-term nonsteroidal anti-inflammatories so this could result in gastric or even small bowel bleeding  Colon neoplasm should be excluded as well  -proceed with EGD and colonoscopy at [facility]  -depending on results may need small-bowel imaging i e  Capsule endoscopy patient did have a small bowel obstruction however which might complicate this procedure    2  Personal history of colonic polyps  -patient did have 1 serrated polyp on colonoscopy February 2017  She would be due for recall in the next 2 years regardless  3  Long term (current) use of non-steroidal anti-inflammatories (nsaid)  -on meloxicam for chronic hip pain-perhaps related to problem number    4  Tobacco dependence  -patient is going on the patch at this time  She is able to quit smoking for a while this late winter in early spring  Seems to term this started at the time of her birthday    Had been smoking upwards of a pack and half cigarettes per day      Followup Appointment:  Pending procedures

## 2020-09-22 NOTE — LETTER
September 22, 2020     Jose L Jacob, [address]  [address] [address]    Patient: La Floyd   YOB: 1960   Date of Visit: 9/22/2020       Dear Dr Jaja Chu: Thank you for referring La Floyd to me for evaluation  Below are my notes for this consultation  If you have questions, please do not hesitate to call me  I look forward to following your patient along with you  Sincerely,        Nhan Pandey MD        CC: MD Nhan Piper MD  9/22/2020  1:38 PM  [facility] - Outpatient Follow-up Note  La Floyd 64 y o  female MRN: [id number]  Encounter: [id number]    ASSESSMENT AND PLAN:      1  Iron deficiency anemia, unspecified iron deficiency anemia type  --patient with recent history of iron deficiency anemia  This was diagnosed January this year  Hemoglobin went down to 8 g  Never required transfusion  Did require IV iron  Last endoscopy 2017-patient had some mild gastritis  Colonoscopy done at that time which revealed a rectal polyp  Suspect anemia related to the some type of GI blood loss  Patient is on long-term nonsteroidal anti-inflammatories so this could result in gastric or even small bowel bleeding  Colon neoplasm should be excluded as well  -proceed with EGD and colonoscopy at [facility]  -depending on results may need small-bowel imaging i e  Capsule endoscopy patient did have a small bowel obstruction however which might complicate this procedure    2  Personal history of colonic polyps  -patient did have 1 serrated polyp on colonoscopy February 2017  She would be due for recall in the next 2 years regardless  3  Long term (current) use of non-steroidal anti-inflammatories (nsaid)  -on meloxicam for chronic hip pain-perhaps related to problem number    4  Tobacco dependence  -patient is going on the patch at this time    She is able to quit smoking for a while this late winter in early spring  Seems to term this started at the time of her birthday  Had been smoking upwards of a pack and half cigarettes per day      Followup Appointment:  Pending procedures  ______________________________________________________________________    Chief Complaint   Patient presents with    blockage     slub     HPI:  Patient returns to the office for follow-up visit with respect to her iron deficiency  She was diagnosed early in the year with this problem  She never required transfusion but was somewhat weak and tired  Her hemoglobin when reviewing records went down into the 8 g range  She required hematologic consultation and had multiple iron transfusions  She is also taking iron orally  The patient really has not had major issues with GI symptoms and no overt bleeding  Few weeks back she had fairly severe abdominal pain which was eventually relieved with a bowel movement  Complicating issues the patient did have small bowel obstruction in February this year and required hospitalization  She had laparoscopic lysis of adhesions at that time  Patient's endoscopy in February 2017 revealed some antral gastritis and hiatal hernia  Colonoscopy revealed a rectal polyp which was small    Patient continues to take meloxicam twice a day for chronic hip pain    Historical Information   Past Medical History:   Diagnosis Date    Anemia     Anxiety     Bursitis of left elbow     Chronic pain disorder     lower back    Colon polyp     Hiatal hernia     Iron deficiency anemia secondary to inadequate dietary iron intake 1/29/2020    Kidney stones     Pancreatitis     Psoriasis     Psychiatric disorder     anxiety    Restless leg syndrome     Tobacco use disorder      Past Surgical History:   Procedure Laterality Date    CYSTOSCOPY      CYSTOSCOPY W/ URETERAL STENT PLACEMENT      LAPAROSCOPY      --  for SBO    NO PAST SURGERIES      [place] ERCP DX COLLECTION SPECIMEN BRUSHING/WASHING N/A 8/15/2018 Procedure: ENDOSCOPIC RETROGRADE CHOLANGIOPANCREATOGRAPHY (ERCP);   Surgeon: Carolina Toro MD;  [facility];  Service: Gastroenterology    AK LAP,DIAGNOSTIC ABDOMEN N/A 2/10/2020    Procedure: LAPAROSCOPY DIAGNOSTIC, REPAIR ENTEROTOMY, REMOVAL OF BEZOAR;  Surgeon: Gil Cano MD;  Location:  MAIN OR;  Service: General     Social History     Substance and Sexual Activity   Alcohol Use Never    Frequency: Never    Drinks per session: Patient refused    Binge frequency: Never     Social History     Substance and Sexual Activity   Drug Use No     Social History     Tobacco Use   Smoking Status Former Smoker    Types: Cigarettes   Smokeless Tobacco Never Used   Tobacco Comment    Attempting to stop     Family History   Problem Relation Age of Onset    Mental illness Sister     Other Other         Cardiovascular disease    Hypertension Mother     Cancer Father     Substance Abuse Neg Hx          Current Outpatient Medications:     Advair Diskus 250-50 MCG/DOSE inhaler    amLODIPine (NORVASC) 10 mg tablet    Cholecalciferol (VITAMIN D3) 5000 units CAPS    Cranberry 200 MG CAPS    fluocinonide (LIDEX) 0 05 % ointment    fluticasone (FLONASE) 50 mcg/act nasal spray    gabapentin (NEURONTIN) 300 mg capsule    meloxicam (MOBIC) 15 mg tablet    methocarbamol (ROBAXIN) 500 mg tablet    Multiple Vitamin tablet    nicotine (NICODERM CQ) 21 mg/24 hr TD 24 hr patch    omeprazole (PriLOSEC) 40 MG capsule    Probiotic Product (PROBIOTIC-10 ULTIMATE PO)    triamcinolone (KENALOG) 0 1 % cream  Allergies   Allergen Reactions    Pollen Extract     Tree Extract      Reviewed medications and allergies and updated as indicated  Musculoskeletal positive for arthritis particularly in the hips  GI please see above  The rest of the review of systems is negative    PHYSICAL EXAM:    Blood pressure 108/68, temperature 99 5 °F (37 5 °C), height 5' 1" (1 549 m), weight 59 9 kg (132 lb), not currently breastfeeding  Body mass index is 24 94 kg/m²  General Appearance: NAD, cooperative, alert  Eyes: Anicteric, conjunctiva pink  ENT:  Normocephalic, atraumatic, normal mucosa  Neck:  Supple, symmetrical, trachea midline  Resp:  Clear to auscultation bilaterally; no rales, rhonchi or wheezing; respirations unlabored   CV:  S1 S2, Regular rate and rhythm; no murmur, rub, or gallop  GI:  Soft, non-tender, non-distended; normal bowel sounds; no masses, no organomegaly -some scar tissue around the umbilicus  Rectal: Deferred  Musculoskeletal: No cyanosis, clubbing or edema  Normal ROM    Skin:  No jaundice, rashes, or lesions   Heme/Lymph: No palpable cervical lymphadenopathy  Psych: Normal affect, good eye contact  Neuro: No gross deficits, AAOx3    Lab Results:   Lab Results   Component Value Date    WBC 5 1 06/22/2020    HGB 13 4 06/22/2020    HCT 40 1 06/22/2020    MCV 99 8 06/22/2020     06/22/2020     Lab Results   Component Value Date     09/20/2017    K 4 1 06/22/2020     06/22/2020    CO2 27 06/22/2020    ANIONGAP 10 06/10/2015    BUN 16 06/22/2020    CREATININE 0 82 06/22/2020    GLUCOSE 96 06/10/2015    CALCIUM 8 8 06/22/2020    AST 15 06/22/2020    ALT 15 06/22/2020    ALKPHOS 86 06/22/2020    PROT 5 9 (L) 09/20/2017    BILITOT 0 3 09/20/2017    EGFR 90 02/20/2020     Lab Results   Component Value Date    IRON 96 06/22/2020    TIBC 266 06/22/2020    FERRITIN 276 (H) 06/22/2020     Lab Results   Component Value Date    LIPASE 126 02/10/2020

## 2020-09-24 ENCOUNTER — OFFICE VISIT (OUTPATIENT)
Dept: PHYSICAL THERAPY | Facility: CLINIC | Age: 60
End: 2020-09-24
Payer: COMMERCIAL

## 2020-09-24 DIAGNOSIS — S92.345D CLOSED NONDISPLACED FRACTURE OF FOURTH METATARSAL BONE OF LEFT FOOT WITH ROUTINE HEALING, SUBSEQUENT ENCOUNTER: ICD-10-CM

## 2020-09-24 DIAGNOSIS — S92.325D CLOSED NONDISPLACED FRACTURE OF SECOND METATARSAL BONE OF LEFT FOOT WITH ROUTINE HEALING, SUBSEQUENT ENCOUNTER: Primary | ICD-10-CM

## 2020-09-24 DIAGNOSIS — S92.335D CLOSED NONDISPLACED FRACTURE OF THIRD METATARSAL BONE OF LEFT FOOT WITH ROUTINE HEALING, SUBSEQUENT ENCOUNTER: ICD-10-CM

## 2020-09-24 PROCEDURE — 97140 MANUAL THERAPY 1/> REGIONS: CPT | Performed by: PHYSICAL THERAPIST

## 2020-09-24 PROCEDURE — 97110 THERAPEUTIC EXERCISES: CPT | Performed by: PHYSICAL THERAPIST

## 2020-09-24 NOTE — PROGRESS NOTES
Daily Note     Today's date: 2020  Patient name: Lynette Donald  : 1960  MRN: [id number]  Referring provider: Juana Hernadez MD  Dx:   Encounter Diagnosis     ICD-10-CM    1  Closed nondisplaced fracture of second metatarsal bone of left foot with routine healing, subsequent encounter  S92 325D    2  Closed nondisplaced fracture of third metatarsal bone of left foot with routine healing, subsequent encounter  S92 335D    3  Closed nondisplaced fracture of fourth metatarsal bone of left foot with routine healing, subsequent encounter  S92 345D                   Subjective: Pt reported that she has been "getting better and better " Stated that she has been working very long shifts, which have challenged her ankle and increased pain, however she stated that nonetheless her pain tolerance is increasing  Objective: See treatment diary below      Assessment: Tolerated treatment well  Performed TB inversion to address weakness, which she performed well without provocation of pain  Patient demonstrated fatigue post treatment, exhibited good technique with therapeutic exercises and would benefit from continued PT  Plan: Continue per plan of care  Precautions: Metatarsal fx , chronic pain hips/back/neck      Manuals 9/15 9/24       9/3 9/8   Talocrural distr   JZ JZ       JZ JZ   Talocrural A-P jt mobs JZ JZ       JZ JZ   L ankle PROM all planes JZ JZ       JZ JZ   Metatarsal mobs  JZ JZ       JZ JZ   Neuro Re-Ed 9/15 9/24       9/3 9/8   Tandem :15x3            SLS :15x5            Rhomberg             Ball toss tramp             Fitter AP                                       Ther Ex 9/15 9/24       9/3 9/8   HEP: ABCs             HEP:Self talocrural mob seat towel under met head             HEP:                          Gastroc str  :15x5 :15x5       :15x5 :15x5   Ankle tband Inv  G 3x10           R hip abd s/l              Calf press             Heel raises 3x15        3x15 box 3x15 box Gastroc stretch step :15x5 :15x3       :15x5 :15x5   Soleus str box :15x5 :15x5       :15x5 :15x5   TB side step             Inversion stret :15x5            TB eversion         B 3x10 B 3x10   Eversion str  :15x5            Ther Activity 9/15 9/24                                     Gait Training                                       Modalities 9/15 9/24       9/3 9/8   [address]             CT         Vaso

## 2020-09-28 ENCOUNTER — OFFICE VISIT (OUTPATIENT)
Dept: PHYSICAL THERAPY | Facility: CLINIC | Age: 60
End: 2020-09-28
Payer: COMMERCIAL

## 2020-09-28 DIAGNOSIS — S92.335D CLOSED NONDISPLACED FRACTURE OF THIRD METATARSAL BONE OF LEFT FOOT WITH ROUTINE HEALING, SUBSEQUENT ENCOUNTER: ICD-10-CM

## 2020-09-28 DIAGNOSIS — S92.345D CLOSED NONDISPLACED FRACTURE OF FOURTH METATARSAL BONE OF LEFT FOOT WITH ROUTINE HEALING, SUBSEQUENT ENCOUNTER: ICD-10-CM

## 2020-09-28 DIAGNOSIS — S92.325D CLOSED NONDISPLACED FRACTURE OF SECOND METATARSAL BONE OF LEFT FOOT WITH ROUTINE HEALING, SUBSEQUENT ENCOUNTER: Primary | ICD-10-CM

## 2020-09-28 PROCEDURE — 97110 THERAPEUTIC EXERCISES: CPT | Performed by: PHYSICAL THERAPIST

## 2020-09-28 PROCEDURE — 97140 MANUAL THERAPY 1/> REGIONS: CPT | Performed by: PHYSICAL THERAPIST

## 2020-09-28 NOTE — PROGRESS NOTES
Daily Note     Today's date: 2020  Patient name: Vi Jiménez  : 1960  MRN: [id number]  Referring provider: Peter Damon MD  Dx:   Encounter Diagnosis     ICD-10-CM    1  Closed nondisplaced fracture of second metatarsal bone of left foot with routine healing, subsequent encounter  S92 325D    2  Closed nondisplaced fracture of third metatarsal bone of left foot with routine healing, subsequent encounter  S92 335D    3  Closed nondisplaced fracture of fourth metatarsal bone of left foot with routine healing, subsequent encounter  S92 345D                   Subjective: Pt reported that she is continuing to improve with her walking and standing tolerance as the sessions continue  Stated that she was able to take off 3 days from work this weekend, which helped improve her symptoms  Objective: See treatment diary below      Assessment: Tolerated treatment well  In order to address soleus weakness and improve gait, initiated seated soleus heel raise, which she performed well without provocation of pain  Advanced exercises well without provocation of pain  Patient demonstrated fatigue post treatment, exhibited good technique with therapeutic exercises and would benefit from continued PT      Plan: Continue per plan of care  Precautions: Metatarsal fx , chronic pain hips/back/neck      Manuals 9/15 9/24 9/28      9/3 9/8   Talocrural distr   JZ JZ JZ      JZ JZ   Talocrural A-P jt mobs JZ JZ JZ      JZ JZ   L ankle PROM all planes JZ JZ JZ      JZ JZ   Metatarsal mobs  JZ JZ JZ      JZ JZ   Neuro Re-Ed 9/15 9/24 9/28      9/3 9/8   Tandem :15x3            SLS :15x5            Rhomberg             Ball toss tramp             Fitter AP                                       Ther Ex 9/15 9/24 9/28      9/3 9/8   HEP: ABCs             HEP:Self talocrural mob seat towel under met head             HEP:             Seated soleus HR   35# 3x12          Gastroc str  :15x5 :15x5 :15x5      :15x5 :15x5 Ankle tband Inv  G 3x10 B 3x10          R hip abd s/l              Calf press   70/ 3x15          Heel raises 3x15        3x15 box 3x15 box   Gastroc stretch step :15x5 :15x3 :15x5      :15x5 :15x5   Soleus str box :15x5 :15x5 :15x5      :15x5 :15x5   TB side step             Inversion stret :15x5            TB eversion   B 3x10      B 3x10 B 3x10   Eversion str  :15x5            Ther Activity 9/15 9/24 9/28                                    Gait Training                                       Modalities 9/15 9/24 9/28      9/3 9/8   [address]             CT         Vaso

## 2020-10-01 ENCOUNTER — OFFICE VISIT (OUTPATIENT)
Dept: PHYSICAL THERAPY | Facility: CLINIC | Age: 60
End: 2020-10-01
Payer: COMMERCIAL

## 2020-10-01 DIAGNOSIS — S92.335D CLOSED NONDISPLACED FRACTURE OF THIRD METATARSAL BONE OF LEFT FOOT WITH ROUTINE HEALING, SUBSEQUENT ENCOUNTER: ICD-10-CM

## 2020-10-01 DIAGNOSIS — S92.325D CLOSED NONDISPLACED FRACTURE OF SECOND METATARSAL BONE OF LEFT FOOT WITH ROUTINE HEALING, SUBSEQUENT ENCOUNTER: Primary | ICD-10-CM

## 2020-10-01 DIAGNOSIS — S92.345D CLOSED NONDISPLACED FRACTURE OF FOURTH METATARSAL BONE OF LEFT FOOT WITH ROUTINE HEALING, SUBSEQUENT ENCOUNTER: ICD-10-CM

## 2020-10-01 PROCEDURE — 97140 MANUAL THERAPY 1/> REGIONS: CPT | Performed by: PHYSICAL THERAPIST

## 2020-10-01 PROCEDURE — 97112 NEUROMUSCULAR REEDUCATION: CPT | Performed by: PHYSICAL THERAPIST

## 2020-10-01 PROCEDURE — 97110 THERAPEUTIC EXERCISES: CPT | Performed by: PHYSICAL THERAPIST

## 2020-10-06 ENCOUNTER — OFFICE VISIT (OUTPATIENT)
Dept: PHYSICAL THERAPY | Facility: CLINIC | Age: 60
End: 2020-10-06
Payer: COMMERCIAL

## 2020-10-06 DIAGNOSIS — S92.345D CLOSED NONDISPLACED FRACTURE OF FOURTH METATARSAL BONE OF LEFT FOOT WITH ROUTINE HEALING, SUBSEQUENT ENCOUNTER: ICD-10-CM

## 2020-10-06 DIAGNOSIS — S92.335D CLOSED NONDISPLACED FRACTURE OF THIRD METATARSAL BONE OF LEFT FOOT WITH ROUTINE HEALING, SUBSEQUENT ENCOUNTER: ICD-10-CM

## 2020-10-06 DIAGNOSIS — S92.325D CLOSED NONDISPLACED FRACTURE OF SECOND METATARSAL BONE OF LEFT FOOT WITH ROUTINE HEALING, SUBSEQUENT ENCOUNTER: Primary | ICD-10-CM

## 2020-10-06 PROCEDURE — 97112 NEUROMUSCULAR REEDUCATION: CPT | Performed by: PHYSICAL THERAPIST

## 2020-10-06 PROCEDURE — 97110 THERAPEUTIC EXERCISES: CPT | Performed by: PHYSICAL THERAPIST

## 2020-10-06 PROCEDURE — 97140 MANUAL THERAPY 1/> REGIONS: CPT | Performed by: PHYSICAL THERAPIST

## 2020-10-08 ENCOUNTER — APPOINTMENT (OUTPATIENT)
Dept: PHYSICAL THERAPY | Facility: CLINIC | Age: 60
End: 2020-10-08
Payer: COMMERCIAL

## 2020-10-13 ENCOUNTER — OFFICE VISIT (OUTPATIENT)
Dept: PHYSICAL THERAPY | Facility: CLINIC | Age: 60
End: 2020-10-13
Payer: COMMERCIAL

## 2020-10-13 DIAGNOSIS — S92.335D CLOSED NONDISPLACED FRACTURE OF THIRD METATARSAL BONE OF LEFT FOOT WITH ROUTINE HEALING, SUBSEQUENT ENCOUNTER: ICD-10-CM

## 2020-10-13 DIAGNOSIS — S92.325D CLOSED NONDISPLACED FRACTURE OF SECOND METATARSAL BONE OF LEFT FOOT WITH ROUTINE HEALING, SUBSEQUENT ENCOUNTER: Primary | ICD-10-CM

## 2020-10-13 DIAGNOSIS — S92.345D CLOSED NONDISPLACED FRACTURE OF FOURTH METATARSAL BONE OF LEFT FOOT WITH ROUTINE HEALING, SUBSEQUENT ENCOUNTER: ICD-10-CM

## 2020-10-13 PROCEDURE — 97110 THERAPEUTIC EXERCISES: CPT | Performed by: PHYSICAL THERAPIST

## 2020-10-13 PROCEDURE — 97140 MANUAL THERAPY 1/> REGIONS: CPT | Performed by: PHYSICAL THERAPIST

## 2020-10-15 ENCOUNTER — APPOINTMENT (OUTPATIENT)
Dept: PHYSICAL THERAPY | Facility: CLINIC | Age: 60
End: 2020-10-15
Payer: COMMERCIAL

## 2020-10-20 ENCOUNTER — APPOINTMENT (OUTPATIENT)
Dept: PHYSICAL THERAPY | Facility: CLINIC | Age: 60
End: 2020-10-20
Payer: COMMERCIAL

## 2020-10-21 ENCOUNTER — TELEMEDICINE (OUTPATIENT)
Dept: GASTROENTEROLOGY | Facility: CLINIC | Age: 60
End: 2020-10-21

## 2020-10-21 VITALS — HEIGHT: 61 IN | WEIGHT: 132 LBS | BODY MASS INDEX: 24.92 KG/M2

## 2020-10-21 DIAGNOSIS — D50.9 IRON DEFICIENCY ANEMIA, UNSPECIFIED IRON DEFICIENCY ANEMIA TYPE: Primary | ICD-10-CM

## 2020-10-21 RX ORDER — SODIUM PICOSULFATE, MAGNESIUM OXIDE, AND ANHYDROUS CITRIC ACID 10; 3.5; 12 MG/160ML; G/160ML; G/160ML
LIQUID ORAL
Qty: 1 BOTTLE | Refills: 0 | Status: SHIPPED | OUTPATIENT
Start: 2020-10-21 | End: 2020-10-27 | Stop reason: HOSPADM

## 2020-10-22 ENCOUNTER — APPOINTMENT (OUTPATIENT)
Dept: PHYSICAL THERAPY | Facility: CLINIC | Age: 60
End: 2020-10-22
Payer: COMMERCIAL

## 2020-10-22 LAB
CHOLEST SERPL-MCNC: 181 MG/DL
CHOLEST/HDLC SERPL: 3.4 (CALC)
HDLC SERPL-MCNC: 53 MG/DL
LDLC SERPL CALC-MCNC: 101 MG/DL (CALC)
NONHDLC SERPL-MCNC: 128 MG/DL (CALC)
TRIGL SERPL-MCNC: 177 MG/DL

## 2020-10-23 DIAGNOSIS — E78.00 HYPERCHOLESTEROLEMIA: Primary | ICD-10-CM

## 2020-10-25 LAB
ALBUMIN SERPL-MCNC: 4.1 G/DL (ref 3.6–5.1)
ALBUMIN/GLOB SERPL: 2.1 (CALC) (ref 1–2.5)
ALP SERPL-CCNC: 87 U/L (ref 37–153)
ALT SERPL-CCNC: 21 U/L (ref 6–29)
AST SERPL-CCNC: 19 U/L (ref 10–35)
BASOPHILS # BLD AUTO: 28 CELLS/UL (ref 0–200)
BASOPHILS NFR BLD AUTO: 0.5 %
BILIRUB SERPL-MCNC: 0.4 MG/DL (ref 0.2–1.2)
BUN SERPL-MCNC: 13 MG/DL (ref 7–25)
BUN/CREAT SERPL: ABNORMAL (CALC) (ref 6–22)
CALCIUM SERPL-MCNC: 9 MG/DL (ref 8.6–10.4)
CHLORIDE SERPL-SCNC: 111 MMOL/L (ref 98–110)
CO2 SERPL-SCNC: 22 MMOL/L (ref 20–32)
CREAT SERPL-MCNC: 0.73 MG/DL (ref 0.5–1.05)
EOSINOPHIL # BLD AUTO: 207 CELLS/UL (ref 15–500)
EOSINOPHIL NFR BLD AUTO: 3.7 %
ERYTHROCYTE [DISTWIDTH] IN BLOOD BY AUTOMATED COUNT: 13.1 % (ref 11–15)
FERRITIN SERPL-MCNC: 260 NG/ML (ref 16–232)
GLOBULIN SER CALC-MCNC: 2 G/DL (CALC) (ref 1.9–3.7)
GLUCOSE SERPL-MCNC: 86 MG/DL (ref 65–99)
HCT VFR BLD AUTO: 40.2 % (ref 35–45)
HGB BLD-MCNC: 13 G/DL (ref 11.7–15.5)
IRON SATN MFR SERPL: 45 % (CALC) (ref 16–45)
IRON SERPL-MCNC: 115 MCG/DL (ref 45–160)
LYMPHOCYTES # BLD AUTO: 1926 CELLS/UL (ref 850–3900)
LYMPHOCYTES NFR BLD AUTO: 34.4 %
MCH RBC QN AUTO: 33.4 PG (ref 27–33)
MCHC RBC AUTO-ENTMCNC: 32.3 G/DL (ref 32–36)
MCV RBC AUTO: 103.3 FL (ref 80–100)
METHYLMALONATE SERPL-SCNC: 120 NMOL/L (ref 87–318)
MONOCYTES # BLD AUTO: 370 CELLS/UL (ref 200–950)
MONOCYTES NFR BLD AUTO: 6.6 %
NEUTROPHILS # BLD AUTO: 3069 CELLS/UL (ref 1500–7800)
NEUTROPHILS NFR BLD AUTO: 54.8 %
PLATELET # BLD AUTO: 248 THOUSAND/UL (ref 140–400)
PMV BLD REES-ECKER: 9.8 FL (ref 7.5–12.5)
POTASSIUM SERPL-SCNC: 4.3 MMOL/L (ref 3.5–5.3)
PROT SERPL-MCNC: 6.1 G/DL (ref 6.1–8.1)
RBC # BLD AUTO: 3.89 MILLION/UL (ref 3.8–5.1)
SL AMB EGFR AFRICAN AMERICAN: 105 ML/MIN/1.73M2
SL AMB EGFR NON AFRICAN AMERICAN: 91 ML/MIN/1.73M2
SODIUM SERPL-SCNC: 141 MMOL/L (ref 135–146)
TIBC SERPL-MCNC: 253 MCG/DL (CALC) (ref 250–450)
WBC # BLD AUTO: 5.6 THOUSAND/UL (ref 3.8–10.8)

## 2020-10-27 ENCOUNTER — ANESTHESIA EVENT (OUTPATIENT)
Dept: GASTROENTEROLOGY | Facility: AMBULATORY SURGERY CENTER | Age: 60
End: 2020-10-27

## 2020-10-27 ENCOUNTER — HOSPITAL ENCOUNTER (OUTPATIENT)
Dept: GASTROENTEROLOGY | Facility: AMBULATORY SURGERY CENTER | Age: 60
Discharge: HOME | End: 2020-10-27
Payer: COMMERCIAL

## 2020-10-27 ENCOUNTER — ANESTHESIA (OUTPATIENT)
Dept: GASTROENTEROLOGY | Facility: AMBULATORY SURGERY CENTER | Age: 60
End: 2020-10-27

## 2020-10-27 VITALS
HEART RATE: 74 BPM | SYSTOLIC BLOOD PRESSURE: 113 MMHG | RESPIRATION RATE: 20 BRPM | OXYGEN SATURATION: 96 % | TEMPERATURE: 97.9 F | DIASTOLIC BLOOD PRESSURE: 72 MMHG

## 2020-10-27 VITALS — HEART RATE: 66 BPM

## 2020-10-27 DIAGNOSIS — Z86.010 PERSONAL HISTORY OF COLONIC POLYPS: ICD-10-CM

## 2020-10-27 DIAGNOSIS — D50.9 IRON DEFICIENCY ANEMIA, UNSPECIFIED IRON DEFICIENCY ANEMIA TYPE: ICD-10-CM

## 2020-10-27 DIAGNOSIS — I10 BENIGN ESSENTIAL HTN: ICD-10-CM

## 2020-10-27 DIAGNOSIS — K64.8 INTERNAL HEMORRHOIDS: Primary | ICD-10-CM

## 2020-10-27 PROCEDURE — 88305 TISSUE EXAM BY PATHOLOGIST: CPT | Performed by: PATHOLOGY

## 2020-10-27 PROCEDURE — 45385 COLONOSCOPY W/LESION REMOVAL: CPT | Performed by: INTERNAL MEDICINE

## 2020-10-27 PROCEDURE — 43239 EGD BIOPSY SINGLE/MULTIPLE: CPT | Performed by: INTERNAL MEDICINE

## 2020-10-27 RX ORDER — HYDROCORTISONE 25 MG/G
CREAM TOPICAL 2 TIMES DAILY
Qty: 28 G | Refills: 2 | Status: SHIPPED | OUTPATIENT
Start: 2020-10-27 | End: 2021-09-27

## 2020-10-27 RX ORDER — METHOCARBAMOL 500 MG/1
TABLET, FILM COATED ORAL
Qty: 60 TABLET | Refills: 3 | Status: SHIPPED | OUTPATIENT
Start: 2020-10-27 | End: 2021-02-22 | Stop reason: SDUPTHER

## 2020-10-27 RX ORDER — PROPOFOL 10 MG/ML
INJECTION, EMULSION INTRAVENOUS AS NEEDED
Status: DISCONTINUED | OUTPATIENT
Start: 2020-10-27 | End: 2020-10-27

## 2020-10-27 RX ORDER — SODIUM CHLORIDE 9 MG/ML
50 INJECTION, SOLUTION INTRAVENOUS CONTINUOUS
Status: DISCONTINUED | OUTPATIENT
Start: 2020-10-27 | End: 2020-10-27

## 2020-10-27 RX ORDER — LIDOCAINE HYDROCHLORIDE 10 MG/ML
INJECTION, SOLUTION EPIDURAL; INFILTRATION; INTRACAUDAL; PERINEURAL AS NEEDED
Status: DISCONTINUED | OUTPATIENT
Start: 2020-10-27 | End: 2020-10-27

## 2020-10-27 RX ADMIN — PROPOFOL 20 MG: 10 INJECTION, EMULSION INTRAVENOUS at 11:19

## 2020-10-27 RX ADMIN — PROPOFOL 20 MG: 10 INJECTION, EMULSION INTRAVENOUS at 11:02

## 2020-10-27 RX ADMIN — PROPOFOL 20 MG: 10 INJECTION, EMULSION INTRAVENOUS at 10:51

## 2020-10-27 RX ADMIN — PROPOFOL 20 MG: 10 INJECTION, EMULSION INTRAVENOUS at 10:56

## 2020-10-27 RX ADMIN — PROPOFOL 20 MG: 10 INJECTION, EMULSION INTRAVENOUS at 10:53

## 2020-10-27 RX ADMIN — PROPOFOL 20 MG: 10 INJECTION, EMULSION INTRAVENOUS at 10:47

## 2020-10-27 RX ADMIN — PROPOFOL 20 MG: 10 INJECTION, EMULSION INTRAVENOUS at 11:11

## 2020-10-27 RX ADMIN — PROPOFOL 10 MG: 10 INJECTION, EMULSION INTRAVENOUS at 11:16

## 2020-10-27 RX ADMIN — PROPOFOL 20 MG: 10 INJECTION, EMULSION INTRAVENOUS at 11:07

## 2020-10-27 RX ADMIN — PROPOFOL 50 MG: 10 INJECTION, EMULSION INTRAVENOUS at 10:43

## 2020-10-27 RX ADMIN — PROPOFOL 20 MG: 10 INJECTION, EMULSION INTRAVENOUS at 11:09

## 2020-10-27 RX ADMIN — PROPOFOL 20 MG: 10 INJECTION, EMULSION INTRAVENOUS at 11:14

## 2020-10-27 RX ADMIN — PROPOFOL 20 MG: 10 INJECTION, EMULSION INTRAVENOUS at 10:44

## 2020-10-27 RX ADMIN — PROPOFOL 20 MG: 10 INJECTION, EMULSION INTRAVENOUS at 11:00

## 2020-10-27 RX ADMIN — LIDOCAINE HYDROCHLORIDE 50 MG: 10 INJECTION, SOLUTION EPIDURAL; INFILTRATION; INTRACAUDAL; PERINEURAL at 10:43

## 2020-10-27 RX ADMIN — PROPOFOL 20 MG: 10 INJECTION, EMULSION INTRAVENOUS at 11:04

## 2020-10-27 RX ADMIN — PROPOFOL 20 MG: 10 INJECTION, EMULSION INTRAVENOUS at 11:17

## 2020-10-27 RX ADMIN — PROPOFOL 20 MG: 10 INJECTION, EMULSION INTRAVENOUS at 10:49

## 2020-10-27 RX ADMIN — PROPOFOL 20 MG: 10 INJECTION, EMULSION INTRAVENOUS at 10:45

## 2020-10-27 RX ADMIN — PROPOFOL 20 MG: 10 INJECTION, EMULSION INTRAVENOUS at 10:58

## 2020-10-30 ENCOUNTER — TELEPHONE (OUTPATIENT)
Dept: GASTROENTEROLOGY | Facility: CLINIC | Age: 60
End: 2020-10-30

## 2020-11-02 ENCOUNTER — TELEPHONE (OUTPATIENT)
Dept: GASTROENTEROLOGY | Facility: CLINIC | Age: 60
End: 2020-11-02

## 2020-11-06 ENCOUNTER — TELEPHONE (OUTPATIENT)
Dept: HEMATOLOGY ONCOLOGY | Facility: HOSPITAL | Age: 60
End: 2020-11-06

## 2020-11-09 ENCOUNTER — OFFICE VISIT (OUTPATIENT)
Dept: HEMATOLOGY ONCOLOGY | Facility: HOSPITAL | Age: 60
End: 2020-11-09
Payer: COMMERCIAL

## 2020-11-09 VITALS
RESPIRATION RATE: 16 BRPM | BODY MASS INDEX: 24.17 KG/M2 | DIASTOLIC BLOOD PRESSURE: 86 MMHG | WEIGHT: 128 LBS | HEART RATE: 86 BPM | SYSTOLIC BLOOD PRESSURE: 152 MMHG | HEIGHT: 61 IN | TEMPERATURE: 97.6 F | OXYGEN SATURATION: 99 %

## 2020-11-09 DIAGNOSIS — D50.8 IRON DEFICIENCY ANEMIA SECONDARY TO INADEQUATE DIETARY IRON INTAKE: Primary | Chronic | ICD-10-CM

## 2020-11-09 PROCEDURE — 99214 OFFICE O/P EST MOD 30 MIN: CPT | Performed by: NURSE PRACTITIONER

## 2020-11-19 ENCOUNTER — APPOINTMENT (EMERGENCY)
Dept: RADIOLOGY | Facility: HOSPITAL | Age: 60
End: 2020-11-19
Payer: COMMERCIAL

## 2020-11-19 ENCOUNTER — APPOINTMENT (EMERGENCY)
Dept: CT IMAGING | Facility: HOSPITAL | Age: 60
End: 2020-11-19
Payer: COMMERCIAL

## 2020-11-19 ENCOUNTER — HOSPITAL ENCOUNTER (EMERGENCY)
Facility: HOSPITAL | Age: 60
Discharge: HOME | End: 2020-11-19
Attending: EMERGENCY MEDICINE | Admitting: EMERGENCY MEDICINE
Payer: COMMERCIAL

## 2020-11-19 VITALS
BODY MASS INDEX: 23.79 KG/M2 | OXYGEN SATURATION: 99 % | SYSTOLIC BLOOD PRESSURE: 107 MMHG | HEIGHT: 61 IN | RESPIRATION RATE: 18 BRPM | WEIGHT: 126 LBS | HEART RATE: 75 BPM | DIASTOLIC BLOOD PRESSURE: 57 MMHG | TEMPERATURE: 97 F

## 2020-11-19 DIAGNOSIS — D72.825 BANDEMIA: Primary | ICD-10-CM

## 2020-11-19 DIAGNOSIS — J06.9 VIRAL URI WITH COUGH: ICD-10-CM

## 2020-11-19 DIAGNOSIS — R10.9 ABDOMINAL PAIN: ICD-10-CM

## 2020-11-19 LAB
ALBUMIN SERPL BCP-MCNC: 2.6 G/DL (ref 3.5–5)
ALP SERPL-CCNC: 85 U/L (ref 46–116)
ALT SERPL W P-5'-P-CCNC: 31 U/L (ref 12–78)
ANION GAP SERPL CALCULATED.3IONS-SCNC: 9 MMOL/L (ref 4–13)
APTT PPP: 34 SECONDS (ref 23–37)
AST SERPL W P-5'-P-CCNC: 14 U/L (ref 5–45)
BACTERIA UR QL AUTO: ABNORMAL /HPF
BASOPHILS # BLD MANUAL: 0.05 THOUSAND/UL (ref 0–0.1)
BASOPHILS NFR MAR MANUAL: 1 % (ref 0–1)
BILIRUB SERPL-MCNC: 0.4 MG/DL (ref 0.2–1)
BILIRUB UR QL STRIP: NEGATIVE
BUN SERPL-MCNC: 26 MG/DL (ref 5–25)
CALCIUM ALBUM COR SERPL-MCNC: 9.4 MG/DL (ref 8.3–10.1)
CALCIUM SERPL-MCNC: 8.3 MG/DL (ref 8.3–10.1)
CHLORIDE SERPL-SCNC: 107 MMOL/L (ref 100–108)
CLARITY UR: CLEAR
CO2 SERPL-SCNC: 22 MMOL/L (ref 21–32)
COLOR UR: ABNORMAL
CREAT SERPL-MCNC: 0.84 MG/DL (ref 0.6–1.3)
EOSINOPHIL # BLD MANUAL: 0.05 THOUSAND/UL (ref 0–0.4)
EOSINOPHIL NFR BLD MANUAL: 1 % (ref 0–6)
ERYTHROCYTE [DISTWIDTH] IN BLOOD BY AUTOMATED COUNT: 12.8 % (ref 11.6–15.1)
FLUAV RNA RESP QL NAA+PROBE: NEGATIVE
FLUBV RNA RESP QL NAA+PROBE: NEGATIVE
GFR SERPL CREATININE-BSD FRML MDRD: 77 ML/MIN/1.73SQ M
GLUCOSE SERPL-MCNC: 117 MG/DL (ref 65–140)
GLUCOSE UR STRIP-MCNC: NEGATIVE MG/DL
HCT VFR BLD AUTO: 37.3 % (ref 34.8–46.1)
HGB BLD-MCNC: 12 G/DL (ref 11.5–15.4)
HGB UR QL STRIP.AUTO: NEGATIVE
HYALINE CASTS #/AREA URNS LPF: ABNORMAL /LPF
INR PPP: 1.14 (ref 0.84–1.19)
KETONES UR STRIP-MCNC: NEGATIVE MG/DL
LACTATE SERPL-SCNC: 0.8 MMOL/L (ref 0.5–2)
LEUKOCYTE ESTERASE UR QL STRIP: NEGATIVE
LIPASE SERPL-CCNC: 51 U/L (ref 73–393)
LYMPHOCYTES # BLD AUTO: 0.72 THOUSAND/UL (ref 0.6–4.47)
LYMPHOCYTES # BLD AUTO: 14 % (ref 14–44)
MCH RBC QN AUTO: 34.2 PG (ref 26.8–34.3)
MCHC RBC AUTO-ENTMCNC: 32.2 G/DL (ref 31.4–37.4)
MCV RBC AUTO: 106 FL (ref 82–98)
MONOCYTES # BLD AUTO: 0.41 THOUSAND/UL (ref 0–1.22)
MONOCYTES NFR BLD: 8 % (ref 4–12)
MUCOUS THREADS UR QL AUTO: ABNORMAL
NEUTROPHILS # BLD MANUAL: 3.88 THOUSAND/UL (ref 1.85–7.62)
NEUTS BAND NFR BLD MANUAL: 19 % (ref 0–8)
NEUTS SEG NFR BLD AUTO: 57 % (ref 43–75)
NITRITE UR QL STRIP: NEGATIVE
NON-SQ EPI CELLS URNS QL MICRO: ABNORMAL /HPF
NRBC BLD AUTO-RTO: 0 /100 WBCS
PH UR STRIP.AUTO: 5.5 [PH]
PLATELET # BLD AUTO: 188 THOUSANDS/UL (ref 149–390)
PLATELET BLD QL SMEAR: ADEQUATE
PMV BLD AUTO: 9.9 FL (ref 8.9–12.7)
POTASSIUM SERPL-SCNC: 4.1 MMOL/L (ref 3.5–5.3)
PROCALCITONIN SERPL-MCNC: 1.93 NG/ML
PROT SERPL-MCNC: 6.1 G/DL (ref 6.4–8.2)
PROT UR STRIP-MCNC: ABNORMAL MG/DL
PROTHROMBIN TIME: 14.6 SECONDS (ref 11.6–14.5)
RBC # BLD AUTO: 3.51 MILLION/UL (ref 3.81–5.12)
RBC #/AREA URNS AUTO: ABNORMAL /HPF
RBC MORPH BLD: NORMAL
RSV RNA RESP QL NAA+PROBE: NEGATIVE
SARS-COV-2 RNA RESP QL NAA+PROBE: NEGATIVE
SODIUM SERPL-SCNC: 138 MMOL/L (ref 136–145)
SP GR UR STRIP.AUTO: 1.02 (ref 1–1.03)
TOTAL CELLS COUNTED SPEC: 100
UROBILINOGEN UR QL STRIP.AUTO: 0.2 E.U./DL
WBC # BLD AUTO: 5.11 THOUSAND/UL (ref 4.31–10.16)
WBC #/AREA URNS AUTO: ABNORMAL /HPF

## 2020-11-19 PROCEDURE — G1004 CDSM NDSC: HCPCS

## 2020-11-19 PROCEDURE — 0241U HB NFCT DS VIR RESP RNA 4 TRGT: CPT | Performed by: EMERGENCY MEDICINE

## 2020-11-19 PROCEDURE — 93005 ELECTROCARDIOGRAM TRACING: CPT

## 2020-11-19 PROCEDURE — 80053 COMPREHEN METABOLIC PANEL: CPT | Performed by: EMERGENCY MEDICINE

## 2020-11-19 PROCEDURE — 99285 EMERGENCY DEPT VISIT HI MDM: CPT | Performed by: EMERGENCY MEDICINE

## 2020-11-19 PROCEDURE — 83690 ASSAY OF LIPASE: CPT | Performed by: EMERGENCY MEDICINE

## 2020-11-19 PROCEDURE — 99284 EMERGENCY DEPT VISIT MOD MDM: CPT

## 2020-11-19 PROCEDURE — 85610 PROTHROMBIN TIME: CPT | Performed by: EMERGENCY MEDICINE

## 2020-11-19 PROCEDURE — 96375 TX/PRO/DX INJ NEW DRUG ADDON: CPT

## 2020-11-19 PROCEDURE — 85730 THROMBOPLASTIN TIME PARTIAL: CPT | Performed by: EMERGENCY MEDICINE

## 2020-11-19 PROCEDURE — 74176 CT ABD & PELVIS W/O CONTRAST: CPT

## 2020-11-19 PROCEDURE — 85027 COMPLETE CBC AUTOMATED: CPT | Performed by: EMERGENCY MEDICINE

## 2020-11-19 PROCEDURE — 87040 BLOOD CULTURE FOR BACTERIA: CPT | Performed by: EMERGENCY MEDICINE

## 2020-11-19 PROCEDURE — 96374 THER/PROPH/DIAG INJ IV PUSH: CPT

## 2020-11-19 PROCEDURE — 36415 COLL VENOUS BLD VENIPUNCTURE: CPT | Performed by: EMERGENCY MEDICINE

## 2020-11-19 PROCEDURE — 96376 TX/PRO/DX INJ SAME DRUG ADON: CPT

## 2020-11-19 PROCEDURE — 96361 HYDRATE IV INFUSION ADD-ON: CPT

## 2020-11-19 PROCEDURE — 71260 CT THORAX DX C+: CPT

## 2020-11-19 PROCEDURE — 71045 X-RAY EXAM CHEST 1 VIEW: CPT

## 2020-11-19 PROCEDURE — 85007 BL SMEAR W/DIFF WBC COUNT: CPT | Performed by: EMERGENCY MEDICINE

## 2020-11-19 PROCEDURE — 74177 CT ABD & PELVIS W/CONTRAST: CPT

## 2020-11-19 PROCEDURE — 81001 URINALYSIS AUTO W/SCOPE: CPT | Performed by: EMERGENCY MEDICINE

## 2020-11-19 PROCEDURE — C9113 INJ PANTOPRAZOLE SODIUM, VIA: HCPCS | Performed by: EMERGENCY MEDICINE

## 2020-11-19 PROCEDURE — 83605 ASSAY OF LACTIC ACID: CPT | Performed by: EMERGENCY MEDICINE

## 2020-11-19 PROCEDURE — 84145 PROCALCITONIN (PCT): CPT | Performed by: EMERGENCY MEDICINE

## 2020-11-19 RX ORDER — KETOROLAC TROMETHAMINE 30 MG/ML
15 INJECTION, SOLUTION INTRAMUSCULAR; INTRAVENOUS ONCE
Status: COMPLETED | OUTPATIENT
Start: 2020-11-19 | End: 2020-11-19

## 2020-11-19 RX ORDER — PANTOPRAZOLE SODIUM 40 MG/1
40 INJECTION, POWDER, FOR SOLUTION INTRAVENOUS ONCE
Status: COMPLETED | OUTPATIENT
Start: 2020-11-19 | End: 2020-11-19

## 2020-11-19 RX ADMIN — IOHEXOL 85 ML: 350 INJECTION, SOLUTION INTRAVENOUS at 16:13

## 2020-11-19 RX ADMIN — KETOROLAC TROMETHAMINE 15 MG: 30 INJECTION, SOLUTION INTRAMUSCULAR at 19:24

## 2020-11-19 RX ADMIN — PANTOPRAZOLE SODIUM 40 MG: 40 INJECTION, POWDER, FOR SOLUTION INTRAVENOUS at 20:10

## 2020-11-19 RX ADMIN — KETOROLAC TROMETHAMINE 15 MG: 30 INJECTION, SOLUTION INTRAMUSCULAR at 14:59

## 2020-11-19 RX ADMIN — SODIUM CHLORIDE 1000 ML: 0.9 INJECTION, SOLUTION INTRAVENOUS at 19:33

## 2020-11-20 ENCOUNTER — TELEMEDICINE (OUTPATIENT)
Dept: FAMILY MEDICINE CLINIC | Facility: HOSPITAL | Age: 60
End: 2020-11-20
Payer: COMMERCIAL

## 2020-11-20 VITALS
BODY MASS INDEX: 23.79 KG/M2 | HEART RATE: 88 BPM | HEIGHT: 61 IN | WEIGHT: 126 LBS | TEMPERATURE: 99.8 F | SYSTOLIC BLOOD PRESSURE: 116 MMHG | DIASTOLIC BLOOD PRESSURE: 78 MMHG

## 2020-11-20 DIAGNOSIS — R10.30 LOWER ABDOMINAL PAIN: Primary | ICD-10-CM

## 2020-11-20 LAB
ATRIAL RATE: 87 BPM
P AXIS: 62 DEGREES
PR INTERVAL: 118 MS
QRS AXIS: -4 DEGREES
QRSD INTERVAL: 88 MS
QT INTERVAL: 356 MS
QTC INTERVAL: 428 MS
T WAVE AXIS: 37 DEGREES
VENTRICULAR RATE: 87 BPM

## 2020-11-20 PROCEDURE — 3074F SYST BP LT 130 MM HG: CPT | Performed by: INTERNAL MEDICINE

## 2020-11-20 PROCEDURE — 93010 ELECTROCARDIOGRAM REPORT: CPT | Performed by: INTERNAL MEDICINE

## 2020-11-20 PROCEDURE — 3078F DIAST BP <80 MM HG: CPT | Performed by: INTERNAL MEDICINE

## 2020-11-20 PROCEDURE — 99214 OFFICE O/P EST MOD 30 MIN: CPT | Performed by: INTERNAL MEDICINE

## 2020-11-20 RX ORDER — DICYCLOMINE HYDROCHLORIDE 10 MG/1
10 CAPSULE ORAL 4 TIMES DAILY PRN
Qty: 16 CAPSULE | Refills: 0 | Status: SHIPPED | OUTPATIENT
Start: 2020-11-20 | End: 2020-11-30 | Stop reason: SDUPTHER

## 2020-11-24 LAB
BACTERIA BLD CULT: NORMAL
BACTERIA BLD CULT: NORMAL

## 2020-11-30 ENCOUNTER — TELEPHONE (OUTPATIENT)
Dept: GASTROENTEROLOGY | Facility: CLINIC | Age: 60
End: 2020-11-30

## 2020-11-30 ENCOUNTER — OFFICE VISIT (OUTPATIENT)
Dept: OBGYN CLINIC | Facility: CLINIC | Age: 60
End: 2020-11-30
Payer: COMMERCIAL

## 2020-11-30 ENCOUNTER — OFFICE VISIT (OUTPATIENT)
Dept: FAMILY MEDICINE CLINIC | Facility: HOSPITAL | Age: 60
End: 2020-11-30
Payer: COMMERCIAL

## 2020-11-30 VITALS
OXYGEN SATURATION: 98 % | DIASTOLIC BLOOD PRESSURE: 80 MMHG | TEMPERATURE: 98 F | SYSTOLIC BLOOD PRESSURE: 130 MMHG | BODY MASS INDEX: 22.84 KG/M2 | HEIGHT: 61 IN | WEIGHT: 121 LBS | HEART RATE: 85 BPM

## 2020-11-30 VITALS
HEIGHT: 60 IN | BODY MASS INDEX: 24.15 KG/M2 | WEIGHT: 123 LBS | SYSTOLIC BLOOD PRESSURE: 126 MMHG | DIASTOLIC BLOOD PRESSURE: 82 MMHG

## 2020-11-30 DIAGNOSIS — R10.84 GENERALIZED ABDOMINAL PAIN: ICD-10-CM

## 2020-11-30 DIAGNOSIS — Z12.4 CERVICAL CANCER SCREENING: ICD-10-CM

## 2020-11-30 DIAGNOSIS — K56.609 SMALL BOWEL OBSTRUCTION (HCC): ICD-10-CM

## 2020-11-30 DIAGNOSIS — Z01.419 ENCOUNTER FOR ANNUAL ROUTINE GYNECOLOGICAL EXAMINATION: Primary | ICD-10-CM

## 2020-11-30 DIAGNOSIS — I10 BENIGN ESSENTIAL HTN: ICD-10-CM

## 2020-11-30 DIAGNOSIS — J45.30 MILD PERSISTENT ASTHMA WITHOUT COMPLICATION: ICD-10-CM

## 2020-11-30 DIAGNOSIS — Z11.51 SCREENING FOR HPV (HUMAN PAPILLOMAVIRUS): ICD-10-CM

## 2020-11-30 DIAGNOSIS — L30.8 PSORIASIFORM ECZEMA: ICD-10-CM

## 2020-11-30 DIAGNOSIS — Z12.31 ENCOUNTER FOR SCREENING MAMMOGRAM FOR BREAST CANCER: ICD-10-CM

## 2020-11-30 DIAGNOSIS — S92.325D CLOSED NONDISPLACED FRACTURE OF SECOND METATARSAL BONE OF LEFT FOOT WITH ROUTINE HEALING, SUBSEQUENT ENCOUNTER: Primary | ICD-10-CM

## 2020-11-30 DIAGNOSIS — R10.30 LOWER ABDOMINAL PAIN: ICD-10-CM

## 2020-11-30 PROCEDURE — 1036F TOBACCO NON-USER: CPT | Performed by: INTERNAL MEDICINE

## 2020-11-30 PROCEDURE — G0145 SCR C/V CYTO,THINLAYER,RESCR: HCPCS | Performed by: OBSTETRICS & GYNECOLOGY

## 2020-11-30 PROCEDURE — 87624 HPV HI-RISK TYP POOLED RSLT: CPT | Performed by: OBSTETRICS & GYNECOLOGY

## 2020-11-30 PROCEDURE — 3725F SCREEN DEPRESSION PERFORMED: CPT | Performed by: INTERNAL MEDICINE

## 2020-11-30 PROCEDURE — 99386 PREV VISIT NEW AGE 40-64: CPT | Performed by: OBSTETRICS & GYNECOLOGY

## 2020-11-30 PROCEDURE — 3008F BODY MASS INDEX DOCD: CPT | Performed by: INTERNAL MEDICINE

## 2020-11-30 PROCEDURE — 99214 OFFICE O/P EST MOD 30 MIN: CPT | Performed by: INTERNAL MEDICINE

## 2020-11-30 RX ORDER — DICYCLOMINE HYDROCHLORIDE 10 MG/1
10 CAPSULE ORAL 4 TIMES DAILY PRN
Qty: 30 CAPSULE | Refills: 0 | Status: SHIPPED | OUTPATIENT
Start: 2020-11-30

## 2020-11-30 RX ORDER — FLUOCINONIDE 0.5 MG/G
1 OINTMENT TOPICAL 2 TIMES DAILY
Qty: 30 G | Refills: 0 | Status: SHIPPED | OUTPATIENT
Start: 2020-11-30

## 2020-12-03 LAB
HPV HR 12 DNA CVX QL NAA+PROBE: POSITIVE
HPV16 DNA CVX QL NAA+PROBE: NEGATIVE
HPV18 DNA CVX QL NAA+PROBE: NEGATIVE

## 2020-12-07 LAB
LAB AP GYN PRIMARY INTERPRETATION: NORMAL
Lab: NORMAL

## 2020-12-14 ENCOUNTER — HOSPITAL ENCOUNTER (OUTPATIENT)
Dept: RADIOLOGY | Facility: HOSPITAL | Age: 60
Discharge: HOME | End: 2020-12-14
Attending: INTERNAL MEDICINE
Payer: COMMERCIAL

## 2020-12-14 DIAGNOSIS — D50.9 IRON DEFICIENCY ANEMIA, UNSPECIFIED IRON DEFICIENCY ANEMIA TYPE: ICD-10-CM

## 2020-12-14 PROCEDURE — 74250 X-RAY XM SM INT 1CNTRST STD: CPT

## 2020-12-16 DIAGNOSIS — Z12.11 COLON CANCER SCREENING: Primary | ICD-10-CM

## 2020-12-16 RX ORDER — SODIUM PICOSULFATE, MAGNESIUM OXIDE, AND ANHYDROUS CITRIC ACID 10; 3.5; 12 MG/160ML; G/160ML; G/160ML
LIQUID ORAL
Qty: 1 BOTTLE | Refills: 0 | Status: SHIPPED | OUTPATIENT
Start: 2020-12-16 | End: 2021-01-28 | Stop reason: ALTCHOICE

## 2020-12-16 NOTE — TELEPHONE ENCOUNTER
----- Message from Barb Jean-Baptiste MD sent at 12/16/2020  9:26 AM EST -----  I called the patient and reviewed small bowel series  This appears negative  Patient had previous small bowel obstruction but very month for without difficulty  Recommend capsule endoscopy as the patient's blood count did get down to the high 8 g range earlier this year  Patient is agreeable  She would like to get it done before the end of the year because of insurance issues and she would not have to pay any extra at this time  Will try to arrange

## 2020-12-16 NOTE — TELEPHONE ENCOUNTER
Called patient  Scheduled for 12/21/2020  She will return equipment 12/22/2020 am     Had clenpiq for colonoscopy  Requesting rx to [facility] [place]

## 2020-12-16 NOTE — TELEPHONE ENCOUNTER
Call placed to [address] [place] [address]  No prior auth for capsule as we are participating provider  Ref [id number]

## 2020-12-21 ENCOUNTER — CLINICAL SUPPORT (OUTPATIENT)
Dept: GASTROENTEROLOGY | Facility: CLINIC | Age: 60
End: 2020-12-21
Payer: COMMERCIAL

## 2020-12-21 DIAGNOSIS — D50.9 IRON DEFICIENCY ANEMIA, UNSPECIFIED IRON DEFICIENCY ANEMIA TYPE: ICD-10-CM

## 2020-12-21 PROCEDURE — 91110 GI TRC IMG INTRAL ESOPH-ILE: CPT | Performed by: INTERNAL MEDICINE

## 2020-12-30 ENCOUNTER — HOSPITAL ENCOUNTER (OUTPATIENT)
Dept: BONE DENSITY | Facility: CLINIC | Age: 60
Discharge: HOME | End: 2020-12-30
Payer: COMMERCIAL

## 2020-12-30 DIAGNOSIS — R10.84 GENERALIZED ABDOMINAL PAIN: ICD-10-CM

## 2020-12-30 PROCEDURE — 77080 DXA BONE DENSITY AXIAL: CPT

## 2021-01-04 DIAGNOSIS — M81.0 AGE-RELATED OSTEOPOROSIS WITHOUT CURRENT PATHOLOGICAL FRACTURE: Primary | ICD-10-CM

## 2021-01-04 RX ORDER — ALENDRONATE SODIUM 70 MG/1
70 TABLET ORAL
Qty: 12 TABLET | Refills: 3 | Status: SHIPPED | OUTPATIENT
Start: 2021-01-04 | End: 2021-10-05 | Stop reason: SDUPTHER

## 2021-01-06 ENCOUNTER — TELEPHONE (OUTPATIENT)
Dept: GASTROENTEROLOGY | Facility: CLINIC | Age: 61
End: 2021-01-06

## 2021-01-06 DIAGNOSIS — K63.9 SMALL INTESTINE DISORDER: Primary | ICD-10-CM

## 2021-01-06 NOTE — TELEPHONE ENCOUNTER
I called the patient and spoke to her about recent capsule study  The capsule did reveal a segment of inflamed and narrowed small intestine probable mid mid ileal area  The normal areas proximal to the ileocecal bowel but probably about 2/3 to 3/4 is down the intestine on this study  Would like to further characterize doing MR enterography  Please arrange  Patient is agreeable  No doubt this is the source of the patient's blood loss  Told patient she may need a specialized endoscopy or colonoscopy i e  double balloon procedure and possibly even surgery  --will ask staff to arrange - needs this even though small bowel study was negative

## 2021-01-12 NOTE — TELEPHONE ENCOUNTER
MRI enterography approved, I left message of same for patient and also that she needs to complete lab work  Dr Agustin Bull has recent order for CMP, Vit D in Epic  I advised patient complete those tests and the information required for MRI contrast would be available

## 2021-01-21 DIAGNOSIS — G89.29 OTHER CHRONIC PAIN: ICD-10-CM

## 2021-01-22 RX ORDER — GABAPENTIN 300 MG/1
CAPSULE ORAL
Qty: 480 CAPSULE | Refills: 3 | Status: SHIPPED | OUTPATIENT
Start: 2021-01-22 | End: 2022-04-17

## 2021-01-25 DIAGNOSIS — J45.21 MILD INTERMITTENT REACTIVE AIRWAY DISEASE WITH ACUTE EXACERBATION: ICD-10-CM

## 2021-01-26 LAB
25(OH)D3 SERPL-MCNC: 73 NG/ML (ref 30–100)
APPEARANCE UR: CLEAR
BACTERIA UR QL AUTO: NORMAL /HPF
BILIRUB UR QL STRIP: NEGATIVE
CHOLEST SERPL-MCNC: 158 MG/DL
CHOLEST/HDLC SERPL: 2.9 (CALC)
COLOR UR: YELLOW
GLUCOSE UR QL STRIP: NEGATIVE
HDLC SERPL-MCNC: 54 MG/DL
HGB UR QL STRIP: NEGATIVE
HYALINE CASTS #/AREA URNS LPF: NORMAL /LPF
KETONES UR QL STRIP: NEGATIVE
LDLC SERPL CALC-MCNC: 79 MG/DL (CALC)
LEUKOCYTE ESTERASE UR QL STRIP: NEGATIVE
NITRITE UR QL STRIP: NEGATIVE
NONHDLC SERPL-MCNC: 104 MG/DL (CALC)
PH UR STRIP: 6.5 [PH] (ref 5–8)
PROT UR QL STRIP: NEGATIVE
RBC #/AREA URNS HPF: NORMAL /HPF
SP GR UR STRIP: 1.01 (ref 1–1.03)
SQUAMOUS #/AREA URNS HPF: NORMAL /HPF
TRIGL SERPL-MCNC: 157 MG/DL
WBC #/AREA URNS HPF: NORMAL /HPF

## 2021-01-28 ENCOUNTER — TELEMEDICINE (OUTPATIENT)
Dept: FAMILY MEDICINE CLINIC | Facility: HOSPITAL | Age: 61
End: 2021-01-28
Payer: COMMERCIAL

## 2021-01-28 VITALS — TEMPERATURE: 98.4 F | BODY MASS INDEX: 24.54 KG/M2 | HEIGHT: 60 IN | WEIGHT: 125 LBS

## 2021-01-28 DIAGNOSIS — J01.01 ACUTE RECURRENT MAXILLARY SINUSITIS: ICD-10-CM

## 2021-01-28 DIAGNOSIS — J32.9 SINUSITIS, UNSPECIFIED CHRONICITY, UNSPECIFIED LOCATION: ICD-10-CM

## 2021-01-28 DIAGNOSIS — Z20.822 CLOSE EXPOSURE TO COVID-19 VIRUS: ICD-10-CM

## 2021-01-28 DIAGNOSIS — J32.9 SINUSITIS, UNSPECIFIED CHRONICITY, UNSPECIFIED LOCATION: Primary | ICD-10-CM

## 2021-01-28 LAB
ALBUMIN SERPL-MCNC: 3.9 G/DL (ref 3.6–5.1)
ALBUMIN/GLOB SERPL: 2.1 (CALC) (ref 1–2.5)
ALP SERPL-CCNC: 67 U/L (ref 37–153)
ALT SERPL-CCNC: 23 U/L (ref 6–29)
AST SERPL-CCNC: 17 U/L (ref 10–35)
BASOPHILS # BLD AUTO: 20 CELLS/UL (ref 0–200)
BASOPHILS NFR BLD AUTO: 0.3 %
BILIRUB SERPL-MCNC: 0.2 MG/DL (ref 0.2–1.2)
BUN SERPL-MCNC: 15 MG/DL (ref 7–25)
BUN/CREAT SERPL: ABNORMAL (CALC) (ref 6–22)
CALCIUM SERPL-MCNC: 8.7 MG/DL (ref 8.6–10.4)
CHLORIDE SERPL-SCNC: 108 MMOL/L (ref 98–110)
CO2 SERPL-SCNC: 28 MMOL/L (ref 20–32)
CREAT SERPL-MCNC: 0.77 MG/DL (ref 0.5–1.05)
EOSINOPHIL # BLD AUTO: 112 CELLS/UL (ref 15–500)
EOSINOPHIL NFR BLD AUTO: 1.7 %
ERYTHROCYTE [DISTWIDTH] IN BLOOD BY AUTOMATED COUNT: 11.7 % (ref 11–15)
FERRITIN SERPL-MCNC: 153 NG/ML (ref 16–232)
GLOBULIN SER CALC-MCNC: 1.9 G/DL (CALC) (ref 1.9–3.7)
GLUCOSE SERPL-MCNC: 109 MG/DL (ref 65–99)
HCT VFR BLD AUTO: 39 % (ref 35–45)
HGB BLD-MCNC: 12.7 G/DL (ref 11.7–15.5)
IRON SATN MFR SERPL: 31 % (CALC) (ref 16–45)
IRON SERPL-MCNC: 81 MCG/DL (ref 45–160)
LYMPHOCYTES # BLD AUTO: 1881 CELLS/UL (ref 850–3900)
LYMPHOCYTES NFR BLD AUTO: 28.5 %
MCH RBC QN AUTO: 33.4 PG (ref 27–33)
MCHC RBC AUTO-ENTMCNC: 32.6 G/DL (ref 32–36)
MCV RBC AUTO: 102.6 FL (ref 80–100)
METHYLMALONATE SERPL-SCNC: 200 NMOL/L (ref 87–318)
MONOCYTES # BLD AUTO: 350 CELLS/UL (ref 200–950)
MONOCYTES NFR BLD AUTO: 5.3 %
NEUTROPHILS # BLD AUTO: 4237 CELLS/UL (ref 1500–7800)
NEUTROPHILS NFR BLD AUTO: 64.2 %
PLATELET # BLD AUTO: 195 THOUSAND/UL (ref 140–400)
PMV BLD REES-ECKER: 9.9 FL (ref 7.5–12.5)
POTASSIUM SERPL-SCNC: 4.1 MMOL/L (ref 3.5–5.3)
PROT SERPL-MCNC: 5.8 G/DL (ref 6.1–8.1)
RBC # BLD AUTO: 3.8 MILLION/UL (ref 3.8–5.1)
SL AMB EGFR AFRICAN AMERICAN: 99 ML/MIN/1.73M2
SL AMB EGFR NON AFRICAN AMERICAN: 85 ML/MIN/1.73M2
SODIUM SERPL-SCNC: 141 MMOL/L (ref 135–146)
TIBC SERPL-MCNC: 259 MCG/DL (CALC) (ref 250–450)
WBC # BLD AUTO: 6.6 THOUSAND/UL (ref 3.8–10.8)

## 2021-01-28 PROCEDURE — 99213 OFFICE O/P EST LOW 20 MIN: CPT | Performed by: PHYSICIAN ASSISTANT

## 2021-01-28 PROCEDURE — U0005 INFEC AGEN DETEC AMPLI PROBE: HCPCS | Performed by: PHYSICIAN ASSISTANT

## 2021-01-28 PROCEDURE — U0003 INFECTIOUS AGENT DETECTION BY NUCLEIC ACID (DNA OR RNA); SEVERE ACUTE RESPIRATORY SYNDROME CORONAVIRUS 2 (SARS-COV-2) (CORONAVIRUS DISEASE [COVID-19]), AMPLIFIED PROBE TECHNIQUE, MAKING USE OF HIGH THROUGHPUT TECHNOLOGIES AS DESCRIBED BY CMS-2020-01-R: HCPCS | Performed by: PHYSICIAN ASSISTANT

## 2021-01-28 PROCEDURE — 1036F TOBACCO NON-USER: CPT | Performed by: PHYSICIAN ASSISTANT

## 2021-01-28 PROCEDURE — 3008F BODY MASS INDEX DOCD: CPT | Performed by: PHYSICIAN ASSISTANT

## 2021-01-28 RX ORDER — AMOXICILLIN AND CLAVULANATE POTASSIUM 875; 125 MG/1; MG/1
1 TABLET, FILM COATED ORAL EVERY 12 HOURS SCHEDULED
Qty: 14 TABLET | Refills: 0 | Status: SHIPPED | OUTPATIENT
Start: 2021-01-28 | End: 2021-02-04

## 2021-01-28 NOTE — PROGRESS NOTES
Virtual Regular Visit      Assessment/Plan:    Problem List Items Addressed This Visit     None      Visit Diagnoses     Sinusitis, unspecified chronicity, unspecified location    -  Primary    Relevant Orders    Novel Coronavirus (Covid-19),PCR [facility] - Collected at [facility]    Close exposure to COVID-19 virus        Relevant Orders    Novel Coronavirus (Covid-19),PCR [facility] - Collected at [facility]    Acute recurrent maxillary sinusitis        Relevant Medications    amoxicillin-clavulanate (AUGMENTIN) 875-125 mg per tablet               Reason for visit is   Chief Complaint   Patient presents with    COVID-19     poss covid    Virtual Regular Visit        Encounter provider Joce Romo PA-C    Provider located at [place] [address] [address] [place] [address]      Recent Visits  No visits were found meeting these conditions  Showing recent visits within past 7 days and meeting all other requirements     Today's Visits  Date Type Provider Dept   01/28/21 Telemedicine Joce Romo PA-C [facility]   Showing today's visits and meeting all other requirements     Future Appointments  No visits were found meeting these conditions  Showing future appointments within next 150 days and meeting all other requirements        The patient was identified by name and date of birth  Jen Daley was informed that this is a telemedicine visit and that the visit is being conducted through [address] [place] [address] and patient was informed that this is not a secure, HIPAA-compliant platform  She agrees to proceed     My office door was closed  No one else was in the room  She acknowledged consent and understanding of privacy and security of the video platform  The patient has agreed to participate and understands they can discontinue the visit at any time      Patient is aware this is a billable service  Subjective  Santos Albright is a 64 y o  female C/o sinus congestion, SOB, and sore throat since past Monday  2 people at work tested positive for Brendan last week; works full time;  [address]  Very tired, and has a headache  Temp  Today 98 4  Mucous yellow  Past Medical History:   Diagnosis Date    Anemia     Anxiety     Asthma     Bursitis of left elbow     Chronic pain disorder     lower back    Colon polyp     Hiatal hernia     Iron deficiency anemia secondary to inadequate dietary iron intake 1/29/2020    Kidney stones     Pancreatitis     Psoriasis     Psychiatric disorder     anxiety    Restless leg syndrome     Tobacco use disorder        Past Surgical History:   Procedure Laterality Date    CYSTOSCOPY      CYSTOSCOPY W/ URETERAL STENT PLACEMENT      EXPLORATORY LAPAROTOMY      small intestine blockage no resection    LAPAROSCOPY      --  for SBO    [place] ERCP DX COLLECTION SPECIMEN BRUSHING/WASHING N/A 8/15/2018    Procedure: ENDOSCOPIC RETROGRADE CHOLANGIOPANCREATOGRAPHY (ERCP);   Surgeon: Otis Fletcher MD;  Location:  MAIN OR;  Service: Gastroenterology    MA LAP,DIAGNOSTIC ABDOMEN N/A 2/10/2020    Procedure: LAPAROSCOPY DIAGNOSTIC, REPAIR ENTEROTOMY, REMOVAL OF BEZOAR;  Surgeon: Elliott Kirby MD;  Location:  MAIN OR;  Service: General       Current Outpatient Medications   Medication Sig Dispense Refill    alendronate (Fosamax) 70 mg tablet Take 1 tablet (70 mg total) by mouth every 7 days 12 tablet 3    amLODIPine (NORVASC) 10 mg tablet Take 1 tablet (10 mg total) by mouth daily Decreased dose 90 tablet 3    Cholecalciferol (VITAMIN D3) 5000 units CAPS Take by mouth daily      Cranberry 200 MG CAPS Take 1 capsule by mouth daily      dicyclomine (BENTYL) 10 mg capsule Take 1 capsule (10 mg total) by mouth 4 (four) times a day as needed (Abdominal pain) 30 capsule 0    fluocinonide (LIDEX) 0 05 % ointment Apply 1 application topically 2 (two) times a day To affected area 30 g 0    fluticasone (FLONASE) 50 mcg/act nasal spray SPRAY 2 SPRAYS INTO EACH NOSTRIL EVERY DAY 48 mL 5    fluticasone-salmeterol (Advair Diskus) 250-50 mcg/dose inhaler Inhale 1 puff 2 (two) times a day Rinse mouth after use 1 Inhaler 5    gabapentin (NEURONTIN) 300 mg capsule take 1 capsule by mouth daily in AM 1 at noon  and 2 capsules by mouth every  capsule 3    hydrocortisone (ANUSOL-HC) 2 5 % rectal cream Apply topically 2 (two) times a day for 14 days 28 g 2    meloxicam (MOBIC) 15 mg tablet Take 1 tablet (15 mg total) by mouth daily 90 tablet 3    methocarbamol (ROBAXIN) 500 mg tablet TAKE 1 TABLET BY MOUTH TWICE A DAY 60 tablet 3    Multiple Vitamin tablet Take 1 tablet by mouth daily      omeprazole (PriLOSEC) 40 MG capsule TAKE 1 CAPSULE BY MOUTH EVERY DAY 90 capsule 3    Probiotic Product (PROBIOTIC-10 ULTIMATE PO) Take by mouth      triamcinolone (KENALOG) 0 1 % cream APPLY TO AFFECTED AREAS TWICE A DAY FOR 2 WEEKS AND THEN AS NEEDED      amoxicillin-clavulanate (AUGMENTIN) 875-125 mg per tablet Take 1 tablet by mouth every 12 (twelve) hours for 7 days 14 tablet 0    nicotine (NICODERM CQ) 21 mg/24 hr TD 24 hr patch PLACE 1 PATCH ON THE SKIN EVERY 24 HOURS (Patient not taking: Reported on 1/28/2021) 28 patch 0     No current facility-administered medications for this visit  Allergies   Allergen Reactions    Pollen Extract     Tree Extract        Review of Systems   Constitutional: Positive for appetite change and fatigue  Negative for chills, diaphoresis and fever  HENT: Positive for congestion, ear pain, postnasal drip, rhinorrhea and sore throat  Respiratory: Positive for chest tightness and shortness of breath  Negative for cough  Neurological: Positive for headaches  Forehead area           Video Exam    Vitals:    01/28/21 1334   Temp: 98 4 °F (36 9 °C)   Weight: 56 7 kg (125 lb)   Height: 5' (1 524 m) Physical Exam     I spent 15 minutes directly with the patient during this visit      VIRTUAL VISIT DISCLAIMER    Echo Joya acknowledges that she has consented to an online visit or consultation  She understands that the online visit is based solely on information provided by her, and that, in the absence of a face-to-face physical evaluation by the physician, the diagnosis she receives is both limited and provisional in terms of accuracy and completeness  This is not intended to replace a full medical face-to-face evaluation by the physician  Echo Joya understands and accepts these terms

## 2021-01-29 LAB — SARS-COV-2 RNA RESP QL NAA+PROBE: NEGATIVE

## 2021-02-03 DIAGNOSIS — I10 BENIGN ESSENTIAL HTN: ICD-10-CM

## 2021-02-03 RX ORDER — AMLODIPINE BESYLATE 10 MG/1
10 TABLET ORAL DAILY
Qty: 90 TABLET | Refills: 3 | Status: SHIPPED | OUTPATIENT
Start: 2021-02-03 | End: 2021-11-01 | Stop reason: SDUPTHER

## 2021-02-12 ENCOUNTER — HOSPITAL ENCOUNTER (OUTPATIENT)
Dept: MRI IMAGING | Facility: HOSPITAL | Age: 61
Discharge: HOME | End: 2021-02-12
Attending: INTERNAL MEDICINE
Payer: COMMERCIAL

## 2021-02-12 DIAGNOSIS — K63.9 SMALL INTESTINE DISORDER: ICD-10-CM

## 2021-02-12 PROCEDURE — A9585 GADOBUTROL INJECTION: HCPCS | Performed by: INTERNAL MEDICINE

## 2021-02-12 PROCEDURE — 74183 MRI ABD W/O CNTR FLWD CNTR: CPT

## 2021-02-12 PROCEDURE — 72197 MRI PELVIS W/O & W/DYE: CPT

## 2021-02-12 PROCEDURE — G1004 CDSM NDSC: HCPCS

## 2021-02-12 RX ADMIN — GLUCAGON HYDROCHLORIDE 1 MG: KIT at 09:43

## 2021-02-12 RX ADMIN — GADOBUTROL 5 ML: 604.72 INJECTION INTRAVENOUS at 10:19

## 2021-02-19 NOTE — RESULT ENCOUNTER NOTE
I called the patient and left a voice message about how MR enterography  There is some narrowing in decreased distensibility in the small bowel felt to be in the segment and jejunum  Question whether this is where abnormality-will refer to doctorTay Levin/[facility] -for possible double balloon enteroscopy  -can show those doctors the disc of the capsule  Patient's most recent hemoglobin stable    Did have recent EGD colonoscopy for gem

## 2021-02-22 ENCOUNTER — TELEPHONE (OUTPATIENT)
Dept: GASTROENTEROLOGY | Facility: CLINIC | Age: 61
End: 2021-02-22

## 2021-02-22 DIAGNOSIS — I10 BENIGN ESSENTIAL HTN: ICD-10-CM

## 2021-02-22 DIAGNOSIS — R93.3 ABNORMAL FINDING ON GI TRACT IMAGING: Primary | ICD-10-CM

## 2021-02-22 RX ORDER — METHOCARBAMOL 500 MG/1
500 TABLET, FILM COATED ORAL 2 TIMES DAILY
Qty: 60 TABLET | Refills: 3 | Status: SHIPPED | OUTPATIENT
Start: 2021-02-22 | End: 2021-06-11 | Stop reason: SDUPTHER

## 2021-02-22 NOTE — TELEPHONE ENCOUNTER
----- Message from Mari Garcia MD sent at 2/19/2021  3:17 PM EST -----  I called the patient and left a voice message about how MR enterography  There is some narrowing in decreased distensibility in the small bowel felt to be in the segment and jejunum  Question whether this is where abnormality-will refer to doctorTay Levin/Pernell -for possible double balloon enteroscopy  -can show those doctors the disc of the capsule  Patient's most recent hemoglobin stable    Did have recent EGD colonoscopy for gem

## 2021-03-05 ENCOUNTER — OFFICE VISIT (OUTPATIENT)
Dept: FAMILY MEDICINE CLINIC | Facility: HOSPITAL | Age: 61
End: 2021-03-05
Payer: COMMERCIAL

## 2021-03-05 ENCOUNTER — TELEPHONE (OUTPATIENT)
Dept: GASTROENTEROLOGY | Facility: CLINIC | Age: 61
End: 2021-03-05

## 2021-03-05 VITALS
WEIGHT: 122.4 LBS | SYSTOLIC BLOOD PRESSURE: 122 MMHG | OXYGEN SATURATION: 97 % | HEIGHT: 60 IN | DIASTOLIC BLOOD PRESSURE: 82 MMHG | HEART RATE: 80 BPM | BODY MASS INDEX: 24.03 KG/M2

## 2021-03-05 DIAGNOSIS — M81.0 AGE-RELATED OSTEOPOROSIS WITHOUT CURRENT PATHOLOGICAL FRACTURE: ICD-10-CM

## 2021-03-05 DIAGNOSIS — M47.816 SPONDYLOSIS OF LUMBAR REGION WITHOUT MYELOPATHY OR RADICULOPATHY: ICD-10-CM

## 2021-03-05 DIAGNOSIS — I10 BENIGN ESSENTIAL HTN: ICD-10-CM

## 2021-03-05 DIAGNOSIS — D50.8 IRON DEFICIENCY ANEMIA SECONDARY TO INADEQUATE DIETARY IRON INTAKE: Chronic | ICD-10-CM

## 2021-03-05 DIAGNOSIS — L40.9 PSORIASIS: ICD-10-CM

## 2021-03-05 DIAGNOSIS — J45.30 MILD PERSISTENT ASTHMA WITHOUT COMPLICATION: Primary | ICD-10-CM

## 2021-03-05 DIAGNOSIS — K56.699 SMALL BOWEL STRICTURE (HCC): ICD-10-CM

## 2021-03-05 DIAGNOSIS — E78.00 HYPERCHOLESTEROLEMIA: ICD-10-CM

## 2021-03-05 DIAGNOSIS — Z72.0 TOBACCO ABUSE: ICD-10-CM

## 2021-03-05 DIAGNOSIS — E78.2 MIXED HYPERLIPIDEMIA: ICD-10-CM

## 2021-03-05 DIAGNOSIS — G89.29 OTHER CHRONIC PAIN: ICD-10-CM

## 2021-03-05 DIAGNOSIS — R73.01 ELEVATED FASTING GLUCOSE: ICD-10-CM

## 2021-03-05 PROBLEM — S92.345A CLOSED NONDISPLACED FRACTURE OF FOURTH METATARSAL BONE OF LEFT FOOT: Status: RESOLVED | Noted: 2020-06-09 | Resolved: 2021-03-05

## 2021-03-05 PROBLEM — S92.325A CLOSED NONDISPLACED FRACTURE OF SECOND METATARSAL BONE OF LEFT FOOT: Status: RESOLVED | Noted: 2020-04-29 | Resolved: 2021-03-05

## 2021-03-05 PROBLEM — S92.335A CLOSED NONDISPLACED FRACTURE OF THIRD METATARSAL BONE OF LEFT FOOT: Status: RESOLVED | Noted: 2020-06-09 | Resolved: 2021-03-05

## 2021-03-05 PROCEDURE — 3074F SYST BP LT 130 MM HG: CPT | Performed by: INTERNAL MEDICINE

## 2021-03-05 PROCEDURE — 99214 OFFICE O/P EST MOD 30 MIN: CPT | Performed by: INTERNAL MEDICINE

## 2021-03-05 PROCEDURE — 3725F SCREEN DEPRESSION PERFORMED: CPT | Performed by: INTERNAL MEDICINE

## 2021-03-05 PROCEDURE — 3079F DIAST BP 80-89 MM HG: CPT | Performed by: INTERNAL MEDICINE

## 2021-03-05 RX ORDER — MELOXICAM 15 MG/1
15 TABLET ORAL DAILY
Qty: 90 TABLET | Refills: 3 | Status: SHIPPED | OUTPATIENT
Start: 2021-03-05 | End: 2021-10-05

## 2021-03-05 NOTE — TELEPHONE ENCOUNTER
Real Thompson   The referral to Ollie/P[facility]ell was already done  The capsule was put on a disc and interofficed to them  I'm not sure what the status is

## 2021-03-05 NOTE — PATIENT INSTRUCTIONS
Do labs in 6 months befroe appt   sign up on my will chart for [facility] for covid vaccination and also [facility]

## 2021-03-05 NOTE — PROGRESS NOTES
Assessment/Plan:             Problem List Items Addressed This Visit        Digestive    Small bowel stricture [facility])     Had recent small bowel mri - told to see gi specialist in [facility] by Dr Felix Cole- llq stricture? Inflammation related            Respiratory    Asthma - Primary       Cardiovascular and Mediastinum    Benign essential HTN       Musculoskeletal and Integument    Psoriasis     Has nail changes and ongoing hand issues- using flucinoninde- had tried stellara injections  She stopped meds as not noticing improvement after 5 month trial  She sis not like that she had not seen the physician         Spondylosis of lumbar region without myelopathy or radiculopathy     Under fairly good control- no heavy lifting with current position         Age-related osteoporosis without current pathological fracture       Other    Iron deficiency anemia secondary to inadequate dietary iron intake (Chronic)    Hypercholesterolemia     reveiwed recent labs improving cholesterol and still mildly elevated tr         Tobacco abuse     Smoking about 1 1/2 ppd- willing to try patch again         Chronic pain            Subjective:      Patient ID: Kyrie Villalobos is a 64 y o  female    See porch- from completed for work "[employer]" discusssed elelvated fbs at 80- will check labs in 10 month  Fathers family had dm- her numbers are improving  To see hematology- had 10 infusion of iron      The following portions of the patient's history were reviewed and updated as appropriate: allergies, current medications and problem list      Review of Systems   Constitutional: Negative for chills and fever  HENT: Positive for congestion  Respiratory: Negative for cough and shortness of breath  Cardiovascular: Negative for chest pain and palpitations  Gastrointestinal: Negative for abdominal distention, constipation and nausea  Genitourinary: Negative for difficulty urinating     Musculoskeletal: Positive for back pain         Back pain under good control   Psychiatric/Behavioral: Negative for agitation  All other systems reviewed and are negative          Objective:      Current Outpatient Medications:     alendronate (Fosamax) 70 mg tablet, Take 1 tablet (70 mg total) by mouth every 7 days, Disp: 12 tablet, Rfl: 3    amLODIPine (NORVASC) 10 mg tablet, Take 1 tablet (10 mg total) by mouth daily Decreased dose, Disp: 90 tablet, Rfl: 3    Cholecalciferol (VITAMIN D3) 5000 units CAPS, Take by mouth daily, Disp: , Rfl:     Cranberry 200 MG CAPS, Take 1 capsule by mouth daily, Disp: , Rfl:     dicyclomine (BENTYL) 10 mg capsule, Take 1 capsule (10 mg total) by mouth 4 (four) times a day as needed (Abdominal pain), Disp: 30 capsule, Rfl: 0    fluocinonide (LIDEX) 0 05 % ointment, Apply 1 application topically 2 (two) times a day To affected area, Disp: 30 g, Rfl: 0    fluticasone (FLONASE) 50 mcg/act nasal spray, SPRAY 2 SPRAYS INTO EACH NOSTRIL EVERY DAY, Disp: 48 mL, Rfl: 5    fluticasone-salmeterol (Advair Diskus) 250-50 mcg/dose inhaler, Inhale 1 puff 2 (two) times a day Rinse mouth after use, Disp: 1 Inhaler, Rfl: 5    gabapentin (NEURONTIN) 300 mg capsule, take 1 capsule by mouth daily in AM 1 at noon  and 2 capsules by mouth every PM, Disp: 480 capsule, Rfl: 3    meloxicam (MOBIC) 15 mg tablet, Take 1 tablet (15 mg total) by mouth daily, Disp: 90 tablet, Rfl: 3    methocarbamol (ROBAXIN) 500 mg tablet, Take 1 tablet (500 mg total) by mouth 2 (two) times a day, Disp: 60 tablet, Rfl: 3    Multiple Vitamin tablet, Take 1 tablet by mouth daily, Disp: , Rfl:     nicotine (NICODERM CQ) 21 mg/24 hr TD 24 hr patch, PLACE 1 PATCH ON THE SKIN EVERY 24 HOURS, Disp: 28 patch, Rfl: 0    omeprazole (PriLOSEC) 40 MG capsule, TAKE 1 CAPSULE BY MOUTH EVERY DAY, Disp: 90 capsule, Rfl: 3    Probiotic Product (PROBIOTIC-10 ULTIMATE PO), Take by mouth, Disp: , Rfl:     triamcinolone (KENALOG) 0 1 % cream, APPLY TO AFFECTED AREAS TWICE A DAY FOR 2 WEEKS AND THEN AS NEEDED, Disp: , Rfl:     hydrocortisone (ANUSOL-HC) 2 5 % rectal cream, Apply topically 2 (two) times a day for 14 days, Disp: 28 g, Rfl: 2    Blood pressure 122/82, pulse 80, height 5' (1 524 m), weight 55 5 kg (122 lb 6 4 oz), SpO2 97 %, not currently breastfeeding  Physical Exam  Vitals signs and nursing note reviewed  Constitutional:       General: She is not in acute distress  Appearance: Normal appearance  HENT:      Head: Normocephalic and atraumatic  Nose: Congestion present  Eyes:      General: No scleral icterus  Right eye: No discharge  Left eye: No discharge  Neck:      Musculoskeletal: Normal range of motion  Cardiovascular:      Rate and Rhythm: Normal rate and regular rhythm  Pulmonary:      Effort: Pulmonary effort is normal  No respiratory distress  Breath sounds: Normal breath sounds  No rhonchi  Abdominal:      General: Abdomen is flat  There is no distension  Palpations: Abdomen is soft  Musculoskeletal:         General: No swelling or tenderness  Lymphadenopathy:      Cervical: No cervical adenopathy  Skin:     General: Skin is warm  Coloration: Skin is not jaundiced  Findings: No bruising  Comments: some scaling on hands   Neurological:      General: No focal deficit present  Mental Status: She is alert  Psychiatric:         Mood and Affect: Mood normal          Thought Content:  Thought content normal          Judgment: Judgment normal

## 2021-03-05 NOTE — ASSESSMENT & PLAN NOTE
Had recent small bowel mri - [place] [facility] [place]el[place]- terrell stricture?  Inflammation related

## 2021-03-05 NOTE — TELEPHONE ENCOUNTER
Thanks Tammy Wilson - I just  Called to remind her to follow as I was a little worried about the follow up

## 2021-03-05 NOTE — TELEPHONE ENCOUNTER
The I called the patient and left a voice message  Patient did have recent capsule endoscopy late December and then MRI enterography in February for evaluation of iron-deficiency anemia and bleeding  Negative EGD and colonoscopy in 2020  Capsule study showed blood and some stricturing of the small bowel which looked somewhat distal however MRI shows abnormality in the jejunal area  Would like her to see Dr Levin or Eron Mercado -for single balloon enteroscopy  Please provide a disc for doctors    The patient makes the appointment give those providers - phone numbers - thanks- cbc ordered by Dr Ana Pittman office

## 2021-03-05 NOTE — ASSESSMENT & PLAN NOTE
Has nail changes and ongoing hand issues- using flucinoninde- had tried stellara injections   She stopped meds as not noticing improvement after 5 month trial  She sis not like that she had not seen the physician

## 2021-03-08 NOTE — TELEPHONE ENCOUNTER
Pt notes she rec'd mssg from Laura Mention  I rev'd above, that disc of capsule had been interofficed & rec'd by Dr Finn Larson office  Provided Pt w/ their ph #; also indicated they will be reaching out to her  Pt asks to let Laura Mention know she called

## 2021-03-10 NOTE — PROGRESS NOTES
Hematology/Oncology Outpatient Follow- up Note  Ana M Trejo, 1960, [id number]  3/15/2021        Chief Complaint   Patient presents with    Follow-up       HPI:   Ana M Trejo is a 64 female who was referred to hematology for iron deficiency anemia  She was treated with 10 doses of IV Venofer and her numbers corrected  She has been taking oral iron   She is following with GI For evaluation of her iron deficiency anemia and bleeding  She underwent EGD and colonoscopy on 10/27/20  The studies were negative for GI blood loss  She had a capsule study done in late 2020 this showed some blood and some stricturing of the small bowel  She had an MRI enterography on 21 which showed some abnormality in the jejunal area; No findings to account for her anemia  She will be undergoing a single balloon enteroscopy the for further evaluation      Previous Hematologic/ Oncologic History:    Workup  Venofer    Current Hematologic/ Oncologic Treatment:    Observation      ECO - Asymptomatic    Interval History:   The patient presents for routine follow up  She was last seen on 2020  Most recent blood work completed on 21 was reviewed  Her CBC demonstrated a normal white count, hemoglobin 12 7,  6, platelet count 291   MMA normal 200   iron panel normal   Serum iron 81, ferritin 153  She has not been taking any oral iron  She denies any fatigue, SOB, CP, dizziness, HA; denies any recent bleeding  No BRBPR, no dark or tarry stools  She is up to date with her health screenings  Test Results:    Imaging: Mri Enterography Diana Doss QN    Result Date: 2/15/2021  Narrative: MRI ABDOMEN AND PELVIS WITH AND WITHOUT CONTRAST (MR ENTEROGRAPHY) INDICATION:  History of anemia  History of small bowel obstruction  Findings suspicious for inflammation and stricture in the mid ileum identified on capsule endoscopy    Diagnostic considerations include inflammatory bowel disease, nonsteroidal anti-inflammatory related injury and intestinal lymphoma  No evidence of inflammatory bowel disease identified on colonoscopy performed September 2020  COMPARISON: Fluoroscopic small bowel series performed December 14, 2020  CT performed November 19, 2020 TECHNIQUE:  The following pulse sequences of the abdomen and pelvis were obtained: Coronal 2D FIESTA multiphase with fat saturation, axial 2D FIESTA with fat saturation, axial and coronal T2, pre-contrast coronal T1 with fat saturation, post-contrast dynamic coronal T1 at 20, 70, and 180 seconds with fat saturation, and axial T1 post-contrast with fat saturation through the abdomen and pelvis  1350 mL of oral contrast containing 0 1% (wt/vol) barium suspension with sorbitol (VoLumen) was administered 45 minutes prior to scanning  1 mg of glucagon was administered IM prior to contrast administration by the radiology nurse  IV Contrast:  5 mL of Gadobutrol injection (SINGLE-DOSE) Note that dynamic imaging was tailored for evaluation of the bowel with limited evaluation of the remainder of the abdominal and pelvic viscera  FINDINGS: Examination is somewhat limited as the patient appears to been unable to tolerate the full volume of enteric contrast  ABDOMEN: BOWEL:   There is a very short segment of and distensible small bowel in the left lower abdomen on coronal image 23 of series 3 which has a somewhat angulated appearance and is incompletely distended on all sequences of this examination best demonstrated  on axial postcontrast image 116 of series 11, without associated pathologic postcontrast enhancement is submucosal wall thickening possibly representing short segment stricture related to prior inflammatory insult/scarring  An incompletely distended segment of jejunum in the left midabdomen on coronal T2 images 20 and 21 but is normally distended on axial fiesta or postcontrast images and probably represents benign prominent peristaltic wave    This loop demonstrates no pathologic enhancement  No MR evidence of active inflammatory bowel disease  No MR evidence of chronic fibrostenotic disease or fibrofatty proliferation surrounding abnormal bowel loops related to long-standing inflammatory bowel disease  Normal terminal ileum  No bowel obstruction  No abdominal or pelvic abscess  No evidence of fistula  No suspicious mass associated with bowel  No findings to suggest small bowel lymphoma  Slightly larger than typical volume of stool seen in the colon suggesting some element of constipation  LIVER:  Normal   GALLBLADDER:  Normal  PANCREAS:  Normal  ADRENAL GLANDS:  Normal  SPLEEN:  Normal  KIDNEYS:  Bilateral renal scarring, most notable at the upper pole the left kidney, similar from prior examinations  Renal stones are better evaluated on prior noncontrast CT examination  No hydronephrosis  LUNG BASES:  Unremarkable MRI appearance  PELVIS: REPRODUCTIVE STRUCTURES:   Age-appropriate  BLADDER:  Normal  OTHER STRUCTURES OF THE ABDOMEN AND PELVIS OSSEOUS STRUCTURES:   No osseous destruction  VASCULAR STRUCTURES:  Visualized vasculature is normal  ABDOMINOPELVIC CAVITY:  No lymphadenopathy or mass  No ascites  ABDOMINOPELVIC WALL:  Normal      Impression: No evidence of active or fibrostenotic inflammatory bowel disease  Short segment of persistently non-distended small bowel in the left lower quadrant without pathologic enhancement or submucosal wall thickening at the site of focal angulation suggests focal mild nonobstructive stricturing possibly related to prior inflammatory insult and could account for the findings on capsule endoscopy  Could this be related to prior infectious enteritis? The patient appeared to have a significant enteritis with multiple segments of bowel wall thickening and mesenteric edema on the noncontrast CT of November 19, 2020  No MR findings to account for the patient's anemia    No MR evidence to support a diagnosis of small bowel lymphoma  Renal stones and renal scarring, poorly evaluated on this examination  No hydronephrosis  Workstation performed: [id number]       Labs:   Lab Results   Component Value Date    WBC 6 6 01/25/2021    HGB 12 7 01/25/2021    HCT 39 0 01/25/2021     6 (H) 01/25/2021     01/25/2021     Lab Results   Component Value Date     09/20/2017    K 4 1 01/25/2021     01/25/2021    CO2 28 01/25/2021    ANIONGAP 10 06/10/2015    BUN 15 01/25/2021    CREATININE 0 77 01/25/2021    GLUCOSE 96 06/10/2015    CALCIUM 8 7 01/25/2021    CORRECTEDCA 9 4 11/19/2020    AST 17 01/25/2021    ALT 23 01/25/2021    ALKPHOS 67 01/25/2021    PROT 5 9 (L) 09/20/2017    BILITOT 0 3 09/20/2017    EGFR 77 11/19/2020           Review of Systems   All other systems reviewed and are negative          Active Problems:   Patient Active Problem List   Diagnosis    Restless leg syndrome    Psoriasis    Narcolepsy without cataplexy    Hypercholesterolemia    Chronic pain    Benign essential HTN    Anxiety    Allergic rhinitis    Anemia    Hiatal hernia    Nail abnormality    Primary osteoarthritis of both hips    Tobacco abuse    Spondylosis of lumbar region without myelopathy or radiculopathy    Chronic superficial gastritis    Iron deficiency anemia secondary to inadequate dietary iron intake    Vision changes    Asthma    Psychiatric disorder    Age-related osteoporosis without current pathological fracture    Small bowel stricture (HCC)       Past Medical History:   Past Medical History:   Diagnosis Date    Anemia     Anxiety     Asthma     Bursitis of left elbow     Chronic pain disorder     lower back    Colon polyp     Hiatal hernia     Iron deficiency anemia secondary to inadequate dietary iron intake 1/29/2020    Kidney stones     Pancreatitis     Psoriasis     Psychiatric disorder     anxiety    Restless leg syndrome     Tobacco use disorder        Surgical History:   Past Surgical History:   Procedure Laterality Date    CYSTOSCOPY      CYSTOSCOPY W/ URETERAL STENT PLACEMENT      EXPLORATORY LAPAROTOMY      small intestine blockage no resection    LAPAROSCOPY      --  for SBO    HI ERCP DX COLLECTION SPECIMEN BRUSHING/WASHING N/A 8/15/2018    Procedure: ENDOSCOPIC RETROGRADE CHOLANGIOPANCREATOGRAPHY (ERCP); Surgeon: Shawna Wiley MD;  Location:  MAIN OR;  Service: Gastroenterology    HI LAP,DIAGNOSTIC ABDOMEN N/A 2/10/2020    Procedure: LAPAROSCOPY DIAGNOSTIC, REPAIR ENTEROTOMY, REMOVAL OF BEZOAR;  Surgeon: Angelica Jose MD;  Location:  MAIN OR;  Service: General       Family History:    Family History   Problem Relation Age of Onset    Mental illness Sister     Other Other         Cardiovascular disease    Hypertension Mother     Cancer Father     Substance Abuse Neg Hx        Cancer-related family history includes Cancer in her father      Social History:   Social History     Socioeconomic History    Marital status: /Civil Union     Spouse name: Not on file    Number of children: Not on file    Years of education: Not on file    Highest education level: Not on file   Occupational History    Not on file   Social Needs    Financial resource strain: Not on file    Food insecurity     Worry: Not on file     Inability: Not on file    Transportation needs     Medical: No     Non-medical: No   Tobacco Use    Smoking status: Former Smoker     Packs/day: 1 00     Types: Cigarettes    Smokeless tobacco: Never Used    Tobacco comment: Attempting to stop   Substance and Sexual Activity    Alcohol use: Never     Frequency: Never     Drinks per session: Patient refused     Binge frequency: Never    Drug use: No    Sexual activity: Not on file   Lifestyle    Physical activity     Days per week: 5 days     Minutes per session: 40 min    Stress: Not at all   Relationships    Social connections     Talks on phone: Not on file     Gets together: Not on file     Attends [employer] service: Not on file     Active member of club or organization: Not on file     Attends meetings of clubs or organizations: Not on file     Relationship status: Not on file    Intimate partner violence     Fear of current or ex partner: No     Emotionally abused: No     Physically abused: No     Forced sexual activity: No   Other Topics Concern    Not on file   Social History Narrative    Lives with     Feels safe at home    Sees dentist occas  No living will  Gets exercise with her job          Current Medications:   Current Outpatient Medications   Medication Sig Dispense Refill    alendronate (Fosamax) 70 mg tablet Take 1 tablet (70 mg total) by mouth every 7 days 12 tablet 3    amLODIPine (NORVASC) 10 mg tablet Take 1 tablet (10 mg total) by mouth daily Decreased dose 90 tablet 3    Cholecalciferol (VITAMIN D3) 5000 units CAPS Take by mouth daily      Cranberry 200 MG CAPS Take 1 capsule by mouth daily      dicyclomine (BENTYL) 10 mg capsule Take 1 capsule (10 mg total) by mouth 4 (four) times a day as needed (Abdominal pain) 30 capsule 0    fluocinonide (LIDEX) 0 05 % ointment Apply 1 application topically 2 (two) times a day To affected area 30 g 0    fluticasone (FLONASE) 50 mcg/act nasal spray SPRAY 2 SPRAYS INTO EACH NOSTRIL EVERY DAY 48 mL 5    fluticasone-salmeterol (Advair Diskus) 250-50 mcg/dose inhaler Inhale 1 puff 2 (two) times a day Rinse mouth after use 1 Inhaler 5    gabapentin (NEURONTIN) 300 mg capsule take 1 capsule by mouth daily in AM 1 at noon  and 2 capsules by mouth every  capsule 3    hydrocortisone (ANUSOL-HC) 2 5 % rectal cream Apply topically 2 (two) times a day for 14 days 28 g 2    meloxicam (MOBIC) 15 mg tablet Take 1 tablet (15 mg total) by mouth daily 90 tablet 3    methocarbamol (ROBAXIN) 500 mg tablet Take 1 tablet (500 mg total) by mouth 2 (two) times a day 60 tablet 3    Multiple Vitamin tablet Take 1 tablet by mouth daily      nicotine (NICODERM CQ) 21 mg/24 hr TD 24 hr patch PLACE 1 PATCH ON THE SKIN EVERY 24 HOURS 28 patch 0    omeprazole (PriLOSEC) 40 MG capsule TAKE 1 CAPSULE BY MOUTH EVERY DAY 90 capsule 3    Probiotic Product (PROBIOTIC-10 ULTIMATE PO) Take by mouth      triamcinolone (KENALOG) 0 1 % cream APPLY TO AFFECTED AREAS TWICE A DAY FOR 2 WEEKS AND THEN AS NEEDED       No current facility-administered medications for this visit  Allergies: Allergies   Allergen Reactions    Pollen Extract     Tree Extract        Physical Exam:  /76 (BP Location: Right arm)   Pulse 93   Temp 98 1 °F (36 7 °C) (Temporal)   Resp 16   Ht 5' (1 524 m)   Wt 56 7 kg (125 lb)   LMP  (LMP Unknown)   SpO2 98%   BMI 24 41 kg/m²   Body surface area is 1 53 meters squared  Wt Readings from Last 3 Encounters:   03/15/21 56 7 kg (125 lb)   03/05/21 55 5 kg (122 lb 6 4 oz)   01/28/21 56 7 kg (125 lb)           Physical Exam  Constitutional:       Appearance: Normal appearance  HENT:      Head: Normocephalic and atraumatic  Eyes:      General: No scleral icterus  Right eye: No discharge  Left eye: No discharge  Conjunctiva/sclera: Conjunctivae normal    Neck:      Musculoskeletal: Normal range of motion  Cardiovascular:      Rate and Rhythm: Normal rate and regular rhythm  Heart sounds: Normal heart sounds  Pulmonary:      Effort: Pulmonary effort is normal       Breath sounds: Normal breath sounds  Abdominal:      General: Bowel sounds are normal       Palpations: Abdomen is soft  Musculoskeletal: Normal range of motion  Lymphadenopathy:      Cervical: No cervical adenopathy  Skin:     General: Skin is warm and dry  Neurological:      General: No focal deficit present  Mental Status: She is alert and oriented to person, place, and time  Psychiatric:         Mood and Affect: Mood normal          Behavior: Behavior normal          Assessment / Plan:    1   H/O iron deficiency anemia      The patient is a very pleasant 65 yo female who was referred to hematology for iron deficiency anemia  She was treated with 10 doses of IV Venofer and her numbers corrected, this was back in March 2020  Her most recent blood work demonstrated a normal hemoglobin and a normal iron panel  Pt feels well and denies any concerning symptoms      She is following closely with GI  From a hematological perspective, her blood counts have normalized  She will remain on observation  Patient reports her PCP monitors her blood work every 6 months and requests to follow with Hematology on an as-needed basis since she is following closely with her PCP  This is reasonable  Patient understands she can return to Hematology at any time especially if she develops symptoms of iron deficiency anemia again in the future and we can order iron replacement as needed    Patient should have her iron panel along with her CBC and CMP and MMA  Monitored every 6 months  These labs are in the system for her next blood draw  Patient is in agreement with plan of care  She knows she can call at any time with questions or concerns  She will follow up with Hematology on an as-needed basis  And continue to follow closely with GI and her PCP      Barriers to care: None  Patient  able to self care  Portions of the record may have been created with voice recognition software  Occasional wrong word or "sound a like" substitutions may have occurred due to the inherent limitations of voice recognition software  Read the chart carefully and recognize, using context, where substitutions have occurred

## 2021-03-15 ENCOUNTER — OFFICE VISIT (OUTPATIENT)
Dept: HEMATOLOGY ONCOLOGY | Facility: HOSPITAL | Age: 61
End: 2021-03-15
Payer: COMMERCIAL

## 2021-03-15 VITALS
SYSTOLIC BLOOD PRESSURE: 112 MMHG | RESPIRATION RATE: 16 BRPM | WEIGHT: 125 LBS | TEMPERATURE: 98.1 F | DIASTOLIC BLOOD PRESSURE: 76 MMHG | HEIGHT: 60 IN | OXYGEN SATURATION: 98 % | BODY MASS INDEX: 24.54 KG/M2 | HEART RATE: 93 BPM

## 2021-03-15 DIAGNOSIS — D50.8 IRON DEFICIENCY ANEMIA SECONDARY TO INADEQUATE DIETARY IRON INTAKE: Primary | ICD-10-CM

## 2021-03-15 PROCEDURE — 99214 OFFICE O/P EST MOD 30 MIN: CPT | Performed by: NURSE PRACTITIONER

## 2021-03-15 PROCEDURE — 3008F BODY MASS INDEX DOCD: CPT | Performed by: NURSE PRACTITIONER

## 2021-03-15 PROCEDURE — 1036F TOBACCO NON-USER: CPT | Performed by: NURSE PRACTITIONER

## 2021-03-17 ENCOUNTER — TELEPHONE (OUTPATIENT)
Dept: GASTROENTEROLOGY | Facility: CLINIC | Age: 61
End: 2021-03-17

## 2021-03-17 NOTE — TELEPHONE ENCOUNTER
----- Message from Uriel Amin MD sent at 3/15/2021  3:04 PM EDT -----  Hello  [facility]? Thanks   Vickie Orr

## 2021-03-22 NOTE — TELEPHONE ENCOUNTER
SBE scheduled on 4/12/2021 with Dr Levin at [facility] gave pt verbal instructions/mailed  Pt requested to schedule procedure after [facility] due to work schedule

## 2021-05-11 ENCOUNTER — ANESTHESIA (OUTPATIENT)
Dept: GASTROENTEROLOGY | Facility: HOSPITAL | Age: 61
End: 2021-05-11

## 2021-05-11 ENCOUNTER — ANESTHESIA EVENT (OUTPATIENT)
Dept: GASTROENTEROLOGY | Facility: HOSPITAL | Age: 61
End: 2021-05-11

## 2021-05-11 ENCOUNTER — HOSPITAL ENCOUNTER (OUTPATIENT)
Dept: GASTROENTEROLOGY | Facility: HOSPITAL | Age: 61
Setting detail: OUTPATIENT SURGERY
Discharge: HOME | End: 2021-05-11
Attending: INTERNAL MEDICINE | Admitting: INTERNAL MEDICINE
Payer: COMMERCIAL

## 2021-05-11 VITALS
SYSTOLIC BLOOD PRESSURE: 128 MMHG | TEMPERATURE: 96.3 F | DIASTOLIC BLOOD PRESSURE: 58 MMHG | RESPIRATION RATE: 18 BRPM | HEART RATE: 73 BPM | OXYGEN SATURATION: 96 %

## 2021-05-11 DIAGNOSIS — R93.3 ABNORMAL FINDING ON GI TRACT IMAGING: ICD-10-CM

## 2021-05-11 PROCEDURE — 88305 TISSUE EXAM BY PATHOLOGIST: CPT | Performed by: PATHOLOGY

## 2021-05-11 PROCEDURE — 88312 SPECIAL STAINS GROUP 1: CPT | Performed by: PATHOLOGY

## 2021-05-11 PROCEDURE — 88313 SPECIAL STAINS GROUP 2: CPT | Performed by: PATHOLOGY

## 2021-05-11 PROCEDURE — 88342 IMHCHEM/IMCYTCHM 1ST ANTB: CPT | Performed by: PATHOLOGY

## 2021-05-11 PROCEDURE — 44361 SMALL BOWEL ENDOSCOPY/BIOPSY: CPT | Performed by: INTERNAL MEDICINE

## 2021-05-11 RX ORDER — PROPOFOL 10 MG/ML
INJECTION, EMULSION INTRAVENOUS AS NEEDED
Status: DISCONTINUED | OUTPATIENT
Start: 2021-05-11 | End: 2021-05-11

## 2021-05-11 RX ORDER — LIDOCAINE HYDROCHLORIDE 10 MG/ML
INJECTION, SOLUTION EPIDURAL; INFILTRATION; INTRACAUDAL; PERINEURAL AS NEEDED
Status: DISCONTINUED | OUTPATIENT
Start: 2021-05-11 | End: 2021-05-11

## 2021-05-11 RX ORDER — SODIUM CHLORIDE 9 MG/ML
INJECTION, SOLUTION INTRAVENOUS CONTINUOUS PRN
Status: DISCONTINUED | OUTPATIENT
Start: 2021-05-11 | End: 2021-05-11

## 2021-05-11 RX ADMIN — PROPOFOL 50 MG: 10 INJECTION, EMULSION INTRAVENOUS at 14:12

## 2021-05-11 RX ADMIN — PROPOFOL 50 MG: 10 INJECTION, EMULSION INTRAVENOUS at 13:39

## 2021-05-11 RX ADMIN — PROPOFOL 50 MG: 10 INJECTION, EMULSION INTRAVENOUS at 14:07

## 2021-05-11 RX ADMIN — SODIUM CHLORIDE: 0.9 INJECTION, SOLUTION INTRAVENOUS at 13:33

## 2021-05-11 RX ADMIN — PROPOFOL 50 MG: 10 INJECTION, EMULSION INTRAVENOUS at 14:18

## 2021-05-11 RX ADMIN — PROPOFOL 100 MG: 10 INJECTION, EMULSION INTRAVENOUS at 13:37

## 2021-05-11 RX ADMIN — PROPOFOL 50 MG: 10 INJECTION, EMULSION INTRAVENOUS at 13:43

## 2021-05-11 RX ADMIN — PROPOFOL 50 MG: 10 INJECTION, EMULSION INTRAVENOUS at 14:00

## 2021-05-11 RX ADMIN — PROPOFOL 50 MG: 10 INJECTION, EMULSION INTRAVENOUS at 13:48

## 2021-05-11 RX ADMIN — PROPOFOL 50 MG: 10 INJECTION, EMULSION INTRAVENOUS at 13:54

## 2021-05-11 RX ADMIN — LIDOCAINE HYDROCHLORIDE 80 MG: 10 INJECTION, SOLUTION EPIDURAL; INFILTRATION; INTRACAUDAL; PERINEURAL at 13:37

## 2021-05-11 NOTE — ANESTHESIA PREPROCEDURE EVALUATION
Procedure:  EGD WITH SINGLE BALLOON ENTEROSCOPY    Relevant Problems   CARDIO   (+) Benign essential HTN   (+) Hypercholesterolemia      GI/HEPATIC   (+) Hiatal hernia      HEMATOLOGY   (+) Anemia   (+) Iron deficiency anemia secondary to inadequate dietary iron intake      MUSCULOSKELETAL   (+) Primary osteoarthritis of both hips   (+) Spondylosis of lumbar region without myelopathy or radiculopathy      NEURO/PSYCH   (+) Anxiety   (+) Chronic pain      PULMONARY   (+) Asthma   Npo verified    Physical Exam    Airway    Mallampati score: II  TM Distance: >3 FB  Neck ROM: full     Dental   Comment: Upper dentures   ,     Cardiovascular      Pulmonary      Other Findings        Anesthesia Plan  ASA Score- 3     Anesthesia Type- IV sedation with anesthesia with ASA Monitors  Additional Monitors:   Airway Plan:     Comment: Plan for IV sedation with GETA as back up          Plan Factors-Exercise tolerance (METS): >4 METS  Chart reviewed  EKG reviewed  Imaging results reviewed  Existing labs reviewed  Patient summary reviewed  Induction- intravenous  Postoperative Plan-     Informed Consent- Anesthetic plan and risks discussed with patient  I personally reviewed this patient with the CRNA  Discussed and agreed on the Anesthesia Plan with the CRNA  Bekah Vogel

## 2021-05-11 NOTE — ANESTHESIA POSTPROCEDURE EVALUATION
Post-Op Assessment Note    CV Status:  Stable  Pain Score: 0    Pain management: adequate     Mental Status:  Arousable   Hydration Status:  Stable   PONV Controlled:  None   Airway Patency:  Patent      Post Op Vitals Reviewed: Yes      Staff: Anesthesiologist, CRNA         No complications documented      BP 93/73 (05/11/21 1433)    Temp (!) 96 3 °F (35 7 °C) (05/11/21 1433)    Pulse 79 (05/11/21 1433)   Resp 18 (05/11/21 1433)    SpO2 98 % (05/11/21 1433)

## 2021-05-11 NOTE — H&P
History and Physical -  [facility]  Chucho Merrill 64 y o  female MRN: [id number]    HPI: Chucho Merrill is a 64y o  year old female who presents with abnormal capsule and anemia  Review of Systems    Historical Information   Past Medical History:   Diagnosis Date    Anemia     Anxiety     Asthma     Bursitis of left elbow     Chronic pain disorder     lower back    Colon polyp     Hiatal hernia     Iron deficiency anemia secondary to inadequate dietary iron intake 1/29/2020    Kidney stones     Pancreatitis     Psoriasis     Psychiatric disorder     anxiety    Restless leg syndrome     Tobacco use disorder      Past Surgical History:   Procedure Laterality Date    CYSTOSCOPY      CYSTOSCOPY W/ URETERAL STENT PLACEMENT      EXPLORATORY LAPAROTOMY      small intestine blockage no resection    LAPAROSCOPY      --  for SBO    IN ERCP DX COLLECTION SPECIMEN BRUSHING/WASHING N/A 8/15/2018    Procedure: ENDOSCOPIC RETROGRADE CHOLANGIOPANCREATOGRAPHY (ERCP);   Surgeon: Juan Sutton MD;  Location:  MAIN OR;  Service: Gastroenterology    IN LAP,DIAGNOSTIC ABDOMEN N/A 2/10/2020    Procedure: LAPAROSCOPY DIAGNOSTIC, REPAIR ENTEROTOMY, REMOVAL OF BEZOAR;  Surgeon: Erin Adame MD;  Location:  MAIN OR;  Service: General     Social History   Social History     Substance and Sexual Activity   Alcohol Use Never    Frequency: Never    Drinks per session: Patient refused    Binge frequency: Never     Social History     Substance and Sexual Activity   Drug Use No     Social History     Tobacco Use   Smoking Status Former Smoker    Packs/day: 1 00    Types: Cigarettes   Smokeless Tobacco Never Used   Tobacco Comment    Attempting to stop     Family History   Problem Relation Age of Onset    Mental illness Sister     Other Other         Cardiovascular disease    Hypertension Mother     Cancer Father     Substance Abuse Neg Hx        Meds/Allergies     (Not in a hospital admission)      Allergies   Allergen Reactions    Pollen Extract     Tree Extract        Objective     /75   Pulse 78   Temp (!) 97 2 °F (36 2 °C) (Tympanic)   Resp 18   LMP  (LMP Unknown)   SpO2 97%       PHYSICAL EXAM    Gen: NAD  CV: RRR  CHEST: Clear  ABD: soft, NT/ND  EXT: no edema  Neuro: AAO      ASSESSMENT/PLAN:  This is a 64y o  year old female here for single balloon enteroscopy for abnoraml capsule (mid ileum) and anemia       PLAN:   Procedure: enterocopy

## 2021-05-18 DIAGNOSIS — B37.81 CANDIDA ESOPHAGITIS (HCC): Primary | ICD-10-CM

## 2021-05-18 RX ORDER — FLUCONAZOLE 200 MG/1
200 TABLET ORAL DAILY
Qty: 15 TABLET | Refills: 0 | Status: SHIPPED | OUTPATIENT
Start: 2021-05-18 | End: 2021-06-01

## 2021-06-02 ENCOUNTER — TELEPHONE (OUTPATIENT)
Dept: GASTROENTEROLOGY | Facility: CLINIC | Age: 61
End: 2021-06-02

## 2021-06-02 NOTE — TELEPHONE ENCOUNTER
----- Message from Tyler Banks MD sent at 6/2/2021 12:04 PM EDT -----  Thanks Tierney Jesus  Will do cbc in the next couple of weeks and follow up with me in 1 -2 months -appreciate all of your help     Danelle Nguyen  ----- Message -----  From: Emilee Verma MD  Sent: 5/31/2021  12:41 PM EDT  To: Tyler Banks MD    Oh hey  I apologize if the note wasn't cc'ed to you  Likely NSAID induced ulcerations  I would recommend follow up cbc in a couple of weeks and you can certainly follow up with her if that's okay  It wont be unreasonable to do a push on her in a couple of months as well to see how the ulcers are healing  Thanks     Tierney Jesus    ----- Message -----  From: Tyler Banks MD  Sent: 5/30/2021   2:33 PM EDT  To: Emilee Verma MD    Thanks Tierney Jesus- - do you want me to follow  up on her cbc's and office visit -  didn't see the note from 5/11 procedure     Thanks   Danelle Nguyen  ----- Message -----  From: Emilee Verma MD  Sent: 5/11/2021   2:44 PM EDT  To: Tyler Banks MD

## 2021-06-02 NOTE — TELEPHONE ENCOUNTER
Called Pt    Left [facility] requesting CB re Lab order (want to make sure she has order for CBC from primary ord'd early March)

## 2021-06-03 ENCOUNTER — TELEPHONE (OUTPATIENT)
Dept: GASTROENTEROLOGY | Facility: AMBULARY SURGERY CENTER | Age: 61
End: 2021-06-03

## 2021-06-03 NOTE — TELEPHONE ENCOUNTER
Pt ret'd call  She has CBC order from NP Kim Friends, will go and have drawn tomorrow  She also has lab orders for other testing ord'd by Dr Miley Pringle, but will hold off on them for another mo  We discussed her waiting to have all labs done at same time in approx 2 wks but she has difficulty getting to the lab because of work sched and wants to have the CBC done ASAP because she is feeling tired  She will ask for copy to be sent to [facility] when drawn tomorrow  Also, she had ques about reaction to med Fluconazole that Dr Aletha Gan put her on for 14 days  She will call his office w/ med questions

## 2021-06-03 NOTE — TELEPHONE ENCOUNTER
Patients GI provider:  Dr Michelle Malin     Number to return call: [phone]    Reason for call: Pt calling requesting to speak with a nurse regarding antibiotic that will be done this Monday but still coughing yellowish  Would like to know next plan       Scheduled procedure/appointment date if applicable: Apt/procedure n/a

## 2021-06-05 LAB
BASOPHILS # BLD AUTO: 22 CELLS/UL (ref 0–200)
BASOPHILS NFR BLD AUTO: 0.3 %
EOSINOPHIL # BLD AUTO: 141 CELLS/UL (ref 15–500)
EOSINOPHIL NFR BLD AUTO: 1.9 %
ERYTHROCYTE [DISTWIDTH] IN BLOOD BY AUTOMATED COUNT: 12.4 % (ref 11–15)
HCT VFR BLD AUTO: 39.7 % (ref 35–45)
HGB BLD-MCNC: 13.2 G/DL (ref 11.7–15.5)
LYMPHOCYTES # BLD AUTO: 1783 CELLS/UL (ref 850–3900)
LYMPHOCYTES NFR BLD AUTO: 24.1 %
MCH RBC QN AUTO: 33.4 PG (ref 27–33)
MCHC RBC AUTO-ENTMCNC: 33.2 G/DL (ref 32–36)
MCV RBC AUTO: 100.5 FL (ref 80–100)
MONOCYTES # BLD AUTO: 451 CELLS/UL (ref 200–950)
MONOCYTES NFR BLD AUTO: 6.1 %
NEUTROPHILS # BLD AUTO: 5002 CELLS/UL (ref 1500–7800)
NEUTROPHILS NFR BLD AUTO: 67.6 %
PLATELET # BLD AUTO: 238 THOUSAND/UL (ref 140–400)
PMV BLD REES-ECKER: 9.9 FL (ref 7.5–12.5)
RBC # BLD AUTO: 3.95 MILLION/UL (ref 3.8–5.1)
WBC # BLD AUTO: 7.4 THOUSAND/UL (ref 3.8–10.8)

## 2021-06-07 NOTE — TELEPHONE ENCOUNTER
Called pt back and reviewed her 6/4 CBC results with her  Questioned how she is feeling and she states "OK" she gets tired frequently but she also notes works second shift so that may be the cause  After reviewing labs please advise if/when you would like a follow up apt    Thanks

## 2021-06-10 ENCOUNTER — TELEPHONE (OUTPATIENT)
Dept: GASTROENTEROLOGY | Facility: AMBULARY SURGERY CENTER | Age: 61
End: 2021-06-10

## 2021-06-10 NOTE — TELEPHONE ENCOUNTER
Patients GI provider:  Dr Reyes Simas     Number to return call: [phone]    Reason for call: Pt calling requesting to speak with a nurse regarding antibiotic and yellow cough    Scheduled procedure/appointment date if applicable: Apt/procedure n/a

## 2021-06-11 DIAGNOSIS — I10 BENIGN ESSENTIAL HTN: ICD-10-CM

## 2021-06-11 RX ORDER — METHOCARBAMOL 500 MG/1
500 TABLET, FILM COATED ORAL 2 TIMES DAILY
Qty: 60 TABLET | Refills: 3 | Status: SHIPPED | OUTPATIENT
Start: 2021-06-11 | End: 2021-10-04 | Stop reason: SDUPTHER

## 2021-06-11 NOTE — TELEPHONE ENCOUNTER
Patient of Dr Donnell Mitchell, last seen 5/11/21    History of iron deficiency anemia, long term nsaid use, hiatal hernia, esophageal candidiasis     Called and spoke with patient  She states that after her 5/11 procedure, she was told she had an infection so she took antibiotics but "they're not working"  She was noted to have candida in her esophagus and started a course of fluconazole on 5/19  She states that about 1 week into taking fluconazole she started "coughing up yellow"  I tried to get further information, I asked if it was mucous, if she was truly coughing it up or was spitting it up, etc, and patient kept saying "I don't know"  The only way she could describe it was to liken it to what would come out of her nose during a sinus infection  She is denying any other respiratory/cold like symptoms though and states she only coughs when she has to bring up this yellow mucous  She denies any dysphagia, throat pain, nausea, but does report globus sensation  She stated this is nothing new  She takes 40 mg omeprazole daily  She did finish course of fluconazole   Unsure if this is respiratory vs GI, please advise

## 2021-06-11 NOTE — TELEPHONE ENCOUNTER
ANNABEL - I spoke with [facility]  I asked her to call PCP's office to discuss productive cough as I'm unsure if this is respiratory/sinus/allergy related  She is not having any difficulty swallowing, so I will ask our office to arrange a non-urgent follow-up visit with Dr Norris Burch  I explained she does not need additional treatment for candida at this time  She expressed understanding and all questions were answered

## 2021-07-26 DIAGNOSIS — J45.21 MILD INTERMITTENT REACTIVE AIRWAY DISEASE WITH ACUTE EXACERBATION: ICD-10-CM

## 2021-08-11 ENCOUNTER — HOSPITAL ENCOUNTER (OUTPATIENT)
Dept: RADIOLOGY | Facility: HOSPITAL | Age: 61
Discharge: HOME | End: 2021-08-11
Attending: INTERNAL MEDICINE
Payer: COMMERCIAL

## 2021-08-11 ENCOUNTER — OFFICE VISIT (OUTPATIENT)
Dept: FAMILY MEDICINE CLINIC | Facility: HOSPITAL | Age: 61
End: 2021-08-11
Payer: COMMERCIAL

## 2021-08-11 VITALS
DIASTOLIC BLOOD PRESSURE: 88 MMHG | BODY MASS INDEX: 24.81 KG/M2 | HEART RATE: 83 BPM | OXYGEN SATURATION: 98 % | WEIGHT: 126.4 LBS | SYSTOLIC BLOOD PRESSURE: 134 MMHG | HEIGHT: 60 IN

## 2021-08-11 DIAGNOSIS — R05.9 COUGH: ICD-10-CM

## 2021-08-11 DIAGNOSIS — R21 RASH: ICD-10-CM

## 2021-08-11 DIAGNOSIS — Z72.0 TOBACCO ABUSE: ICD-10-CM

## 2021-08-11 DIAGNOSIS — L40.9 PSORIASIS: ICD-10-CM

## 2021-08-11 DIAGNOSIS — J45.30 MILD PERSISTENT ASTHMA WITHOUT COMPLICATION: Primary | ICD-10-CM

## 2021-08-11 DIAGNOSIS — J45.30 MILD PERSISTENT ASTHMA WITHOUT COMPLICATION: ICD-10-CM

## 2021-08-11 DIAGNOSIS — I10 BENIGN ESSENTIAL HTN: ICD-10-CM

## 2021-08-11 PROCEDURE — 3075F SYST BP GE 130 - 139MM HG: CPT | Performed by: INTERNAL MEDICINE

## 2021-08-11 PROCEDURE — 71046 X-RAY EXAM CHEST 2 VIEWS: CPT

## 2021-08-11 PROCEDURE — 99213 OFFICE O/P EST LOW 20 MIN: CPT | Performed by: INTERNAL MEDICINE

## 2021-08-11 PROCEDURE — 1036F TOBACCO NON-USER: CPT | Performed by: INTERNAL MEDICINE

## 2021-08-11 PROCEDURE — 3079F DIAST BP 80-89 MM HG: CPT | Performed by: INTERNAL MEDICINE

## 2021-08-11 PROCEDURE — 3008F BODY MASS INDEX DOCD: CPT | Performed by: INTERNAL MEDICINE

## 2021-08-11 RX ORDER — CEFUROXIME AXETIL 250 MG/1
250 TABLET ORAL EVERY 12 HOURS SCHEDULED
Qty: 14 TABLET | Refills: 0 | Status: SHIPPED | OUTPATIENT
Start: 2021-08-11 | End: 2021-08-18

## 2021-08-11 NOTE — PROGRESS NOTES
Assessment/Plan:             Problem List Items Addressed This Visit        Respiratory    Asthma - Primary    Relevant Orders    XR chest pa & lateral       Cardiovascular and Mediastinum    Benign essential HTN     Well controlled today            Other    Tobacco abuse    Relevant Orders    XR chest pa & lateral      Other Visit Diagnoses     Cough        Relevant Orders    XR chest pa & lateral            Subjective:      Patient ID: Jose Ferreira is a 64 y o  female    Tiredness- working 5 days per [employer] tired with overnight shift work  in may had appt with Dr Nola Leigh- had anitvbiotics for candida in esophagus now coughing up yellow phlegm  Still  Smoking 1 ppd- denies any loss of taste or smell  No definite exposure to COVID that she is aware of-   discussed meds - did not feel well on chantix- discusssed trying patch again     having new rash for past month or so on inner thighs  And brachial areas      The following portions of the patient's history were reviewed and updated as appropriate: allergies, current medications and problem list      Review of Systems   Constitutional: Positive for fatigue  HENT: Negative for facial swelling  Respiratory: Positive for cough  Cardiovascular: Negative for chest pain and palpitations  Gastrointestinal: Negative for diarrhea and nausea  All other systems reviewed and are negative          Objective:      Current Outpatient Medications:     alendronate (Fosamax) 70 mg tablet, Take 1 tablet (70 mg total) by mouth every 7 days, Disp: 12 tablet, Rfl: 3    amLODIPine (NORVASC) 10 mg tablet, Take 1 tablet (10 mg total) by mouth daily Decreased dose, Disp: 90 tablet, Rfl: 3    Cholecalciferol (VITAMIN D3) 5000 units CAPS, Take by mouth daily, Disp: , Rfl:     Cranberry 200 MG CAPS, Take 1 capsule by mouth daily, Disp: , Rfl:     dicyclomine (BENTYL) 10 mg capsule, Take 1 capsule (10 mg total) by mouth 4 (four) times a day as needed (Abdominal pain), Disp: 30 capsule, Rfl: 0    fluocinonide (LIDEX) 0 05 % ointment, Apply 1 application topically 2 (two) times a day To affected area, Disp: 30 g, Rfl: 0    fluticasone (FLONASE) 50 mcg/act nasal spray, SPRAY 2 SPRAYS INTO EACH NOSTRIL EVERY DAY, Disp: 48 mL, Rfl: 5    fluticasone-salmeterol (Advair Diskus) 250-50 mcg/dose inhaler, Inhale 1 puff 2 (two) times a day Rinse mouth after use, Disp: 60 blister, Rfl: 5    gabapentin (NEURONTIN) 300 mg capsule, take 1 capsule by mouth daily in AM 1 at noon  and 2 capsules by mouth every PM, Disp: 480 capsule, Rfl: 3    meloxicam (MOBIC) 15 mg tablet, Take 1 tablet (15 mg total) by mouth daily, Disp: 90 tablet, Rfl: 3    methocarbamol (ROBAXIN) 500 mg tablet, Take 1 tablet (500 mg total) by mouth 2 (two) times a day, Disp: 60 tablet, Rfl: 3    Multiple Vitamin tablet, Take 1 tablet by mouth daily, Disp: , Rfl:     nicotine (NICODERM CQ) 21 mg/24 hr TD 24 hr patch, PLACE 1 PATCH ON THE SKIN EVERY 24 HOURS, Disp: 28 patch, Rfl: 0    omeprazole (PriLOSEC) 40 MG capsule, TAKE 1 CAPSULE BY MOUTH EVERY DAY, Disp: 90 capsule, Rfl: 3    Probiotic Product (PROBIOTIC-10 ULTIMATE PO), Take by mouth, Disp: , Rfl:     triamcinolone (KENALOG) 0 1 % cream, APPLY TO AFFECTED AREAS TWICE A DAY FOR 2 WEEKS AND THEN AS NEEDED, Disp: , Rfl:     hydrocortisone (ANUSOL-HC) 2 5 % rectal cream, Apply topically 2 (two) times a day for 14 days, Disp: 28 g, Rfl: 2    Blood pressure 134/88, pulse 83, height 5' (1 524 m), weight 57 3 kg (126 lb 6 4 oz), SpO2 98 %, not currently breastfeeding  Physical Exam  Vitals and nursing note reviewed  Constitutional:       Comments: Appears mildly fatigued   HENT:      Right Ear: Tympanic membrane normal       Ears:      Comments: Air-fluid level on the left     Nose: Congestion present  Mouth/Throat:      Mouth: Mucous membranes are dry  Pharynx: No posterior oropharyngeal erythema  Eyes:      General: No scleral icterus          Right eye: No discharge  Left eye: No discharge  Conjunctiva/sclera: Conjunctivae normal    Cardiovascular:      Rate and Rhythm: Normal rate and regular rhythm  Heart sounds: No murmur heard  No gallop  Pulmonary:      Breath sounds: Rhonchi present  No wheezing  Comments: Some minimal upper airway rhonchi with occasional crackles with deep respirations  Abdominal:      General: Abdomen is flat  Palpations: Abdomen is soft  Tenderness: There is no abdominal tenderness  There is no guarding  Skin:     Findings: Rash present  Comments: Fine sandpaper rash in the left inner thigh and right brachial area -no pustules or petechiae   Neurological:      General: No focal deficit present  Mental Status: She is alert  Motor: No weakness  Psychiatric:         Mood and Affect: Mood normal          Thought Content:  Thought content normal          Judgment: Judgment normal

## 2021-08-23 ENCOUNTER — TELEPHONE (OUTPATIENT)
Dept: FAMILY MEDICINE CLINIC | Facility: HOSPITAL | Age: 61
End: 2021-08-23

## 2021-09-24 ENCOUNTER — OFFICE VISIT (OUTPATIENT)
Dept: FAMILY MEDICINE CLINIC | Facility: HOSPITAL | Age: 61
End: 2021-09-24
Payer: COMMERCIAL

## 2021-09-24 VITALS
HEIGHT: 60 IN | HEART RATE: 91 BPM | BODY MASS INDEX: 24.15 KG/M2 | WEIGHT: 123 LBS | SYSTOLIC BLOOD PRESSURE: 138 MMHG | OXYGEN SATURATION: 96 % | DIASTOLIC BLOOD PRESSURE: 78 MMHG

## 2021-09-24 DIAGNOSIS — M54.9 BACK PAIN, UNSPECIFIED BACK LOCATION, UNSPECIFIED BACK PAIN LATERALITY, UNSPECIFIED CHRONICITY: ICD-10-CM

## 2021-09-24 DIAGNOSIS — N28.89 RENAL CALCIFICATION: Primary | ICD-10-CM

## 2021-09-24 LAB
SL AMB  POCT GLUCOSE, UA: NORMAL
SL AMB LEUKOCYTE ESTERASE,UA: NORMAL
SL AMB POCT BILIRUBIN,UA: NORMAL
SL AMB POCT BLOOD,UA: NORMAL
SL AMB POCT CLARITY,UA: CLEAR
SL AMB POCT COLOR,UA: YELLOW
SL AMB POCT KETONES,UA: NORMAL
SL AMB POCT NITRITE,UA: NORMAL
SL AMB POCT PH,UA: 6
SL AMB POCT SPECIFIC GRAVITY,UA: 1.01
SL AMB POCT URINE PROTEIN: NORMAL
SL AMB POCT UROBILINOGEN: 3.5

## 2021-09-24 PROCEDURE — 81003 URINALYSIS AUTO W/O SCOPE: CPT | Performed by: PHYSICIAN ASSISTANT

## 2021-09-24 PROCEDURE — 3078F DIAST BP <80 MM HG: CPT | Performed by: PHYSICIAN ASSISTANT

## 2021-09-24 PROCEDURE — 3725F SCREEN DEPRESSION PERFORMED: CPT | Performed by: PHYSICIAN ASSISTANT

## 2021-09-24 PROCEDURE — 3075F SYST BP GE 130 - 139MM HG: CPT | Performed by: PHYSICIAN ASSISTANT

## 2021-09-24 PROCEDURE — 3008F BODY MASS INDEX DOCD: CPT | Performed by: PHYSICIAN ASSISTANT

## 2021-09-24 PROCEDURE — 99213 OFFICE O/P EST LOW 20 MIN: CPT | Performed by: PHYSICIAN ASSISTANT

## 2021-09-24 NOTE — PROGRESS NOTES
Assessment/Plan:         Problem List Items Addressed This Visit     None      Visit Diagnoses     Renal calcification    -  Primary    Relevant Orders    CT renal stone study abdomen pelvis wo contrast    POCT urine dip auto non-scope (Completed)          Back pain- recent work injury,   [facility], and   Started on pain meds  Subjective:      Patient ID: Everardo Roldan is a 64 y o  female  [facility], Tuesday 9/21/2021, patient had mid back pain  Was doing heavy lifting at work, urgent care recommended she gets a CT scan, due to xray findings of kidney stone  Review of Systems   Respiratory: Negative for cough, chest tightness and shortness of breath  Gastrointestinal: Negative for abdominal pain, constipation, diarrhea, nausea and vomiting  Genitourinary: Negative for dysuria, hematuria, pelvic pain, vaginal bleeding and vaginal discharge  Musculoskeletal: Positive for back pain and myalgias  Negative for arthralgias, neck pain and neck stiffness  Objective:      /78   Pulse 91   Ht 5' (1 524 m)   Wt 55 8 kg (123 lb)   LMP  (LMP Unknown)   SpO2 96%   BMI 24 02 kg/m²          Physical Exam  Vitals reviewed  Constitutional:       General: She is not in acute distress  Appearance: Normal appearance  She is not ill-appearing, toxic-appearing or diaphoretic  Cardiovascular:      Rate and Rhythm: Normal rate and regular rhythm  Pulses: Normal pulses  Heart sounds: Normal heart sounds  Pulmonary:      Effort: Pulmonary effort is normal  No respiratory distress  Breath sounds: Normal breath sounds  No stridor  No wheezing or rhonchi  Musculoskeletal:         General: No swelling, tenderness, deformity or signs of injury  Normal range of motion  Right lower leg: No edema  Left lower leg: No edema  Neurological:      General: No focal deficit present        Mental Status: She is alert and oriented to person, place, and time  Cranial Nerves: No cranial nerve deficit  Sensory: No sensory deficit  Motor: No weakness  Coordination: Coordination normal    Psychiatric:         Mood and Affect: Mood normal          Behavior: Behavior normal          Thought Content:  Thought content normal          Judgment: Judgment normal

## 2021-09-27 DIAGNOSIS — K64.8 INTERNAL HEMORRHOIDS: ICD-10-CM

## 2021-09-27 RX ORDER — HYDROCORTISONE 25 MG/G
CREAM TOPICAL
Qty: 30 G | Refills: 2 | Status: SHIPPED | OUTPATIENT
Start: 2021-09-27

## 2021-09-29 PROBLEM — R91.1 NODULE OF MIDDLE LOBE OF RIGHT LUNG: Status: ACTIVE | Noted: 2021-09-29

## 2021-09-30 ENCOUNTER — TELEPHONE (OUTPATIENT)
Dept: FAMILY MEDICINE CLINIC | Facility: HOSPITAL | Age: 61
End: 2021-09-30

## 2021-09-30 NOTE — TELEPHONE ENCOUNTER
----- Message from Tala Ivory DO sent at 9/29/2021  8:27 AM EDT -----  Had prior CT scan the abdomen pelvis February 2020 that showed a right middle lobe lung nodule small 4 mm it is recommended that patient has a noncontrast CT done of the chest which I have put an order in for

## 2021-10-04 ENCOUNTER — TELEPHONE (OUTPATIENT)
Dept: FAMILY MEDICINE CLINIC | Facility: HOSPITAL | Age: 61
End: 2021-10-04

## 2021-10-04 DIAGNOSIS — I10 BENIGN ESSENTIAL HTN: ICD-10-CM

## 2021-10-04 RX ORDER — METHOCARBAMOL 500 MG/1
500 TABLET, FILM COATED ORAL 2 TIMES DAILY
Qty: 60 TABLET | Refills: 3 | Status: SHIPPED | OUTPATIENT
Start: 2021-10-04 | End: 2022-01-15

## 2021-10-05 ENCOUNTER — OFFICE VISIT (OUTPATIENT)
Dept: GASTROENTEROLOGY | Facility: CLINIC | Age: 61
End: 2021-10-05
Payer: COMMERCIAL

## 2021-10-05 ENCOUNTER — APPOINTMENT (EMERGENCY)
Dept: CT IMAGING | Facility: HOSPITAL | Age: 61
End: 2021-10-05
Payer: COMMERCIAL

## 2021-10-05 ENCOUNTER — HOSPITAL ENCOUNTER (EMERGENCY)
Facility: HOSPITAL | Age: 61
Discharge: HOME | End: 2021-10-05
Attending: EMERGENCY MEDICINE | Admitting: EMERGENCY MEDICINE
Payer: COMMERCIAL

## 2021-10-05 VITALS
BODY MASS INDEX: 24.35 KG/M2 | RESPIRATION RATE: 16 BRPM | TEMPERATURE: 97.5 F | HEART RATE: 72 BPM | WEIGHT: 124 LBS | OXYGEN SATURATION: 98 % | SYSTOLIC BLOOD PRESSURE: 138 MMHG | DIASTOLIC BLOOD PRESSURE: 72 MMHG | HEIGHT: 60 IN

## 2021-10-05 VITALS
HEIGHT: 60 IN | SYSTOLIC BLOOD PRESSURE: 128 MMHG | WEIGHT: 124.6 LBS | DIASTOLIC BLOOD PRESSURE: 88 MMHG | BODY MASS INDEX: 24.46 KG/M2

## 2021-10-05 DIAGNOSIS — K21.9 GASTROESOPHAGEAL REFLUX DISEASE: ICD-10-CM

## 2021-10-05 DIAGNOSIS — N20.1 LEFT URETERAL CALCULUS: Primary | ICD-10-CM

## 2021-10-05 DIAGNOSIS — M81.0 AGE-RELATED OSTEOPOROSIS WITHOUT CURRENT PATHOLOGICAL FRACTURE: ICD-10-CM

## 2021-10-05 DIAGNOSIS — E61.1 IRON DEFICIENCY: ICD-10-CM

## 2021-10-05 DIAGNOSIS — D50.0 CHRONIC BLOOD LOSS ANEMIA: Primary | ICD-10-CM

## 2021-10-05 DIAGNOSIS — K28.9 JEJUNAL ULCER: ICD-10-CM

## 2021-10-05 DIAGNOSIS — B37.81 CANDIDA ESOPHAGITIS (HCC): ICD-10-CM

## 2021-10-05 LAB
ALBUMIN SERPL BCP-MCNC: 3.9 G/DL (ref 3.5–5)
ALP SERPL-CCNC: 69 U/L (ref 46–116)
ALT SERPL W P-5'-P-CCNC: 28 U/L (ref 12–78)
ANION GAP SERPL CALCULATED.3IONS-SCNC: 12 MMOL/L (ref 4–13)
AST SERPL W P-5'-P-CCNC: 17 U/L (ref 5–45)
BACTERIA UR QL AUTO: ABNORMAL /HPF
BASOPHILS # BLD AUTO: 0.02 THOUSANDS/ΜL (ref 0–0.1)
BASOPHILS NFR BLD AUTO: 0 % (ref 0–1)
BILIRUB SERPL-MCNC: 0.4 MG/DL (ref 0.2–1)
BILIRUB UR QL STRIP: NEGATIVE
BUN SERPL-MCNC: 20 MG/DL (ref 5–25)
CALCIUM SERPL-MCNC: 8.6 MG/DL (ref 8.3–10.1)
CHLORIDE SERPL-SCNC: 106 MMOL/L (ref 100–108)
CLARITY UR: CLEAR
CO2 SERPL-SCNC: 23 MMOL/L (ref 21–32)
COLOR UR: YELLOW
CREAT SERPL-MCNC: 1.03 MG/DL (ref 0.6–1.3)
EOSINOPHIL # BLD AUTO: 0.02 THOUSAND/ΜL (ref 0–0.61)
EOSINOPHIL NFR BLD AUTO: 0 % (ref 0–6)
ERYTHROCYTE [DISTWIDTH] IN BLOOD BY AUTOMATED COUNT: 12.1 % (ref 11.6–15.1)
GFR SERPL CREATININE-BSD FRML MDRD: 60 ML/MIN/1.73SQ M
GLUCOSE SERPL-MCNC: 119 MG/DL (ref 65–140)
GLUCOSE UR STRIP-MCNC: NEGATIVE MG/DL
HCT VFR BLD AUTO: 42.8 % (ref 34.8–46.1)
HGB BLD-MCNC: 13.7 G/DL (ref 11.5–15.4)
HGB UR QL STRIP.AUTO: ABNORMAL
IMM GRANULOCYTES # BLD AUTO: 0.05 THOUSAND/UL (ref 0–0.2)
IMM GRANULOCYTES NFR BLD AUTO: 0 % (ref 0–2)
KETONES UR STRIP-MCNC: ABNORMAL MG/DL
LEUKOCYTE ESTERASE UR QL STRIP: NEGATIVE
LIPASE SERPL-CCNC: 244 U/L (ref 73–393)
LYMPHOCYTES # BLD AUTO: 0.96 THOUSANDS/ΜL (ref 0.6–4.47)
LYMPHOCYTES NFR BLD AUTO: 8 % (ref 14–44)
MCH RBC QN AUTO: 32.5 PG (ref 26.8–34.3)
MCHC RBC AUTO-ENTMCNC: 32 G/DL (ref 31.4–37.4)
MCV RBC AUTO: 101 FL (ref 82–98)
MONOCYTES # BLD AUTO: 0.32 THOUSAND/ΜL (ref 0.17–1.22)
MONOCYTES NFR BLD AUTO: 3 % (ref 4–12)
NEUTROPHILS # BLD AUTO: 10 THOUSANDS/ΜL (ref 1.85–7.62)
NEUTS SEG NFR BLD AUTO: 89 % (ref 43–75)
NITRITE UR QL STRIP: NEGATIVE
NON-SQ EPI CELLS URNS QL MICRO: ABNORMAL /HPF
NRBC BLD AUTO-RTO: 0 /100 WBCS
PH UR STRIP.AUTO: 5.5 [PH]
PLATELET # BLD AUTO: 203 THOUSANDS/UL (ref 149–390)
PMV BLD AUTO: 10 FL (ref 8.9–12.7)
POTASSIUM SERPL-SCNC: 4.3 MMOL/L (ref 3.5–5.3)
PROT SERPL-MCNC: 7.1 G/DL (ref 6.4–8.2)
PROT UR STRIP-MCNC: NEGATIVE MG/DL
RBC # BLD AUTO: 4.22 MILLION/UL (ref 3.81–5.12)
RBC #/AREA URNS AUTO: ABNORMAL /HPF
SODIUM SERPL-SCNC: 141 MMOL/L (ref 136–145)
SP GR UR STRIP.AUTO: >=1.03 (ref 1–1.03)
UROBILINOGEN UR QL STRIP.AUTO: 0.2 E.U./DL
WBC # BLD AUTO: 11.37 THOUSAND/UL (ref 4.31–10.16)
WBC #/AREA URNS AUTO: ABNORMAL /HPF

## 2021-10-05 PROCEDURE — 36415 COLL VENOUS BLD VENIPUNCTURE: CPT

## 2021-10-05 PROCEDURE — G1004 CDSM NDSC: HCPCS

## 2021-10-05 PROCEDURE — 83690 ASSAY OF LIPASE: CPT | Performed by: EMERGENCY MEDICINE

## 2021-10-05 PROCEDURE — 85025 COMPLETE CBC W/AUTO DIFF WBC: CPT | Performed by: EMERGENCY MEDICINE

## 2021-10-05 PROCEDURE — 74176 CT ABD & PELVIS W/O CONTRAST: CPT

## 2021-10-05 PROCEDURE — 99214 OFFICE O/P EST MOD 30 MIN: CPT | Performed by: INTERNAL MEDICINE

## 2021-10-05 PROCEDURE — 80053 COMPREHEN METABOLIC PANEL: CPT | Performed by: EMERGENCY MEDICINE

## 2021-10-05 PROCEDURE — 81001 URINALYSIS AUTO W/SCOPE: CPT | Performed by: EMERGENCY MEDICINE

## 2021-10-05 PROCEDURE — 99284 EMERGENCY DEPT VISIT MOD MDM: CPT

## 2021-10-05 PROCEDURE — 99284 EMERGENCY DEPT VISIT MOD MDM: CPT | Performed by: EMERGENCY MEDICINE

## 2021-10-05 PROCEDURE — 96372 THER/PROPH/DIAG INJ SC/IM: CPT

## 2021-10-05 RX ORDER — FAMOTIDINE 20 MG/1
20 TABLET, FILM COATED ORAL ONCE
Status: COMPLETED | OUTPATIENT
Start: 2021-10-05 | End: 2021-10-05

## 2021-10-05 RX ORDER — ALENDRONATE SODIUM 70 MG/1
70 TABLET ORAL
Qty: 12 TABLET | Refills: 3 | Status: SHIPPED | OUTPATIENT
Start: 2021-10-05

## 2021-10-05 RX ORDER — OMEPRAZOLE 40 MG/1
CAPSULE, DELAYED RELEASE ORAL
Qty: 90 CAPSULE | Refills: 3 | Status: SHIPPED | OUTPATIENT
Start: 2021-10-05 | End: 2021-10-05 | Stop reason: SDUPTHER

## 2021-10-05 RX ORDER — KETOROLAC TROMETHAMINE 30 MG/ML
15 INJECTION, SOLUTION INTRAMUSCULAR; INTRAVENOUS ONCE
Status: COMPLETED | OUTPATIENT
Start: 2021-10-05 | End: 2021-10-05

## 2021-10-05 RX ORDER — OMEPRAZOLE 40 MG/1
CAPSULE, DELAYED RELEASE ORAL
Qty: 90 CAPSULE | Refills: 3 | Status: SHIPPED | OUTPATIENT
Start: 2021-10-05

## 2021-10-05 RX ORDER — TAMSULOSIN HYDROCHLORIDE 0.4 MG/1
0.4 CAPSULE ORAL
Qty: 10 CAPSULE | Refills: 0 | Status: SHIPPED | OUTPATIENT
Start: 2021-10-05

## 2021-10-05 RX ORDER — OXYCODONE HYDROCHLORIDE AND ACETAMINOPHEN 5; 325 MG/1; MG/1
1 TABLET ORAL EVERY 4 HOURS PRN
Qty: 15 TABLET | Refills: 0 | Status: SHIPPED | OUTPATIENT
Start: 2021-10-05 | End: 2021-10-14 | Stop reason: SDUPTHER

## 2021-10-05 RX ADMIN — KETOROLAC TROMETHAMINE 15 MG: 30 INJECTION, SOLUTION INTRAMUSCULAR; INTRAVENOUS at 15:59

## 2021-10-05 RX ADMIN — FAMOTIDINE 20 MG: 20 TABLET, FILM COATED ORAL at 15:59

## 2021-10-07 ENCOUNTER — HOSPITAL ENCOUNTER (EMERGENCY)
Facility: HOSPITAL | Age: 61
Discharge: HOME | End: 2021-10-07
Attending: EMERGENCY MEDICINE
Payer: COMMERCIAL

## 2021-10-07 VITALS
OXYGEN SATURATION: 96 % | TEMPERATURE: 98.7 F | DIASTOLIC BLOOD PRESSURE: 59 MMHG | WEIGHT: 124 LBS | HEIGHT: 60 IN | HEART RATE: 78 BPM | SYSTOLIC BLOOD PRESSURE: 121 MMHG | RESPIRATION RATE: 20 BRPM | BODY MASS INDEX: 24.35 KG/M2

## 2021-10-07 DIAGNOSIS — N23 URETERAL COLIC: ICD-10-CM

## 2021-10-07 DIAGNOSIS — R10.9 ABDOMINAL PAIN: Primary | ICD-10-CM

## 2021-10-07 LAB
ALBUMIN SERPL BCP-MCNC: 3.5 G/DL (ref 3.5–5)
ALP SERPL-CCNC: 69 U/L (ref 46–116)
ALT SERPL W P-5'-P-CCNC: 21 U/L (ref 12–78)
ANION GAP SERPL CALCULATED.3IONS-SCNC: 10 MMOL/L (ref 4–13)
AST SERPL W P-5'-P-CCNC: 21 U/L (ref 5–45)
BACTERIA UR QL AUTO: ABNORMAL /HPF
BASOPHILS # BLD AUTO: 0.02 THOUSANDS/ΜL (ref 0–0.1)
BASOPHILS NFR BLD AUTO: 0 % (ref 0–1)
BILIRUB SERPL-MCNC: 0.5 MG/DL (ref 0.2–1)
BILIRUB UR QL STRIP: NEGATIVE
BUN SERPL-MCNC: 14 MG/DL (ref 5–25)
CALCIUM SERPL-MCNC: 8.8 MG/DL (ref 8.3–10.1)
CHLORIDE SERPL-SCNC: 104 MMOL/L (ref 100–108)
CLARITY UR: CLEAR
CO2 SERPL-SCNC: 23 MMOL/L (ref 21–32)
COLOR UR: YELLOW
CREAT SERPL-MCNC: 0.82 MG/DL (ref 0.6–1.3)
EOSINOPHIL # BLD AUTO: 0.03 THOUSAND/ΜL (ref 0–0.61)
EOSINOPHIL NFR BLD AUTO: 0 % (ref 0–6)
ERYTHROCYTE [DISTWIDTH] IN BLOOD BY AUTOMATED COUNT: 11.8 % (ref 11.6–15.1)
GFR SERPL CREATININE-BSD FRML MDRD: 79 ML/MIN/1.73SQ M
GLUCOSE SERPL-MCNC: 100 MG/DL (ref 65–140)
GLUCOSE UR STRIP-MCNC: NEGATIVE MG/DL
HCT VFR BLD AUTO: 36.8 % (ref 34.8–46.1)
HGB BLD-MCNC: 12.1 G/DL (ref 11.5–15.4)
HGB UR QL STRIP.AUTO: ABNORMAL
IMM GRANULOCYTES # BLD AUTO: 0.04 THOUSAND/UL (ref 0–0.2)
IMM GRANULOCYTES NFR BLD AUTO: 0 % (ref 0–2)
KETONES UR STRIP-MCNC: ABNORMAL MG/DL
LEUKOCYTE ESTERASE UR QL STRIP: ABNORMAL
LYMPHOCYTES # BLD AUTO: 0.99 THOUSANDS/ΜL (ref 0.6–4.47)
LYMPHOCYTES NFR BLD AUTO: 9 % (ref 14–44)
MCH RBC QN AUTO: 33 PG (ref 26.8–34.3)
MCHC RBC AUTO-ENTMCNC: 32.9 G/DL (ref 31.4–37.4)
MCV RBC AUTO: 100 FL (ref 82–98)
MONOCYTES # BLD AUTO: 0.76 THOUSAND/ΜL (ref 0.17–1.22)
MONOCYTES NFR BLD AUTO: 7 % (ref 4–12)
MUCOUS THREADS UR QL AUTO: ABNORMAL
NEUTROPHILS # BLD AUTO: 9.8 THOUSANDS/ΜL (ref 1.85–7.62)
NEUTS SEG NFR BLD AUTO: 84 % (ref 43–75)
NITRITE UR QL STRIP: NEGATIVE
NON-SQ EPI CELLS URNS QL MICRO: ABNORMAL /HPF
NRBC BLD AUTO-RTO: 0 /100 WBCS
PH UR STRIP.AUTO: 6 [PH]
PLATELET # BLD AUTO: 184 THOUSANDS/UL (ref 149–390)
PMV BLD AUTO: 9.8 FL (ref 8.9–12.7)
POTASSIUM SERPL-SCNC: 3.9 MMOL/L (ref 3.5–5.3)
PROT SERPL-MCNC: 6.9 G/DL (ref 6.4–8.2)
PROT UR STRIP-MCNC: NEGATIVE MG/DL
RBC # BLD AUTO: 3.67 MILLION/UL (ref 3.81–5.12)
RBC #/AREA URNS AUTO: ABNORMAL /HPF
SARS-COV-2 RNA RESP QL NAA+PROBE: NEGATIVE
SODIUM SERPL-SCNC: 137 MMOL/L (ref 136–145)
SP GR UR STRIP.AUTO: <=1.005 (ref 1–1.03)
UROBILINOGEN UR QL STRIP.AUTO: 0.2 E.U./DL
WBC # BLD AUTO: 11.64 THOUSAND/UL (ref 4.31–10.16)
WBC #/AREA URNS AUTO: ABNORMAL /HPF

## 2021-10-07 PROCEDURE — 36415 COLL VENOUS BLD VENIPUNCTURE: CPT | Performed by: EMERGENCY MEDICINE

## 2021-10-07 PROCEDURE — U0005 INFEC AGEN DETEC AMPLI PROBE: HCPCS | Performed by: EMERGENCY MEDICINE

## 2021-10-07 PROCEDURE — 96374 THER/PROPH/DIAG INJ IV PUSH: CPT

## 2021-10-07 PROCEDURE — 85025 COMPLETE CBC W/AUTO DIFF WBC: CPT | Performed by: EMERGENCY MEDICINE

## 2021-10-07 PROCEDURE — 80053 COMPREHEN METABOLIC PANEL: CPT | Performed by: EMERGENCY MEDICINE

## 2021-10-07 PROCEDURE — U0003 INFECTIOUS AGENT DETECTION BY NUCLEIC ACID (DNA OR RNA); SEVERE ACUTE RESPIRATORY SYNDROME CORONAVIRUS 2 (SARS-COV-2) (CORONAVIRUS DISEASE [COVID-19]), AMPLIFIED PROBE TECHNIQUE, MAKING USE OF HIGH THROUGHPUT TECHNOLOGIES AS DESCRIBED BY CMS-2020-01-R: HCPCS | Performed by: EMERGENCY MEDICINE

## 2021-10-07 PROCEDURE — 99284 EMERGENCY DEPT VISIT MOD MDM: CPT | Performed by: EMERGENCY MEDICINE

## 2021-10-07 PROCEDURE — 81001 URINALYSIS AUTO W/SCOPE: CPT | Performed by: EMERGENCY MEDICINE

## 2021-10-07 PROCEDURE — 99284 EMERGENCY DEPT VISIT MOD MDM: CPT

## 2021-10-07 RX ORDER — OXYCODONE HYDROCHLORIDE 5 MG/1
5 TABLET ORAL EVERY 6 HOURS PRN
Qty: 8 TABLET | Refills: 0 | Status: SHIPPED | OUTPATIENT
Start: 2021-10-07

## 2021-10-07 RX ORDER — KETOROLAC TROMETHAMINE 30 MG/ML
15 INJECTION, SOLUTION INTRAMUSCULAR; INTRAVENOUS ONCE
Status: COMPLETED | OUTPATIENT
Start: 2021-10-07 | End: 2021-10-07

## 2021-10-07 RX ADMIN — KETOROLAC TROMETHAMINE 15 MG: 30 INJECTION, SOLUTION INTRAMUSCULAR; INTRAVENOUS at 21:58

## 2021-10-08 ENCOUNTER — TELEPHONE (OUTPATIENT)
Dept: UROLOGY | Facility: AMBULATORY SURGERY CENTER | Age: 61
End: 2021-10-08

## 2021-10-14 ENCOUNTER — OFFICE VISIT (OUTPATIENT)
Dept: UROLOGY | Facility: HOSPITAL | Age: 61
End: 2021-10-14
Payer: COMMERCIAL

## 2021-10-14 VITALS
HEIGHT: 60 IN | WEIGHT: 125 LBS | BODY MASS INDEX: 24.54 KG/M2 | SYSTOLIC BLOOD PRESSURE: 138 MMHG | DIASTOLIC BLOOD PRESSURE: 70 MMHG | HEART RATE: 94 BPM

## 2021-10-14 DIAGNOSIS — N20.0 CALCULUS OF KIDNEY: Primary | ICD-10-CM

## 2021-10-14 DIAGNOSIS — N20.1 LEFT URETERAL CALCULUS: ICD-10-CM

## 2021-10-14 PROCEDURE — 1036F TOBACCO NON-USER: CPT | Performed by: NURSE PRACTITIONER

## 2021-10-14 PROCEDURE — 82360 CALCULUS ASSAY QUANT: CPT | Performed by: NURSE PRACTITIONER

## 2021-10-14 PROCEDURE — 3008F BODY MASS INDEX DOCD: CPT | Performed by: NURSE PRACTITIONER

## 2021-10-14 PROCEDURE — 99203 OFFICE O/P NEW LOW 30 MIN: CPT | Performed by: NURSE PRACTITIONER

## 2021-10-14 PROCEDURE — 3078F DIAST BP <80 MM HG: CPT | Performed by: NURSE PRACTITIONER

## 2021-10-14 PROCEDURE — 3075F SYST BP GE 130 - 139MM HG: CPT | Performed by: NURSE PRACTITIONER

## 2021-10-14 RX ORDER — OXYCODONE HYDROCHLORIDE AND ACETAMINOPHEN 5; 325 MG/1; MG/1
1 TABLET ORAL EVERY 6 HOURS PRN
Qty: 4 TABLET | Refills: 0 | Status: SHIPPED | OUTPATIENT
Start: 2021-10-14

## 2021-10-19 ENCOUNTER — HOSPITAL ENCOUNTER (OUTPATIENT)
Dept: RADIOLOGY | Facility: HOSPITAL | Age: 61
Discharge: HOME | End: 2021-10-19
Payer: COMMERCIAL

## 2021-10-19 ENCOUNTER — HOSPITAL ENCOUNTER (OUTPATIENT)
Dept: ULTRASOUND IMAGING | Facility: HOSPITAL | Age: 61
Discharge: HOME | End: 2021-10-19
Payer: COMMERCIAL

## 2021-10-19 DIAGNOSIS — N20.0 CALCULUS OF KIDNEY: ICD-10-CM

## 2021-10-19 PROCEDURE — 76770 US EXAM ABDO BACK WALL COMP: CPT

## 2021-10-19 PROCEDURE — 74018 RADEX ABDOMEN 1 VIEW: CPT

## 2021-10-20 LAB
CALCIUM OXALATE DIHYDRATE MFR STONE IR: 60 %
COLOR STONE: NORMAL
COM MFR STONE: 40 %
COMMENT-STONE3: NORMAL
COMPOSITION: NORMAL
LABORATORY COMMENT REPORT: NORMAL
PHOTO: NORMAL
SIZE STONE: NORMAL MM
SPEC SOURCE SUBJ: NORMAL
STONE ANALYSIS-IMP: NORMAL
WT STONE: 133 MG

## 2021-11-01 DIAGNOSIS — I10 BENIGN ESSENTIAL HTN: ICD-10-CM

## 2021-11-01 DIAGNOSIS — J45.21 MILD INTERMITTENT REACTIVE AIRWAY DISEASE WITH ACUTE EXACERBATION: ICD-10-CM

## 2021-11-01 RX ORDER — AMLODIPINE BESYLATE 10 MG/1
10 TABLET ORAL DAILY
Qty: 90 TABLET | Refills: 3 | Status: SHIPPED | OUTPATIENT
Start: 2021-11-01

## 2021-11-14 LAB
ALBUMIN SERPL-MCNC: 4.1 G/DL (ref 3.6–5.1)
ALBUMIN/GLOB SERPL: 1.9 (CALC) (ref 1–2.5)
ALP SERPL-CCNC: 67 U/L (ref 37–153)
ALT SERPL-CCNC: 20 U/L (ref 6–29)
AST SERPL-CCNC: 21 U/L (ref 10–35)
BASOPHILS # BLD AUTO: 20 CELLS/UL (ref 0–200)
BASOPHILS NFR BLD AUTO: 0.3 %
BILIRUB SERPL-MCNC: 0.3 MG/DL (ref 0.2–1.2)
BUN SERPL-MCNC: 9 MG/DL (ref 7–25)
BUN/CREAT SERPL: NORMAL (CALC) (ref 6–22)
CALCIUM SERPL-MCNC: 9.3 MG/DL (ref 8.6–10.4)
CHLORIDE SERPL-SCNC: 108 MMOL/L (ref 98–110)
CHOLEST SERPL-MCNC: 168 MG/DL
CHOLEST/HDLC SERPL: 2.8 (CALC)
CO2 SERPL-SCNC: 27 MMOL/L (ref 20–32)
CREAT SERPL-MCNC: 0.71 MG/DL (ref 0.5–1.05)
EOSINOPHIL # BLD AUTO: 60 CELLS/UL (ref 15–500)
EOSINOPHIL NFR BLD AUTO: 0.9 %
ERYTHROCYTE [DISTWIDTH] IN BLOOD BY AUTOMATED COUNT: 11.7 % (ref 11–15)
FERRITIN SERPL-MCNC: 93 NG/ML (ref 16–232)
GLOBULIN SER CALC-MCNC: 2.2 G/DL (CALC) (ref 1.9–3.7)
GLUCOSE SERPL-MCNC: 91 MG/DL (ref 65–99)
HBA1C MFR BLD: 4.9 % OF TOTAL HGB
HCT VFR BLD AUTO: 41 % (ref 35–45)
HDLC SERPL-MCNC: 60 MG/DL
HGB BLD-MCNC: 13.5 G/DL (ref 11.7–15.5)
IRON SATN MFR SERPL: 37 % (CALC) (ref 16–45)
IRON SERPL-MCNC: 113 MCG/DL (ref 45–160)
LDLC SERPL CALC-MCNC: 86 MG/DL (CALC)
LYMPHOCYTES # BLD AUTO: 1548 CELLS/UL (ref 850–3900)
LYMPHOCYTES NFR BLD AUTO: 23.1 %
MCH RBC QN AUTO: 32.3 PG (ref 27–33)
MCHC RBC AUTO-ENTMCNC: 32.9 G/DL (ref 32–36)
MCV RBC AUTO: 98.1 FL (ref 80–100)
MONOCYTES # BLD AUTO: 348 CELLS/UL (ref 200–950)
MONOCYTES NFR BLD AUTO: 5.2 %
NEUTROPHILS # BLD AUTO: 4724 CELLS/UL (ref 1500–7800)
NEUTROPHILS NFR BLD AUTO: 70.5 %
NONHDLC SERPL-MCNC: 108 MG/DL (CALC)
PLATELET # BLD AUTO: 257 THOUSAND/UL (ref 140–400)
PMV BLD REES-ECKER: 10.2 FL (ref 7.5–12.5)
POTASSIUM SERPL-SCNC: 4.1 MMOL/L (ref 3.5–5.3)
PROT SERPL-MCNC: 6.3 G/DL (ref 6.1–8.1)
RBC # BLD AUTO: 4.18 MILLION/UL (ref 3.8–5.1)
SL AMB EGFR AFRICAN AMERICAN: 108 ML/MIN/1.73M2
SL AMB EGFR NON AFRICAN AMERICAN: 93 ML/MIN/1.73M2
SODIUM SERPL-SCNC: 143 MMOL/L (ref 135–146)
TIBC SERPL-MCNC: 306 MCG/DL (CALC) (ref 250–450)
TRIGL SERPL-MCNC: 122 MG/DL
WBC # BLD AUTO: 6.7 THOUSAND/UL (ref 3.8–10.8)

## 2021-11-16 ENCOUNTER — TELEPHONE (OUTPATIENT)
Dept: GASTROENTEROLOGY | Facility: CLINIC | Age: 61
End: 2021-11-16

## 2021-11-16 ENCOUNTER — TELEPHONE (OUTPATIENT)
Dept: UROLOGY | Facility: AMBULATORY SURGERY CENTER | Age: 61
End: 2021-11-16

## 2021-11-23 ENCOUNTER — TELEPHONE (OUTPATIENT)
Dept: FAMILY MEDICINE CLINIC | Facility: HOSPITAL | Age: 61
End: 2021-11-23

## 2022-01-05 DIAGNOSIS — D50.0 CHRONIC BLOOD LOSS ANEMIA: Primary | ICD-10-CM

## 2022-01-15 DIAGNOSIS — I10 BENIGN ESSENTIAL HTN: ICD-10-CM

## 2022-01-15 RX ORDER — METHOCARBAMOL 500 MG/1
TABLET, FILM COATED ORAL
Qty: 60 TABLET | Refills: 3 | Status: SHIPPED | OUTPATIENT
Start: 2022-01-15 | End: 2022-05-05

## 2022-02-27 LAB
BASOPHILS # BLD AUTO: 21 CELLS/UL (ref 0–200)
BASOPHILS NFR BLD AUTO: 0.3 %
EOSINOPHIL # BLD AUTO: 110 CELLS/UL (ref 15–500)
EOSINOPHIL NFR BLD AUTO: 1.6 %
ERYTHROCYTE [DISTWIDTH] IN BLOOD BY AUTOMATED COUNT: 12.7 % (ref 11–15)
HCT VFR BLD AUTO: 39.2 % (ref 35–45)
HGB BLD-MCNC: 13.5 G/DL (ref 11.7–15.5)
LYMPHOCYTES # BLD AUTO: 1946 CELLS/UL (ref 850–3900)
LYMPHOCYTES NFR BLD AUTO: 28.2 %
MCH RBC QN AUTO: 32.7 PG (ref 27–33)
MCHC RBC AUTO-ENTMCNC: 34.4 G/DL (ref 32–36)
MCV RBC AUTO: 94.9 FL (ref 80–100)
MONOCYTES # BLD AUTO: 421 CELLS/UL (ref 200–950)
MONOCYTES NFR BLD AUTO: 6.1 %
NEUTROPHILS # BLD AUTO: 4402 CELLS/UL (ref 1500–7800)
NEUTROPHILS NFR BLD AUTO: 63.8 %
PLATELET # BLD AUTO: 205 THOUSAND/UL (ref 140–400)
PMV BLD REES-ECKER: 10.5 FL (ref 7.5–12.5)
RBC # BLD AUTO: 4.13 MILLION/UL (ref 3.8–5.1)
WBC # BLD AUTO: 6.9 THOUSAND/UL (ref 3.8–10.8)

## 2022-04-16 DIAGNOSIS — G89.29 OTHER CHRONIC PAIN: ICD-10-CM

## 2022-04-17 RX ORDER — GABAPENTIN 300 MG/1
CAPSULE ORAL
Qty: 360 CAPSULE | Refills: 5 | Status: SHIPPED | OUTPATIENT
Start: 2022-04-17

## 2022-05-05 DIAGNOSIS — I10 BENIGN ESSENTIAL HTN: ICD-10-CM

## 2022-05-05 RX ORDER — METHOCARBAMOL 500 MG/1
TABLET, FILM COATED ORAL
Qty: 60 TABLET | Refills: 3 | Status: SHIPPED | OUTPATIENT
Start: 2022-05-05

## 2022-08-08 DIAGNOSIS — E61.1 IRON DEFICIENCY: Primary | ICD-10-CM

## 2022-08-08 DIAGNOSIS — D50.0 CHRONIC BLOOD LOSS ANEMIA: ICD-10-CM

## 2022-08-20 ENCOUNTER — OFFICE VISIT (OUTPATIENT)
Dept: URGENT CARE | Facility: CLINIC | Age: 62
End: 2022-08-20
Payer: COMMERCIAL

## 2022-08-20 VITALS
BODY MASS INDEX: 22.78 KG/M2 | DIASTOLIC BLOOD PRESSURE: 81 MMHG | SYSTOLIC BLOOD PRESSURE: 129 MMHG | OXYGEN SATURATION: 95 % | WEIGHT: 116 LBS | RESPIRATION RATE: 18 BRPM | HEIGHT: 60 IN | HEART RATE: 85 BPM | TEMPERATURE: 99.1 F

## 2022-08-20 DIAGNOSIS — U07.1 COVID: Primary | ICD-10-CM

## 2022-08-20 LAB
SARS-COV-2 AG UPPER RESP QL IA: POSITIVE
VALID CONTROL: ABNORMAL

## 2022-08-20 PROCEDURE — G0382 LEV 3 HOSP TYPE B ED VISIT: HCPCS

## 2022-08-20 PROCEDURE — 87811 SARS-COV-2 COVID19 W/OPTIC: CPT

## 2022-08-20 RX ORDER — HYDROXYCHLOROQUINE SULFATE 200 MG/1
200 TABLET, FILM COATED ORAL DAILY
COMMUNITY
Start: 2022-06-21

## 2022-08-20 RX ORDER — FLUTICASONE PROPIONATE 50 MCG
1 SPRAY, SUSPENSION (ML) NASAL DAILY
Qty: 48 ML | Refills: 1 | Status: SHIPPED | OUTPATIENT
Start: 2022-08-20

## 2022-08-20 NOTE — PROGRESS NOTES
[facility]    NAME: Vi Jiménez is a 63 y o  female  : 1960    MRN: [id number]  DATE: 2022  TIME: 5:12 PM    Assessment and Plan   COVID [U07 1]  1  COVID  Poct Covid 19 Rapid Antigen Test    fluticasone (FLONASE) 50 mcg/act nasal spray     One week of symptoms and improving  Will manage the patient conservatively  Advised the patient to stay well hydrated  Advised patient if there p o  Intake is to decrease, worsening of cough, temperature is uncontrolled with Tylenol to return to our office report to the emergency department immediate      Patient Instructions   There are no Patient Instructions on file for this visit  Follow up with PCP in 3-5 days  Proceed to ER if symptoms worsen  Chief Complaint     Chief Complaint   Patient presents with    Cold Like Symptoms     Pt reports feeling congested nasally/chest/"mucusy"/ "runny nose"/chills/fatigue x1week-low grade fever at home     History of Present Illness   URI   This is a new problem  The current episode started in the past 7 days  The problem has been unchanged  Maximum temperature: subjective  Associated symptoms include congestion and rhinorrhea  Pertinent negatives include no dysuria, nausea, sinus pain, sneezing, sore throat, swollen glands or vomiting  Associated symptoms comments: Myalgia  She has tried acetaminophen and antihistamine for the symptoms  The treatment provided moderate relief  Review of Systems   Review of Systems   HENT: Positive for congestion and rhinorrhea  Negative for sinus pain, sneezing and sore throat  Gastrointestinal: Negative for nausea and vomiting  Genitourinary: Negative for dysuria  All other systems reviewed and are negative      The following portions of the patient's history were reviewed and updated as appropriate: allergies, current medications, past family history, past medical history, past social history, past surgical history and problem list      Medications have been verified  Objective   /81   Pulse 85   Temp 99 1 °F (37 3 °C)   Resp 18   Ht 5' (1 524 m)   Wt 52 6 kg (116 lb)   LMP  (LMP Unknown)   SpO2 95%   BMI 22 65 kg/m²     Physical Exam  Vitals reviewed  Constitutional:       General: She is not in acute distress  Appearance: She is well-developed  She is not diaphoretic  HENT:      Head: Normocephalic and atraumatic  Right Ear: Tympanic membrane, ear canal and external ear normal  There is no impacted cerumen  Left Ear: Tympanic membrane, ear canal and external ear normal  There is no impacted cerumen  Nose: Nose normal  No congestion or rhinorrhea  Mouth/Throat:      Pharynx: Posterior oropharyngeal erythema present  No oropharyngeal exudate  Eyes:      Extraocular Movements: Extraocular movements intact  Conjunctiva/sclera: Conjunctivae normal       Pupils: Pupils are equal, round, and reactive to light  Cardiovascular:      Rate and Rhythm: Normal rate and regular rhythm  Heart sounds: Normal heart sounds  No murmur heard  No friction rub  No gallop  Pulmonary:      Effort: Pulmonary effort is normal  No respiratory distress  Breath sounds: Normal breath sounds  No wheezing or rales  Abdominal:      General: Bowel sounds are normal  There is no distension  Palpations: Abdomen is soft  Tenderness: There is no abdominal tenderness  Musculoskeletal:         General: Normal range of motion  Cervical back: Normal range of motion and neck supple  Lymphadenopathy:      Cervical: No cervical adenopathy  Skin:     General: Skin is warm and dry  Findings: No erythema or rash  Neurological:      Mental Status: She is alert and oriented to person, place, and time         Yosef De La O MD

## 2022-09-02 ENCOUNTER — OFFICE VISIT (OUTPATIENT)
Dept: FAMILY MEDICINE CLINIC | Facility: HOSPITAL | Age: 62
End: 2022-09-02
Payer: COMMERCIAL

## 2022-09-02 VITALS
SYSTOLIC BLOOD PRESSURE: 150 MMHG | HEIGHT: 60 IN | BODY MASS INDEX: 22.38 KG/M2 | OXYGEN SATURATION: 94 % | DIASTOLIC BLOOD PRESSURE: 98 MMHG | WEIGHT: 114 LBS | HEART RATE: 92 BPM

## 2022-09-02 DIAGNOSIS — Z72.0 TOBACCO ABUSE: ICD-10-CM

## 2022-09-02 DIAGNOSIS — G89.29 OTHER CHRONIC PAIN: ICD-10-CM

## 2022-09-02 DIAGNOSIS — J01.00 ACUTE NON-RECURRENT MAXILLARY SINUSITIS: ICD-10-CM

## 2022-09-02 DIAGNOSIS — H00.015 HORDEOLUM EXTERNUM OF LEFT LOWER EYELID: ICD-10-CM

## 2022-09-02 DIAGNOSIS — Z13.29 SCREENING FOR THYROID DISORDER: ICD-10-CM

## 2022-09-02 DIAGNOSIS — Z13.220 SCREENING FOR HYPERLIPIDEMIA: ICD-10-CM

## 2022-09-02 DIAGNOSIS — I10 BENIGN ESSENTIAL HTN: ICD-10-CM

## 2022-09-02 DIAGNOSIS — Z13.1 SCREENING FOR DIABETES MELLITUS: Primary | ICD-10-CM

## 2022-09-02 PROCEDURE — 3725F SCREEN DEPRESSION PERFORMED: CPT | Performed by: STUDENT IN AN ORGANIZED HEALTH CARE EDUCATION/TRAINING PROGRAM

## 2022-09-02 PROCEDURE — 99214 OFFICE O/P EST MOD 30 MIN: CPT | Performed by: STUDENT IN AN ORGANIZED HEALTH CARE EDUCATION/TRAINING PROGRAM

## 2022-09-02 RX ORDER — NICOTINE 21 MG/24HR
1 PATCH, TRANSDERMAL 24 HOURS TRANSDERMAL EVERY 24 HOURS
Qty: 28 PATCH | Refills: 0 | Status: SHIPPED | OUTPATIENT
Start: 2022-09-02 | End: 2022-10-04

## 2022-09-02 RX ORDER — CEFUROXIME AXETIL 500 MG/1
500 TABLET ORAL EVERY 12 HOURS SCHEDULED
Qty: 10 TABLET | Refills: 0 | Status: SHIPPED | OUTPATIENT
Start: 2022-09-02 | End: 2022-09-07

## 2022-09-02 RX ORDER — AMLODIPINE BESYLATE 10 MG/1
10 TABLET ORAL DAILY
Qty: 90 TABLET | Refills: 3 | Status: SHIPPED | OUTPATIENT
Start: 2022-09-02

## 2022-09-02 RX ORDER — GABAPENTIN 300 MG/1
CAPSULE ORAL
Qty: 360 CAPSULE | Refills: 5 | Status: SHIPPED | OUTPATIENT
Start: 2022-09-02

## 2022-09-02 NOTE — PROGRESS NOTES
[facility],     Assessment/Plan:      Diagnosis ICD-10-CM Associated Orders   1  Screening for diabetes mellitus  Z13 1 Comprehensive metabolic panel     Comprehensive metabolic panel   2  Tobacco abuse  Z72 0 nicotine (NICODERM CQ) 21 mg/24 hr TD 24 hr patch   3  Screening for thyroid disorder  Z13 29 TSH, 3rd generation with Free T4 reflex     TSH, 3rd generation with Free T4 reflex   4  Screening for hyperlipidemia  Z13 220 Lipid panel     Lipid panel   5  Acute non-recurrent maxillary sinusitis  J01 00 cefuroxime (CEFTIN) 500 mg tablet   6  Hordeolum externum of left lower eyelid  H00 015 cefuroxime (CEFTIN) 500 mg tablet   7  Benign essential HTN  I10 amLODIPine (NORVASC) 10 mg tablet   8  Other chronic pain  G89 29 gabapentin (NEURONTIN) 300 mg capsule      Ceftin ordered for acute sinus infection and left stye  Advised warm compresses 4-5 times per day   Medications refilled  Reordered nicotine patch, and will follow up with patient at next appointment in regards to quitting for accountability   Blood work ordered for review at annual   CBC already ordered by GI  Return in about 3 months (around 12/2/2022) for Annual Physical, smoking cessation f/u    Patient may call or return to office with any questions or concerns  ______________________________________________________________________  Subjective:     Patient ID: Everton Query is a 63 y o  female  HPI  Everton Query  Chief Complaint   Patient presents with    Medication Management    Sinus Problem     Post covid     Recent COVID 8/20/22 - a few days before started  3 days of worsening left medial lower lid swelling & pain  Interested in quitting smoking  Wants nicoderm patch  Tried 21 mg, and had quit for 4 months  Currently 1 ppd or just above  Medications reviewed, refills requested of gabapentin and Norvasc  Robaxin just sent         The following portions of the patient's history were reviewed and updated as appropriate: allergies, current medications, past medical history, and problem list     Review of Systems   Constitutional: Negative for chills and fever  HENT: Positive for congestion, postnasal drip, rhinorrhea, sinus pressure and sinus pain  Negative for ear pain and sore throat  Eyes: Positive for pain, discharge, redness and itching  Respiratory: Negative for cough and shortness of breath  Cardiovascular: Negative for chest pain and palpitations  Gastrointestinal: Negative for diarrhea and nausea  Musculoskeletal: Negative for arthralgias and myalgias  Neurological: Positive for headaches  Negative for dizziness and light-headedness  Objective:      Vitals:    09/02/22 1029   BP: 150/98   Pulse: 92   SpO2: 94%      Physical Exam  Vitals reviewed  Constitutional:       General: She is not in acute distress  Appearance: Normal appearance  She is well-developed and normal weight  She is not ill-appearing  HENT:      Head: Normocephalic and atraumatic  Comments: Mild sinus tenderness bilaterally at maxillary sinuses  Frontal sinuses feel nontender  Right Ear: Tympanic membrane, ear canal and external ear normal       Left Ear: Tympanic membrane, ear canal and external ear normal       Nose: Congestion present  Eyes:      General: No scleral icterus  Right eye: No discharge  Comments: Left eye demonstrates medial lower lid swelling, erythema, into pinpoint papules similar to white comedones  Not actively draining, but appears likely soon  Tender to palpation  Sclera normal   Upper eyelid normal without tenderness  Cardiovascular:      Rate and Rhythm: Normal rate and regular rhythm  Pulses: Normal pulses  Heart sounds: Normal heart sounds  No murmur heard  Pulmonary:      Effort: Pulmonary effort is normal  No respiratory distress  Breath sounds: Normal breath sounds  No stridor  No wheezing     Musculoskeletal: Cervical back: Normal range of motion and neck supple  No rigidity or tenderness  Lymphadenopathy:      Cervical: No cervical adenopathy  Skin:     General: Skin is warm  Neurological:      Mental Status: She is alert and oriented to person, place, and time  Gait: Gait normal    Psychiatric:         Mood and Affect: Mood normal          Behavior: Behavior normal          Thought Content: Thought content normal          Judgment: Judgment normal            Portions of the record may have been created with voice recognition software  Occasional wrong word or "sound alike" substitutions may have occurred due to the inherent limitations of voice recognition software  Please review the chart carefully and recognize, using context, where substitutions/typographical errors may have occurred

## 2022-09-20 DIAGNOSIS — I10 BENIGN ESSENTIAL HTN: ICD-10-CM

## 2022-09-22 DIAGNOSIS — I10 BENIGN ESSENTIAL HTN: ICD-10-CM

## 2022-09-22 RX ORDER — METHOCARBAMOL 500 MG/1
TABLET, FILM COATED ORAL
Qty: 60 TABLET | Refills: 0 | Status: SHIPPED | OUTPATIENT
Start: 2022-09-22 | End: 2022-10-20

## 2022-09-22 RX ORDER — METHOCARBAMOL 500 MG/1
500 TABLET, FILM COATED ORAL 2 TIMES DAILY
Qty: 60 TABLET | Refills: 0 | OUTPATIENT
Start: 2022-09-22

## 2022-09-25 LAB
BASOPHILS # BLD AUTO: 16 CELLS/UL (ref 0–200)
BASOPHILS NFR BLD AUTO: 0.2 %
EOSINOPHIL # BLD AUTO: 81 CELLS/UL (ref 15–500)
EOSINOPHIL NFR BLD AUTO: 1 %
ERYTHROCYTE [DISTWIDTH] IN BLOOD BY AUTOMATED COUNT: 13.2 % (ref 11–15)
HCT VFR BLD AUTO: 38.4 % (ref 35–45)
HGB BLD-MCNC: 13 G/DL (ref 11.7–15.5)
LYMPHOCYTES # BLD AUTO: 1272 CELLS/UL (ref 850–3900)
LYMPHOCYTES NFR BLD AUTO: 15.7 %
MCH RBC QN AUTO: 32.7 PG (ref 27–33)
MCHC RBC AUTO-ENTMCNC: 33.9 G/DL (ref 32–36)
MCV RBC AUTO: 96.7 FL (ref 80–100)
MONOCYTES # BLD AUTO: 543 CELLS/UL (ref 200–950)
MONOCYTES NFR BLD AUTO: 6.7 %
NEUTROPHILS # BLD AUTO: 6188 CELLS/UL (ref 1500–7800)
NEUTROPHILS NFR BLD AUTO: 76.4 %
PLATELET # BLD AUTO: 207 THOUSAND/UL (ref 140–400)
PMV BLD REES-ECKER: 10 FL (ref 7.5–12.5)
RBC # BLD AUTO: 3.97 MILLION/UL (ref 3.8–5.1)
WBC # BLD AUTO: 8.1 THOUSAND/UL (ref 3.8–10.8)

## 2022-09-26 NOTE — RESULT ENCOUNTER NOTE
Called Reviewed results  Hemoglobin has been stable over the past year  Perhaps he could be checked every 6-12 months by her PCP  Spoke with the patient    Thank you- appears that her a

## 2022-10-03 DIAGNOSIS — Z72.0 TOBACCO ABUSE: ICD-10-CM

## 2022-10-04 RX ORDER — NICOTINE 21 MG/24HR
1 PATCH, TRANSDERMAL 24 HOURS TRANSDERMAL EVERY 24 HOURS
Qty: 28 PATCH | Refills: 0 | Status: SHIPPED | OUTPATIENT
Start: 2022-10-04

## 2022-10-19 DIAGNOSIS — I10 BENIGN ESSENTIAL HTN: ICD-10-CM

## 2022-10-20 ENCOUNTER — TELEPHONE (OUTPATIENT)
Dept: FAMILY MEDICINE CLINIC | Facility: HOSPITAL | Age: 62
End: 2022-10-20

## 2022-10-20 RX ORDER — METHOCARBAMOL 500 MG/1
TABLET, FILM COATED ORAL
Qty: 60 TABLET | Refills: 0 | Status: SHIPPED | OUTPATIENT
Start: 2022-10-20

## 2022-10-30 DIAGNOSIS — I10 BENIGN ESSENTIAL HTN: ICD-10-CM

## 2022-10-31 RX ORDER — METHOCARBAMOL 500 MG/1
TABLET, FILM COATED ORAL
Qty: 60 TABLET | Refills: 0 | Status: SHIPPED | OUTPATIENT
Start: 2022-10-31

## 2022-11-04 DIAGNOSIS — Z72.0 TOBACCO ABUSE: ICD-10-CM

## 2022-11-04 RX ORDER — NICOTINE 21 MG/24HR
1 PATCH, TRANSDERMAL 24 HOURS TRANSDERMAL EVERY 24 HOURS
Qty: 28 PATCH | Refills: 0 | Status: SHIPPED | OUTPATIENT
Start: 2022-11-04

## 2022-11-25 LAB
ALBUMIN SERPL-MCNC: 4.4 G/DL (ref 3.6–5.1)
ALBUMIN/GLOB SERPL: 2 (CALC) (ref 1–2.5)
ALP SERPL-CCNC: 57 U/L (ref 37–153)
ALT SERPL-CCNC: 20 U/L (ref 6–29)
AST SERPL-CCNC: 20 U/L (ref 10–35)
BILIRUB SERPL-MCNC: 0.5 MG/DL (ref 0.2–1.2)
BUN SERPL-MCNC: 13 MG/DL (ref 7–25)
BUN/CREAT SERPL: NORMAL (CALC) (ref 6–22)
CALCIUM SERPL-MCNC: 9.5 MG/DL (ref 8.6–10.4)
CHLORIDE SERPL-SCNC: 105 MMOL/L (ref 98–110)
CHOLEST SERPL-MCNC: 167 MG/DL
CHOLEST/HDLC SERPL: 2.5 (CALC)
CO2 SERPL-SCNC: 27 MMOL/L (ref 20–32)
CREAT SERPL-MCNC: 0.75 MG/DL (ref 0.5–1.05)
GFR/BSA.PRED SERPLBLD CYS-BASED-ARV: 91 ML/MIN/1.73M2
GLOBULIN SER CALC-MCNC: 2.2 G/DL (CALC) (ref 1.9–3.7)
GLUCOSE SERPL-MCNC: 91 MG/DL (ref 65–99)
HDLC SERPL-MCNC: 68 MG/DL
LDLC SERPL CALC-MCNC: 80 MG/DL (CALC)
NONHDLC SERPL-MCNC: 99 MG/DL (CALC)
POTASSIUM SERPL-SCNC: 4.7 MMOL/L (ref 3.5–5.3)
PROT SERPL-MCNC: 6.6 G/DL (ref 6.1–8.1)
SODIUM SERPL-SCNC: 141 MMOL/L (ref 135–146)
TRIGL SERPL-MCNC: 109 MG/DL
TSH SERPL-ACNC: 1.68 MIU/L (ref 0.4–4.5)

## 2022-12-09 ENCOUNTER — RA CDI HCC (OUTPATIENT)
Dept: OTHER | Facility: HOSPITAL | Age: 62
End: 2022-12-09

## 2022-12-12 ENCOUNTER — OFFICE VISIT (OUTPATIENT)
Dept: FAMILY MEDICINE CLINIC | Facility: HOSPITAL | Age: 62
End: 2022-12-12

## 2022-12-12 VITALS
BODY MASS INDEX: 22.97 KG/M2 | DIASTOLIC BLOOD PRESSURE: 80 MMHG | SYSTOLIC BLOOD PRESSURE: 142 MMHG | OXYGEN SATURATION: 97 % | HEIGHT: 60 IN | HEART RATE: 82 BPM | WEIGHT: 117 LBS

## 2022-12-12 DIAGNOSIS — Z72.0 TOBACCO ABUSE: ICD-10-CM

## 2022-12-12 DIAGNOSIS — Z00.00 ROUTINE ADULT HEALTH MAINTENANCE: Primary | ICD-10-CM

## 2022-12-12 DIAGNOSIS — D50.8 IRON DEFICIENCY ANEMIA SECONDARY TO INADEQUATE DIETARY IRON INTAKE: ICD-10-CM

## 2022-12-12 NOTE — PROGRESS NOTES
[address] [facility] PRIMARY CARE [address]    NAME: Lizzette Nugent  AGE: 63 y o  SEX: female  : 1960   DATE: 2023     Assessment and Plan:      Diagnosis ICD-10-CM Associated Orders   1  Routine adult health maintenance  Z00 00       2  Iron deficiency anemia secondary to inadequate dietary iron intake  D50 8 CBC and differential     Iron Panel (Includes Ferritin, Iron Sat%, Iron, and TIBC)     CBC and differential      3  Tobacco abuse  Z72 0 CT lung screening program      BW for iron follow up  D/W pt CT Lung, ordered for her  Was having worse pain on fosamax - no lbnger on  Immunizations and preventive care screenings were discussed with patient today  Appropriate education was printed on patient's after visit summary  Counseling:  Alcohol/drug use: discussed moderation in alcohol intake, the recommendations for healthy alcohol use, and avoidance of illicit drug use  Dental Health: discussed importance of regular tooth brushing, flossing, and dental visits  Injury prevention: discussed safety/seat belts, safety helmets, smoke detectors, carbon dioxide detectors, and smoking near bedding or upholstery  Sexual health: discussed sexually transmitted diseases, partner selection, use of condoms, avoidance of unintended pregnancy, and contraceptive alternatives  · Exercise: the importance of regular exercise/physical activity was discussed  Recommend exercise 3-5 times per week for at least 30 minutes  No follow-ups on file  Chief Complaint:     Chief Complaint   Patient presents with   • Follow-up     3 month possible sinus infection       History of Present Illness:     Adult Annual Physical   Patient here for a comprehensive physical exam   She had severe anemia & low iron 2020 requiring infusions  Diet and Physical Activity  · Diet/Nutrition: well balanced diet     · Exercise: walking, stretches   · No Drug use  Alcohol use - Rare/occasional/frequent: rare  · No ready to quit tobacco yet     General Health  · Sleep: sleeps well, 2nd shift  · Hearing: normal - bilateral   · Vision: most recent eye exam >1 year ago and wears glasses  · Dental: regular dental visits and brushes teeth twice daily  /GYN Health  · Patient is: postmenopausal     Review of Systems:     Review of Systems   Constitutional: Negative for chills and fever  HENT: Positive for sinus pain (on/off)  Negative for congestion and sinus pressure  Respiratory: Positive for cough (mild smokers)  Negative for shortness of breath  Cardiovascular: Negative for chest pain and palpitations  Gastrointestinal: Negative for diarrhea and nausea  Genitourinary: Negative for dysuria and frequency  Past Medical History:     Past Medical History:   Diagnosis Date   • Anemia    • Anxiety    • Asthma    • Bursitis of left elbow    • Chronic pain disorder     lower back   • Colon polyp    • Hiatal hernia    • Hypertension    • Iron deficiency anemia secondary to inadequate dietary iron intake 01/29/2020   • Kidney stones    • Pancreatitis    • Psoriasis    • Psychiatric disorder     anxiety   • Restless leg syndrome    • Tobacco use disorder       Past Surgical History:     Past Surgical History:   Procedure Laterality Date   • CYSTOSCOPY     • CYSTOSCOPY W/ URETERAL STENT PLACEMENT     • EXPLORATORY LAPAROTOMY      small intestine blockage no resection   • LAPAROSCOPY      --  for SBO   • MN ERCP DX COLLECTION SPECIMEN BRUSHING/WASHING N/A 8/15/2018    Procedure: ENDOSCOPIC RETROGRADE CHOLANGIOPANCREATOGRAPHY (ERCP);   Surgeon: Abdifatah Beth MD;  Location:  MAIN OR;  Service: Gastroenterology   • MN LAPS ABD PRTM&OMENTUM DX W/WO SPEC BR/WA SPX N/A 2/10/2020    Procedure: LAPAROSCOPY DIAGNOSTIC, REPAIR ENTEROTOMY, REMOVAL OF BEZOAR;  Surgeon: Maribell Velazquez MD;  Location:  MAIN OR;  Service: General      Social History:     Social History Socioeconomic History   • Marital status: /Civil Union     Spouse name: None   • Number of children: None   • Years of education: None   • Highest education level: None   Occupational History   • None   Tobacco Use   • Smoking status: Every Day     Packs/day: 1 00     Years: 30 00     Pack years: 30 00     Types: Cigarettes   • Smokeless tobacco: Never   • Tobacco comments:     Attempting to stop   Vaping Use   • Vaping Use: Never used   Substance and Sexual Activity   • Alcohol use: Never   • Drug use: No   • Sexual activity: None   Other Topics Concern   • None   Social History Narrative    Lives with     Feels safe at home    Sees dentist occas  No living will  Gets exercise with her job        Social Determinants of Health     Financial Resource Strain: Not on file   Food Insecurity: Not on file   Transportation Needs: Not on file   Physical Activity: Not on file   Stress: Not on file   Social Connections: Not on file   Intimate Partner Violence: Not on file   Housing Stability: Not on file      Family History:     Family History   Problem Relation Age of Onset   • Mental illness Sister    • Other Other         Cardiovascular disease   • Hypertension Mother    • Cancer Father    • Substance Abuse Neg Hx    • Colon cancer Neg Hx    • Colon polyps Neg Hx       Current Medications:     Current Outpatient Medications   Medication Sig Dispense Refill   • amLODIPine (NORVASC) 10 mg tablet Take 1 tablet (10 mg total) by mouth daily Decreased dose 90 tablet 3   • fluticasone (FLONASE) 50 mcg/act nasal spray 1 spray into each nostril daily 48 mL 1   • gabapentin (NEURONTIN) 300 mg capsule One capsule daily in a m , 1 capsule at noon, and 2 capsules at bedtime 360 capsule 5   • hydroxychloroquine (PLAQUENIL) 200 mg tablet Take 200 mg by mouth daily     • Probiotic Product (PROBIOTIC-10 ULTIMATE PO) Take by mouth     • benzonatate (TESSALON) 200 MG capsule Take 1 capsule (200 mg total) by mouth 3 (three) times a day as needed for cough 20 capsule 0   • methocarbamol (ROBAXIN) 500 mg tablet TAKE 1 TABLET BY MOUTH TWICE A DAY 60 tablet 2     No current facility-administered medications for this visit  Allergies: Allergies   Allergen Reactions   • Pollen Extract Sneezing   • Tree Extract Sneezing      Physical Exam:     /80   Pulse 82   Ht 5' (1 524 m)   Wt 53 1 kg (117 lb)   LMP  (LMP Unknown)   SpO2 97%   BMI 22 85 kg/m²     Physical Exam  Vitals and nursing note reviewed  Constitutional:       General: She is not in acute distress  Appearance: Normal appearance  She is well-developed and normal weight  She is not ill-appearing  HENT:      Head: Normocephalic and atraumatic  Eyes:      General: No scleral icterus  Right eye: No discharge  Left eye: No discharge  Conjunctiva/sclera: Conjunctivae normal    Cardiovascular:      Rate and Rhythm: Normal rate and regular rhythm  Pulses: Normal pulses  Heart sounds: Normal heart sounds  No murmur heard  Pulmonary:      Effort: Pulmonary effort is normal  No respiratory distress  Breath sounds: Normal breath sounds  Musculoskeletal:      Cervical back: Normal range of motion and neck supple  No rigidity  Right lower leg: No edema  Left lower leg: No edema  Skin:     General: Skin is warm and dry  Capillary Refill: Capillary refill takes less than 2 seconds  Neurological:      Mental Status: She is alert and oriented to person, place, and time  Gait: Gait normal    Psychiatric:         Mood and Affect: Mood normal          Behavior: Behavior normal          Thought Content:  Thought content normal          Judgment: Judgment normal           DO Dunia Martines [address] [phone]

## 2022-12-22 ENCOUNTER — TELEPHONE (OUTPATIENT)
Dept: FAMILY MEDICINE CLINIC | Facility: HOSPITAL | Age: 62
End: 2022-12-22

## 2022-12-22 DIAGNOSIS — J32.9 SINUSITIS, UNSPECIFIED CHRONICITY, UNSPECIFIED LOCATION: Primary | ICD-10-CM

## 2022-12-22 RX ORDER — AZITHROMYCIN 250 MG/1
TABLET, FILM COATED ORAL
Qty: 6 TABLET | Refills: 0 | Status: SHIPPED | OUTPATIENT
Start: 2022-12-22 | End: 2022-12-27

## 2022-12-22 NOTE — TELEPHONE ENCOUNTER
Pt was seen 12/12, told to take Mucinex for congestion  Blowing out yellow mucus and states she can "taste an infection"  Has been taking the Mucinex for 10 days and not working  No fever or any other symptoms  Asking for abx   PCB

## 2023-01-12 ENCOUNTER — TELEPHONE (OUTPATIENT)
Dept: FAMILY MEDICINE CLINIC | Facility: HOSPITAL | Age: 63
End: 2023-01-12

## 2023-01-12 ENCOUNTER — TELEMEDICINE (OUTPATIENT)
Dept: FAMILY MEDICINE CLINIC | Facility: HOSPITAL | Age: 63
End: 2023-01-12

## 2023-01-12 VITALS — TEMPERATURE: 98.4 F

## 2023-01-12 DIAGNOSIS — U07.1 COVID-19: Primary | ICD-10-CM

## 2023-01-12 DIAGNOSIS — Z72.0 TOBACCO ABUSE: ICD-10-CM

## 2023-01-12 RX ORDER — BENZONATATE 200 MG/1
200 CAPSULE ORAL 3 TIMES DAILY PRN
Qty: 20 CAPSULE | Refills: 0 | Status: SHIPPED | OUTPATIENT
Start: 2023-01-12

## 2023-01-12 RX ORDER — NIRMATRELVIR AND RITONAVIR 300-100 MG
3 KIT ORAL 2 TIMES DAILY
Qty: 30 TABLET | Refills: 0 | Status: SHIPPED | OUTPATIENT
Start: 2023-01-12 | End: 2023-01-17

## 2023-01-12 NOTE — LETTER
January 12, 2023    Patient: Jose Cortés  YOB: 1960  Date of Last Encounter: 1/12/2023      To whom it may concern:     Jose Cortés has tested positive for COVID-19 (Coronavirus)  She may return to work on 1/16/2023, which is greater than 5 days from illness onset (provided symptoms are improving) and 24 hours without fever  She should mask around others through 1/20/2023      Sincerely,         ROXY Orona

## 2023-01-12 NOTE — TELEPHONE ENCOUNTER
Patient tested positive for Covid today  Her symptoms began "maybe two days ago " She is coughing up yellow mucous  No fever  Please advise

## 2023-01-12 NOTE — PROGRESS NOTES
COVID-19 Outpatient Progress Note    Assessment/Plan:    Problem List Items Addressed This Visit        Other    Tobacco abuse   Other Visit Diagnoses     COVID-19    -  Primary    Relevant Medications    nirmatrelvir & ritonavir (Paxlovid, 300/100,) tablet therapy pack    benzonatate (TESSALON) 200 MG capsule         Disposition:     Patient has asymptomatic or mild COVID-19 infection  Based off CDC guidelines, they were recommended to isolate for 5 days  If they are asymptomatic or symptoms are improving with no fevers in the past 24 hours, isolation may be ended followed by 5 days of wearing a mask when around othes to minimize risk of infecting others  If still have a fever or other symptoms have not improved, continue to isolate until they improve  Regardless of when they end isolation, avoid being around people who are more likely to get very sick from COVID-19 until at least day 11  Discussed symptom directed medication options with patient  Discussed vitamin D, vitamin C, and/or zinc supplementation with patient  Non vaccinated high risk (immune compromised smoker) w/mild covid sx's currently  Advise she is a candidate for paxlovid tx and she is agreeable - rx issued & SEs reviewed  Reviewed isolation/masking guidelines and she voices understanding - work note written as she requests  Continue OTC meds prn, rest, adequate hydration, diet as tolerated and cough med as rx'd  Call w/worse or persistent sx's  Patient meets criteria for PAXLOVID and they have been counseled appropriately according to EUA documentation released by the FDA  After discussion, patient agrees to treatment      Sylvester Hole is an investigational medicine used to treat mild-to-moderate COVID-19 in adults and children (15years of age and older weighing at least 80 pounds (40 kg)) with positive results of direct SARS-CoV-2 viral testing, and who are at high risk for progression to severe COVID-19, including hospitalization or death  Natan Thurston is investigational because it is still being studied  There is limited information about the safety and effectiveness of using PAXLOVID to treat people with mild-to-moderate COVID-19  The FDA has authorized the emergency use of PAXLOVID for the treatment of mild-tomoderate COVID-19 in adults and children (15years of age and older weighing at least 80 pounds (40 kg)) with a positive test for the virus that causes COVID-19, and who are at high risk for progression to severe COVID-19, including hospitalization or death, under an EUA  What should I tell my healthcare provider before I take PAXLOVID? Tell your healthcare provider if you:  - Have any allergies  - Have liver or kidney disease  - Are pregnant or plan to become pregnant  - Are breastfeeding a child  - Have any serious illnesses    Tell your healthcare provider about all the medicines you take, including prescription and over-the-counter medicines, vitamins, and herbal supplements  Some medicines may interact with PAXLOVID and may cause serious side effects  Keep a list of your medicines to show your healthcare provider and pharmacist when you get a new medicine  You can ask your healthcare provider or pharmacist for a list of medicines that interact with PAXLOVID  Do not start taking a new medicine without telling your healthcare provider  Your healthcare provider can tell you if it is safe to take PAXLOVID with other medicines  Tell your healthcare provider if you are taking combined hormonal contraceptive  PAXLOVID may affect how your birth control pills work  Females who are able to become pregnant should use another effective alternative form of contraception or an additional barrier method of contraception  Talk to your healthcare provider if you have any questions about contraceptive methods that might be right for you  How do I take PAXLOVID? PAXLOVID consists of 2 medicines: nirmatrelvir and ritonavir    - Take 2 pink tablets of nirmatrelvir with 1 white tablet of ritonavir by mouth 2 times each day (in the morning and in the evening) for 5 days  For each dose, take all 3 tablets at the same time  - If you have kidney disease, talk to your healthcare provider  You may need a different dose  - Swallow the tablets whole  Do not chew, break, or crush the tablets  - Take PAXLOVID with or without food  - Do not stop taking PAXLOVID without talking to your healthcare provider, even if you feel better  - If you miss a dose of PAXLOVID within 8 hours of the time it is usually taken, take it as soon as you remember  If you miss a dose by more than 8 hours, skip the missed dose and take the next dose at your regular time  Do not take 2 doses of PAXLOVID at the same time  - If you take too much PAXLOVID, call your healthcare provider or go to the nearest hospital emergency room right away  - If you are taking a ritonavir- or cobicistat-containing medicine to treat hepatitis C or Human Immunodeficiency Virus (HIV), you should continue to take your medicine as prescribed by your healthcare provider   - Talk to your healthcare provider if you do not feel better or if you feel worse after 5 days  Who should generally not take PAXLOVID? Do not take PAXLOVID if:  You are allergic to nirmatrelvir, ritonavir, or any of the ingredients in PAXLOVID  You are taking any of the following medicines:  - Alfuzosin  - Pethidine, piroxicam, propoxyphene  - Ranolazine  - Amiodarone, dronedarone, flecainide, propafenone, quinidine  - Colchicine  - Lurasidone, pimozide, clozapine  - Dihydroergotamine, ergotamine, methylergonovine  - Lovastatin, simvastatin  - Sildenafil (Revatio®) for pulmonary arterial hypertension (PAH)  - Triazolam, oral midazolam  - Apalutamide  - Carbamazepine, phenobarbital, phenytoin  - Rifampin  - St  Chuck’s Wort (hypericum perforatum)    What are the important possible side effects of PAXLOVID?     Possible side effects of PAXLOVID are:  - Liver Problems  Tell your healthcare provider right away if you have any of these signs and symptoms of liver problems: loss of appetite, yellowing of your skin and the whites of eyes (jaundice), dark-colored urine, pale colored stools and itchy skin, stomach area (abdominal) pain  - Resistance to HIV Medicines  If you have untreated HIV infection, PAXLOVID may lead to some HIV medicines not working as well in the future  - Other possible side effects include: altered sense of taste, diarrhea, high blood pressure, or muscle aches    These are not all the possible side effects of PAXLOVID  Not many people have taken PAXLOVID  Serious and unexpected side effects may happen  [address] is still being studied, so it is possible that all of the risks are not known at this time  What other treatment choices are there? Like Valente Aguilar may allow for the emergency use of other medicines to treat people with COVID-19  Go to https://[employer]/ for information on the emergency use of other medicines that are authorized by FDA to treat people with COVID-19  Your healthcare provider may talk with you about clinical trials for which you may be eligible  It is your choice to be treated or not to be treated with PAXLOVID  Should you decide not to receive it or for your child not to receive it, it will not change your standard medical care  What if I am pregnant or breastfeeding? There is no experience treating pregnant women or breastfeeding mothers with PAXLOVID  For a mother and unborn baby, the benefit of taking PAXLOVID may be greater than the risk from the treatment  If you are pregnant, discuss your options and specific situation with your healthcare provider      It is recommended that you use effective barrier contraception or do not have sexual activity while taking PAXLOVID  If you are breastfeeding, discuss your options and specific situation with your healthcare provider  How do I report side effects with PAXLOVID? Contact your healthcare provider if you have any side effects that bother you or do not go away  Report side effects to FDA MedWatch at www fda gov/medwatch or call 1-984-JLR8265 or you can report side effects to [facility]  at the contact information provided below  Website Fax number Telephone number   IForem [phone]     How should I store Laila Mantle? Store PAXLOVID tablets at room temperature between 68°F to 77°F (20°C to 25°C)  Full fact sheet for patients, parents, and caregivers can be found at: [employer]    I have spent 8 minutes directly with the patient  Greater than 50% of this time was spent in counseling/coordination of care regarding: diagnostic results, prognosis, risks and benefits of treatment options, instructions for management, patient and family education, importance of treatment compliance, risk factor reductions and impressions  Encounter provider: ROYX Aleman     Provider located at: [address] [place] [address] [address] [facility]   [id number]  [facility] [id number]     Recent Visits  No visits were found meeting these conditions  Showing recent visits within past 7 days and meeting all other requirements  Today's Visits  Date Type Provider Dept   01/12/23 Telemedicine ROXY Aleman P[facility]   01/12/23 Telephone DO Kaushal Scanlon [facility]   Showing today's visits and meeting all other requirements  Future Appointments  No visits were found meeting these conditions    Showing future appointments within next 150 days and meeting all other requirements     This virtual check-in was done via [employer] and patient was informed that this is a secure, HIPAA-compliant platform  She agrees to proceed  Patient agrees to participate in a virtual check in via telephone or video visit instead of presenting to the office to address urgent/immediate medical needs  Patient is aware this is a billable service  She acknowledged consent and understanding of privacy and security of the video platform  The patient has agreed to participate and understands they can discontinue the visit at any time  After connecting through [facility], the patient was identified by name and date of birth  Lynette Donald was informed that this was a telemedicine visit and that the exam was being conducted confidentially over secure lines  My office door was closed  No one else was in the room  Lynette Donald acknowledged consent and understanding of privacy and security of the telemedicine visit  I informed the patient that I have reviewed her record in Epic and presented the opportunity for her to ask any questions regarding the visit today  The patient agreed to participate  Verification of patient location:  Patient is located in the following state in which I hold an active license: [place]    Subjective:   Lynette Donald is a 63 y o  female who has been screened for COVID-19  Symptom change since last report: worsening  Patient's symptoms include chills, fatigue, rhinorrhea, cough (yellow mucous) and diarrhea  Patient denies fever, congestion, shortness of breath, chest tightness, nausea and vomiting      - Date of symptom onset: 1/10/2023  - Date of positive COVID-19 test: 1/12/2023  Type of test: Home antigen  Patient with typical symptoms of COVID-19 and they attest that they were positive on home rapid antigen testing  Image of positive result is not able to be uploaded into their chart  COVID-19 vaccination status: Not vaccinated    Rubén España has been staying home and has isolated themselves in her home  Daily smoker - 1ppd  Denies known lung/heart disease  Second time having covid - fully recovered w/o treatment first time  Was on antibiotic before [place] for sinus infection - has had diarrhea since  Lab Results   Component Value Date    SARSCOV2 Negative 10/07/2021    SARSCOV2 Not Detected 07/27/2020    SARSCOVAG Positive (A) 08/20/2022       Review of Systems   Constitutional: Positive for chills and fatigue  Negative for appetite change and fever  HENT: Positive for rhinorrhea  Negative for congestion  Respiratory: Positive for cough (yellow mucous)  Negative for chest tightness and shortness of breath  Gastrointestinal: Positive for diarrhea  Negative for nausea and vomiting  Current Outpatient Medications on File Prior to Visit   Medication Sig   • amLODIPine (NORVASC) 10 mg tablet Take 1 tablet (10 mg total) by mouth daily Decreased dose   • fluticasone (FLONASE) 50 mcg/act nasal spray 1 spray into each nostril daily   • gabapentin (NEURONTIN) 300 mg capsule One capsule daily in a m , 1 capsule at noon, and 2 capsules at bedtime   • hydroxychloroquine (PLAQUENIL) 200 mg tablet Take 200 mg by mouth daily   • methocarbamol (ROBAXIN) 500 mg tablet TAKE 1 TABLET BY MOUTH TWICE A DAY   • Probiotic Product (PROBIOTIC-10 ULTIMATE PO) Take by mouth   • [DISCONTINUED] Cholecalciferol (VITAMIN D3) 5000 units CAPS Take by mouth daily (Patient not taking: Reported on 1/12/2023)   • [DISCONTINUED] Cranberry 200 MG CAPS Take 1 capsule by mouth daily (Patient not taking: Reported on 1/12/2023)   • [DISCONTINUED] Multiple Vitamin tablet Take 1 tablet by mouth daily (Patient not taking: Reported on 1/12/2023)       Objective:    Temp 98 4 °F (36 9 °C)   LMP  (LMP Unknown)      Physical Exam  Vitals reviewed  Constitutional:       General: She is not in acute distress  Appearance: Normal appearance  HENT:      Head: Normocephalic  Nose: Rhinorrhea present  Pulmonary:      Effort: Pulmonary effort is normal  No respiratory distress  Comments: Moist cough  Neurological:      General: No focal deficit present  Mental Status: She is alert and oriented to person, place, and time     Psychiatric:         Mood and Affect: Mood normal        ROXY Edwards

## 2023-01-17 ENCOUNTER — TELEPHONE (OUTPATIENT)
Dept: FAMILY MEDICINE CLINIC | Facility: HOSPITAL | Age: 63
End: 2023-01-17

## 2023-01-17 NOTE — TELEPHONE ENCOUNTER
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

## 2023-01-23 ENCOUNTER — TELEPHONE (OUTPATIENT)
Dept: FAMILY MEDICINE CLINIC | Facility: HOSPITAL | Age: 63
End: 2023-01-23

## 2023-01-23 NOTE — TELEPHONE ENCOUNTER
Pt left  stating on 1/12 tested covid positive  Last night had fever of 100 5  Pt is asking for chest X-Ray, no other details left   PCB

## 2023-01-24 ENCOUNTER — HOSPITAL ENCOUNTER (OUTPATIENT)
Dept: RADIOLOGY | Facility: HOSPITAL | Age: 63
Discharge: HOME | End: 2023-01-24
Attending: INTERNAL MEDICINE

## 2023-01-24 ENCOUNTER — OFFICE VISIT (OUTPATIENT)
Dept: FAMILY MEDICINE CLINIC | Facility: HOSPITAL | Age: 63
End: 2023-01-24

## 2023-01-24 VITALS
TEMPERATURE: 99.4 F | OXYGEN SATURATION: 97 % | HEART RATE: 80 BPM | SYSTOLIC BLOOD PRESSURE: 130 MMHG | BODY MASS INDEX: 23.56 KG/M2 | HEIGHT: 60 IN | WEIGHT: 120 LBS | DIASTOLIC BLOOD PRESSURE: 78 MMHG | RESPIRATION RATE: 16 BRPM

## 2023-01-24 DIAGNOSIS — U07.1 COVID-19 VIRUS INFECTION: Primary | ICD-10-CM

## 2023-01-24 DIAGNOSIS — U07.1 COVID-19 VIRUS INFECTION: ICD-10-CM

## 2023-01-24 RX ORDER — BENZONATATE 200 MG/1
200 CAPSULE ORAL 3 TIMES DAILY PRN
Qty: 20 CAPSULE | Refills: 0 | Status: SHIPPED | OUTPATIENT
Start: 2023-01-24

## 2023-01-24 NOTE — ASSESSMENT & PLAN NOTE
Patient diagnosed with COVID-19 infection with a home test on January 12  She was prescribed 5 days of paxlovid that she completed  She felt better for couple of days but her symptoms recurred last Saturday with runny nose, cough, postnasal discharge, feeling tired, fatigued having diarrhea and fever as high as 100 5 on Sunday  Since then her temperature has been hovering around 100  She denies shortness of breath, signs of GI bleeding, chest pain, pleurisy  On physical examination she has evidence of upper respiratory viral infection, her lungs are clear to auscultation    She most likely has rebound COVID 19 symptoms after completion of paxlovid  I will treat her symptomatically with Flonase, vitamin C, vitamin D, Tessalon Perles, rest, increase fluid intake well  Imodium  I gave her a note for work with tentative return to work on January 30    Chest x-ray to look for complications of [id number] infection such as pneumonia

## 2023-01-24 NOTE — PATIENT INSTRUCTIONS
Use Flonase nasal spray 1 spray each nostril twice a day  Take vitamin D 1000 units daily and vitamin C 1000 mg daily  Take Imodium 2 mg 3 times a day as needed for diarrhea if you have more than 4 episodes a day!

## 2023-01-24 NOTE — LETTER
January 24, 2023     Patient: Mirna Taylor  YOB: 1960  Date of Visit: 1/24/2023      To Whom it May Concern:    Mirna Taylor is under my professional care  Francine Maldonado was seen in my office on 1/24/2023  Francine Maldonado may return to work on 1/30/2023  If you have any questions or concerns, please don't hesitate to call           Sincerely,          Lucas Sotomayor MD        CC: No Recipients

## 2023-01-24 NOTE — PROGRESS NOTES
Assessment/Plan:    COVID-19 virus infection  Patient diagnosed with COVID-19 infection with a home test on January 12  She was prescribed 5 days of paxlovid that she completed  She felt better for couple of days but her symptoms recurred last Saturday with runny nose, cough, postnasal discharge, feeling tired, fatigued having diarrhea and fever as high as 100 5 on Sunday  Since then her temperature has been hovering around 100  She denies shortness of breath, signs of GI bleeding, chest pain, pleurisy  On physical examination she has evidence of upper respiratory viral infection, her lungs are clear to auscultation    She most likely has rebound COVID 19 symptoms after completion of paxlovid  I will treat her symptomatically with Flonase, vitamin C, vitamin D, Tessalon Perles, rest, increase fluid intake well  Imodium  I gave her a note for work with tentative return to work on January 30    Chest x-ray to look for complications of [id number] infection such as pneumonia  Diagnoses and all orders for this visit:    COVID-19 virus infection  -     XR chest pa & lateral; Future  -     benzonatate (TESSALON) 200 MG capsule; Take 1 capsule (200 mg total) by mouth 3 (three) times a day as needed for cough          Subjective:      Patient ID: Josiah Paz is a 63 y o  female  HPI    Patient presents for follow-up of chronic conditions as detailed in the assessment and plan  The following portions of the patient's history were reviewed and updated as appropriate: current medications, past family history, past medical history, past social history, past surgical history and problem list     Review of Systems      Objective:    /78   Pulse 80   Temp 99 4 °F (37 4 °C) (Tympanic)   Resp 16   Ht 5' (1 524 m)   Wt 54 4 kg (120 lb)   LMP  (LMP Unknown)   SpO2 97%   BMI 23 44 kg/m²      Physical Exam  Constitutional:       General: She is not in acute distress       Appearance: She is not toxic-appearing  HENT:      Head: Normocephalic  Nose: Congestion and rhinorrhea (Clear nasal discharge is present bilaterally) present  Mouth/Throat:      Mouth: Mucous membranes are moist       Pharynx: No oropharyngeal exudate or posterior oropharyngeal erythema  Eyes:      Conjunctiva/sclera: Conjunctivae normal    Cardiovascular:      Rate and Rhythm: Normal rate and regular rhythm  Heart sounds: No murmur heard  Pulmonary:      Effort: No respiratory distress  Breath sounds: No wheezing or rales  Abdominal:      General: Bowel sounds are normal  There is no distension  Palpations: Abdomen is soft  Tenderness: There is no abdominal tenderness  Neurological:      Mental Status: She is alert and oriented to person, place, and time  Cranial Nerves: No cranial nerve deficit  Motor: No weakness        Gait: Gait normal    Psychiatric:         Mood and Affect: Mood normal              Vignesh Moore MD

## 2023-02-20 DIAGNOSIS — U07.1 COVID: ICD-10-CM

## 2023-02-21 RX ORDER — FLUTICASONE PROPIONATE 50 MCG
SPRAY, SUSPENSION (ML) NASAL
Qty: 48 ML | Refills: 1 | Status: SHIPPED | OUTPATIENT
Start: 2023-02-21

## 2023-02-23 DIAGNOSIS — I10 BENIGN ESSENTIAL HTN: ICD-10-CM

## 2023-02-23 RX ORDER — METHOCARBAMOL 500 MG/1
TABLET, FILM COATED ORAL
Qty: 60 TABLET | Refills: 2 | Status: SHIPPED | OUTPATIENT
Start: 2023-02-23

## 2023-03-26 LAB
BASOPHILS # BLD AUTO: 23 CELLS/UL (ref 0–200)
BASOPHILS NFR BLD AUTO: 0.4 %
EOSINOPHIL # BLD AUTO: 80 CELLS/UL (ref 15–500)
EOSINOPHIL NFR BLD AUTO: 1.4 %
ERYTHROCYTE [DISTWIDTH] IN BLOOD BY AUTOMATED COUNT: 12.8 % (ref 11–15)
HCT VFR BLD AUTO: 41.5 % (ref 35–45)
HGB BLD-MCNC: 13.8 G/DL (ref 11.7–15.5)
LYMPHOCYTES # BLD AUTO: 1619 CELLS/UL (ref 850–3900)
LYMPHOCYTES NFR BLD AUTO: 28.4 %
MCH RBC QN AUTO: 32.8 PG (ref 27–33)
MCHC RBC AUTO-ENTMCNC: 33.3 G/DL (ref 32–36)
MCV RBC AUTO: 98.6 FL (ref 80–100)
MONOCYTES # BLD AUTO: 450 CELLS/UL (ref 200–950)
MONOCYTES NFR BLD AUTO: 7.9 %
NEUTROPHILS # BLD AUTO: 3528 CELLS/UL (ref 1500–7800)
NEUTROPHILS NFR BLD AUTO: 61.9 %
PLATELET # BLD AUTO: 196 THOUSAND/UL (ref 140–400)
PMV BLD REES-ECKER: 10.7 FL (ref 7.5–12.5)
RBC # BLD AUTO: 4.21 MILLION/UL (ref 3.8–5.1)
WBC # BLD AUTO: 5.7 THOUSAND/UL (ref 3.8–10.8)

## 2023-05-12 DIAGNOSIS — I10 BENIGN ESSENTIAL HTN: ICD-10-CM

## 2023-05-14 RX ORDER — METHOCARBAMOL 500 MG/1
TABLET, FILM COATED ORAL
Qty: 60 TABLET | Refills: 2 | Status: SHIPPED | OUTPATIENT
Start: 2023-05-14

## 2023-07-14 ENCOUNTER — OFFICE VISIT (OUTPATIENT)
Dept: FAMILY MEDICINE CLINIC | Facility: HOSPITAL | Age: 63
End: 2023-07-14
Payer: COMMERCIAL

## 2023-07-14 VITALS
TEMPERATURE: 97.3 F | SYSTOLIC BLOOD PRESSURE: 150 MMHG | BODY MASS INDEX: 24.17 KG/M2 | WEIGHT: 128 LBS | DIASTOLIC BLOOD PRESSURE: 88 MMHG | HEIGHT: 61 IN | OXYGEN SATURATION: 93 % | HEART RATE: 82 BPM

## 2023-07-14 DIAGNOSIS — U07.1 COVID-19 VIRUS INFECTION: ICD-10-CM

## 2023-07-14 DIAGNOSIS — Z72.0 TOBACCO ABUSE: ICD-10-CM

## 2023-07-14 DIAGNOSIS — R09.82 POST-NASAL DRIP: ICD-10-CM

## 2023-07-14 DIAGNOSIS — R09.81 NASAL CONGESTION: ICD-10-CM

## 2023-07-14 DIAGNOSIS — J30.89 NON-SEASONAL ALLERGIC RHINITIS, UNSPECIFIED TRIGGER: Primary | ICD-10-CM

## 2023-07-14 DIAGNOSIS — R05.3 CHRONIC COUGH: ICD-10-CM

## 2023-07-14 PROCEDURE — 99214 OFFICE O/P EST MOD 30 MIN: CPT | Performed by: STUDENT IN AN ORGANIZED HEALTH CARE EDUCATION/TRAINING PROGRAM

## 2023-07-14 RX ORDER — MONTELUKAST SODIUM 10 MG/1
10 TABLET ORAL
Qty: 90 TABLET | Refills: 3 | Status: SHIPPED | OUTPATIENT
Start: 2023-07-14

## 2023-07-14 NOTE — PROGRESS NOTES
[address], DO    Assessment/Plan:      Diagnosis ICD-10-CM Associated Orders   1. Non-seasonal allergic rhinitis, unspecified trigger  J30.89 montelukast (SINGULAIR) 10 mg tablet     Allergy Evaluation 1, [facility]     Allergy Evaluation 1, [facility]      2. Nasal congestion  R09.81       3. Post-nasal drip  R09.82       4. Chronic cough  R05.3       5. Tobacco abuse  Z72.0       6. COVID-19 virus infection  U07.1          Try Xyzal or Zyrtec instead of long use claritin. • Could also go to mucinex 600 mg twice a day or 1200 mg twice a day. • Plan for allergy testing - [facility].   • Sample saline rinse given. • Trial singulair, was on this in the past & thought it helped. • Does have a garden & sits outside in the sun. Return in about 4 months (around 11/14/2023) for Annual Physical.  • Patient may call or return to office with any questions or concerns. ______________________________________________________________________  Subjective:     Patient ID: Pam Phillip is a 63 y.o. female. HPI  Pam Knows  Chief Complaint   Patient presents with   • Nasal Congestion     X few months     Worse in later in the day, gets runny nose. Maybe if mucinex wears off. Sneezing more than normal. Coughing up mucus (light green, yellow color). Worse since having covid in Jan. 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     Ears on and off. Takes mucinex, not helping much. 1200 mg once a day in am.   Still using flonase. Takes claritin 10 mg qd for one yr now. Hx of spring allergies etc.   No pets. Possibly mold in basement due to water. BP a tad high today, but typically always sees 120 SBP at home. Taking her norvasc.      Rash on arms & legs was seeing Dr. Moose Becerra       The following portions of the patient's history were reviewed and updated as appropriate: allergies, current medications, past medical history, and problem list.    Review of Systems   Constitutional: Negative for chills and fever. HENT: Positive for postnasal drip, rhinorrhea, sinus pressure, sinus pain and sneezing. Negative for sore throat and trouble swallowing. Ear feels like it has fluid in it   Eyes: Positive for redness and itching. Respiratory: Positive for cough. Negative for shortness of breath. Cardiovascular: Negative for chest pain. Neurological: Positive for headaches (sometimes). Negative for dizziness and light-headedness. Objective:      Vitals:    07/14/23 0919   BP: 150/88   Pulse: 82   Temp: (!) 97.3 °F (36.3 °C)   SpO2: 93%      Physical Exam  Vitals reviewed. Constitutional:       General: She is not in acute distress. Appearance: Normal appearance. She is well-developed and normal weight. She is not ill-appearing. HENT:      Head: Normocephalic and atraumatic. Right Ear: Ear canal and external ear normal. There is no impacted cerumen. Left Ear: Ear canal and external ear normal. There is no impacted cerumen. Ears:      Comments: Allergic mucoid appearing TMs  No Sinus TTP     Nose: Congestion present. Mouth/Throat:      Mouth: Mucous membranes are dry. Pharynx: Oropharynx is clear. No oropharyngeal exudate. Eyes:      General: No scleral icterus. Right eye: No discharge. Left eye: No discharge. Cardiovascular:      Rate and Rhythm: Normal rate and regular rhythm. Pulses: Normal pulses. Heart sounds: Normal heart sounds. No murmur heard. Pulmonary:      Effort: Pulmonary effort is normal. No respiratory distress. Breath sounds: Normal breath sounds. No stridor. No wheezing. Musculoskeletal:      Cervical back: Normal range of motion. No rigidity or tenderness. Right lower leg: No edema. Left lower leg: No edema. Lymphadenopathy:      Cervical: No cervical adenopathy. Skin:     General: Skin is warm.    Neurological:      Mental Status: She is alert and oriented to person, place, and time. Gait: Gait normal.   Psychiatric:         Mood and Affect: Mood normal.         Behavior: Behavior normal.         Thought Content: Thought content normal.         Judgment: Judgment normal.          I have spent a total time of 25 minutes on 07/14/23 in caring for this patient including Risks and benefits of tx options, Instructions for management and Patient and family education. Portions of the record may have been created with voice recognition software. Occasional wrong word or "sound alike" substitutions may have occurred due to the inherent limitations of voice recognition software. Please review the chart carefully and recognize, using context, where substitutions/typographical errors may have occurred.

## 2023-08-06 DIAGNOSIS — I10 BENIGN ESSENTIAL HTN: ICD-10-CM

## 2023-08-07 RX ORDER — METHOCARBAMOL 500 MG/1
TABLET, FILM COATED ORAL
Qty: 60 TABLET | Refills: 2 | Status: SHIPPED | OUTPATIENT
Start: 2023-08-07

## 2023-08-19 LAB
A ALTERNATA IGE QN: <0.1 KU/L
C HERBARUM IGE QN: <0.1 KU/L
CAT DANDER IGE QN: <0.1 KU/L
COMMON RAGWEED IGE QN: <0.1 KU/L
D FARINAE IGE QN: <0.1 KU/L
DEPRECATED A ALTERNATA IGE RAST QL: 0
DEPRECATED C HERBARUM IGE RAST QL: 0
DEPRECATED CAT DANDER IGE RAST QL: 0
DEPRECATED COMMON RAGWEED IGE RAST QL: 0
DEPRECATED D FARINAE IGE RAST QL: 0
DEPRECATED DOG DANDER IGE RAST QL: 0
DEPRECATED GOOSEFOOT IGE RAST QL: 0
DEPRECATED KENT BLUE GRASS IGE RAST QL: 0
DEPRECATED TIMOTHY IGE RAST QL: 0
DEPRECATED WHITE OAK IGE RAST QL: 0
DOG DANDER IGE QN: <0.1 KU/L
FERRITIN SERPL-MCNC: 33 NG/ML (ref 16–232)
GOOSEFOOT IGE QN: <0.1 KU/L
IRON SATN MFR SERPL: 30 % (CALC) (ref 16–45)
IRON SERPL-MCNC: 93 MCG/DL (ref 45–160)
KENT BLUE GRASS IGE QN: <0.1 KU/L
TIBC SERPL-MCNC: 305 MCG/DL (CALC) (ref 250–450)
TIMOTHY IGE QN: <0.1 KU/L
WHITE OAK IGE QN: <0.1 KU/L

## 2023-08-21 DIAGNOSIS — U07.1 COVID: ICD-10-CM

## 2023-08-21 RX ORDER — FLUTICASONE PROPIONATE 50 MCG
SPRAY, SUSPENSION (ML) NASAL
Qty: 48 ML | Refills: 1 | Status: SHIPPED | OUTPATIENT
Start: 2023-08-21

## 2023-09-17 DIAGNOSIS — I10 BENIGN ESSENTIAL HTN: ICD-10-CM

## 2023-09-17 RX ORDER — AMLODIPINE BESYLATE 10 MG/1
10 TABLET ORAL DAILY
Qty: 90 TABLET | Refills: 3 | Status: SHIPPED | OUTPATIENT
Start: 2023-09-17

## 2023-10-21 DIAGNOSIS — G89.29 OTHER CHRONIC PAIN: ICD-10-CM

## 2023-10-21 RX ORDER — GABAPENTIN 300 MG/1
CAPSULE ORAL
Qty: 360 CAPSULE | Refills: 5 | Status: SHIPPED | OUTPATIENT
Start: 2023-10-21

## 2023-10-24 ENCOUNTER — OFFICE VISIT (OUTPATIENT)
Dept: FAMILY MEDICINE CLINIC | Facility: HOSPITAL | Age: 63
End: 2023-10-24
Payer: COMMERCIAL

## 2023-10-24 VITALS
SYSTOLIC BLOOD PRESSURE: 141 MMHG | WEIGHT: 130 LBS | BODY MASS INDEX: 24.55 KG/M2 | DIASTOLIC BLOOD PRESSURE: 74 MMHG | HEIGHT: 61 IN | OXYGEN SATURATION: 96 % | HEART RATE: 81 BPM

## 2023-10-24 DIAGNOSIS — Z13.220 SCREENING FOR HYPERLIPIDEMIA: Primary | ICD-10-CM

## 2023-10-24 DIAGNOSIS — Z12.31 ENCOUNTER FOR SCREENING MAMMOGRAM FOR BREAST CANCER: ICD-10-CM

## 2023-10-24 DIAGNOSIS — M62.830 LUMBAR PARASPINAL MUSCLE SPASM: ICD-10-CM

## 2023-10-24 DIAGNOSIS — I10 BENIGN ESSENTIAL HTN: ICD-10-CM

## 2023-10-24 DIAGNOSIS — E56.9 VITAMIN DEFICIENCY: ICD-10-CM

## 2023-10-24 DIAGNOSIS — R21 RASH AND NONSPECIFIC SKIN ERUPTION: ICD-10-CM

## 2023-10-24 DIAGNOSIS — Z13.1 SCREENING FOR DIABETES MELLITUS: ICD-10-CM

## 2023-10-24 PROCEDURE — 99214 OFFICE O/P EST MOD 30 MIN: CPT | Performed by: STUDENT IN AN ORGANIZED HEALTH CARE EDUCATION/TRAINING PROGRAM

## 2023-10-24 PROCEDURE — 99396 PREV VISIT EST AGE 40-64: CPT | Performed by: STUDENT IN AN ORGANIZED HEALTH CARE EDUCATION/TRAINING PROGRAM

## 2023-10-24 RX ORDER — METHOCARBAMOL 500 MG/1
500 TABLET, FILM COATED ORAL 2 TIMES DAILY
Qty: 60 TABLET | Refills: 2 | Status: SHIPPED | OUTPATIENT
Start: 2023-10-24

## 2023-10-24 NOTE — PROGRESS NOTES
[address] [facility]  PRIMARY CARE [address]    NAME: Ismael Officer  AGE: 66 y.o. SEX: female  : 1960   DATE: 10/24/2023     Assessment and Plan:      Diagnosis ICD-10-CM Associated Orders   1. Screening for hyperlipidemia  Z13.220 Lipid panel     Lipid panel      2. Benign essential HTN  I10 methocarbamol (ROBAXIN) 500 mg tablet     Comprehensive metabolic panel     Comprehensive metabolic panel      3. Screening for diabetes mellitus  Z13.1 Comprehensive metabolic panel     Comprehensive metabolic panel      4. Vitamin deficiency  E56.9 Vitamin D 25 hydroxy     Vitamin B12     Vitamin D 25 hydroxy     Vitamin B12        Labs ordered - will reach out with results. Derm ref for ongoing rash as well as skin CA screening. Form for work done. Immunizations and preventive care screenings were discussed with patient today. Appropriate education was printed on patient's after visit summary. Counseling:  Alcohol/drug use: discussed moderation in alcohol intake, the recommendations for healthy alcohol use, and avoidance of illicit drug use. Dental Health: discussed importance of regular tooth brushing, flossing, and dental visits. Injury prevention: discussed safety/seat belts, safety helmets, smoke detectors, carbon dioxide detectors, and smoking near bedding or upholstery. Exercise: the importance of regular exercise/physical activity was discussed. Recommend exercise 3-5 times per week for at least 30 minutes. Depression Screening and Follow-up Plan: Patient was screened for depression during today's encounter. They screened negative with a PHQ-2 score of 0. Tobacco Cessation Counseling: Tobacco cessation counseling was provided. The patient is sincerely urged to quit consumption of tobacco. She is ready to quit tobacco. Medication options discussed. Patient refused medication. Declined meds.        Return in about 6 months (around 4/24/2024) for F/U Chronic Conditions. Chief Complaint:     Chief Complaint   Patient presents with    Physical Exam      History of Present Illness:     Adult Annual Physical   Patient here for a comprehensive physical exam. The patient reports: Rash on arms & legs was seeing Dr. Shadi Parkinson in [facility]. - coming back now on inner BUE & inner BLE  Not itchy. Diet and Physical Activity  Diet/Nutrition: well balanced diet. Exercise: walking and running a ton at work with busy, and exercises there . No Drug use. Tobacco use:  reports that she has been smoking cigarettes. She has a 30.00 pack-year smoking history. She has never used smokeless tobacco.   Alcohol use -  no     Depression Screening  PHQ-2/9 Depression Screening    Little interest or pleasure in doing things: 0 - not at all  Feeling down, depressed, or hopeless: 0 - not at all  PHQ-2 Score: 0  PHQ-2 Interpretation: Negative depression screen       General Health  Sleep: sleeps well and gets 7-8 hours of sleep on average. Hearing: normal - bilateral.  Vision: most recent eye exam >1 year ago and readers . Dental: regular dental visits and brushes teeth twice daily. /GYN Health  Patient is: postmenopausal  UTD PAP     Review of Systems:     Review of Systems   Constitutional:  Negative for chills and fever. HENT:  Negative for rhinorrhea and sore throat. Eyes:  Positive for pain (straining a little, frontal pain at times). Respiratory:  Positive for cough (smokers). Negative for shortness of breath. Cardiovascular:  Negative for chest pain and palpitations. Gastrointestinal:  Negative for constipation and diarrhea. Genitourinary:  Negative for dysuria and urgency. Skin:  Positive for rash. Neurological:  Negative for dizziness and light-headedness.       Past Medical History:     Past Medical History:   Diagnosis Date    Anemia     Anxiety     Asthma     Bursitis of left elbow     Chronic pain disorder     lower back Colon polyp     Hiatal hernia     Hypertension     Iron deficiency anemia secondary to inadequate dietary iron intake 01/29/2020    Kidney stones     Pancreatitis     Psoriasis     Psychiatric disorder     anxiety    Restless leg syndrome     Tobacco use disorder       Past Surgical History:     Past Surgical History:   Procedure Laterality Date    CYSTOSCOPY      CYSTOSCOPY W/ URETERAL STENT PLACEMENT      EXPLORATORY LAPAROTOMY      small intestine blockage no resection    LAPAROSCOPY      --  for SBO    MA ERCP DX COLLECTION SPECIMEN BRUSHING/WASHING N/A 8/15/2018    Procedure: ENDOSCOPIC RETROGRADE CHOLANGIOPANCREATOGRAPHY (ERCP); Surgeon: Salvatore Higgins MD;  Location:  MAIN OR;  Service: Gastroenterology    MA LAPS ABD PRTM&OMENTUM DX W/WO SPEC BR/WA SPX N/A 2/10/2020    Procedure: LAPAROSCOPY DIAGNOSTIC, REPAIR ENTEROTOMY, REMOVAL OF BEZOAR;  Surgeon: Megan Davidson MD;  Location:  MAIN OR;  Service: General      Social History:     Social History     Socioeconomic History    Marital status: /Civil Union     Spouse name: None    Number of children: None    Years of education: None    Highest education level: None   Occupational History    None   Tobacco Use    Smoking status: Every Day     Packs/day: 1.00     Years: 30.00     Total pack years: 30.00     Types: Cigarettes    Smokeless tobacco: Never    Tobacco comments:     Attempting to stop   Vaping Use    Vaping Use: Never used   Substance and Sexual Activity    Alcohol use: Never    Drug use: No    Sexual activity: None   Other Topics Concern    None   Social History Narrative    Lives with     Feels safe at home    Sees dentist occas. No living will. Gets exercise with her job.       Social Determinants of Health     Financial Resource Strain: Not on file   Food Insecurity: Not on file   Transportation Needs: No Transportation Needs (3/5/2021)    PRAPARE - Transportation     Lack of Transportation (Medical): No     Lack of Transportation (Non-Medical): No   Physical Activity: Sufficiently Active (3/5/2021)    Exercise Vital Sign     Days of Exercise per Week: 5 days     Minutes of Exercise per Session: 40 min   Stress: No Stress Concern Present (3/5/2021)    [address] [facility]     Feeling of Stress : Not at all   Social Connections: Not on file   Intimate Partner Violence: Not At Risk (3/5/2021)    Humiliation, Afraid, Rape, and Kick questionnaire     Fear of Current or Ex-Partner: No     Emotionally Abused: No     Physically Abused: No     Sexually Abused: No   Housing Stability: Not on file      Family History:     Family History   Problem Relation Age of Onset    Mental illness Sister     Other Other         Cardiovascular disease    Hypertension Mother     Cancer Father     Substance Abuse Neg Hx     Colon cancer Neg Hx     Colon polyps Neg Hx       Current Medications:     Current Outpatient Medications   Medication Sig Dispense Refill    amLODIPine (NORVASC) 10 mg tablet TAKE 1 TABLET (10 MG TOTAL) BY MOUTH DAILY DECREASED DOSE 90 tablet 3    fluticasone (FLONASE) 50 mcg/act nasal spray SPRAY 1 SPRAY INTO EACH NOSTRIL EVERY DAY 48 mL 1    gabapentin (NEURONTIN) 300 mg capsule ONE CAPSULE DAILY IN A. M., 1 CAPSULE AT NOON, AND 2 CAPSULES AT BEDTIME 360 capsule 5    hydroxychloroquine (PLAQUENIL) 200 mg tablet Take 200 mg by mouth daily      methocarbamol (ROBAXIN) 500 mg tablet Take 1 tablet (500 mg total) by mouth 2 (two) times a day 60 tablet 2    montelukast (SINGULAIR) 10 mg tablet Take 1 tablet (10 mg total) by mouth daily at bedtime 90 tablet 3    Probiotic Product (PROBIOTIC-10 ULTIMATE PO) Take by mouth       No current facility-administered medications for this visit. Allergies:      Allergies   Allergen Reactions    Pollen Extract Sneezing    Tree Extract Sneezing      Physical Exam:     /100   Pulse 81   Ht 5' 1" (1.549 m)   Wt 59 kg (130 lb)   LMP (LMP Unknown)   SpO2 96%   BMI 24.56 kg/m²     Physical Exam  Vitals reviewed. Constitutional:       General: She is not in acute distress. Appearance: Normal appearance. She is normal weight. She is not ill-appearing. HENT:      Head: Normocephalic and atraumatic. Right Ear: Ear canal and external ear normal. There is no impacted cerumen. Left Ear: Ear canal and external ear normal. There is no impacted cerumen. Ears:      Comments: Mucoid mild TM L > R     Nose: Congestion (mild) present. No rhinorrhea. Mouth/Throat:      Mouth: Mucous membranes are moist.      Pharynx: Oropharynx is clear. No oropharyngeal exudate or posterior oropharyngeal erythema. Eyes:      General: No scleral icterus. Right eye: No discharge. Left eye: No discharge. Conjunctiva/sclera: Conjunctivae normal.   Cardiovascular:      Rate and Rhythm: Normal rate and regular rhythm. Pulses: Normal pulses. Heart sounds: Normal heart sounds. No murmur heard. No friction rub. No gallop. Pulmonary:      Effort: Pulmonary effort is normal. No respiratory distress. Breath sounds: Normal breath sounds. No stridor. No wheezing. Musculoskeletal:         General: Normal range of motion. Cervical back: Normal range of motion and neck supple. No rigidity or tenderness. Right lower leg: No edema. Left lower leg: No edema. Lymphadenopathy:      Cervical: No cervical adenopathy. Skin:     General: Skin is warm and dry. Capillary Refill: Capillary refill takes less than 2 seconds. Coloration: Skin is not jaundiced or pale. Findings: Erythema and rash (slightly raised, maculopapular rash on inner BUE & BLE proximally) present. Neurological:      Mental Status: She is alert and oriented to person, place, and time. Gait: Gait normal.   Psychiatric:         Mood and Affect: Mood normal.         Behavior: Behavior normal.         Thought Content:  Thought content normal.         Judgment: Judgment normal.        Awa Gautam, DO  [address]

## 2023-10-24 NOTE — PATIENT INSTRUCTIONS
Locally, many patients use:     [facility]: [phone]  Dr. Weeks Gip: [phone]  Dr. Enriquez Nephew: [phone]

## 2023-10-29 LAB
25(OH)D3 SERPL-MCNC: 61 NG/ML (ref 30–100)
ALBUMIN SERPL-MCNC: 4 G/DL (ref 3.6–5.1)
ALBUMIN/GLOB SERPL: 1.8 (CALC) (ref 1–2.5)
ALP SERPL-CCNC: 75 U/L (ref 37–153)
ALT SERPL-CCNC: 22 U/L (ref 6–29)
AST SERPL-CCNC: 18 U/L (ref 10–35)
BILIRUB SERPL-MCNC: 0.4 MG/DL (ref 0.2–1.2)
BUN SERPL-MCNC: 17 MG/DL (ref 7–25)
BUN/CREAT SERPL: NORMAL (CALC) (ref 6–22)
CALCIUM SERPL-MCNC: 9.4 MG/DL (ref 8.6–10.4)
CHLORIDE SERPL-SCNC: 107 MMOL/L (ref 98–110)
CHOLEST SERPL-MCNC: 202 MG/DL
CHOLEST/HDLC SERPL: 2.8 (CALC)
CO2 SERPL-SCNC: 28 MMOL/L (ref 20–32)
CREAT SERPL-MCNC: 0.79 MG/DL (ref 0.5–1.05)
GFR/BSA.PRED SERPLBLD CYS-BASED-ARV: 85 ML/MIN/1.73M2
GLOBULIN SER CALC-MCNC: 2.2 G/DL (CALC) (ref 1.9–3.7)
GLUCOSE SERPL-MCNC: 96 MG/DL (ref 65–99)
HDLC SERPL-MCNC: 71 MG/DL
LDLC SERPL CALC-MCNC: 109 MG/DL (CALC)
NONHDLC SERPL-MCNC: 131 MG/DL (CALC)
POTASSIUM SERPL-SCNC: 4.6 MMOL/L (ref 3.5–5.3)
PROT SERPL-MCNC: 6.2 G/DL (ref 6.1–8.1)
SODIUM SERPL-SCNC: 141 MMOL/L (ref 135–146)
TRIGL SERPL-MCNC: 117 MG/DL
VIT B12 SERPL-MCNC: 762 PG/ML (ref 200–1100)

## 2024-01-04 ENCOUNTER — APPOINTMENT (EMERGENCY)
Dept: RADIOLOGY | Facility: HOSPITAL | Age: 64
End: 2024-01-04
Payer: COMMERCIAL

## 2024-01-04 ENCOUNTER — HOSPITAL ENCOUNTER (EMERGENCY)
Facility: HOSPITAL | Age: 64
Discharge: HOME | End: 2024-01-04
Attending: EMERGENCY MEDICINE
Payer: COMMERCIAL

## 2024-01-04 VITALS
TEMPERATURE: 97.4 F | OXYGEN SATURATION: 100 % | HEART RATE: 63 BPM | DIASTOLIC BLOOD PRESSURE: 58 MMHG | SYSTOLIC BLOOD PRESSURE: 102 MMHG | RESPIRATION RATE: 18 BRPM

## 2024-01-04 DIAGNOSIS — S52.90XA RADIUS FRACTURE: Primary | ICD-10-CM

## 2024-01-04 PROCEDURE — 99284 EMERGENCY DEPT VISIT MOD MDM: CPT | Performed by: EMERGENCY MEDICINE

## 2024-01-04 PROCEDURE — 25605 CLTX DST RDL FX/EPHYS SEP W/: CPT | Performed by: EMERGENCY MEDICINE

## 2024-01-04 PROCEDURE — 73110 X-RAY EXAM OF WRIST: CPT

## 2024-01-04 PROCEDURE — 73100 X-RAY EXAM OF WRIST: CPT

## 2024-01-04 PROCEDURE — 99283 EMERGENCY DEPT VISIT LOW MDM: CPT

## 2024-01-04 RX ORDER — LIDOCAINE HYDROCHLORIDE AND EPINEPHRINE 10; 10 MG/ML; UG/ML
20 INJECTION, SOLUTION INFILTRATION; PERINEURAL ONCE
Status: COMPLETED | OUTPATIENT
Start: 2024-01-04 | End: 2024-01-04

## 2024-01-04 RX ORDER — OXYCODONE HYDROCHLORIDE 5 MG/1
5 TABLET ORAL ONCE
Status: COMPLETED | OUTPATIENT
Start: 2024-01-04 | End: 2024-01-04

## 2024-01-04 RX ORDER — OXYCODONE HYDROCHLORIDE 5 MG/1
5 TABLET ORAL EVERY 6 HOURS PRN
Qty: 15 TABLET | Refills: 0 | Status: SHIPPED | OUTPATIENT
Start: 2024-01-04 | End: 2024-01-14

## 2024-01-04 RX ORDER — IBUPROFEN 600 MG/1
600 TABLET ORAL ONCE
Status: COMPLETED | OUTPATIENT
Start: 2024-01-04 | End: 2024-01-04

## 2024-01-04 RX ADMIN — IBUPROFEN 600 MG: 600 TABLET, FILM COATED ORAL at 05:26

## 2024-01-04 RX ADMIN — OXYCODONE HYDROCHLORIDE 5 MG: 5 TABLET ORAL at 05:26

## 2024-01-04 RX ADMIN — LIDOCAINE HYDROCHLORIDE,EPINEPHRINE BITARTRATE 20 ML: 10; .01 INJECTION, SOLUTION INFILTRATION; PERINEURAL at 05:12

## 2024-01-04 NOTE — ED PROVIDER NOTES
History  Chief Complaint   Patient presents with    Wrist Injury     Pt fell down steps this morning and hurt her left wrist.      63-year-old female presents for evaluation of left wrist injury that occurred shortly prior to arrival.  Patient was walking down the steps and slipped falling with outstretched hand bracing herself with her left wrist.  Denies any further trauma.  Pain with movement.  Mild deformity noted on exam.        Prior to Admission Medications   Prescriptions Last Dose Informant Patient Reported? Taking?   Probiotic Product (PROBIOTIC-10 ULTIMATE PO)  Self Yes No   Sig: Take by mouth   amLODIPine (NORVASC) 10 mg tablet   No No   Sig: TAKE 1 TABLET (10 MG TOTAL) BY MOUTH DAILY DECREASED DOSE   fluticasone (FLONASE) 50 mcg/act nasal spray   No No   Sig: SPRAY 1 SPRAY INTO EACH NOSTRIL EVERY DAY   gabapentin (NEURONTIN) 300 mg capsule   No No   Sig: ONE CAPSULE DAILY IN A.M., 1 CAPSULE AT NOON, AND 2 CAPSULES AT BEDTIME   hydroxychloroquine (PLAQUENIL) 200 mg tablet   Yes No   Sig: Take 200 mg by mouth daily   methocarbamol (ROBAXIN) 500 mg tablet   No No   Sig: Take 1 tablet (500 mg total) by mouth 2 (two) times a day   montelukast (SINGULAIR) 10 mg tablet   No No   Sig: Take 1 tablet (10 mg total) by mouth daily at bedtime      Facility-Administered Medications: None       Past Medical History:   Diagnosis Date    Anemia     Anxiety     Asthma     Bursitis of left elbow     Chronic pain disorder     lower back    Colon polyp     Hiatal hernia     Hypertension     Iron deficiency anemia secondary to inadequate dietary iron intake 01/29/2020    Kidney stones     Pancreatitis     Psoriasis     Psychiatric disorder     anxiety    Restless leg syndrome     Tobacco use disorder        Past Surgical History:   Procedure Laterality Date    CYSTOSCOPY      CYSTOSCOPY W/ URETERAL STENT PLACEMENT      EXPLORATORY LAPAROTOMY      small intestine blockage no resection    LAPAROSCOPY      --  for SBO    MO  ERCP DX COLLECTION SPECIMEN BRUSHING/WASHING N/A 8/15/2018    Procedure: ENDOSCOPIC RETROGRADE CHOLANGIOPANCREATOGRAPHY (ERCP);  Surgeon: Odell Whitaker MD;  Location: [facility];  Service: Gastroenterology    [place] LAPS ABD PRTM&OMENTUM DX W/WO SPEC BR/WA SPX N/A 2/10/2020    Procedure: LAPAROSCOPY DIAGNOSTIC, REPAIR ENTEROTOMY, REMOVAL OF BEZOAR;  Surgeon: Baljit Faith MD;  Location:  MAIN OR;  Service: General       Family History   Problem Relation Age of Onset    Mental illness Sister     Other Other         Cardiovascular disease    Hypertension Mother     Cancer Father     Substance Abuse Neg Hx     Colon cancer Neg Hx     Colon polyps Neg Hx      I have reviewed and agree with the history as documented.    E-Cigarette/Vaping    E-Cigarette Use Never User      E-Cigarette/Vaping Substances    Nicotine Yes nicotine patch    THC No     CBD No     Flavoring No     Other No     Unknown No      Social History     Tobacco Use    Smoking status: Every Day     Current packs/day: 1.00     Average packs/day: 1 pack/day for 30.0 years (30.0 ttl pk-yrs)     Types: Cigarettes    Smokeless tobacco: Never    Tobacco comments:     Attempting to stop   Vaping Use    Vaping status: Never Used   Substance Use Topics    Alcohol use: Never    Drug use: No       Review of Systems   Constitutional:  Negative for fever.   Musculoskeletal:  Positive for arthralgias.       Physical Exam  Physical Exam  Vitals and nursing note reviewed.   Constitutional:       Appearance: She is well-developed.   HENT:      Head: Normocephalic and atraumatic.      Right Ear: External ear normal.      Left Ear: External ear normal.      Nose: Nose normal.   Eyes:      General: No scleral icterus.  Cardiovascular:      Rate and Rhythm: Normal rate.   Pulmonary:      Effort: Pulmonary effort is normal. No respiratory distress.   Abdominal:      General: There is no distension.   Musculoskeletal:         General: Tenderness, deformity and signs of  injury present.      Cervical back: Normal range of motion.   Skin:     Findings: No rash.   Neurological:      General: No focal deficit present.      Mental Status: She is alert and oriented to person, place, and time.   Psychiatric:         Mood and Affect: Mood normal.         Vital Signs  ED Triage Vitals [01/04/24 0509]   Temperature Pulse Respirations Blood Pressure SpO2   (!) 97.4 °F (36.3 °C) 63 18 102/58 100 %      Temp Source Heart Rate Source Patient Position - Orthostatic VS BP Location FiO2 (%)   Oral Monitor Lying Right arm --      Pain Score       10 - Worst Possible Pain           Vitals:    01/04/24 0509   BP: 102/58   Pulse: 63   Patient Position - Orthostatic VS: Lying         Visual Acuity      ED Medications  Medications   lidocaine-epinephrine (XYLOCAINE/EPINEPHRINE) 1 %-1:100,000 injection 20 mL (20 mL Infiltration Given by Other 1/4/24 0512)   ibuprofen (MOTRIN) tablet 600 mg (600 mg Oral Given 1/4/24 0526)   oxyCODONE (ROXICODONE) IR tablet 5 mg (5 mg Oral Given 1/4/24 0526)       Diagnostic Studies  Results Reviewed       None                   XR wrist 3+ views LEFT    (Results Pending)   XR wrist 2 vw left    (Results Pending)              Procedures  Orthopedic injury treatment    Date/Time: 1/4/2024 6:16 AM    Performed by: Rocky Alexander DO  Authorized by: Rocky Alexander DO    Patient Location:  ED  Universal Protocol:  Consent given by: patient  Patient identity confirmed: verbally with patient    Injury location:  Wrist  Location details:  Left wrist  Injury type:  Fracture  Fracture type: distal radius    Fracture type: distal radius    Neurovascular status: Neurovascularly intact    Distal perfusion: normal    Neurological function: normal    Range of motion: reduced    Local anesthesia used?: Yes    Anesthesia:  Hematoma block  Local anesthetic:  Lidocaine 1% with epinephrine  Anesthetic total (ml):  2  Manipulation performed?: Yes    Skeletal traction used?: Yes    Reduction  successful?: Yes    Confirmation: Reduction confirmed by x-ray    Immobilization:  Sling  Splint type:  Sugar tong  Supplies used:  Cotton padding, elastic bandage and Ortho-Glass  Neurovascular status: Neurovascularly intact    Distal perfusion: normal    Neurological function: normal    Range of motion: improved    Patient tolerance:  Patient tolerated the procedure well with no immediate complications           ED Course                               SBIRT 20yo+      Flowsheet Row Most Recent Value   Initial Alcohol Screen: US AUDIT-C     1. How often do you have a drink containing alcohol? 0 Filed at: 01/04/2024 0528   2. How many drinks containing alcohol do you have on a typical day you are drinking?  0 Filed at: 01/04/2024 0528   3b. FEMALE Any Age, or MALE 65+: How often do you have 4 or more drinks on one occassion? 0 Filed at: 01/04/2024 0528   Audit-C Score 0 Filed at: 01/04/2024 0528   SORAIDA: How many times in the past year have you...    Used an illegal drug or used a prescription medication for non-medical reasons? Never Filed at: 01/04/2024 0528                      Medical Decision Making  63 yof distal radius fracture after FOOSH. XR. Reduction. Splint. FU ortho. RTED    Amount and/or Complexity of Data Reviewed  Radiology: ordered.    Risk  Prescription drug management.             Disposition  Final diagnoses:   Radius fracture     Time reflects when diagnosis was documented in both MDM as applicable and the Disposition within this note       Time User Action Codes Description Comment    1/4/2024  6:13 AM Rocky Alexander Add [S52.90XA] Radius fracture           ED Disposition       ED Disposition   Discharge    Condition   Stable    Date/Time   Thu Jan 4, 2024 0613    Comment   Lori Childs discharge to home/self care.                   Follow-up Information       Follow up With Specialties Details Why Contact Info Additional Information    [facility] In 1 week  [address]  [facility] [address] [phone] [facility] [place], [address], [address] [place], [place], [address] [phone]     [facility] Emergency Department Emergency Medicine  If symptoms worsen [address] [facility] [address] [phone] [facility] Emergency Department, [address] [facility], [place], [place] [address]            Patient's Medications   Discharge Prescriptions    OXYCODONE (ROXICODONE) 5 IMMEDIATE RELEASE TABLET    Take 1 tablet (5 mg total) by mouth every 6 (six) hours as needed for severe pain for up to 10 days Max Daily Amount: 20 mg       Start Date: 1/4/2024  End Date: 1/14/2024       Order Dose: 5 mg       Quantity: 15 tablet    Refills: 0           PDMP Review         Value Time User    PDMP Reviewed  Yes 5/14/2023  7:41 PM Stacey Olson DO            ED Provider  Electronically Signed by             Rocky Alexander DO  01/04/24 0618

## 2024-01-04 NOTE — Clinical Note
Lori Childs was seen and treated in our emergency department on 1/4/2024.                Diagnosis:     Lori  may return to work on return date.    She may return on this date: 01/12/2024         If you have any questions or concerns, please don't hesitate to call.      Rocky Alexander, DO    ______________________________           _______________          _______________  Hospital Representative                              Date                                Time

## 2024-01-10 ENCOUNTER — APPOINTMENT (OUTPATIENT)
Dept: RADIOLOGY | Facility: CLINIC | Age: 64
End: 2024-01-10
Payer: COMMERCIAL

## 2024-01-10 ENCOUNTER — OFFICE VISIT (OUTPATIENT)
Dept: OBGYN CLINIC | Facility: CLINIC | Age: 64
End: 2024-01-10
Payer: COMMERCIAL

## 2024-01-10 VITALS
HEIGHT: 61 IN | WEIGHT: 130 LBS | DIASTOLIC BLOOD PRESSURE: 80 MMHG | BODY MASS INDEX: 24.55 KG/M2 | SYSTOLIC BLOOD PRESSURE: 148 MMHG

## 2024-01-10 DIAGNOSIS — S52.592A OTHER CLOSED FRACTURE OF DISTAL END OF LEFT RADIUS, INITIAL ENCOUNTER: ICD-10-CM

## 2024-01-10 DIAGNOSIS — S52.592A OTHER CLOSED FRACTURE OF DISTAL END OF LEFT RADIUS, INITIAL ENCOUNTER: Primary | ICD-10-CM

## 2024-01-10 DIAGNOSIS — S52.90XA RADIUS FRACTURE: ICD-10-CM

## 2024-01-10 PROCEDURE — 99203 OFFICE O/P NEW LOW 30 MIN: CPT | Performed by: ORTHOPAEDIC SURGERY

## 2024-01-10 PROCEDURE — 25600 CLTX DST RDL FX/EPHYS SEP WO: CPT | Performed by: ORTHOPAEDIC SURGERY

## 2024-01-10 PROCEDURE — 73110 X-RAY EXAM OF WRIST: CPT

## 2024-01-10 NOTE — PROGRESS NOTES
Assessment/Plan:  1. Other closed fracture of distal end of left radius, initial encounter  XR wrist 3+ vw left    Fracture / Dislocation Treatment      2. Radius fracture  Ambulatory Referral to Orthopedic Surgery          Subjective history, physical examination performed, diagnostic imaging reviewed at today's visit  Diagnosis was discussed  X-rays were reviewed  with patient today  Discussed with the patient that the fracture has been well reduced and should heal well with non operative treatment as long as the fracture maintains alignment.  We discussed placing the patient into a molded cast in the office today and repeating x-rays to ensure the fracture has maintained alignment   Treatment options were discussed which included nonoperative and operative treatment.    Risks, benefits, and realistic expectations for treatment options were discussed.    The patient was given an opportunity to ask questions.  Questions were answered to the patient's satisfaction.    Through shared decision making, the patient decided to move forward with casting and a repeat x-rays.  A molded short arm cast was applied in the office today.  Repeat x-rays were reviewed which demonstrate that the fracture did translate.  We discussed continued non operative treatment versus surgical intervention.  The patient elected to proceed with non operative treatment.  Discussed she will remain out of work for 12 weeks and can return earlier if possible.  She will follow up in 2 weeks for repeat evaluation x-rays in the cast to include PA/lateral/articular surface views.  We can change the cast if its loose.     Fracture / Dislocation Treatment    Date/Time: 1/10/2024 1:30 PM    Performed by: Mary Segura MD  Authorized by: Mary Segura MD    Patient Location:  [facility] Protocol:  Consent: Verbal consent obtained.  Consent given by: patient  Patient identity confirmed: verbally with patient    Injury location:   Wrist  Location details:  Left wrist  Injury type:  Fracture  Fracture type: distal radius    Fracture type: distal radius    Manipulation performed?: No    Immobilization:  Cast  Cast type:  Short arm  Patient tolerance:  Patient tolerated the procedure well with no immediate complications              cc: left wrist fracture     Subjective:   Lori Childs is a right hand dominant 63 y.o. female who presents to the office today for evaluation of left wrist pain. The patient states on 1/4/24 she had a fall down the steps. The patient presented to the ED after the injury where x-rays were taken and she was diagnosed with a distal radius fracture and underwent closed reduction. The patient was placed in a sugar tong splint which she has been compliant with. The patient states when she was 16 she cut the volar aspect of her wrist and sustained tendon and nerve injuries which required surgical intervention. The patient states she has full finger motion. She also notes paraesthesias in the hand which has been chronic since her injury.       Review of Systems   Constitutional:  Negative for chills and fever.   HENT:  Negative for drooling and sneezing.    Eyes:  Negative for redness.   Respiratory:  Negative for cough and wheezing.    Gastrointestinal:  Negative for nausea and vomiting.   Musculoskeletal:  Negative for arthralgias, joint swelling and myalgias.   Neurological:  Negative for weakness and numbness.   Psychiatric/Behavioral:  Negative for behavioral problems. The patient is not nervous/anxious.          Past Medical History:   Diagnosis Date    Anemia     Anxiety     Asthma     Bursitis of left elbow     Chronic pain disorder     lower back    Colon polyp     Fractures     Hiatal hernia     Hypertension     Iron deficiency anemia secondary to inadequate dietary iron intake 01/29/2020    Kidney stones     Pancreatitis     Psoriasis     Psychiatric disorder     anxiety    Restless leg syndrome     Tobacco use  disorder        Past Surgical History:   Procedure Laterality Date    CYSTOSCOPY      CYSTOSCOPY W/ URETERAL STENT PLACEMENT      EXPLORATORY LAPAROTOMY      small intestine blockage no resection    HAND SURGERY      LAPAROSCOPY      --  for SBO    [place] ERCP DX COLLECTION SPECIMEN BRUSHING/WASHING N/A 08/15/2018    Procedure: ENDOSCOPIC RETROGRADE CHOLANGIOPANCREATOGRAPHY (ERCP);  Surgeon: Odell Whitaker MD;  Location: [facility];  Service: Gastroenterology    [place] LAPS ABD PRTM&OMENTUM DX W/WO SPEC BR/WA SPX N/A 02/10/2020    Procedure: LAPAROSCOPY DIAGNOSTIC, REPAIR ENTEROTOMY, REMOVAL OF BEZOAR;  Surgeon: Baljit Faith MD;  Location:  MAIN OR;  Service: General    TENDON REPAIR         Family History   Problem Relation Age of Onset    Mental illness Sister     Other Other         Cardiovascular disease    Hypertension Mother     Cancer Father     Substance Abuse Neg Hx     Colon cancer Neg Hx     Colon polyps Neg Hx        Social History     Occupational History    Not on file   Tobacco Use    Smoking status: Every Day     Current packs/day: 1.00     Average packs/day: 1 pack/day for 30.0 years (30.0 ttl pk-yrs)     Types: Cigarettes    Smokeless tobacco: Never    Tobacco comments:     Attempting to stop   Vaping Use    Vaping status: Never Used   Substance and Sexual Activity    Alcohol use: Never    Drug use: No    Sexual activity: Yes     Partners: Male     Birth control/protection: None         Current Outpatient Medications:     amLODIPine (NORVASC) 10 mg tablet, TAKE 1 TABLET (10 MG TOTAL) BY MOUTH DAILY DECREASED DOSE, Disp: 90 tablet, Rfl: 3    fluticasone (FLONASE) 50 mcg/act nasal spray, SPRAY 1 SPRAY INTO EACH NOSTRIL EVERY DAY, Disp: 48 mL, Rfl: 1    gabapentin (NEURONTIN) 300 mg capsule, ONE CAPSULE DAILY IN A.M., 1 CAPSULE AT NOON, AND 2 CAPSULES AT BEDTIME, Disp: 360 capsule, Rfl: 5    hydroxychloroquine (PLAQUENIL) 200 mg tablet, Take 200 mg by mouth daily, Disp: , Rfl:     methocarbamol  (ROBAXIN) 500 mg tablet, Take 1 tablet (500 mg total) by mouth 2 (two) times a day, Disp: 60 tablet, Rfl: 2    oxyCODONE (Roxicodone) 5 immediate release tablet, Take 1 tablet (5 mg total) by mouth every 6 (six) hours as needed for severe pain for up to 10 days Max Daily Amount: 20 mg, Disp: 15 tablet, Rfl: 0    Probiotic Product (PROBIOTIC-10 ULTIMATE PO), Take by mouth, Disp: , Rfl:     montelukast (SINGULAIR) 10 mg tablet, Take 1 tablet (10 mg total) by mouth daily at bedtime, Disp: 90 tablet, Rfl: 3    Allergies   Allergen Reactions    Pollen Extract Sneezing    Tree Extract Sneezing       Objective:  Vitals:    01/10/24 1304   BP: 148/80       The patient was awake, alert, and oriented to person, place, and time.  No acute distress.  Normocephalic.  EOMI.  Mucous membranes moist.  Normal respiratory effort.    Examination of the affected extremity was compared to the unaffected extremity. The splint was removed for the exam.  Skin was intact.  Mild to moderate swelling.  Mild ecchymosis.  No obvious deformity.  Hand and fingers were warm and well-perfused.  Capillary refill was brisk.   Sensation intact.      Imaging/Diagnostic Studies:    I reviewed imaging studies dated 1/4/24 which included x-rays left wrist .  These images studies demonstrated left distal radius fracture in good alignment. X-rays performed today demonstrate distal radius fracture with 2mm of dorsal translation. No dorsal angulation.             This document was created using speech voice recognition software.   Grammatical errors, random word insertions, pronoun errors, and incomplete sentences are an occasional consequence of this system due to software limitations, ambient noise, and hardware issues.   Any formal questions or concerns about content, text, or information contained within the body of this dictation should be directly addressed to the provider for clarification.    Scribe Attestation      I,:  Merlene Dukes MA am acting  as a scribe while in the presence of the attending physician.:       I,:  Mary Segura MD personally performed the services described in this documentation    as scribed in my presence.:

## 2024-01-10 NOTE — LETTER
January 10, 2024     Patient: Lori Childs  YOB: 1960  Date of Visit: 1/10/2024      To Whom it May Concern:    Lori Childs is under my professional care. Lori was seen in my office on 1/10/2024. Lori will remain out of work for 12 weeks. The patient may be returned sooner if possible.     If you have any questions or concerns, please don't hesitate to call.         Sincerely,          Mary Segura MD

## 2024-01-15 ENCOUNTER — TELEPHONE (OUTPATIENT)
Age: 64
End: 2024-01-15

## 2024-01-15 NOTE — TELEPHONE ENCOUNTER
Caller: Xin disability claims handler    Doctor: Colton    Reason for call: Needs diagnosis & Estimated return to work date     Call back#: [phone] ext: [phone]

## 2024-01-18 ENCOUNTER — TELEPHONE (OUTPATIENT)
Age: 64
End: 2024-01-18

## 2024-01-18 NOTE — TELEPHONE ENCOUNTER
Caller: Patient    Doctor: Colton    Reason for call: Patient questioned  if the forms she dropped off on 1/10 and the forms [employer] faxed were received? Advised both forms were in her chart. 10-14 days for completion. Please contact patient when forms are completed    Call back#: [phone]

## 2024-01-18 NOTE — TELEPHONE ENCOUNTER
Forms scanned in on 1/10 were never sent to the leave bin   We do have the scanned forms from 1/15 which will be sent out tomorrow.

## 2024-01-24 ENCOUNTER — OFFICE VISIT (OUTPATIENT)
Dept: OBGYN CLINIC | Facility: CLINIC | Age: 64
End: 2024-01-24

## 2024-01-24 ENCOUNTER — APPOINTMENT (OUTPATIENT)
Dept: RADIOLOGY | Facility: CLINIC | Age: 64
End: 2024-01-24
Payer: COMMERCIAL

## 2024-01-24 VITALS
BODY MASS INDEX: 24.55 KG/M2 | HEIGHT: 61 IN | WEIGHT: 130 LBS | DIASTOLIC BLOOD PRESSURE: 80 MMHG | SYSTOLIC BLOOD PRESSURE: 125 MMHG

## 2024-01-24 DIAGNOSIS — S52.592A OTHER CLOSED FRACTURE OF DISTAL END OF LEFT RADIUS, INITIAL ENCOUNTER: Primary | ICD-10-CM

## 2024-01-24 DIAGNOSIS — S52.592A OTHER CLOSED FRACTURE OF DISTAL END OF LEFT RADIUS, INITIAL ENCOUNTER: ICD-10-CM

## 2024-01-24 PROCEDURE — 99024 POSTOP FOLLOW-UP VISIT: CPT | Performed by: ORTHOPAEDIC SURGERY

## 2024-01-24 PROCEDURE — 73110 X-RAY EXAM OF WRIST: CPT

## 2024-01-24 NOTE — PROGRESS NOTES
Assessment/Plan:  1. Other closed fracture of distal end of left radius, initial encounter  XR wrist 3+ vw left          Subjective history, physical examination performed, diagnostic imaging reviewed at today's visit  Diagnosis was discussed left closed fracture of distal radius  There was no change in position of the fracture when compared to images obtained 2 weeks ago.   The patient was given an opportunity to ask questions.  Questions were answered to the patient's satisfaction.    Through shared decision making, the patient decided to move forward with continue with short arm cast for the next 2 weeks.  I advised patient to continue to work on finger range of motion as she is becoming stiff within her fingers.  Patient will follow-up in 2 weeks for removal of cast, obtain new left wrist x-rays without cast, and transition into removable splint, and referral for hand therapy.    cc: Follow-up for left wrist fracture    Subjective:   Lori Childs is a right hand dominant 63 y.o. female who presents for follow-up evaluation of left wrist fracture.  Patient states she has been compliant with her short arm cast of the left wrist.  Patient states she continues to have restricted range of motion at her fingers and states her pain is slowly improving.      Review of Systems   Constitutional:  Negative for chills and fever.   HENT:  Negative for ear pain and sore throat.    Eyes:  Negative for pain and visual disturbance.   Respiratory:  Negative for cough and shortness of breath.    Cardiovascular:  Negative for chest pain and palpitations.   Gastrointestinal:  Negative for abdominal pain and vomiting.   Genitourinary:  Negative for dysuria and hematuria.   Musculoskeletal:  Negative for arthralgias and back pain.   Skin:  Negative for color change and rash.   Neurological:  Negative for seizures and syncope.   All other systems reviewed and are negative.        Past Medical History:   Diagnosis Date    Anemia      Anxiety     Asthma     Bursitis of left elbow     Chronic pain disorder     lower back    Colon polyp     Fractures     Hiatal hernia     Hypertension     Iron deficiency anemia secondary to inadequate dietary iron intake 01/29/2020    Kidney stones     Pancreatitis     Psoriasis     Psychiatric disorder     anxiety    Restless leg syndrome     Tobacco use disorder        Past Surgical History:   Procedure Laterality Date    CYSTOSCOPY      CYSTOSCOPY W/ URETERAL STENT PLACEMENT      EXPLORATORY LAPAROTOMY      small intestine blockage no resection    HAND SURGERY      LAPAROSCOPY      --  for SBO    SC ERCP DX COLLECTION SPECIMEN BRUSHING/WASHING N/A 08/15/2018    Procedure: ENDOSCOPIC RETROGRADE CHOLANGIOPANCREATOGRAPHY (ERCP);  Surgeon: Odell Whitaker MD;  Location: [facility];  Service: Gastroenterology    SC LAPS ABD PRTM&OMENTUM DX W/WO SPEC BR/WA SPX N/A 02/10/2020    Procedure: LAPAROSCOPY DIAGNOSTIC, REPAIR ENTEROTOMY, REMOVAL OF BEZOAR;  Surgeon: Baljit Faith MD;  Location:  MAIN OR;  Service: General    TENDON REPAIR         Family History   Problem Relation Age of Onset    Mental illness Sister     Other Other         Cardiovascular disease    Hypertension Mother     Cancer Father     Substance Abuse Neg Hx     Colon cancer Neg Hx     Colon polyps Neg Hx        Social History     Occupational History    Not on file   Tobacco Use    Smoking status: Every Day     Current packs/day: 1.00     Average packs/day: 1 pack/day for 30.0 years (30.0 ttl pk-yrs)     Types: Cigarettes    Smokeless tobacco: Never    Tobacco comments:     Attempting to stop   Vaping Use    Vaping status: Never Used   Substance and Sexual Activity    Alcohol use: Never    Drug use: No    Sexual activity: Yes     Partners: Male     Birth control/protection: None         Current Outpatient Medications:     amLODIPine (NORVASC) 10 mg tablet, TAKE 1 TABLET (10 MG TOTAL) BY MOUTH DAILY DECREASED DOSE, Disp: 90 tablet, Rfl: 3     fluticasone (FLONASE) 50 mcg/act nasal spray, SPRAY 1 SPRAY INTO EACH NOSTRIL EVERY DAY, Disp: 48 mL, Rfl: 1    gabapentin (NEURONTIN) 300 mg capsule, ONE CAPSULE DAILY IN A.M., 1 CAPSULE AT NOON, AND 2 CAPSULES AT BEDTIME, Disp: 360 capsule, Rfl: 5    hydroxychloroquine (PLAQUENIL) 200 mg tablet, Take 200 mg by mouth daily, Disp: , Rfl:     methocarbamol (ROBAXIN) 500 mg tablet, Take 1 tablet (500 mg total) by mouth 2 (two) times a day, Disp: 60 tablet, Rfl: 2    Probiotic Product (PROBIOTIC-10 ULTIMATE PO), Take by mouth, Disp: , Rfl:     montelukast (SINGULAIR) 10 mg tablet, Take 1 tablet (10 mg total) by mouth daily at bedtime, Disp: 90 tablet, Rfl: 3    Allergies   Allergen Reactions    Pollen Extract Sneezing    Tree Extract Sneezing       Objective:  Vitals:    01/24/24 1103   BP: 125/80       The patient was awake, alert, and oriented to person, place, and time.  No acute distress.  Normocephalic.  EOMI.  Mucous membranes moist.  Normal respiratory effort.    Patient is in a short arm cast.  Cast fits well. No areas of skin irritation. Mild to moderate edema in fingers.    Limited finger range of motion due to stiffness/swelling.      Imaging/Diagnostic Studies:    I reviewed imaging studies dated 1/24/2024 which included left wrist x-rays.  These images studies demonstrated no change in alignment of the fracture.  Neutral tilt of articular surface.  Slight dorsal translation of the distal fragment.       This document was created using speech voice recognition software.   Grammatical errors, random word insertions, pronoun errors, and incomplete sentences are an occasional consequence of this system due to software limitations, ambient noise, and hardware issues.   Any formal questions or concerns about content, text, or information contained within the body of this dictation should be directly addressed to the provider for clarification.     Scribe Attestation      I,:  Mateo Pretty am acting as a scribe  while in the presence of the attending physician.:       I,:  Mary Segura MD personally performed the services described in this documentation    as scribed in my presence.:

## 2024-02-02 DIAGNOSIS — M62.830 LUMBAR PARASPINAL MUSCLE SPASM: ICD-10-CM

## 2024-02-02 RX ORDER — METHOCARBAMOL 500 MG/1
500 TABLET, FILM COATED ORAL 2 TIMES DAILY
Qty: 60 TABLET | Refills: 2 | Status: SHIPPED | OUTPATIENT
Start: 2024-02-02

## 2024-02-08 ENCOUNTER — OFFICE VISIT (OUTPATIENT)
Dept: OBGYN CLINIC | Facility: CLINIC | Age: 64
End: 2024-02-08

## 2024-02-08 ENCOUNTER — APPOINTMENT (OUTPATIENT)
Dept: RADIOLOGY | Facility: CLINIC | Age: 64
End: 2024-02-08
Payer: COMMERCIAL

## 2024-02-08 ENCOUNTER — OFFICE VISIT (OUTPATIENT)
Dept: OCCUPATIONAL THERAPY | Facility: CLINIC | Age: 64
End: 2024-02-08
Payer: COMMERCIAL

## 2024-02-08 VITALS
HEIGHT: 61 IN | SYSTOLIC BLOOD PRESSURE: 151 MMHG | HEART RATE: 101 BPM | WEIGHT: 130 LBS | DIASTOLIC BLOOD PRESSURE: 100 MMHG | BODY MASS INDEX: 24.55 KG/M2

## 2024-02-08 DIAGNOSIS — S52.592A OTHER CLOSED FRACTURE OF DISTAL END OF LEFT RADIUS, INITIAL ENCOUNTER: ICD-10-CM

## 2024-02-08 DIAGNOSIS — S52.592A OTHER CLOSED FRACTURE OF DISTAL END OF LEFT RADIUS, INITIAL ENCOUNTER: Primary | ICD-10-CM

## 2024-02-08 PROCEDURE — 99024 POSTOP FOLLOW-UP VISIT: CPT | Performed by: ORTHOPAEDIC SURGERY

## 2024-02-08 PROCEDURE — 97760 ORTHOTIC MGMT&TRAING 1ST ENC: CPT | Performed by: OCCUPATIONAL THERAPIST

## 2024-02-08 PROCEDURE — 73110 X-RAY EXAM OF WRIST: CPT

## 2024-02-08 NOTE — LETTER
February 8, 2024     Patient: Lori Childs  YOB: 1960  Date of Visit: 2/8/2024      To Whom it May Concern:    Lori Childs is under my professional care. Lori was seen in my office on 2/8/2024. Lori may return to work on 3/8/2024 .    If you have any questions or concerns, please don't hesitate to call.         Sincerely,          Mary Segura MD        CC: No Recipients

## 2024-02-08 NOTE — PROGRESS NOTES
Assessment/Plan:  1. Other closed fracture of distal end of left radius, initial encounter  XR wrist 3+ vw left    Ambulatory Referral to Occupational Therapy          Subjective history, physical examination performed, diagnostic imaging reviewed at today's visit  There are interval signs of healing at the fracture site.  At this point in time I recommended transition to a removable splint and beginning formal hand therapy.  Her fingers are stiff.  Therapy would be beneficial to restore range of motion to the wrist and the fingers.  Risks, benefits, and realistic expectations for treatment options were discussed.    The patient was given an opportunity to ask questions.  Questions were answered to the patient's satisfaction.    Through shared decision making, the patient decided to move forward with fabrication of a volar wrist splint by hand therapist and formal therapy for wrist and finger range of motion.  Follow-up in 4 weeks for clinical and radiographic evaluation of the left wrist to include 3 views out of the splint.  I provided the patient a note for work.  Anticipated date of return is 3/8/2024.          cc: Follow-up for my wrist fracture    Subjective:   Lori Childs is a right hand dominant 63 y.o. female who presents for follow-up evaluation of a left distal radius fracture sustained on 1/4/2024.  She has been treated in a short arm cast.  She reports that she is due been doing well.      Review of Systems      Past Medical History:   Diagnosis Date    Anemia     Anxiety     Asthma     Bursitis of left elbow     Chronic pain disorder     lower back    Colon polyp     Fractures     Hiatal hernia     Hypertension     Iron deficiency anemia secondary to inadequate dietary iron intake 01/29/2020    Kidney stones     Pancreatitis     Psoriasis     Psychiatric disorder     anxiety    Restless leg syndrome     Tobacco use disorder        Past Surgical History:   Procedure Laterality Date    CYSTOSCOPY       CYSTOSCOPY W/ URETERAL STENT PLACEMENT      EXPLORATORY LAPAROTOMY      small intestine blockage no resection    HAND SURGERY      LAPAROSCOPY      --  for SBO    AR ERCP DX COLLECTION SPECIMEN BRUSHING/WASHING N/A 08/15/2018    Procedure: ENDOSCOPIC RETROGRADE CHOLANGIOPANCREATOGRAPHY (ERCP);  Surgeon: Odell Whitaker MD;  Location: [facility];  Service: Gastroenterology    AR LAPS ABD PRTM&OMENTUM DX W/WO SPEC BR/WA SPX N/A 02/10/2020    Procedure: LAPAROSCOPY DIAGNOSTIC, REPAIR ENTEROTOMY, REMOVAL OF BEZOAR;  Surgeon: Baljit Faith MD;  Location: [facility];  Service: General    TENDON REPAIR         Family History   Problem Relation Age of Onset    Mental illness Sister     Other Other         Cardiovascular disease    Hypertension Mother     Cancer Father     Substance Abuse Neg Hx     Colon cancer Neg Hx     Colon polyps Neg Hx        Social History     Occupational History    Not on file   Tobacco Use    Smoking status: Every Day     Current packs/day: 1.00     Average packs/day: 1 pack/day for 30.0 years (30.0 ttl pk-yrs)     Types: Cigarettes    Smokeless tobacco: Never    Tobacco comments:     Attempting to stop   Vaping Use    Vaping status: Never Used   Substance and Sexual Activity    Alcohol use: Never    Drug use: No    Sexual activity: Yes     Partners: Male     Birth control/protection: None         Current Outpatient Medications:     amLODIPine (NORVASC) 10 mg tablet, TAKE 1 TABLET (10 MG TOTAL) BY MOUTH DAILY DECREASED DOSE, Disp: 90 tablet, Rfl: 3    fluticasone (FLONASE) 50 mcg/act nasal spray, SPRAY 1 SPRAY INTO EACH NOSTRIL EVERY DAY, Disp: 48 mL, Rfl: 1    gabapentin (NEURONTIN) 300 mg capsule, ONE CAPSULE DAILY IN A.M., 1 CAPSULE AT NOON, AND 2 CAPSULES AT BEDTIME, Disp: 360 capsule, Rfl: 5    hydroxychloroquine (PLAQUENIL) 200 mg tablet, Take 200 mg by mouth daily, Disp: , Rfl:     methocarbamol (ROBAXIN) 500 mg tablet, TAKE 1 TABLET BY MOUTH TWICE A DAY, Disp: 60 tablet, Rfl: 2     Probiotic Product (PROBIOTIC-10 ULTIMATE PO), Take by mouth, Disp: , Rfl:     montelukast (SINGULAIR) 10 mg tablet, Take 1 tablet (10 mg total) by mouth daily at bedtime, Disp: 90 tablet, Rfl: 3    Allergies   Allergen Reactions    Pollen Extract Sneezing    Tree Extract Sneezing       Objective:  Vitals:    02/08/24 1046   BP: 151/100   Pulse: 101       The cast was removed for the examination.  Skin was intact.  There was mild residual swelling.  The wrist was stiff.  Composite flexion of the fingers was 3 cm from the distal palmar flexion crease.    Imaging/Diagnostic Studies:    I reviewed imaging studies obtained in the office today which included 3 views of the left wrist out of the cast.  There were interval signs of healing at the fracture site.      This document was created using speech voice recognition software.   Grammatical errors, random word insertions, pronoun errors, and incomplete sentences are an occasional consequence of this system due to software limitations, ambient noise, and hardware issues.   Any formal questions or concerns about content, text, or information contained within the body of this dictation should be directly addressed to the provider for clarification.

## 2024-02-08 NOTE — PROGRESS NOTES
Orthosis    Diagnosis:   1. Other closed fracture of distal end of left radius, initial encounter  Ambulatory Referral to Occupational Therapy        Indication: Fracture    Location: Left  wrist  Supplies: Custom Fit Orthotic and Skin coverage   Orthosis type: Wrist splint  Wearing Schedule: Remove for hygiene only and Remove for HEP  Describe Position: function    Precautions: Fracture    Patient or Caregiver expresses understanding of wearing Schedule and Precautions? Yes  Patient or Caregiver able to don/doff orthotic independently?Yes    Written orders provided to patient? Yes  Orders Obtained: Written  Orders Obtained from: Dr Segura    Return for evaluation and treatment Yes

## 2024-02-19 ENCOUNTER — EVALUATION (OUTPATIENT)
Dept: OCCUPATIONAL THERAPY | Facility: CLINIC | Age: 64
End: 2024-02-19
Payer: COMMERCIAL

## 2024-02-19 DIAGNOSIS — S52.592A OTHER CLOSED FRACTURE OF DISTAL END OF LEFT RADIUS, INITIAL ENCOUNTER: Primary | ICD-10-CM

## 2024-02-19 PROCEDURE — 97165 OT EVAL LOW COMPLEX 30 MIN: CPT | Performed by: OCCUPATIONAL THERAPIST

## 2024-02-19 PROCEDURE — 97140 MANUAL THERAPY 1/> REGIONS: CPT | Performed by: OCCUPATIONAL THERAPIST

## 2024-02-19 NOTE — PROGRESS NOTES
OT Evaluation     Today's date: 2024  Patient name: Lori Childs  : 1960  MRN: [id number]  Referring provider: Mary Segura,*  Dx:   Encounter Diagnosis     ICD-10-CM    1. Other closed fracture of distal end of left radius, initial encounter  S52.592A                      Assessment  Assessment details: Pt. Presents today for evaluation of the L wrist.  Pt. Has limited ROM of the L wrist, stiffness and mild pain with function.  She has weakness of the L hand with her ADLs. Pt. Is not working due to her injury.  Pt. Has limited finger and wrist motion.  Pt. To benefit from skilled hand therapy to improve function and return to baseline ADLs/ work.  Treatment to include modalities, stretching, ROM, manual tx, IASTM, and progressive strengthening.  Impairments: abnormal or restricted ROM, impaired physical strength, lacks appropriate home exercise program and pain with function  Functional limitations: Pt. is out of work due to her injury.  Pt. is unable to lift and carry, open containers with L non dominant hand.Understanding of Dx/Px/POC: good   Prognosis: good    Goals  STG( 6 visits)  1. Compliant with HEP/ ween from splint.  2. Increase L wrist AROM by 10 degrees.  3. Full composite fist for gripping.  LTG( 12 visits or discharge)  1. Pain free L hand use for ADLs.  2. Full AROM of L wrist for function  3. L  strength >15lbs for RTW  4. Improve FOTO score to predicted outcome or greater.    Plan  Patient would benefit from: skilled occupational therapy  Planned modality interventions: cryotherapy and thermotherapy: hydrocollator packs  Planned therapy interventions: IASTM, joint mobilization, manual therapy, home exercise program, graded exercise, functional ROM exercises, fine motor coordination training, strengthening, therapeutic exercise and therapeutic activities  Frequency: 2x week  Duration in visits: 2  Duration in weeks: 6  Plan of Care beginning date: 2024  Plan of Care  expiration date: 3/29/2024  Treatment plan discussed with: patient        Subjective Evaluation    History of Present Illness  Mechanism of injury: Pt. Fell down the stairs on 1/4/24 injuring her L wrist.  Pt. S/p L distal radius fx and was casted for about 4 weeks.  She has now transitioned to a removeable custom volar splint.  Pt. Referred to begin hand therapy for evaluation and treatment.  Quality of life: good    Patient Goals  Patient goals for therapy: decreased pain, increased motion, return to work, increased strength and independence with ADLs/IADLs    Pain  Current pain rating: 3  Quality: discomfort and tight  Relieving factors: ice  Aggravating factors: lifting (ROM)  Progression: improved    Social Support  Lives in: multiple-level home  Lives with: spouse    Employment status: working ([employer]  Hand dominance: right    Treatments  Current treatment: occupational therapy        Objective     Neurological Testing     Sensation     Wrist/Hand   Left   Intact: light touch    Active Range of Motion     Left Elbow   Forearm supination: 75 degrees   Forearm pronation: 70 degrees     Left Wrist   Wrist flexion: 25 degrees   Wrist extension: 35 degrees   Radial deviation: 15 degrees   Ulnar deviation: 20 degrees     Left Thumb     Opposition: Opposes to ring finger    Additional Active Range of Motion Details  Loose composite fist  +extrinsic/intrinsic tightness    Strength/Myotome Testing     Left Wrist/Hand   Wrist extension: 3  Wrist flexion: 3  Radial deviation: 3  Ulnar deviation: 3     (2nd hand position)     Trial 1: 0    Thumb Strength  Key/Lateral Pinch     Trial 1: 8  Tip/Two-Point Pinch     Trial 1: 6    Right Wrist/Hand      (2nd hand position)     Trial 1: 25    Thumb Strength   Key/Lateral Pinch     Trial 1: 13  Tip/Two-Point Pinch     Trial 1: 17             Precautions: Fx Jan 2024, no restrictions      Manuals 2/19             IE 30'            retrograde 4m            Graston L 4m             Intrinsic/extrinsic stretches 3m            Neuro Re-Ed                          HEP- Wrist AROM reviewed                                                                             Ther Ex             Wrist PROM 3m            Wrist AROM             gripping             AROM P/S 3m                                                                Ther Activity                                       Gait Training                                       Modalities              L 8m

## 2024-02-23 DIAGNOSIS — U07.1 COVID: ICD-10-CM

## 2024-02-23 RX ORDER — FLUTICASONE PROPIONATE 50 MCG
SPRAY, SUSPENSION (ML) NASAL
Qty: 48 ML | Refills: 1 | Status: SHIPPED | OUTPATIENT
Start: 2024-02-23

## 2024-02-28 ENCOUNTER — OFFICE VISIT (OUTPATIENT)
Dept: OCCUPATIONAL THERAPY | Facility: CLINIC | Age: 64
End: 2024-02-28
Payer: COMMERCIAL

## 2024-02-28 DIAGNOSIS — S52.592A OTHER CLOSED FRACTURE OF DISTAL END OF LEFT RADIUS, INITIAL ENCOUNTER: Primary | ICD-10-CM

## 2024-02-28 PROCEDURE — 97140 MANUAL THERAPY 1/> REGIONS: CPT | Performed by: OCCUPATIONAL THERAPIST

## 2024-02-28 PROCEDURE — 97110 THERAPEUTIC EXERCISES: CPT | Performed by: OCCUPATIONAL THERAPIST

## 2024-02-28 NOTE — PROGRESS NOTES
Daily Note     Today's date: 2024  Patient name: Lori Childs  : 1960  MRN: [id number]  Referring provider: Mary Segura,*  Dx:   Encounter Diagnosis     ICD-10-CM    1. Other closed fracture of distal end of left radius, initial encounter  S52.592A                      Subjective: I have been doing my exercises.      Objective: See treatment diary below      Assessment: Tolerated treatment well. Patient exhibited good technique with therapeutic exercises.  Pt. Has good ROM and tolerated light resistance without complaint.      Plan: Continue per plan of care.      Precautions: Fx 2024, no restrictions      Manuals             IE 30'            retrograde 4m 4m           Graston L 4m 4m           Intrinsic/extrinsic stretches 3m 3m           Neuro Re-Ed                          HEP- Wrist AROM reviewed                                                                             Ther Ex             Wrist PROM 3m 3m           Wrist AROM  2#   3x10           gripping  [id number]           AROM P/S 3m Y 3x10                                                               Ther Activity                                       Gait Training                                       Modalities             MH L 8m 8m

## 2024-03-01 ENCOUNTER — OFFICE VISIT (OUTPATIENT)
Dept: OCCUPATIONAL THERAPY | Facility: CLINIC | Age: 64
End: 2024-03-01
Payer: COMMERCIAL

## 2024-03-01 DIAGNOSIS — S52.592A OTHER CLOSED FRACTURE OF DISTAL END OF LEFT RADIUS, INITIAL ENCOUNTER: Primary | ICD-10-CM

## 2024-03-01 PROCEDURE — 97140 MANUAL THERAPY 1/> REGIONS: CPT | Performed by: OCCUPATIONAL THERAPIST

## 2024-03-01 PROCEDURE — 97110 THERAPEUTIC EXERCISES: CPT | Performed by: OCCUPATIONAL THERAPIST

## 2024-03-01 NOTE — PROGRESS NOTES
Daily Note     Today's date: 3/1/2024  Patient name: Lori Childs  : 1960  MRN: [id number]  Referring provider: Mary Segura,*  Dx:   Encounter Diagnosis     ICD-10-CM    1. Other closed fracture of distal end of left radius, initial encounter  S52.592A                      Subjective: It was hurting today.      Objective: See treatment diary below      Assessment: Tolerated treatment well. Patient with improved pain following manual tx.  Tolerated functional strengthening exercises without complaint.      Plan: Continue per plan of care.      Precautions: Fx 2024, no restrictions      Manuals 2/19 2/28 3/1           IE 30'            retrograde 4m 4m 4m          Graston L 4m 4m 4m          Intrinsic/extrinsic stretches 3m 3m 3m          Neuro Re-Ed                          HEP- Wrist AROM reviewed                                                                             Ther Ex             Wrist PROM 3m 3m 3m          Wrist AROM  2#   3x10 #2 3x10          gripping  [id number] RPW 2x30          AROM P/S 3m Y 3x10 Y 3x10          Ball lifts   Y 3x10          Pinch clips   G 3 sets                                    Ther Activity                                       Gait Training                                       Modalities             MH L 8m 8m 8m          CP post   5m

## 2024-03-04 ENCOUNTER — OFFICE VISIT (OUTPATIENT)
Dept: OCCUPATIONAL THERAPY | Facility: CLINIC | Age: 64
End: 2024-03-04
Payer: COMMERCIAL

## 2024-03-04 DIAGNOSIS — S52.592A OTHER CLOSED FRACTURE OF DISTAL END OF LEFT RADIUS, INITIAL ENCOUNTER: Primary | ICD-10-CM

## 2024-03-04 PROCEDURE — 97110 THERAPEUTIC EXERCISES: CPT | Performed by: OCCUPATIONAL THERAPIST

## 2024-03-04 PROCEDURE — 97140 MANUAL THERAPY 1/> REGIONS: CPT | Performed by: OCCUPATIONAL THERAPIST

## 2024-03-04 NOTE — PROGRESS NOTES
Daily Note     Today's date: 3/4/2024  Patient name: Lori Childs  : 1960  MRN: [id number]  Referring provider: Mary Segura,*  Dx:   Encounter Diagnosis     ICD-10-CM    1. Other closed fracture of distal end of left radius, initial encounter  S52.592A                      Subjective: I feel good.      Objective: See treatment diary below      Assessment: Tolerated treatment well. Patient making steady progress with her ROM and functional strength.  Anticipating RTW end of this week, per MD F/U.      Plan: Continue per plan of care.      Precautions: Fx 2024, no restrictions      Manuals 2/19 2/28 3/1 3/4          IE 30'            retrograde 4m 4m 4m 4m         Graston L 4m 4m 4m 4m         Intrinsic/extrinsic stretches 3m 3m 3m 3m         Neuro Re-Ed                          HEP- Wrist AROM reviewed   MP block splint SF                                                                          Ther Ex             Wrist PROM 3m 3m 3m 3m         Wrist AROM  2#   3x10 #2 3x10 #2 3x10         gripping  [id number] RPW 2x30 RPW 2x30         AROM P/S 3m Y 3x10 Y 3x10 Y 3x10         Ball lifts   Y 3x10 Y 3x10         Pinch clips   G 3 sets G 3 sets                                   Ther Activity                                       Gait Training                                       Modalities             MH L 8m 8m 8m 8m         CP post   5m

## 2024-03-07 ENCOUNTER — APPOINTMENT (OUTPATIENT)
Dept: RADIOLOGY | Facility: CLINIC | Age: 64
End: 2024-03-07
Payer: COMMERCIAL

## 2024-03-07 ENCOUNTER — OFFICE VISIT (OUTPATIENT)
Dept: OBGYN CLINIC | Facility: CLINIC | Age: 64
End: 2024-03-07
Payer: COMMERCIAL

## 2024-03-07 VITALS
HEART RATE: 82 BPM | BODY MASS INDEX: 24.55 KG/M2 | SYSTOLIC BLOOD PRESSURE: 144 MMHG | WEIGHT: 130 LBS | HEIGHT: 61 IN | DIASTOLIC BLOOD PRESSURE: 83 MMHG

## 2024-03-07 DIAGNOSIS — M65.352 TRIGGER FINGER, LEFT LITTLE FINGER: ICD-10-CM

## 2024-03-07 DIAGNOSIS — S52.592A OTHER CLOSED FRACTURE OF DISTAL END OF LEFT RADIUS, INITIAL ENCOUNTER: Primary | ICD-10-CM

## 2024-03-07 DIAGNOSIS — S52.592A OTHER CLOSED FRACTURE OF DISTAL END OF LEFT RADIUS, INITIAL ENCOUNTER: ICD-10-CM

## 2024-03-07 PROCEDURE — 99213 OFFICE O/P EST LOW 20 MIN: CPT | Performed by: ORTHOPAEDIC SURGERY

## 2024-03-07 PROCEDURE — 73110 X-RAY EXAM OF WRIST: CPT

## 2024-03-07 PROCEDURE — 20550 NJX 1 TENDON SHEATH/LIGAMENT: CPT | Performed by: ORTHOPAEDIC SURGERY

## 2024-03-07 RX ORDER — BETAMETHASONE SODIUM PHOSPHATE AND BETAMETHASONE ACETATE 3; 3 MG/ML; MG/ML
6 INJECTION, SUSPENSION INTRA-ARTICULAR; INTRALESIONAL; INTRAMUSCULAR; SOFT TISSUE
Status: COMPLETED | OUTPATIENT
Start: 2024-03-07 | End: 2024-03-07

## 2024-03-07 RX ADMIN — BETAMETHASONE SODIUM PHOSPHATE AND BETAMETHASONE ACETATE 6 MG: 3; 3 INJECTION, SUSPENSION INTRA-ARTICULAR; INTRALESIONAL; INTRAMUSCULAR; SOFT TISSUE at 10:45

## 2024-03-07 NOTE — PROGRESS NOTES
Assessment/Plan:  1. Other closed fracture of distal end of left radius, initial encounter  XR wrist 3+ vw left      2. Trigger finger, left little finger            The patient  is doing well.  Wrist range of motion is near comparable to the contralateral side.  T she has a new development of a trigger finger on the left small finger.    I recommended continued therapy for strengthening and an injection for treatment of the trigger finger.  The patient was given an opportunity to ask questions.  Questions were answered to the patient's satisfaction.    Through shared decision making, the patient decided to move forward with  continued OT for strengthening.  A local corticosteroid injection to the left trigger finger was offered and accepted as outlined below.  She can continue with the extension splint if this helps her symptoms. Discontinue her wrist splint.   She can return to work possibly when we see her back in 2 weeks with restriction of limited use left arm and no lifting, pushing, pulling or gripping more than 10 pound with left arm  Follow up in 2 weeks.  No Xray is needed.          cc: Follow up left wrist fracture date of injury 1/4/2024    Subjective:   Lori Childs is a  63 y.o. female who presents today for follow up left wrist.  She is now two months from injury date and is progressing as expected in OT and function.  She is able to do some of her ADLs at home.  By midafternoon, however, her discomfort increases and she needs to stop using her wrist.  She is attending OT and was recently put in an extension splint on her pinky for some new clicking.  She does not describe triggering or catching.  She notices improvement with the splint.              Past Medical History:   Diagnosis Date    Anemia     Anxiety     Asthma     Bursitis of left elbow     Chronic pain disorder     lower back    Colon polyp     Fractures     Hiatal hernia     Hypertension     Iron deficiency anemia secondary to  inadequate dietary iron intake 01/29/2020    Kidney stones     Pancreatitis     Psoriasis     Psychiatric disorder     anxiety    Restless leg syndrome     Tobacco use disorder        Past Surgical History:   Procedure Laterality Date    CYSTOSCOPY      CYSTOSCOPY W/ URETERAL STENT PLACEMENT      EXPLORATORY LAPAROTOMY      small intestine blockage no resection    HAND SURGERY      LAPAROSCOPY      --  for SBO    AZ ERCP DX COLLECTION SPECIMEN BRUSHING/WASHING N/A 08/15/2018    Procedure: ENDOSCOPIC RETROGRADE CHOLANGIOPANCREATOGRAPHY (ERCP);  Surgeon: Odell Whitaker MD;  Location: [facility];  Service: Gastroenterology    AZ LAPS ABD PRTM&OMENTUM DX W/WO SPEC BR/WA SPX N/A 02/10/2020    Procedure: LAPAROSCOPY DIAGNOSTIC, REPAIR ENTEROTOMY, REMOVAL OF BEZOAR;  Surgeon: Baljit Faith MD;  Location: [facility];  Service: General    TENDON REPAIR         Family History   Problem Relation Age of Onset    Mental illness Sister     Other Other         Cardiovascular disease    Hypertension Mother     Cancer Father     Substance Abuse Neg Hx     Colon cancer Neg Hx     Colon polyps Neg Hx        Social History     Occupational History    Not on file   Tobacco Use    Smoking status: Every Day     Current packs/day: 1.00     Average packs/day: 1 pack/day for 30.0 years (30.0 ttl pk-yrs)     Types: Cigarettes    Smokeless tobacco: Never    Tobacco comments:     Attempting to stop   Vaping Use    Vaping status: Never Used   Substance and Sexual Activity    Alcohol use: Never    Drug use: No    Sexual activity: Yes     Partners: Male     Birth control/protection: None         Current Outpatient Medications:     amLODIPine (NORVASC) 10 mg tablet, TAKE 1 TABLET (10 MG TOTAL) BY MOUTH DAILY DECREASED DOSE, Disp: 90 tablet, Rfl: 3    fluticasone (FLONASE) 50 mcg/act nasal spray, SPRAY 1 SPRAY INTO EACH NOSTRIL EVERY DAY, Disp: 48 mL, Rfl: 1    gabapentin (NEURONTIN) 300 mg capsule, ONE CAPSULE DAILY IN A.M., 1 CAPSULE AT  "NOON, AND 2 CAPSULES AT BEDTIME, Disp: 360 capsule, Rfl: 5    hydroxychloroquine (PLAQUENIL) 200 mg tablet, Take 200 mg by mouth daily, Disp: , Rfl:     methocarbamol (ROBAXIN) 500 mg tablet, TAKE 1 TABLET BY MOUTH TWICE A DAY, Disp: 60 tablet, Rfl: 2    Probiotic Product (PROBIOTIC-10 ULTIMATE PO), Take by mouth, Disp: , Rfl:     montelukast (SINGULAIR) 10 mg tablet, Take 1 tablet (10 mg total) by mouth daily at bedtime, Disp: 90 tablet, Rfl: 3    Allergies   Allergen Reactions    Pollen Extract Sneezing    Tree Extract Sneezing       Objective:  Vitals:    03/07/24 1032   BP: 144/83   Pulse: 82       The patient was awake, alert, and oriented to person, place, and time.  No acute distress.  Normocephalic.  EOMI.  Mucous membranes moist.  Normal respiratory effort.    Examination of the affected extremity was compared to the unaffected extremity.  Skin was intact.  No swelling or ecchymosis.  No deformity.  Hand and fingers were warm and well-perfused.  Capillary refill was brisk.  Full active range of motion of the elbows, forearms, wrists, and fingers.  5/5 wrist flexor/extensors and 4/5 .     Palpable clicking over the A1 pulley region at base of left 5th finger.  Mild TTP at A1 pulley.    Imaging/Diagnostic Studies:    I reviewed imaging studies dated 3/7/2024 which included left wrist .  These images studies demonstrated increased callus formation and healing of this distal radius fracture unchanged in alignment from previous Xrays.    Hand/upper extremity injection: L small A1  Universal Protocol:  Consent given by: patient  Time out: Immediately prior to procedure a \"time out\" was called to verify the correct patient, procedure, equipment, support staff and site/side marked as required.  Timeout called at: 3/7/2024 11:21 AM.  Patient identity confirmed: verbally with patient  Supporting Documentation  Indications: tendon swelling   Procedure Details  Condition:trigger finger Location: small finger - L " small A1   Needle size: 25 G  Ultrasound guidance: no  Medications administered: 6 mg betamethasone acetate-betamethasone sodium phosphate 6 (3-3) mg/mL             This document was created using speech voice recognition software.   Grammatical errors, random word insertions, pronoun errors, and incomplete sentences are an occasional consequence of this system due to software limitations, ambient noise, and hardware issues.   Any formal questions or concerns about content, text, or information contained within the body of this dictation should be directly addressed to the provider for clarification.

## 2024-03-07 NOTE — LETTER
March 7, 2024     Patient: Lori Childs  YOB: 1960  Date of Visit: 3/7/2024      To Whom it May Concern:    Lori Childs is under my professional care. Lori was seen in my office on 3/7/2024. Lori is unable to return to work until seen back in office for repeat clinical examination in two weeks.    If you have any questions or concerns, please don't hesitate to call.         Sincerely,          Mary Segura MD        CC: No Recipients

## 2024-03-11 ENCOUNTER — APPOINTMENT (OUTPATIENT)
Dept: OCCUPATIONAL THERAPY | Facility: CLINIC | Age: 64
End: 2024-03-11
Payer: COMMERCIAL

## 2024-03-18 ENCOUNTER — OFFICE VISIT (OUTPATIENT)
Dept: FAMILY MEDICINE CLINIC | Facility: HOSPITAL | Age: 64
End: 2024-03-18
Payer: COMMERCIAL

## 2024-03-18 ENCOUNTER — APPOINTMENT (OUTPATIENT)
Dept: OCCUPATIONAL THERAPY | Facility: CLINIC | Age: 64
End: 2024-03-18
Payer: COMMERCIAL

## 2024-03-18 VITALS
TEMPERATURE: 99 F | BODY MASS INDEX: 23.03 KG/M2 | HEART RATE: 88 BPM | DIASTOLIC BLOOD PRESSURE: 84 MMHG | HEIGHT: 61 IN | SYSTOLIC BLOOD PRESSURE: 160 MMHG | OXYGEN SATURATION: 95 % | WEIGHT: 122 LBS

## 2024-03-18 DIAGNOSIS — Z72.0 TOBACCO ABUSE: ICD-10-CM

## 2024-03-18 DIAGNOSIS — M81.0 AGE-RELATED OSTEOPOROSIS WITHOUT CURRENT PATHOLOGICAL FRACTURE: ICD-10-CM

## 2024-03-18 DIAGNOSIS — D50.8 IRON DEFICIENCY ANEMIA SECONDARY TO INADEQUATE DIETARY IRON INTAKE: Chronic | ICD-10-CM

## 2024-03-18 DIAGNOSIS — I10 BENIGN ESSENTIAL HTN: Primary | ICD-10-CM

## 2024-03-18 DIAGNOSIS — J06.9 URI WITH COUGH AND CONGESTION: ICD-10-CM

## 2024-03-18 DIAGNOSIS — Z13.220 SCREENING FOR HYPERLIPIDEMIA: ICD-10-CM

## 2024-03-18 LAB
SL AMB POCT RAPID FLU A: NORMAL
SL AMB POCT RAPID FLU B: NORMAL

## 2024-03-18 PROCEDURE — 87804 INFLUENZA ASSAY W/OPTIC: CPT | Performed by: STUDENT IN AN ORGANIZED HEALTH CARE EDUCATION/TRAINING PROGRAM

## 2024-03-18 PROCEDURE — 99214 OFFICE O/P EST MOD 30 MIN: CPT | Performed by: STUDENT IN AN ORGANIZED HEALTH CARE EDUCATION/TRAINING PROGRAM

## 2024-03-18 RX ORDER — CEFUROXIME AXETIL 500 MG/1
500 TABLET ORAL EVERY 12 HOURS SCHEDULED
Qty: 14 TABLET | Refills: 0 | Status: SHIPPED | OUTPATIENT
Start: 2024-03-18 | End: 2024-03-25

## 2024-03-18 NOTE — PROGRESS NOTES
"  [facility]   Stacey Olson DO    Assessment/Plan:      Diagnosis ICD-10-CM Associated Orders   1. Benign essential HTN  I10 Comprehensive metabolic panel     TSH, 3rd generation     Comprehensive metabolic panel     TSH, 3rd generation      2. Screening for hyperlipidemia  Z13.220 Lipid Panel with Direct LDL reflex     Lipid Panel with Direct LDL reflex      3. Tobacco abuse  Z72.0 Iron, TIBC and Ferritin Panel     CBC and Platelet     Iron, TIBC and Ferritin Panel     CBC and Platelet     CT lung screening program      4. Age-related osteoporosis without current pathological fracture  M81.0 DXA bone density spine hip and pelvis      5. Iron deficiency anemia secondary to inadequate dietary iron intake  D50.8       6. URI with cough and congestion  J06.9 cefuroxime (CEFTIN) 500 mg tablet     POCT rapid flu A and B        Ceftin for acute illness.   Flu negative in office today.   Labs for April ordered.   Imaging as above ordered & due.   Return if symptoms worsen or fail to improve - April as scheduled..  Patient may call or return to office with any questions or concerns.   ___________________________________________________________________  Subjective:     Patient ID: Lori Childs is a 63 y.o. female.  Lori Childs  Chief Complaint   Patient presents with   • Diarrhea     Last week    • Fever     1/4/24 - 63 yof distal radius fracture after FOOSH. XR. Reduction. Splint. FU ortho.  Seeing ortho, working on OT & HEP.   Taking tylenol and motrin for wrist. Hard time sleeping due to pain.   \"Condition:trigger finger Location: small finger - L small A1   Needle size: 25 G   Ultrasound guidance: no   Medications administered: 6 mg betamethasone acetate-betamethasone sodium   phosphate 6 (3-3) mg/mL \"    Ortho appt thurs. Hoping to go back soon. .    Ready to RTW, but sick, and lingering.     Started last Saturday the 9th.    now sick also, but resolved.   No covid swabs done,  was " negative though.    Wt Readings from Last 3 Encounters:   03/21/24 54.9 kg (121 lb)   03/18/24 55.3 kg (122 lb)   03/07/24 59 kg (130 lb)     Temp Readings from Last 3 Encounters:   03/18/24 99 °F (37.2 °C)   01/04/24 (!) 97.4 °F (36.3 °C) (Oral)   07/14/23 (!) 97.3 °F (36.3 °C)     BP Readings from Last 3 Encounters:   03/21/24 142/80   03/18/24 160/84   03/07/24 144/83     Pulse Readings from Last 3 Encounters:   03/18/24 88   03/07/24 82   02/08/24 101        The following portions of the patient's history were reviewed and updated as appropriate: allergies, current medications, past medical history, and problem list.    Review of Systems   Constitutional:  Positive for chills and fever. Negative for appetite change, diaphoresis and fatigue.   HENT:  Positive for congestion, ear pain (Left ear crackling), postnasal drip, rhinorrhea, sinus pressure, sinus pain and sneezing. Negative for sore throat and voice change.    Eyes:  Negative for pain, redness and itching.   Respiratory:  Positive for cough (yellow aliza). Negative for shortness of breath.    Cardiovascular:  Positive for palpitations (some anxiety). Negative for chest pain.   Gastrointestinal:  Positive for diarrhea (still some today). Negative for constipation, nausea and vomiting.   Genitourinary:  Negative for frequency and urgency.   Neurological:  Positive for headaches (above eyes). Negative for dizziness and light-headedness.       Objective:      Vitals:    03/18/24 1438   BP: 160/84   Pulse: 88   Temp: 99 °F (37.2 °C)   SpO2: 95%      Physical Exam  Vitals and nursing note reviewed.   Constitutional:       General: She is not in acute distress.     Appearance: Normal appearance. She is ill-appearing and diaphoretic.   HENT:      Head: Normocephalic and atraumatic.      Right Ear: Ear canal and external ear normal. There is no impacted cerumen.      Left Ear: Ear canal and external ear normal. There is no impacted cerumen.      Ears:      Comments:  "Sinuses TTP b/l   TM's mucoid appearing     Nose: Congestion and rhinorrhea present.      Mouth/Throat:      Mouth: Mucous membranes are moist.      Pharynx: Oropharynx is clear. No oropharyngeal exudate.   Eyes:      General: No scleral icterus.        Right eye: No discharge.         Left eye: No discharge.   Cardiovascular:      Rate and Rhythm: Normal rate and regular rhythm.      Pulses: Normal pulses.      Heart sounds: Normal heart sounds. No murmur heard.  Pulmonary:      Effort: Pulmonary effort is normal. No respiratory distress.      Breath sounds: Normal breath sounds. No stridor. No wheezing.      Comments: Cough, hoarse voice  Musculoskeletal:      Cervical back: Normal range of motion and neck supple. No rigidity or tenderness.      Right lower leg: No edema.      Left lower leg: No edema.   Lymphadenopathy:      Cervical: No cervical adenopathy.   Neurological:      Mental Status: She is alert and oriented to person, place, and time.      Gait: Gait normal.   Psychiatric:         Mood and Affect: Mood normal.         Behavior: Behavior normal.         Thought Content: Thought content normal.         Judgment: Judgment normal.           Portions of the record may have been created with voice recognition software. Occasional wrong word or \"sound alike\" substitutions may have occurred due to the inherent limitations of voice recognition software. Please review the chart carefully and recognize, using context, where substitutions/typographical errors may have occurred.     "

## 2024-03-19 ENCOUNTER — TELEPHONE (OUTPATIENT)
Dept: OBGYN CLINIC | Facility: CLINIC | Age: 64
End: 2024-03-19

## 2024-03-19 NOTE — TELEPHONE ENCOUNTER
Received incoming fax from [facility] from [employer] phone [phone] fax [phone] requesting office note and letter from  3/7/24 to be faxed. Faxed information received confirmation.

## 2024-03-20 ENCOUNTER — APPOINTMENT (OUTPATIENT)
Dept: OCCUPATIONAL THERAPY | Facility: CLINIC | Age: 64
End: 2024-03-20
Payer: COMMERCIAL

## 2024-03-21 ENCOUNTER — OFFICE VISIT (OUTPATIENT)
Dept: OBGYN CLINIC | Facility: CLINIC | Age: 64
End: 2024-03-21
Payer: COMMERCIAL

## 2024-03-21 VITALS
WEIGHT: 121 LBS | SYSTOLIC BLOOD PRESSURE: 142 MMHG | BODY MASS INDEX: 22.84 KG/M2 | DIASTOLIC BLOOD PRESSURE: 80 MMHG | HEIGHT: 61 IN

## 2024-03-21 DIAGNOSIS — S52.592D OTHER CLOSED FRACTURE OF DISTAL END OF LEFT RADIUS WITH ROUTINE HEALING, SUBSEQUENT ENCOUNTER: Primary | ICD-10-CM

## 2024-03-21 PROCEDURE — 99213 OFFICE O/P EST LOW 20 MIN: CPT | Performed by: ORTHOPAEDIC SURGERY

## 2024-03-21 NOTE — PROGRESS NOTES
Assessment/Plan:  1. Other closed fracture of distal end of left radius with routine healing, subsequent encounter              The patient was given an opportunity to ask questions.  Questions were answered to the patient's satisfaction.  The patient has good motion and strength on exam.  She will keep her scheduled therapy appointment and may transition to  a HEP.  Discussed in the triggering or clicking returns to the left small finger she may return to the office for a possible repeat injection.  She may return to work full duty and a note was provided for this today. She may follow up with me as needed.            cc: left wrist fx, DOI 1/4/24    Subjective:   Lori Childs is a  63 y.o. female who presents to the office today for follow up evaluation left distal radius fracture, DOI 1/4/24. The patient states she is doing well. She did miss 3 appointments at OT due to being sick. She notes an occ click. She underwent a left small TF injection at her last visit.       Review of Systems   Constitutional:  Negative for chills and fever.   HENT:  Negative for drooling and sneezing.    Eyes:  Negative for redness.   Respiratory:  Negative for cough and wheezing.    Gastrointestinal:  Negative for nausea and vomiting.   Musculoskeletal:  Negative for arthralgias, joint swelling and myalgias.   Neurological:  Negative for weakness and numbness.   Psychiatric/Behavioral:  Negative for behavioral problems. The patient is not nervous/anxious.          Past Medical History:   Diagnosis Date    Anemia     Anxiety     Asthma     Bursitis of left elbow     Chronic pain disorder     lower back    Colon polyp     Fractures     Hiatal hernia     Hypertension     Iron deficiency anemia secondary to inadequate dietary iron intake 01/29/2020    Kidney stones     Pancreatitis     Psoriasis     Psychiatric disorder     anxiety    Restless leg syndrome     Tobacco use disorder        Past Surgical History:   Procedure Laterality  Date    CYSTOSCOPY      CYSTOSCOPY W/ URETERAL STENT PLACEMENT      EXPLORATORY LAPAROTOMY      small intestine blockage no resection    HAND SURGERY      LAPAROSCOPY      --  for SBO    LA ERCP DX COLLECTION SPECIMEN BRUSHING/WASHING N/A 08/15/2018    Procedure: ENDOSCOPIC RETROGRADE CHOLANGIOPANCREATOGRAPHY (ERCP);  Surgeon: Odell Whitaker MD;  Location: [facility];  Service: Gastroenterology    LA LAPS ABD PRTM&OMENTUM DX W/WO SPEC BR/WA SPX N/A 02/10/2020    Procedure: LAPAROSCOPY DIAGNOSTIC, REPAIR ENTEROTOMY, REMOVAL OF BEZOAR;  Surgeon: Baljit Faith MD;  Location: [facility];  Service: General    TENDON REPAIR         Family History   Problem Relation Age of Onset    Mental illness Sister     Other Other         Cardiovascular disease    Hypertension Mother     Cancer Father     Substance Abuse Neg Hx     Colon cancer Neg Hx     Colon polyps Neg Hx        Social History     Occupational History    Not on file   Tobacco Use    Smoking status: Every Day     Current packs/day: 1.00     Average packs/day: 1 pack/day for 30.0 years (30.0 ttl pk-yrs)     Types: Cigarettes    Smokeless tobacco: Never    Tobacco comments:     Attempting to stop   Vaping Use    Vaping status: Never Used   Substance and Sexual Activity    Alcohol use: Never    Drug use: No    Sexual activity: Yes     Partners: Male     Birth control/protection: None         Current Outpatient Medications:     amLODIPine (NORVASC) 10 mg tablet, TAKE 1 TABLET (10 MG TOTAL) BY MOUTH DAILY DECREASED DOSE, Disp: 90 tablet, Rfl: 3    cefuroxime (CEFTIN) 500 mg tablet, Take 1 tablet (500 mg total) by mouth every 12 (twelve) hours for 7 days, Disp: 14 tablet, Rfl: 0    fluticasone (FLONASE) 50 mcg/act nasal spray, SPRAY 1 SPRAY INTO EACH NOSTRIL EVERY DAY, Disp: 48 mL, Rfl: 1    methocarbamol (ROBAXIN) 500 mg tablet, TAKE 1 TABLET BY MOUTH TWICE A DAY, Disp: 60 tablet, Rfl: 2    Probiotic Product (PROBIOTIC-10 ULTIMATE PO), Take by mouth, Disp: , Rfl:      Allergies   Allergen Reactions    Pollen Extract Sneezing    Tree Extract Sneezing       Objective:  Vitals:    03/21/24 1111   BP: 142/80       The patient was awake, alert, and oriented to person, place, and time.  No acute distress.  Normocephalic.  EOMI.  Mucous membranes moist.  Normal respiratory effort.    Examination of the affected extremity was compared to the unaffected extremity.  Skin was intact.  No swelling or ecchymosis.  No deformity.  Hand and fingers were warm and well-perfused.  Capillary refill was brisk.  Full active range of motion of the elbows, forearms, wrists, and fingers.  5/5 elbow flexors/extensors, wrist flexor/extensors, and .       Imaging/Diagnostic Studies:    No new images reviewed in the office today.        This document was created using speech voice recognition software.   Grammatical errors, random word insertions, pronoun errors, and incomplete sentences are an occasional consequence of this system due to software limitations, ambient noise, and hardware issues.   Any formal questions or concerns about content, text, or information contained within the body of this dictation should be directly addressed to the provider for clarification.    Scribe Attestation      I,:  Merlene Dukes MA am acting as a scribe while in the presence of the attending physician.:       I,:  Mary Segura MD personally performed the services described in this documentation    as scribed in my presence.:

## 2024-03-21 NOTE — LETTER
March 21, 2024     Patient: Lori Childs  YOB: 1960  Date of Visit: 3/21/2024      To Whom it May Concern:    Lori Childs is under my professional care. Lori was seen in my office on 3/21/2024. Lori may return to work full duty with no restrictions.     If you have any questions or concerns, please don't hesitate to call.         Sincerely,          Mary Segura MD

## 2024-03-25 ENCOUNTER — OFFICE VISIT (OUTPATIENT)
Dept: OCCUPATIONAL THERAPY | Facility: CLINIC | Age: 64
End: 2024-03-25
Payer: COMMERCIAL

## 2024-03-25 DIAGNOSIS — S52.592A OTHER CLOSED FRACTURE OF DISTAL END OF LEFT RADIUS, INITIAL ENCOUNTER: Primary | ICD-10-CM

## 2024-03-25 PROCEDURE — 97140 MANUAL THERAPY 1/> REGIONS: CPT | Performed by: OCCUPATIONAL THERAPIST

## 2024-03-25 PROCEDURE — 97110 THERAPEUTIC EXERCISES: CPT | Performed by: OCCUPATIONAL THERAPIST

## 2024-03-25 NOTE — PROGRESS NOTES
Daily Note     Today's date: 3/25/2024  Patient name: Lori Childs  : 1960  MRN: [id number]  Referring provider: Mary Segura,*  Dx:   Encounter Diagnosis     ICD-10-CM    1. Other closed fracture of distal end of left radius, initial encounter  S52.592A                      Subjective: Today is my last day.      Objective: See treatment diary below      Assessment: Tolerated treatment well. Patient has returned to full function and RTW full duty.  Pt. Will continue with HEP independently.  NO further skilled OT needed at this time.  Discontinue with OT today.      Plan: D/C from OT.     Precautions: Fx 2024, no restrictions      Manuals 2/19 2/28 3/1 3/4 3/25         IE 30'            retrograde 4m 4m 4m 4m 4m        Graston L 4m 4m 4m 4m 4m        Intrinsic/extrinsic stretches 3m 3m 3m 3m 3m        Neuro Re-Ed                          HEP- Wrist AROM reviewed   MP block splint SF                                                                          Ther Ex             Wrist PROM 3m 3m 3m 3m 3m        Wrist AROM  2#   3x10 #2 3x10 #2 3x10 #3 3x10        gripping  [id number] RPW 2x30 RPW 2x30 GPW 2x30        AROM P/S 3m Y 3x10 Y 3x10 Y 3x10 R 3x1o        Ball lifts   Y 3x10 Y 3x10 G 3x10        Pinch clips   G 3 sets G 3 sets                                   Ther Activity                                       Gait Training                                       Modalities             MH L 8m 8m 8m 8m 8m        CP post   5m

## 2024-04-07 LAB
ALBUMIN SERPL-MCNC: 4.2 G/DL (ref 3.6–5.1)
ALBUMIN/GLOB SERPL: 2.3 (CALC) (ref 1–2.5)
ALP SERPL-CCNC: 58 U/L (ref 37–153)
ALT SERPL-CCNC: 20 U/L (ref 6–29)
AST SERPL-CCNC: 21 U/L (ref 10–35)
BILIRUB SERPL-MCNC: 0.3 MG/DL (ref 0.2–1.2)
BUN SERPL-MCNC: 18 MG/DL (ref 7–25)
BUN/CREAT SERPL: ABNORMAL (CALC) (ref 6–22)
CALCIUM SERPL-MCNC: 8.9 MG/DL (ref 8.6–10.4)
CHLORIDE SERPL-SCNC: 112 MMOL/L (ref 98–110)
CHOLEST SERPL-MCNC: 160 MG/DL
CHOLEST/HDLC SERPL: 2.8 (CALC)
CO2 SERPL-SCNC: 22 MMOL/L (ref 20–32)
CREAT SERPL-MCNC: 0.6 MG/DL (ref 0.5–1.05)
ERYTHROCYTE [DISTWIDTH] IN BLOOD BY AUTOMATED COUNT: 11.7 % (ref 11–15)
FERRITIN SERPL-MCNC: 53 NG/ML (ref 16–288)
GFR/BSA.PRED SERPLBLD CYS-BASED-ARV: 102 ML/MIN/1.73M2
GLOBULIN SER CALC-MCNC: 1.8 G/DL (CALC) (ref 1.9–3.7)
GLUCOSE SERPL-MCNC: 91 MG/DL (ref 65–99)
HCT VFR BLD AUTO: 36.6 % (ref 35–45)
HDLC SERPL-MCNC: 58 MG/DL
HGB BLD-MCNC: 12.3 G/DL (ref 11.7–15.5)
IRON SATN MFR SERPL: 29 % (CALC) (ref 16–45)
IRON SERPL-MCNC: 95 MCG/DL (ref 45–160)
LDLC SERPL CALC-MCNC: 77 MG/DL (CALC)
MCH RBC QN AUTO: 34.2 PG (ref 27–33)
MCHC RBC AUTO-ENTMCNC: 33.6 G/DL (ref 32–36)
MCV RBC AUTO: 101.7 FL (ref 80–100)
NONHDLC SERPL-MCNC: 102 MG/DL (CALC)
PLATELET # BLD AUTO: 259 THOUSAND/UL (ref 140–400)
PMV BLD REES-ECKER: 9.9 FL (ref 7.5–12.5)
POTASSIUM SERPL-SCNC: 4.2 MMOL/L (ref 3.5–5.3)
PROT SERPL-MCNC: 6 G/DL (ref 6.1–8.1)
RBC # BLD AUTO: 3.6 MILLION/UL (ref 3.8–5.1)
SODIUM SERPL-SCNC: 142 MMOL/L (ref 135–146)
TIBC SERPL-MCNC: 328 MCG/DL (CALC) (ref 250–450)
TRIGL SERPL-MCNC: 153 MG/DL
TSH SERPL-ACNC: 1.6 MIU/L (ref 0.4–4.5)
WBC # BLD AUTO: 5.4 THOUSAND/UL (ref 3.8–10.8)

## 2024-04-24 DIAGNOSIS — M62.830 LUMBAR PARASPINAL MUSCLE SPASM: ICD-10-CM

## 2024-04-25 RX ORDER — METHOCARBAMOL 500 MG/1
500 TABLET, FILM COATED ORAL 2 TIMES DAILY
Qty: 60 TABLET | Refills: 2 | Status: SHIPPED | OUTPATIENT
Start: 2024-04-25

## 2024-04-30 ENCOUNTER — OFFICE VISIT (OUTPATIENT)
Dept: FAMILY MEDICINE CLINIC | Facility: HOSPITAL | Age: 64
End: 2024-04-30
Payer: COMMERCIAL

## 2024-04-30 VITALS
WEIGHT: 118 LBS | HEIGHT: 61 IN | OXYGEN SATURATION: 97 % | HEART RATE: 82 BPM | BODY MASS INDEX: 22.28 KG/M2 | SYSTOLIC BLOOD PRESSURE: 160 MMHG | DIASTOLIC BLOOD PRESSURE: 90 MMHG

## 2024-04-30 DIAGNOSIS — Z72.0 TOBACCO ABUSE: ICD-10-CM

## 2024-04-30 DIAGNOSIS — D50.8 IRON DEFICIENCY ANEMIA SECONDARY TO INADEQUATE DIETARY IRON INTAKE: ICD-10-CM

## 2024-04-30 DIAGNOSIS — M81.0 AGE-RELATED OSTEOPOROSIS WITHOUT CURRENT PATHOLOGICAL FRACTURE: ICD-10-CM

## 2024-04-30 DIAGNOSIS — I10 BENIGN ESSENTIAL HTN: Primary | ICD-10-CM

## 2024-04-30 DIAGNOSIS — D50.0 CHRONIC BLOOD LOSS ANEMIA: ICD-10-CM

## 2024-04-30 PROCEDURE — 3725F SCREEN DEPRESSION PERFORMED: CPT | Performed by: STUDENT IN AN ORGANIZED HEALTH CARE EDUCATION/TRAINING PROGRAM

## 2024-04-30 PROCEDURE — 99214 OFFICE O/P EST MOD 30 MIN: CPT | Performed by: STUDENT IN AN ORGANIZED HEALTH CARE EDUCATION/TRAINING PROGRAM

## 2024-04-30 RX ORDER — FERROUS SULFATE 324(65)MG
324 TABLET, DELAYED RELEASE (ENTERIC COATED) ORAL
Qty: 90 TABLET | Refills: 1 | Status: SHIPPED | OUTPATIENT
Start: 2024-04-30

## 2024-04-30 RX ORDER — LISINOPRIL 5 MG/1
5 TABLET ORAL DAILY
Qty: 90 TABLET | Refills: 0 | Status: SHIPPED | OUTPATIENT
Start: 2024-04-30

## 2024-04-30 NOTE — PROGRESS NOTES
[facility]   Stacey Olson DO    Assessment/Plan:      Diagnosis ICD-10-CM Associated Orders   1. Benign essential HTN  I10 lisinopril (ZESTRIL) 5 mg tablet      2. Age-related osteoporosis without current pathological fracture  M81.0       3. Chronic blood loss anemia  D50.0 ferrous sulfate 324 MG TBEC      4. Tobacco abuse  Z72.0       5. Iron deficiency anemia secondary to inadequate dietary iron intake  D50.8         Start low dose lisinopril. SE's reviewed. Watch for DZ & LH.   C/W norvasc as is.   Start ferrous sulfate 324 mg daily, discussed dark stools.   Return in about 2 months (around 6/30/2024) for F/U Chronic Conditions, BP med check .  Patient may call or return to office with any questions or concerns.   ______________________________________________________________________  Subjective:     Patient ID: Lori Childs is a 63 y.o. female.  HPI  Lori Childs  Chief Complaint   Patient presents with   • Follow-up     Review labs     Ceftin last month for URI & Cough. Completed it.   Using claritin D & Has been bad pollen right now.     Still had times of morning coughing. Clearing throat.      Work BP was 153/90  Occas HA's, not always in am.   On lisinopril in 2018 - felt , switched to amlodipine.   Could have been due to anemia now looking back.      Wt Readings from Last 3 Encounters:   04/30/24 53.5 kg (118 lb)   03/21/24 54.9 kg (121 lb)   03/18/24 55.3 kg (122 lb)     Temp Readings from Last 3 Encounters:   03/18/24 99 °F (37.2 °C)   01/04/24 (!) 97.4 °F (36.3 °C) (Oral)   07/14/23 (!) 97.3 °F (36.3 °C)     BP Readings from Last 3 Encounters:   04/30/24 160/90   03/21/24 142/80   03/18/24 160/84     Pulse Readings from Last 3 Encounters:   04/30/24 82   03/18/24 88   03/07/24 82       The following portions of the patient's history were reviewed and updated as appropriate: allergies, current medications, past medical history, and problem list.    Review of Systems  "  Constitutional:  Positive for fatigue (mild). Negative for chills and fever.   Respiratory:  Positive for cough. Negative for shortness of breath.         No ADAIR    Cardiovascular:  Negative for chest pain and palpitations.   Gastrointestinal:  Positive for diarrhea. Negative for constipation.   Musculoskeletal:  Positive for arthralgias (Left wrist s/p fx, still painful at times).   Neurological:  Positive for headaches (occas). Negative for dizziness and light-headedness.       Objective:      Vitals:    04/30/24 0743   BP: 160/90   Pulse: 82   SpO2: 97%     RPT BP - 160/90.      Physical Exam  Vitals and nursing note reviewed.   Constitutional:       General: She is not in acute distress.     Appearance: Normal appearance. She is normal weight. She is not ill-appearing.   HENT:      Head: Normocephalic and atraumatic.   Eyes:      General: No scleral icterus.        Right eye: No discharge.         Left eye: No discharge.   Cardiovascular:      Rate and Rhythm: Normal rate and regular rhythm.      Pulses: Normal pulses.      Heart sounds: Normal heart sounds. No murmur heard.  Pulmonary:      Effort: Pulmonary effort is normal. No respiratory distress.      Breath sounds: Normal breath sounds. No stridor. No wheezing.   Musculoskeletal:         General: Tenderness (Left wrist TTP - over prior wrist fx) present.      Cervical back: Normal range of motion and neck supple. No rigidity.      Right lower leg: No edema.      Left lower leg: No edema.   Neurological:      Mental Status: She is alert and oriented to person, place, and time.      Gait: Gait normal.   Psychiatric:         Mood and Affect: Mood normal.         Behavior: Behavior normal.         Thought Content: Thought content normal.         Judgment: Judgment normal.       Portions of the record may have been created with voice recognition software. Occasional wrong word or \"sound alike\" substitutions may have occurred due to the inherent limitations of " voice recognition software. Please review the chart carefully and recognize, using context, where substitutions/typographical errors may have occurred.

## 2024-06-19 NOTE — PROGRESS NOTES
[facility]   Verena RAMSEY    Assessment/Plan:   1. Benign essential HTN  Assessment & Plan:  Well controlled in office today.  Continue lisinopril 5mg daily and amlodipine 10mg daily.   2. Iron deficiency anemia secondary to inadequate dietary iron intake  Assessment & Plan:   Latest Reference Range & Units 04/06/24 08:03   Iron 45 - 160 mcg/dL 95   FERRITIN 16 - 288 ng/mL 53   Iron Saturation 16 - 45 % (calc) 29   TIBC 250 - 450 mcg/dL (calc) 328     Hx Fe deficiency s/p venofer infusions.  Was to start Fe sulfate last OV but pharmacy had power outage and never got rx.  Refilled order today.  Encouraged to take on empty stomach with glass of OJ    3. Tobacco abuse  Assessment & Plan:  Smoking 1.5ppd.  pre contemplative phase.  CT lung screen is ordered.  4. Lumbar paraspinal muscle spasm      Return in about 3 months (around 9/20/2024) for Recheck.  Patient may call or return to office with any questions or concerns.     ______________________________________________________________________  Subjective:     Patient ID: Lori Childs is a 63 y.o. female.  Lori Childs  Chief Complaint   Patient presents with   • Follow-up       Checked it at home 119/69 with upper arm cuff.  Adherent amlodpine 10mg and lisinopril.    1.5 cup coffee this monring.    Never got iron supplementation due power outage at [facility] in April.   Was 128lb last year, down to 111lb due to increased activity/optimized diet.  Baseline weight is 115lb.  Broke wrist and gained a lot of weight being sedentary             The following portions of the patient's history were reviewed and updated as appropriate: allergies, current medications, past family history, past medical history, past social history, past surgical history, and problem list.    Review of Systems   Constitutional:  Negative for activity change, appetite change, chills, fatigue, fever and unexpected weight change.   HENT: Negative.  Negative for congestion, ear  pain, postnasal drip and sinus pain.    Eyes: Negative.    Respiratory: Negative.  Negative for cough and shortness of breath.    Cardiovascular: Negative.  Negative for chest pain and leg swelling.   Gastrointestinal: Negative.  Negative for constipation and diarrhea.   Endocrine: Negative.    Genitourinary: Negative.  Negative for dysuria.   Musculoskeletal:  Positive for arthralgias.   Skin: Negative.    Allergic/Immunologic: Negative.  Negative for immunocompromised state.   Neurological: Negative.  Negative for dizziness and light-headedness.   Hematological: Negative.    Psychiatric/Behavioral: Negative.  Negative for sleep disturbance.          Objective:      Vitals:    06/20/24 0916   BP: 122/68   Pulse: 83   SpO2: 96%      Physical Exam  Vitals and nursing note reviewed.   Constitutional:       Appearance: Normal appearance.   HENT:      Head: Normocephalic and atraumatic.      Right Ear: Tympanic membrane, ear canal and external ear normal.      Left Ear: Tympanic membrane, ear canal and external ear normal.      Nose: Nose normal.      Mouth/Throat:      Mouth: Mucous membranes are moist.      Pharynx: Oropharynx is clear.   Eyes:      Extraocular Movements: Extraocular movements intact.      Conjunctiva/sclera: Conjunctivae normal.      Pupils: Pupils are equal, round, and reactive to light.   Cardiovascular:      Rate and Rhythm: Normal rate and regular rhythm.      Pulses: Normal pulses.      Heart sounds: Normal heart sounds.   Pulmonary:      Effort: Pulmonary effort is normal.      Breath sounds: Normal breath sounds.   Abdominal:      General: Bowel sounds are normal.      Palpations: Abdomen is soft.   Musculoskeletal:         General: Normal range of motion.      Cervical back: Normal range of motion and neck supple.   Skin:     General: Skin is warm and dry.   Neurological:      General: No focal deficit present.      Mental Status: She is alert and oriented to person, place, and time.  "  Psychiatric:         Mood and Affect: Mood normal.         Behavior: Behavior normal.         Thought Content: Thought content normal.         Judgment: Judgment normal.         Portions of the record may have been created with voice recognition software. Occasional wrong word or \"sound alike\" substitutions may have occurred due to the inherent limitations of voice recognition software. Please review the chart carefully and recognize, using context, where substitutions/typographical errors may have occurred.       "

## 2024-06-20 ENCOUNTER — OFFICE VISIT (OUTPATIENT)
Dept: FAMILY MEDICINE CLINIC | Facility: HOSPITAL | Age: 64
End: 2024-06-20
Payer: COMMERCIAL

## 2024-06-20 VITALS
OXYGEN SATURATION: 96 % | DIASTOLIC BLOOD PRESSURE: 68 MMHG | BODY MASS INDEX: 21.07 KG/M2 | WEIGHT: 111.6 LBS | HEART RATE: 83 BPM | HEIGHT: 61 IN | SYSTOLIC BLOOD PRESSURE: 122 MMHG

## 2024-06-20 DIAGNOSIS — D50.8 IRON DEFICIENCY ANEMIA SECONDARY TO INADEQUATE DIETARY IRON INTAKE: Chronic | ICD-10-CM

## 2024-06-20 DIAGNOSIS — Z72.0 TOBACCO ABUSE: ICD-10-CM

## 2024-06-20 DIAGNOSIS — I10 BENIGN ESSENTIAL HTN: Primary | ICD-10-CM

## 2024-06-20 DIAGNOSIS — M62.830 LUMBAR PARASPINAL MUSCLE SPASM: ICD-10-CM

## 2024-06-20 PROBLEM — D50.0 CHRONIC BLOOD LOSS ANEMIA: Status: RESOLVED | Noted: 2021-10-05 | Resolved: 2024-06-20

## 2024-06-20 PROBLEM — E61.1 IRON DEFICIENCY: Status: RESOLVED | Noted: 2021-10-05 | Resolved: 2024-06-20

## 2024-06-20 PROBLEM — D64.9 ANEMIA: Status: RESOLVED | Noted: 2017-12-14 | Resolved: 2024-06-20

## 2024-06-20 PROCEDURE — 99213 OFFICE O/P EST LOW 20 MIN: CPT | Performed by: NURSE PRACTITIONER

## 2024-06-20 RX ORDER — LISINOPRIL 5 MG/1
5 TABLET ORAL DAILY
Qty: 90 TABLET | Refills: 3 | Status: SHIPPED | OUTPATIENT
Start: 2024-06-20

## 2024-06-20 RX ORDER — AMLODIPINE BESYLATE 10 MG/1
10 TABLET ORAL DAILY
Qty: 90 TABLET | Refills: 3 | Status: SHIPPED | OUTPATIENT
Start: 2024-06-20

## 2024-06-20 RX ORDER — FERROUS SULFATE 324(65)MG
324 TABLET, DELAYED RELEASE (ENTERIC COATED) ORAL
Qty: 90 TABLET | Refills: 3 | Status: SHIPPED | OUTPATIENT
Start: 2024-06-20

## 2024-06-20 RX ORDER — METHOCARBAMOL 500 MG/1
500 TABLET, FILM COATED ORAL 2 TIMES DAILY
Qty: 60 TABLET | Refills: 2 | Status: SHIPPED | OUTPATIENT
Start: 2024-06-20

## 2024-06-20 NOTE — ASSESSMENT & PLAN NOTE
Latest Reference Range & Units 04/06/24 08:03   Iron 45 - 160 mcg/dL 95   FERRITIN 16 - 288 ng/mL 53   Iron Saturation 16 - 45 % (calc) 29   TIBC 250 - 450 mcg/dL (calc) 328     Hx Fe deficiency s/p venofer infusions.  Was to start Fe sulfate last OV but pharmacy had power outage and never got rx.  Refilled order today.  Encouraged to take on empty stomach with glass of OJ

## 2024-08-27 DIAGNOSIS — U07.1 COVID: ICD-10-CM

## 2024-08-27 RX ORDER — FLUTICASONE PROPIONATE 50 MCG
SPRAY, SUSPENSION (ML) NASAL
Qty: 48 ML | Refills: 1 | Status: SHIPPED | OUTPATIENT
Start: 2024-08-27

## 2024-09-23 ENCOUNTER — OFFICE VISIT (OUTPATIENT)
Dept: FAMILY MEDICINE CLINIC | Facility: HOSPITAL | Age: 64
End: 2024-09-23
Payer: COMMERCIAL

## 2024-09-23 VITALS
DIASTOLIC BLOOD PRESSURE: 78 MMHG | OXYGEN SATURATION: 98 % | HEART RATE: 85 BPM | HEIGHT: 61 IN | SYSTOLIC BLOOD PRESSURE: 160 MMHG | WEIGHT: 113.6 LBS | BODY MASS INDEX: 21.45 KG/M2

## 2024-09-23 DIAGNOSIS — D50.8 IRON DEFICIENCY ANEMIA SECONDARY TO INADEQUATE DIETARY IRON INTAKE: ICD-10-CM

## 2024-09-23 DIAGNOSIS — Z72.0 TOBACCO ABUSE: ICD-10-CM

## 2024-09-23 DIAGNOSIS — I10 BENIGN ESSENTIAL HTN: ICD-10-CM

## 2024-09-23 DIAGNOSIS — R21 RASH AND NONSPECIFIC SKIN ERUPTION: ICD-10-CM

## 2024-09-23 DIAGNOSIS — M81.0 AGE-RELATED OSTEOPOROSIS WITHOUT CURRENT PATHOLOGICAL FRACTURE: ICD-10-CM

## 2024-09-23 DIAGNOSIS — R31.9 HEMATURIA, UNSPECIFIED TYPE: Primary | ICD-10-CM

## 2024-09-23 PROCEDURE — 99214 OFFICE O/P EST MOD 30 MIN: CPT | Performed by: STUDENT IN AN ORGANIZED HEALTH CARE EDUCATION/TRAINING PROGRAM

## 2024-09-23 PROCEDURE — 81003 URINALYSIS AUTO W/O SCOPE: CPT | Performed by: STUDENT IN AN ORGANIZED HEALTH CARE EDUCATION/TRAINING PROGRAM

## 2024-09-23 RX ORDER — NICOTINE 21 MG/24HR
1 PATCH, TRANSDERMAL 24 HOURS TRANSDERMAL EVERY 24 HOURS
Qty: 28 PATCH | Refills: 0 | Status: SHIPPED | OUTPATIENT
Start: 2024-09-23

## 2024-09-23 RX ORDER — LISINOPRIL 10 MG/1
10 TABLET ORAL DAILY
Qty: 90 TABLET | Refills: 0 | Status: SHIPPED | OUTPATIENT
Start: 2024-09-23

## 2024-09-23 RX ORDER — FLUOCINONIDE 0.5 MG/G
OINTMENT TOPICAL 2 TIMES DAILY PRN
Qty: 60 G | Refills: 0 | Status: SHIPPED | OUTPATIENT
Start: 2024-09-23

## 2024-09-23 NOTE — PROGRESS NOTES
[facility]   Stacey Olson DO    Assessment/Plan:      Diagnosis ICD-10-CM Associated Orders   1. Hematuria, unspecified type  R31.9 POCT urine dip auto non-scope      2. Benign essential HTN  I10 Basic metabolic panel     Basic metabolic panel     lisinopril (ZESTRIL) 10 mg tablet      3. Tobacco abuse  Z72.0 nicotine (NICODERM CQ) 21 mg/24 hr TD 24 hr patch      4. Age-related osteoporosis without current pathological fracture  M81.0       5. Iron deficiency anemia secondary to inadequate dietary iron intake  D50.8 CBC and Platelet     Iron, TIBC and Ferritin Panel     CBC and Platelet     Iron, TIBC and Ferritin Panel      6. Rash and nonspecific skin eruption  R21 fluocinonide (LIDEX) 0.05 % ointment        Will increase lisinopril from 5 mg to 10 mg daily. Reordered it. PT to c/w at checking at home. Watch for LH or DZ.  UA in office negative for blood, possible irritated urethra last week or kidney stone concern. Felt fatigued some last week, but better now.   Due for rpt labs, has been on iron for 90 d or so. No constipation.   Pt trying to quit smoking for her birthday this week. Will order patches.   Notices more dry skin over the winter. In the past on lidex, needs refill. Found on old med list.   Return in about 15 weeks (around 1/6/2025) for Yrly Physical.  Patient may call or return to office with any questions or concerns.   ______________________________________________________________________  Subjective:     Patient ID: Lori Childs is a 63 y.o. female.  Lori Childs  Chief Complaint   Patient presents with    Medication Management    Blood in Urine     X 1 day last week      UA in office - no blood. Only happened 5 days ago.     Chronic cough, feels like she has tons of mucus, white or yellow in color.   Smoker for more  than half her life.   Trying to quit this week.   1.5 ppd.   Tobacco Use: High Risk (9/23/2024)    Patient History     Smoking Tobacco Use: Every Day      Smokeless Tobacco Use: Never     Passive Exposure: Not on file     Home BP's 130's/78.    Wt Readings from Last 3 Encounters:   09/23/24 51.5 kg (113 lb 9.6 oz)   06/20/24 50.6 kg (111 lb 9.6 oz)   04/30/24 53.5 kg (118 lb)     Temp Readings from Last 3 Encounters:   03/18/24 99 °F (37.2 °C)   01/04/24 (!) 97.4 °F (36.3 °C) (Oral)   07/14/23 (!) 97.3 °F (36.3 °C)     BP Readings from Last 3 Encounters:   09/23/24 160/78   06/20/24 122/68   04/30/24 160/90     Pulse Readings from Last 3 Encounters:   09/23/24 85   06/20/24 83   04/30/24 82     Taking an OTC iron, once daily [employer] brand.   Labs last done in April. Has had issues with anemia & low iron.        The following portions of the patient's history were reviewed and updated as appropriate: allergies, current medications, past medical history, and problem list.    Review of Systems   Constitutional:  Negative for chills and fever.   Respiratory:  Positive for cough. Negative for chest tightness and shortness of breath.    Cardiovascular:  Negative for chest pain, palpitations and leg swelling.   Gastrointestinal:  Negative for blood in stool, diarrhea, nausea and vomiting.   Genitourinary:  Positive for hematuria. Negative for dysuria, vaginal bleeding and vaginal discharge.   Musculoskeletal:  Positive for neck stiffness (at times, lately giving her a HA).   Neurological:  Positive for headaches (on & off). Negative for dizziness and light-headedness.       Objective:      Vitals:    09/23/24 0903   BP: 160/78   Pulse:    SpO2:       Physical Exam  Vitals and nursing note reviewed.   Constitutional:       General: She is not in acute distress.     Appearance: Normal appearance. She is normal weight. She is not ill-appearing.   HENT:      Head: Normocephalic and atraumatic.      Right Ear: Tympanic membrane, ear canal and external ear normal. There is no impacted cerumen.      Left Ear: Ear canal and external ear normal. There is no impacted cerumen.  "     Ears:      Comments: Left TM, thin area from childhood TM rupture     Nose: Nose normal. No congestion.      Mouth/Throat:      Mouth: Mucous membranes are moist.      Pharynx: Oropharynx is clear. No oropharyngeal exudate.      Comments: Dentures  Eyes:      General: No scleral icterus.        Right eye: No discharge.         Left eye: No discharge.   Neck:      Comments: No thyroid nodules or thyromegaly.  Cardiovascular:      Rate and Rhythm: Normal rate and regular rhythm.      Pulses: Normal pulses.      Heart sounds: Normal heart sounds. No murmur heard.  Pulmonary:      Effort: Pulmonary effort is normal. No respiratory distress.      Breath sounds: Normal breath sounds. No stridor. No wheezing.      Comments: Wet cough, hoarse voice   Musculoskeletal:      Cervical back: Normal range of motion and neck supple. No rigidity or tenderness.      Right lower leg: No edema.      Left lower leg: No edema.   Lymphadenopathy:      Cervical: No cervical adenopathy.   Neurological:      Mental Status: She is alert and oriented to person, place, and time.      Gait: Gait normal.   Psychiatric:         Mood and Affect: Mood normal.         Behavior: Behavior normal.         Thought Content: Thought content normal.         Judgment: Judgment normal.         Portions of the record may have been created with voice recognition software. Occasional wrong word or \"sound alike\" substitutions may have occurred due to the inherent limitations of voice recognition software. Please review the chart carefully and recognize, using context, where substitutions/typographical errors may have occurred.     "

## 2024-09-23 NOTE — PATIENT INSTRUCTIONS
Please call [facility]'s Central Scheduling to make an appt: [phone] to make appt for DXA/Bone Scan & CT Lung/ Lung Cancer Screening.     Call in with home or work BP's in 2-4 weeks.   A few per week.

## 2024-10-04 DIAGNOSIS — M62.830 LUMBAR PARASPINAL MUSCLE SPASM: ICD-10-CM

## 2024-10-04 RX ORDER — METHOCARBAMOL 500 MG/1
500 TABLET, FILM COATED ORAL 2 TIMES DAILY
Qty: 60 TABLET | Refills: 2 | Status: SHIPPED | OUTPATIENT
Start: 2024-10-04

## 2024-10-06 ENCOUNTER — HOSPITAL ENCOUNTER (EMERGENCY)
Facility: HOSPITAL | Age: 64
Discharge: HOME | End: 2024-10-06
Attending: EMERGENCY MEDICINE
Payer: COMMERCIAL

## 2024-10-06 ENCOUNTER — APPOINTMENT (EMERGENCY)
Dept: CT IMAGING | Facility: HOSPITAL | Age: 64
End: 2024-10-06
Payer: COMMERCIAL

## 2024-10-06 VITALS
TEMPERATURE: 98.5 F | BODY MASS INDEX: 21.73 KG/M2 | RESPIRATION RATE: 19 BRPM | HEART RATE: 77 BPM | SYSTOLIC BLOOD PRESSURE: 128 MMHG | OXYGEN SATURATION: 98 % | WEIGHT: 115 LBS | DIASTOLIC BLOOD PRESSURE: 61 MMHG

## 2024-10-06 DIAGNOSIS — K92.1 BLOODY STOOLS: ICD-10-CM

## 2024-10-06 DIAGNOSIS — K52.9 COLITIS: Primary | ICD-10-CM

## 2024-10-06 LAB
ALBUMIN SERPL BCG-MCNC: 4.1 G/DL (ref 3.5–5)
ALP SERPL-CCNC: 55 U/L (ref 34–104)
ALT SERPL W P-5'-P-CCNC: 22 U/L (ref 7–52)
ANION GAP SERPL CALCULATED.3IONS-SCNC: 7 MMOL/L (ref 4–13)
APTT PPP: 25 SECONDS (ref 23–34)
AST SERPL W P-5'-P-CCNC: 18 U/L (ref 13–39)
BASOPHILS # BLD AUTO: 0.03 THOUSANDS/ΜL (ref 0–0.1)
BASOPHILS NFR BLD AUTO: 0 % (ref 0–1)
BILIRUB SERPL-MCNC: 0.27 MG/DL (ref 0.2–1)
BUN SERPL-MCNC: 19 MG/DL (ref 5–25)
CALCIUM SERPL-MCNC: 8.6 MG/DL (ref 8.4–10.2)
CHLORIDE SERPL-SCNC: 111 MMOL/L (ref 96–108)
CO2 SERPL-SCNC: 22 MMOL/L (ref 21–32)
CREAT SERPL-MCNC: 0.78 MG/DL (ref 0.6–1.3)
EOSINOPHIL # BLD AUTO: 0.12 THOUSAND/ΜL (ref 0–0.61)
EOSINOPHIL NFR BLD AUTO: 1 % (ref 0–6)
ERYTHROCYTE [DISTWIDTH] IN BLOOD BY AUTOMATED COUNT: 11.4 % (ref 11.6–15.1)
GFR SERPL CREATININE-BSD FRML MDRD: 81 ML/MIN/1.73SQ M
GLUCOSE SERPL-MCNC: 118 MG/DL (ref 65–140)
HCT VFR BLD AUTO: 37 % (ref 34.8–46.1)
HGB BLD-MCNC: 12.5 G/DL (ref 11.5–15.4)
IMM GRANULOCYTES # BLD AUTO: 0.06 THOUSAND/UL (ref 0–0.2)
IMM GRANULOCYTES NFR BLD AUTO: 1 % (ref 0–2)
INR PPP: 1.12 (ref 0.85–1.19)
LACTATE SERPL-SCNC: 0.8 MMOL/L (ref 0.5–2)
LIPASE SERPL-CCNC: 39 U/L (ref 11–82)
LYMPHOCYTES # BLD AUTO: 1.22 THOUSANDS/ΜL (ref 0.6–4.47)
LYMPHOCYTES NFR BLD AUTO: 12 % (ref 14–44)
MCH RBC QN AUTO: 35.2 PG (ref 26.8–34.3)
MCHC RBC AUTO-ENTMCNC: 33.8 G/DL (ref 31.4–37.4)
MCV RBC AUTO: 104 FL (ref 82–98)
MONOCYTES # BLD AUTO: 0.5 THOUSAND/ΜL (ref 0.17–1.22)
MONOCYTES NFR BLD AUTO: 5 % (ref 4–12)
NEUTROPHILS # BLD AUTO: 7.9 THOUSANDS/ΜL (ref 1.85–7.62)
NEUTS SEG NFR BLD AUTO: 81 % (ref 43–75)
NRBC BLD AUTO-RTO: 0 /100 WBCS
PLATELET # BLD AUTO: 205 THOUSANDS/UL (ref 149–390)
PMV BLD AUTO: 9.6 FL (ref 8.9–12.7)
POTASSIUM SERPL-SCNC: 3.9 MMOL/L (ref 3.5–5.3)
PROT SERPL-MCNC: 6.1 G/DL (ref 6.4–8.4)
PROTHROMBIN TIME: 14.9 SECONDS (ref 12.3–15)
RBC # BLD AUTO: 3.55 MILLION/UL (ref 3.81–5.12)
SODIUM SERPL-SCNC: 140 MMOL/L (ref 135–147)
WBC # BLD AUTO: 9.83 THOUSAND/UL (ref 4.31–10.16)

## 2024-10-06 PROCEDURE — 83605 ASSAY OF LACTIC ACID: CPT

## 2024-10-06 PROCEDURE — 96374 THER/PROPH/DIAG INJ IV PUSH: CPT

## 2024-10-06 PROCEDURE — 83690 ASSAY OF LIPASE: CPT

## 2024-10-06 PROCEDURE — 80053 COMPREHEN METABOLIC PANEL: CPT

## 2024-10-06 PROCEDURE — 85610 PROTHROMBIN TIME: CPT

## 2024-10-06 PROCEDURE — 96361 HYDRATE IV INFUSION ADD-ON: CPT

## 2024-10-06 PROCEDURE — 99285 EMERGENCY DEPT VISIT HI MDM: CPT

## 2024-10-06 PROCEDURE — 74177 CT ABD & PELVIS W/CONTRAST: CPT

## 2024-10-06 PROCEDURE — 85025 COMPLETE CBC W/AUTO DIFF WBC: CPT

## 2024-10-06 PROCEDURE — 85730 THROMBOPLASTIN TIME PARTIAL: CPT

## 2024-10-06 PROCEDURE — 36415 COLL VENOUS BLD VENIPUNCTURE: CPT

## 2024-10-06 PROCEDURE — 93005 ELECTROCARDIOGRAM TRACING: CPT

## 2024-10-06 RX ORDER — PANTOPRAZOLE SODIUM 40 MG/10ML
40 INJECTION, POWDER, LYOPHILIZED, FOR SOLUTION INTRAVENOUS ONCE
Status: COMPLETED | OUTPATIENT
Start: 2024-10-06 | End: 2024-10-06

## 2024-10-06 RX ADMIN — SODIUM CHLORIDE 1000 ML: 0.9 INJECTION, SOLUTION INTRAVENOUS at 14:30

## 2024-10-06 RX ADMIN — IOHEXOL 100 ML: 350 INJECTION, SOLUTION INTRAVENOUS at 16:00

## 2024-10-06 RX ADMIN — PANTOPRAZOLE SODIUM 40 MG: 40 INJECTION, POWDER, FOR SOLUTION INTRAVENOUS at 14:37

## 2024-10-06 NOTE — ED PROVIDER NOTES
Final diagnoses:   Colitis   Bloody stools     ED Disposition       ED Disposition   Discharge    Condition   Stable    Date/Time   Sun Oct 6, 2024  5:39 PM    Comment   Lori Childs discharge to home/self care.                   Assessment & Plan       Medical Decision Making  Differential diagnosis including but not limited to: upper GI bleed, gastritis, PUD, diverticulosis, anemia, coagulopathy, diverticular bleed, polyp    Patient with 1 day of lower abdominal pain with associated bloody stools.  Mild lower abdominal tenderness on exam.  Will obtain labs and CT imaging to further evaluate for anemia, coagulopathy, and acute intra-abdominal pathology such as diverticulitis or diverticular bleed.  Will medicate with fluids and Protonix.    Problems Addressed:  Bloody stools: acute illness or injury  Colitis: acute illness or injury    Amount and/or Complexity of Data Reviewed  Independent Historian: spouse  Labs: ordered. Decision-making details documented in ED Course.  Radiology: ordered. Decision-making details documented in ED Course.  ECG/medicine tests: ordered and independent interpretation performed.    Risk  OTC drugs.  Prescription drug management.        ED Course as of 10/06/24 1749   Sun Oct 06, 2024   1448 Hemoglobin: 12.5  Hemoglobin stable   1605 Patient and her  updated on lab results.  She is going to CAT scan now.  No further episodes of pain.  No further bowel movements since being here.   1733 CT abdomen pelvis with contrast  IMPRESSION:     Sigmoid and descending colonic edematous wall thickening consistent with colitis.  Fluid-filled nondistended loops of distal small bowel consistent with a mild enteritis.     1749 Patient updated on results.  Continues to appear well.  No episodes of abdominal cramping here or bowel movements.  Abdominal exam soft, flat, nontender on reevaluation.  Suspect colitis as cause of patient's symptoms.  Less suspicious for acute GI bleed.  Will have her  closely follow-up with primary care and outpatient GI.  Discussed strict return precautions and she and her  demonstrate understanding teach back method.       Medications   sodium chloride 0.9 % bolus 1,000 mL (1,000 mL Intravenous New Bag 10/6/24 1430)   pantoprazole (PROTONIX) injection 40 mg (40 mg Intravenous Given 10/6/24 1437)   iohexol (OMNIPAQUE) 350 MG/ML injection (MULTI-DOSE) 100 mL (100 mL Intravenous Given 10/6/24 1600)       ED Risk Strat Scores                                               History of Present Illness       Chief Complaint   Patient presents with    Rectal Bleeding     Pt c/o lower abd pain x2 days, 5 episodes of bloody stool today, dark blood, minimal stool       Past Medical History:   Diagnosis Date    Anemia     Anxiety     Asthma     Bursitis of left elbow     Chronic pain disorder     lower back    Colon polyp     Fractures     Hiatal hernia     Hypertension     Iron deficiency anemia secondary to inadequate dietary iron intake 01/29/2020    Kidney stones     Pancreatitis     Psoriasis     Psychiatric disorder     anxiety    Restless leg syndrome     Tobacco use disorder       Past Surgical History:   Procedure Laterality Date    CYSTOSCOPY      CYSTOSCOPY W/ URETERAL STENT PLACEMENT      EXPLORATORY LAPAROTOMY      small intestine blockage no resection    HAND SURGERY      LAPAROSCOPY      --  for SBO    [place] ERCP DX COLLECTION SPECIMEN BRUSHING/WASHING N/A 08/15/2018    Procedure: ENDOSCOPIC RETROGRADE CHOLANGIOPANCREATOGRAPHY (ERCP);  Surgeon: Odell Whitaker MD;  Location: [facility];  Service: Gastroenterology    [place] LAPS ABD PRTM&OMENTUM DX W/WO SPEC BR/WA SPX N/A 02/10/2020    Procedure: LAPAROSCOPY DIAGNOSTIC, REPAIR ENTEROTOMY, REMOVAL OF BEZOAR;  Surgeon: Baljit Faith MD;  Location: [facility];  Service: General    TENDON REPAIR        Family History   Problem Relation Age of Onset    Mental illness Sister     Other Other         Cardiovascular disease     Hypertension Mother     Cancer Father     Substance Abuse Neg Hx     Colon cancer Neg Hx     Colon polyps Neg Hx       Social History     Tobacco Use    Smoking status: Every Day     Current packs/day: 1.00     Average packs/day: 1 pack/day for 30.0 years (30.0 ttl pk-yrs)     Types: Cigarettes    Smokeless tobacco: Never    Tobacco comments:     Attempting to stop   Vaping Use    Vaping status: Never Used   Substance Use Topics    Alcohol use: Never    Drug use: No      E-Cigarette/Vaping    E-Cigarette Use Never User       E-Cigarette/Vaping Substances    Nicotine Yes nicotine patch    THC No     CBD No     Flavoring No     Other No     Unknown No       I have reviewed and agree with the history as documented.     The patient is a 64-year-old female with PMH of anxiety, anemia, asthma, HTN, nephrolithiasis, pancreatitis, and SBO presenting for evaluation of 1 day of lower abdominal pain with blood in stool.  The patient started this morning with lower abdominal pain that is intermittent, described as waxing waning, cramping/pressure, with associated dark maroon bloody stools.  She denies bright red blood but does note there are dark blood clots.  She denies black and tarry appearance.  She does not take aspirin or blood thinners.  She notes she otherwise has been in her usual state of health.      History provided by:  Patient   used: No        Review of Systems   Constitutional:  Negative for fever.   Respiratory:  Negative for shortness of breath.    Cardiovascular:  Negative for chest pain.   Gastrointestinal:  Positive for abdominal pain and blood in stool. Negative for abdominal distention, anal bleeding, diarrhea, nausea, rectal pain and vomiting.   All other systems reviewed and are negative.          Objective       ED Triage Vitals [10/06/24 1355]   Temperature Pulse Blood Pressure Respirations SpO2 Patient Position - Orthostatic VS   98.5 °F (36.9 °C) 93 157/72 18 97 % Sitting       Temp src Heart Rate Source BP Location FiO2 (%) Pain Score    -- Monitor Right arm -- 4      Vitals      Date and Time Temp Pulse SpO2 Resp BP Pain Score FACES Pain Rating User   10/06/24 1725 -- -- -- -- -- No Pain -- GS   10/06/24 1522 -- -- -- -- -- No Pain -- AL   10/06/24 1500 -- 76 98 % 19 128/60 -- -- CAC   10/06/24 1355 98.5 °F (36.9 °C) 93 97 % 18 157/72 4 -- CHELSEA            Physical Exam  Vitals and nursing note reviewed.   Constitutional:       General: She is not in acute distress.     Appearance: Normal appearance. She is normal weight. She is not ill-appearing, toxic-appearing or diaphoretic.   HENT:      Head: Normocephalic and atraumatic.      Nose: Nose normal.      Mouth/Throat:      Mouth: Mucous membranes are moist.      Pharynx: Oropharynx is clear.   Eyes:      Extraocular Movements: Extraocular movements intact.      Conjunctiva/sclera: Conjunctivae normal.   Cardiovascular:      Rate and Rhythm: Normal rate and regular rhythm.      Pulses:           Radial pulses are 2+ on the right side and 2+ on the left side.      Heart sounds: S1 normal and S2 normal. Murmur heard.      Systolic murmur is present with a grade of 1/6.   Pulmonary:      Effort: Pulmonary effort is normal. No respiratory distress.      Breath sounds: Normal breath sounds and air entry.   Abdominal:      General: There is no distension.      Palpations: Abdomen is soft.      Tenderness: There is abdominal tenderness in the right lower quadrant, suprapubic area and left lower quadrant. There is no guarding or rebound.   Musculoskeletal:         General: Normal range of motion.      Cervical back: Normal range of motion and neck supple.   Skin:     General: Skin is warm and dry.      Capillary Refill: Capillary refill takes less than 2 seconds.      Findings: No rash.   Neurological:      General: No focal deficit present.      Mental Status: She is alert and oriented to person, place, and time. Mental status is at baseline.          Results Reviewed       Procedure Component Value Units Date/Time    Comprehensive metabolic panel [[id number]]  (Abnormal) Collected: 10/06/24 1428    Lab Status: Final result Specimen: Blood from Arm, Right Updated: 10/06/24 1458     Sodium 140 mmol/L      Potassium 3.9 mmol/L      Chloride 111 mmol/L      CO2 22 mmol/L      ANION GAP 7 mmol/L      BUN 19 mg/dL      Creatinine 0.78 mg/dL      Glucose 118 mg/dL      Calcium 8.6 mg/dL      AST 18 U/L      ALT 22 U/L      Alkaline Phosphatase 55 U/L      Total Protein 6.1 g/dL      Albumin 4.1 g/dL      Total Bilirubin 0.27 mg/dL      eGFR 81 ml/min/1.73sq m     Narrative:      [employer] guidelines for Chronic Kidney Disease (CKD):     Stage 1 with normal or high GFR (GFR > 90 mL/min/1.73 square meters)    Stage 2 Mild CKD (GFR = 60-89 mL/min/1.73 square meters)    Stage 3A Moderate CKD (GFR = 45-59 mL/min/1.73 square meters)    Stage 3B Moderate CKD (GFR = 30-44 mL/min/1.73 square meters)    Stage 4 Severe CKD (GFR = 15-29 mL/min/1.73 square meters)    Stage 5 End Stage CKD (GFR <15 mL/min/1.73 square meters)  Note: GFR calculation is accurate only with a steady state creatinine    Lipase [[id number]]  (Normal) Collected: 10/06/24 1428    Lab Status: Final result Specimen: Blood from Arm, Right Updated: 10/06/24 1458     Lipase 39 u/L     Lactic acid, plasma (w/reflex if result > 2.0) [[id number]]  (Normal) Collected: 10/06/24 1428    Lab Status: Final result Specimen: Blood from Arm, Right Updated: 10/06/24 1457     LACTIC ACID 0.8 mmol/L     Narrative:      Result may be elevated if tourniquet was used during collection.    Protime-INR [[id number]]  (Normal) Collected: 10/06/24 1428    Lab Status: Final result Specimen: Blood from Arm, Right Updated: 10/06/24 1453     Protime 14.9 seconds      INR 1.12    Narrative:      INR Therapeutic Range    Indication                                             INR Range      Atrial Fibrillation                                                2.0-3.0  Hypercoagulable State                                    2.0.2.3  Left Ventricular Asist Device                            2.0-3.0  Mechanical Heart Valve                                  -    Aortic(with afib, MI, embolism, HF, LA enlargement,    and/or coagulopathy)                                     2.0-3.0 (2.5-3.5)     Mitral                                                             2.5-3.5  Prosthetic/Bioprosthetic Heart Valve               2.0-3.0  Venous thromboembolism (VTE: VT, PE        2.0-3.0    APTT [[id number]]  (Normal) Collected: 10/06/24 1428    Lab Status: Final result Specimen: Blood from Arm, Right Updated: 10/06/24 1453     PTT 25 seconds     CBC and differential [[id number]]  (Abnormal) Collected: 10/06/24 1428    Lab Status: Final result Specimen: Blood from Arm, Right Updated: 10/06/24 1440     WBC 9.83 Thousand/uL      RBC 3.55 Million/uL      Hemoglobin 12.5 g/dL      Hematocrit 37.0 %       fL      MCH 35.2 pg      MCHC 33.8 g/dL      RDW 11.4 %      MPV 9.6 fL      Platelets 205 Thousands/uL      nRBC 0 /100 WBCs      Segmented % 81 %      Immature Grans % 1 %      Lymphocytes % 12 %      Monocytes % 5 %      Eosinophils Relative 1 %      Basophils Relative 0 %      Absolute Neutrophils 7.90 Thousands/µL      Absolute Immature Grans 0.06 Thousand/uL      Absolute Lymphocytes 1.22 Thousands/µL      Absolute Monocytes 0.50 Thousand/µL      Eosinophils Absolute 0.12 Thousand/µL      Basophils Absolute 0.03 Thousands/µL             CT abdomen pelvis with contrast   Final Interpretation by Erasmo Coon MD (10/06 1728)      Sigmoid and descending colonic edematous wall thickening consistent with colitis.   Fluid-filled nondistended loops of distal small bowel consistent with a mild enteritis.      The study was marked in EPIC for immediate notification.               Workstation performed: [id number]             ECG 12 Lead  Documentation Only    Date/Time: 10/6/2024 2:55 PM    Performed by: ROXY Chase  Authorized by: ROXY Chase    Indications / Diagnosis:  GI bleed  ECG reviewed by me, the ED Provider: yes    Patient location:  ED  Previous ECG:     Previous ECG:  Compared to current    Comparison ECG info:  November 19, 2020    Similarity:  No change    Comparison to cardiac monitor: Yes    Interpretation:     Interpretation: non-specific    Rate:     ECG rate:  84    ECG rate assessment: normal    Rhythm:     Rhythm: sinus rhythm    Ectopy:     Ectopy: none    QRS:     QRS axis:  Left    QRS intervals:  Normal  Conduction:     Conduction: normal    ST segments:     ST segments:  Normal  T waves:     T waves: non-specific    Comments:      Sinus rhythm, leftward axis, normal intervals, no acute ischemic changes read by me      ED Medication and Procedure Management   Prior to Admission Medications   Prescriptions Last Dose Informant Patient Reported? Taking?   Probiotic Product (PROBIOTIC-10 ULTIMATE PO)  Self Yes No   Sig: Take by mouth   amLODIPine (NORVASC) 10 mg tablet   No No   Sig: Take 1 tablet (10 mg total) by mouth daily Decreased dose   ferrous sulfate 324 MG TBEC   No No   Sig: Take 1 tablet (324 mg total) by mouth daily with breakfast   fluocinonide (LIDEX) 0.05 % ointment   No No   Sig: Apply topically 2 (two) times a day as needed for rash   fluticasone (FLONASE) 50 mcg/act nasal spray   No No   Sig: SPRAY 1 SPRAY INTO EACH NOSTRIL EVERY DAY   lisinopril (ZESTRIL) 10 mg tablet   No No   Sig: Take 1 tablet (10 mg total) by mouth daily   loratadine-pseudoephedrine (CLARITIN-D 24-HOUR)  mg per 24 hr tablet   Yes No   Sig: Take 1 tablet by mouth daily   methocarbamol (ROBAXIN) 500 mg tablet   No No   Sig: TAKE 1 TABLET BY MOUTH TWICE A DAY   nicotine (NICODERM CQ) 21 mg/24 hr TD 24 hr patch   No No   Sig: Place 1 patch on the skin over 24 hours every 24 hours      Facility-Administered Medications: None      Patient's Medications   Discharge Prescriptions    No medications on file       ED SEPSIS DOCUMENTATION   Time reflects when diagnosis was documented in both MDM as applicable and the Disposition within this note       Time User Action Codes Description Comment    10/6/2024  5:39 PM Kelly Luna [K52.9] Colitis     10/6/2024  5:47 PM Kelly Luna [K92.1] Bloody stools                  ROXY Chase  10/06/24 9042

## 2024-10-06 NOTE — DISCHARGE INSTRUCTIONS
Take small sips of fluids throughout the day to maintain your hydration.  Eat soft mechanical foods like applesauce, mashed potatoes, rice, toast, and soups.  Continue to monitor your stools.  Return if you develop new or worsening abdominal pain, vomiting, difficulty breathing, persistent diarrhea-like stools, or any acute worsening of your blood in stools.

## 2024-10-07 ENCOUNTER — OFFICE VISIT (OUTPATIENT)
Dept: GASTROENTEROLOGY | Facility: CLINIC | Age: 64
End: 2024-10-07
Payer: COMMERCIAL

## 2024-10-07 ENCOUNTER — TELEPHONE (OUTPATIENT)
Age: 64
End: 2024-10-07

## 2024-10-07 ENCOUNTER — TELEPHONE (OUTPATIENT)
Dept: GASTROENTEROLOGY | Facility: CLINIC | Age: 64
End: 2024-10-07

## 2024-10-07 VITALS
SYSTOLIC BLOOD PRESSURE: 128 MMHG | WEIGHT: 115 LBS | HEIGHT: 61 IN | BODY MASS INDEX: 21.71 KG/M2 | DIASTOLIC BLOOD PRESSURE: 80 MMHG

## 2024-10-07 DIAGNOSIS — K52.9 COLITIS: ICD-10-CM

## 2024-10-07 DIAGNOSIS — K92.1 BLOODY STOOLS: ICD-10-CM

## 2024-10-07 PROCEDURE — 99244 OFF/OP CNSLTJ NEW/EST MOD 40: CPT | Performed by: NURSE PRACTITIONER

## 2024-10-07 NOTE — TELEPHONE ENCOUNTER
Patient wanted to let Dr. Olson know that she was in the ER 10/06/24 for rectal bleeding.  She will be calling her gastroenterologist for an appt. LETITIA

## 2024-10-07 NOTE — PROGRESS NOTES
[facility] - Outpatient Consultation  Lori Childs 64 y.o. female MRN: [id number]  Encounter: [id number]    ASSESSMENT AND PLAN:      1.  Acute colitis on CT scan in ED  2.  Hematochezia/rectal bleeding  Presented to ED 10/6 with 3 episodes of watery diarrhea and development of hematochezia and bright red blood with wiping after last episode  Associated mild lower abdominal cramping  CT scan abdomen/pelvis with contrast in ED showed mild left abdominal colitis and mild enteritis  Hemoglobin stable at 12.5, normal WBCs.  Afebrile  No further diarrhea but small amount rectal bleeding this a.m.  Colonoscopy 2020 showed large prolapsed hemorrhoids which may be cause of rectal bleeding in setting of diarrhea  Suspect probable viral gastroenteritis with hemorrhoidal bleeding due to diarrhea.  Low suspicion for infectious colitis as diarrhea is self-limiting  Given abnormal CT findings and patient reports sister with history of colitis recommend colonoscopy in 6 to 8 weeks for further evaluation  -Scheduled for colonoscopy in [facility] center in 6 to 8 weeks  -Recommend bland low fiber diet for 3 to 5 days  -Encourage patient to remain well-hydrated with at least 64 ounces of water and electrolyte replacement daily  -Contact office if recurrent diarrhea or rectal bleeding persists after 5 days    3.  History of colon polyps  Prior colonoscopy 10/2020 revealed 2 hyperplastic polyps which were excised, noted to have grade 3 large prolapsing internal hemorrhoids  5-year recall recommended in October 2025  -Scheduled for colonoscopy as above due to possible colitis    Followup Appointment: In 3 months after colonoscopy  ______________________________________________________________________    Chief Complaint   Patient presents with    Follow-up     Pt was in ER yesterday with rectal bleeding and lower abdominal pain, possible Colitis and pt needs colon per ER       HPI:   Lori Childs is a  64 y.o. year old female who presents after ED evaluation at [facility] yesterday 10/6/2024.  Patient developed acute onset of watery diarrhea yesterday  10/6 with 3 loose bowel movements and associated lower abdominal cramping  She noted bright red blood in last liquid stool and bright red blood with wiping after bowel movement    In ED, CT scan abdomen/pelvis with contrast showed wall thickening at sigmoid and descending colon, fluid-filled nondistended distal small bowel loops consistent with mild enteritis  Hemoglobin stable at 12.5, WBCs normal at 9.83  Patient had no further diarrhea or rectal bleeding while in ED.  She was discharged home with GI follow-up    Patient denies any further diarrhea since discharge from ED.  She did have small formed BM today but again noted bright red blood with wiping and passed small blood clot this a.m.  Able to tolerate regular food last evening and this a.m.  No nausea or vomiting  Denies fever or chills    Patient denies prior history of colitis or rectal bleeding  She does report her sister has colitis but unsure if it is IBD  Denies recent change in bowel habits with formed bowel movement every 1 to 2 days.  Denies melena or rectal bleeding prior to acute episode    Prior colonoscopy 10/2020 revealed 2 hyperplastic polyps which were excised, noted to have grade 3 large prolapsing internal hemorrhoids    Historical Information   Past Medical History:   Diagnosis Date    Anemia     Anxiety     Asthma     Bursitis of left elbow     Chronic pain disorder     lower back    Colon polyp     Fractures     Hiatal hernia     Hypertension     Iron deficiency anemia secondary to inadequate dietary iron intake 01/29/2020    Kidney stones     Pancreatitis     Psoriasis     Psychiatric disorder     anxiety    Restless leg syndrome     Tobacco use disorder      Past Surgical History:   Procedure Laterality Date    CYSTOSCOPY      CYSTOSCOPY W/ URETERAL STENT PLACEMENT       "EXPLORATORY LAPAROTOMY      small intestine blockage no resection    HAND SURGERY      LAPAROSCOPY      --  for SBO    MI ERCP DX COLLECTION SPECIMEN BRUSHING/WASHING N/A 08/15/2018    Procedure: ENDOSCOPIC RETROGRADE CHOLANGIOPANCREATOGRAPHY (ERCP);  Surgeon: Odell Whitaker MD;  Location: [facility];  Service: Gastroenterology    MI LAPS ABD PRTM&OMENTUM DX W/WO SPEC BR/WA SPX N/A 02/10/2020    Procedure: LAPAROSCOPY DIAGNOSTIC, REPAIR ENTEROTOMY, REMOVAL OF BEZOAR;  Surgeon: Baljit Faith MD;  Location: [facility];  Service: General    TENDON REPAIR       Social History     Substance and Sexual Activity   Alcohol Use Never     Social History     Substance and Sexual Activity   Drug Use No     Social History     Tobacco Use   Smoking Status Every Day    Current packs/day: 1.00    Average packs/day: 1 pack/day for 30.0 years (30.0 ttl pk-yrs)    Types: Cigarettes   Smokeless Tobacco Never   Tobacco Comments    Attempting to stop     Family History   Problem Relation Age of Onset    Mental illness Sister     Other Other         Cardiovascular disease    Hypertension Mother     Cancer Father     Substance Abuse Neg Hx     Colon cancer Neg Hx     Colon polyps Neg Hx        Meds/Allergies     Current Outpatient Medications:     amLODIPine (NORVASC) 10 mg tablet    ferrous sulfate 324 MG TBEC    fluocinonide (LIDEX) 0.05 % ointment    fluticasone (FLONASE) 50 mcg/act nasal spray    lisinopril (ZESTRIL) 10 mg tablet    loratadine-pseudoephedrine (CLARITIN-D 24-HOUR)  mg per 24 hr tablet    methocarbamol (ROBAXIN) 500 mg tablet    nicotine (NICODERM CQ) 21 mg/24 hr TD 24 hr patch    Probiotic Product (PROBIOTIC-10 ULTIMATE PO)  No current facility-administered medications for this visit.    Allergies   Allergen Reactions    Pollen Extract Sneezing    Tree Extract Sneezing       PHYSICAL EXAM:    Blood pressure 128/80, height 5' 1\" (1.549 m), weight 52.2 kg (115 lb), not currently breastfeeding. Body mass index " is 21.73 kg/m².  General Appearance: NAD, cooperative, alert  Eyes: Anicteric  ENT:  Normocephalic, atraumatic, normal mucosa.    Neck:  Supple, symmetrical, trachea midline,   Resp:  Clear to auscultation bilaterally; no rales, rhonchi or wheezing; respirations unlabored   CV:  S1 S2, Regular rate and rhythm; no murmur, rub, or gallop.  GI:  Soft, non-tender, non-distended; normal bowel sounds; no masses, no organomegaly   Rectal: Deferred  Musculoskeletal: No cyanosis, clubbing or edema. Normal ROM.  Skin:  No jaundice, rashes, or lesions   Psych: Normal affect, good eye contact  Neuro: No gross deficits, AAOx3    Lab Results:   Lab Results   Component Value Date    WBC 9.83 10/06/2024    HGB 12.5 10/06/2024    HCT 37.0 10/06/2024     (H) 10/06/2024     10/06/2024     Lab Results   Component Value Date     09/20/2017    K 3.9 10/06/2024     (H) 10/06/2024    CO2 22 10/06/2024    ANIONGAP 10 06/10/2015    BUN 19 10/06/2024    CREATININE 0.78 10/06/2024    GLUCOSE 96 06/10/2015    CALCIUM 8.6 10/06/2024    CORRECTEDCA 9.4 11/19/2020    AST 18 10/06/2024    ALT 22 10/06/2024    ALKPHOS 55 10/06/2024    PROT 5.9 (L) 09/20/2017    BILITOT 0.3 09/20/2017    EGFR 81 10/06/2024     Lab Results   Component Value Date    IRON 95 04/06/2024    TIBC 328 04/06/2024    FERRITIN 53 04/06/2024     Lab Results   Component Value Date    LIPASE 39 10/06/2024       Radiology Results:   CT abdomen pelvis with contrast    Result Date: 10/6/2024  Narrative: CT ABDOMEN AND PELVIS WITH IV CONTRAST INDICATION: Lower abdominal pain, melanotic stools. . Rectal Bleeding (Pt c/o lower abd pain x2 days, 5 episodes of bloody stool today, dark blood, minimal stool) COMPARISON: CT abdomen pelvis without contrast 10/5/2021, 11/19/2020. TECHNIQUE: CT examination of the abdomen and pelvis was performed. Multiplanar 2D reformatted images were created from the source data. This examination, like all CT scans performed in the  [facility], was performed utilizing techniques to minimize radiation dose exposure, including the use of iterative reconstruction and automated exposure control. Radiation dose length product (DLP) for this visit: 394.7 mGy-cm IV Contrast: 100 mL of iohexol (OMNIPAQUE) Enteric Contrast: Not administered. FINDINGS: ABDOMEN LOWER CHEST: No clinically significant abnormality in the visualized lower chest. LIVER/BILIARY TREE: Hepatomegaly. Trace intrahepatic biliary dilation and CBD prominence. No radiopaque obstructing stones or obstructing soft tissue lesion identified. Note of normal bilirubin on today's labs. GALLBLADDER: No calcified gallstones. No pericholecystic inflammatory change. SPLEEN: Unremarkable. PANCREAS: Unremarkable. ADRENAL GLANDS: Unremarkable. KIDNEYS/URETERS: Left renal cyst and other subcentimeter hypodense lesions are too small to characterize however statistically most likely also represent cysts. Punctate nonobstructing right midpole calculus. Nonobstructing calculi in the left kidney measuring up to 6 mm in the lower pole, 3 mm in the upper pole. Focal cortical thinning in the left upper pole likely sequela of prior infarct. No solid masses. No hydronephrosis. STOMACH AND BOWEL: Sigmoid and descending colonic edematous wall thickening. Fluid-filled nondistended loops of distal small bowel. No evidence of obstruction. Stomach unremarkable. APPENDIX: No findings to suggest appendicitis. ABDOMINOPELVIC CAVITY: No ascites. No pneumoperitoneum. No lymphadenopathy. VESSELS: Atherosclerosis without abdominal aortic aneurysm. Prominent left gonadal and left periuterine veins. PELVIS REPRODUCTIVE ORGANS: Unremarkable for patient's age. URINARY BLADDER: Unremarkable. ABDOMINAL WALL/INGUINAL REGIONS: Small fat-containing supraumbilical ventral abdominal hernia. BONES: No acute fracture or suspicious osseous lesion. Lumbar degenerative changes.     Impression: Sigmoid and descending  colonic edematous wall thickening consistent with colitis. Fluid-filled nondistended loops of distal small bowel consistent with a mild enteritis. The study was marked in EPIC for immediate notification. Workstation performed: [id number]         REVIEW OF SYSTEMS:    CONSTITUTIONAL: Denies any fever, chills, rigors, and weight loss.  HEENT: No earache or tinnitus. Denies hearing loss or visual disturbances.  CARDIOVASCULAR: No chest pain or palpitations.   RESPIRATORY: Denies any cough, hemoptysis, shortness of breath or dyspnea on exertion.  GASTROINTESTINAL: As noted in the History of Present Illness.   GENITOURINARY: No problems with urination. Denies any hematuria or dysuria.  NEUROLOGIC: No dizziness or vertigo, denies headaches.   MUSCULOSKELETAL: Denies any muscle or joint pain.   SKIN: Denies skin rashes or itching.   ENDOCRINE: Denies excessive thirst. Denies intolerance to heat or cold.  PSYCHOSOCIAL: Denies depression or anxiety. Denies any recent memory loss.

## 2024-10-07 NOTE — TELEPHONE ENCOUNTER
Scheduled date of colonoscopy (as of today): 11/19/2024  Physician performing colonoscopy: TRI  Location of colonoscopy: [facility]  Bowel prep reviewed with patient: Miralax  Instructions reviewed with patient by: SONU  Clearances: N

## 2024-10-09 LAB
ATRIAL RATE: 84 BPM
P AXIS: 71 DEGREES
PR INTERVAL: 124 MS
QRS AXIS: -43 DEGREES
QRSD INTERVAL: 86 MS
QT INTERVAL: 368 MS
QTC INTERVAL: 434 MS
T WAVE AXIS: 51 DEGREES
VENTRICULAR RATE: 84 BPM

## 2024-10-09 PROCEDURE — 93010 ELECTROCARDIOGRAM REPORT: CPT | Performed by: INTERNAL MEDICINE

## 2024-10-26 LAB
BUN SERPL-MCNC: 17 MG/DL (ref 7–25)
BUN/CREAT SERPL: NORMAL (CALC) (ref 6–22)
CALCIUM SERPL-MCNC: 8.8 MG/DL (ref 8.6–10.4)
CHLORIDE SERPL-SCNC: 110 MMOL/L (ref 98–110)
CO2 SERPL-SCNC: 27 MMOL/L (ref 20–32)
CREAT SERPL-MCNC: 0.72 MG/DL (ref 0.5–1.05)
ERYTHROCYTE [DISTWIDTH] IN BLOOD BY AUTOMATED COUNT: 11.1 % (ref 11–15)
FERRITIN SERPL-MCNC: 28 NG/ML (ref 16–288)
GFR/BSA.PRED SERPLBLD CYS-BASED-ARV: 94 ML/MIN/1.73M2
GLUCOSE SERPL-MCNC: 81 MG/DL (ref 65–139)
HCT VFR BLD AUTO: 38.1 % (ref 35–45)
HGB BLD-MCNC: 12.4 G/DL (ref 11.7–15.5)
IRON SATN MFR SERPL: 31 % (CALC) (ref 16–45)
IRON SERPL-MCNC: 99 MCG/DL (ref 45–160)
MCH RBC QN AUTO: 33.5 PG (ref 27–33)
MCHC RBC AUTO-ENTMCNC: 32.5 G/DL (ref 32–36)
MCV RBC AUTO: 103 FL (ref 80–100)
PLATELET # BLD AUTO: 244 THOUSAND/UL (ref 140–400)
PMV BLD REES-ECKER: 9.8 FL (ref 7.5–12.5)
POTASSIUM SERPL-SCNC: 4.3 MMOL/L (ref 3.5–5.3)
RBC # BLD AUTO: 3.7 MILLION/UL (ref 3.8–5.1)
SODIUM SERPL-SCNC: 143 MMOL/L (ref 135–146)
TIBC SERPL-MCNC: 317 MCG/DL (CALC) (ref 250–450)
WBC # BLD AUTO: 5.4 THOUSAND/UL (ref 3.8–10.8)

## 2024-11-05 ENCOUNTER — ANESTHESIA EVENT (OUTPATIENT)
Dept: ANESTHESIOLOGY | Facility: AMBULATORY SURGERY CENTER | Age: 64
End: 2024-11-05

## 2024-11-05 ENCOUNTER — ANESTHESIA (OUTPATIENT)
Dept: ANESTHESIOLOGY | Facility: AMBULATORY SURGERY CENTER | Age: 64
End: 2024-11-05

## 2024-11-06 ENCOUNTER — TELEPHONE (OUTPATIENT)
Dept: GASTROENTEROLOGY | Facility: CLINIC | Age: 64
End: 2024-11-06

## 2024-11-17 DIAGNOSIS — I10 BENIGN ESSENTIAL HTN: ICD-10-CM

## 2024-11-18 RX ORDER — LISINOPRIL 10 MG/1
10 TABLET ORAL DAILY
Qty: 90 TABLET | Refills: 1 | Status: SHIPPED | OUTPATIENT
Start: 2024-11-18

## 2024-11-19 ENCOUNTER — ANESTHESIA EVENT (OUTPATIENT)
Dept: GASTROENTEROLOGY | Facility: AMBULATORY SURGERY CENTER | Age: 64
End: 2024-11-19

## 2024-11-19 ENCOUNTER — HOSPITAL ENCOUNTER (OUTPATIENT)
Dept: GASTROENTEROLOGY | Facility: AMBULATORY SURGERY CENTER | Age: 64
Discharge: HOME | End: 2024-11-19
Payer: COMMERCIAL

## 2024-11-19 ENCOUNTER — TELEPHONE (OUTPATIENT)
Dept: GASTROENTEROLOGY | Facility: CLINIC | Age: 64
End: 2024-11-19

## 2024-11-19 ENCOUNTER — ANESTHESIA (OUTPATIENT)
Dept: GASTROENTEROLOGY | Facility: AMBULATORY SURGERY CENTER | Age: 64
End: 2024-11-19

## 2024-11-19 VITALS
SYSTOLIC BLOOD PRESSURE: 153 MMHG | DIASTOLIC BLOOD PRESSURE: 72 MMHG | WEIGHT: 115 LBS | TEMPERATURE: 97.4 F | HEART RATE: 68 BPM | HEIGHT: 61 IN | BODY MASS INDEX: 21.71 KG/M2 | OXYGEN SATURATION: 100 % | RESPIRATION RATE: 21 BRPM

## 2024-11-19 DIAGNOSIS — K52.9 COLITIS: ICD-10-CM

## 2024-11-19 DIAGNOSIS — K92.1 BLOODY STOOLS: ICD-10-CM

## 2024-11-19 DIAGNOSIS — K64.2 PROLAPSED INTERNAL HEMORRHOIDS, GRADE 3: Primary | ICD-10-CM

## 2024-11-19 PROCEDURE — 88305 TISSUE EXAM BY PATHOLOGIST: CPT | Performed by: PATHOLOGY

## 2024-11-19 PROCEDURE — 45380 COLONOSCOPY AND BIOPSY: CPT | Performed by: INTERNAL MEDICINE

## 2024-11-19 RX ORDER — PROPOFOL 10 MG/ML
INJECTION, EMULSION INTRAVENOUS AS NEEDED
Status: DISCONTINUED | OUTPATIENT
Start: 2024-11-19 | End: 2024-11-19

## 2024-11-19 RX ORDER — SODIUM CHLORIDE, SODIUM LACTATE, POTASSIUM CHLORIDE, CALCIUM CHLORIDE 600; 310; 30; 20 MG/100ML; MG/100ML; MG/100ML; MG/100ML
50 INJECTION, SOLUTION INTRAVENOUS CONTINUOUS
Status: DISCONTINUED | OUTPATIENT
Start: 2024-11-19 | End: 2024-11-23 | Stop reason: HOSPADM

## 2024-11-19 RX ORDER — HYDROCORTISONE 25 MG/G
CREAM TOPICAL 2 TIMES DAILY
Qty: 28 G | Refills: 1 | Status: SHIPPED | OUTPATIENT
Start: 2024-11-19 | End: 2024-12-03

## 2024-11-19 RX ADMIN — PROPOFOL 50 MG: 10 INJECTION, EMULSION INTRAVENOUS at 12:45

## 2024-11-19 RX ADMIN — PROPOFOL 100 MG: 10 INJECTION, EMULSION INTRAVENOUS at 12:32

## 2024-11-19 RX ADMIN — PROPOFOL 100 MG: 10 INJECTION, EMULSION INTRAVENOUS at 12:25

## 2024-11-19 RX ADMIN — PROPOFOL 50 MG: 10 INJECTION, EMULSION INTRAVENOUS at 12:39

## 2024-11-19 RX ADMIN — SODIUM CHLORIDE, SODIUM LACTATE, POTASSIUM CHLORIDE, CALCIUM CHLORIDE 50 ML/HR: 600; 310; 30; 20 INJECTION, SOLUTION INTRAVENOUS at 11:30

## 2024-11-19 NOTE — H&P
History and Physical - [facility]  Lori Childs 64 y.o. female MRN: [id number]    HPI: Lori Childs is a 64 y.o. year old female who presents for a recent history of hematochezia.  Went to the ED for rectal bleeding note was made of thickening of the sigmoid colon consistent with colitis.  Symptoms have resolved.  Patient also previous history of colon polyps    REVIEW OF SYSTEMS: Per the HPI, and otherwise unremarkable.    Historical Information   Past Medical History:   Diagnosis Date    Anemia     Anxiety     Asthma     Bursitis of left elbow     Chronic pain disorder     lower back    Colon polyp     Fractures     Hiatal hernia     Hypertension     Iron deficiency anemia secondary to inadequate dietary iron intake 01/29/2020    Kidney stone     Kidney stones     Pancreatitis     Psoriasis     Psychiatric disorder     anxiety    Restless leg syndrome     Tobacco use disorder      Past Surgical History:   Procedure Laterality Date    CYSTOSCOPY      CYSTOSCOPY W/ URETERAL STENT PLACEMENT      EXPLORATORY LAPAROTOMY      small intestine blockage no resection    HAND SURGERY      LAPAROSCOPY      --  for SBO    UT ERCP DX COLLECTION SPECIMEN BRUSHING/WASHING N/A 08/15/2018    Procedure: ENDOSCOPIC RETROGRADE CHOLANGIOPANCREATOGRAPHY (ERCP);  Surgeon: Odell Whitaker MD;  Location:  MAIN OR;  Service: Gastroenterology    UT LAPS ABD PRTM&OMENTUM DX W/WO SPEC BR/WA SPX N/A 02/10/2020    Procedure: LAPAROSCOPY DIAGNOSTIC, REPAIR ENTEROTOMY, REMOVAL OF BEZOAR;  Surgeon: Baljit Faith MD;  Location:  MAIN OR;  Service: General    TENDON REPAIR       Social History   Social History     Substance and Sexual Activity   Alcohol Use Never     Social History     Substance and Sexual Activity   Drug Use No     Social History     Tobacco Use   Smoking Status Every Day    Current packs/day: 1.00    Average packs/day: 1 pack/day for 30.0 years (30.0 ttl pk-yrs)    Types: Cigarettes   Smokeless  "Tobacco Never   Tobacco Comments    Attempting to stop     Family History   Problem Relation Age of Onset    Mental illness Sister     Other Other         Cardiovascular disease    Hypertension Mother     Cancer Father     Substance Abuse Neg Hx     Colon cancer Neg Hx     Colon polyps Neg Hx        Meds/Allergies       Current Outpatient Medications:     amLODIPine (NORVASC) 10 mg tablet    ferrous sulfate 324 MG TBEC    fluticasone (FLONASE) 50 mcg/act nasal spray    lisinopril (ZESTRIL) 10 mg tablet    loratadine-pseudoephedrine (CLARITIN-D 24-HOUR)  mg per 24 hr tablet    methocarbamol (ROBAXIN) 500 mg tablet    nicotine (NICODERM CQ) 21 mg/24 hr TD 24 hr patch    Probiotic Product (PROBIOTIC-10 ULTIMATE PO)    fluocinonide (LIDEX) 0.05 % ointment    Current Facility-Administered Medications:     lactated ringers infusion, 50 mL/hr, Intravenous, Continuous, Continue from Pre-op at 11/19/24 1135    Allergies   Allergen Reactions    Pollen Extract Sneezing    Tree Extract Sneezing       Objective     /76   Pulse 78   Temp (!) 97.4 °F (36.3 °C) (Temporal)   Resp 20   Ht 5' 1\" (1.549 m)   Wt 52.2 kg (115 lb)   LMP  (LMP Unknown)   SpO2 100%   BMI 21.73 kg/m²     PHYSICAL EXAM    Gen: NAD AAOx3  Head: Normocephalic, Atraumatic  CV: S1S2 RRR no m/r/g  CHEST: Clear b/l no c/r/w  ABD: soft, +BS NT/ND  EXT: no edema    ASSESSMENT/PLAN:  This is a 64 y.o. year old female here for diagnostic colonoscopy, and she is stable and optimized for her procedure.        "

## 2024-11-19 NOTE — TELEPHONE ENCOUNTER
Prescription requested for hemorrhoid cream.  Please schedule patient for hemorrhoid banding in the near future.  Thank you

## 2024-11-19 NOTE — ANESTHESIA PREPROCEDURE EVALUATION
Procedure:  COLONOSCOPY    Relevant Problems   CARDIO   (+) Benign essential HTN   (+) Hypercholesterolemia      GI/HEPATIC   (+) Hiatal hernia      HEMATOLOGY   (+) Iron deficiency anemia secondary to inadequate dietary iron intake      MUSCULOSKELETAL   (+) Hiatal hernia   (+) Primary osteoarthritis of both hips   (+) Spondylosis of lumbar region without myelopathy or radiculopathy      NEURO/PSYCH   (+) Anxiety   (+) Chronic pain      PULMONARY   (+) Asthma        Physical Exam    Airway    Mallampati score: II  TM Distance: <3 FB  Neck ROM: full     Dental   Comment: None loose upper dentures,     Cardiovascular      Pulmonary      Other Findings  post-pubertal.      Anesthesia Plan  ASA Score- 2     Anesthesia Type- IV sedation with anesthesia with ASA Monitors.         Additional Monitors:     Airway Plan:     Comment: Last of PO bowel prep: 07:00    Patient educated on the possibility for awareness under sedation and of the possibility of airway intervention in the event of an airway or procedural emergency  .       Plan Factors-Exercise tolerance (METS): >4 METS.    Chart reviewed.    Patient summary reviewed.    Patient is not a current smoker (QUIT AND ON NICOTIME).              Induction- intravenous.    Postoperative Plan-         Informed Consent- Anesthetic plan and risks discussed with patient.  I personally reviewed this patient with the CRNA. Discussed and agreed on the Anesthesia Plan with the CRNA..

## 2024-11-19 NOTE — TELEPHONE ENCOUNTER
Pt is scheduled for a f/u with you on 12/23 for her post-op visit. Should I add a banding onto this appt.? MP

## 2024-11-19 NOTE — ANESTHESIA POSTPROCEDURE EVALUATION
Post-Op Assessment Note    CV Status:  Stable  Pain Score: 0    Pain management: adequate       Mental Status:  Alert and awake   Hydration Status:  Euvolemic   PONV Controlled:  Controlled   Airway Patency:  Patent     Post Op Vitals Reviewed: Yes    No anethesia notable event occurred.    Staff: CRNA           Last Filed PACU Vitals:  Vitals Value Taken Time   Temp 98 1252   Pulse 66    /72    Resp 16    SpO2 100        Modified Jose Carlos:  Activity: 2 (11/19/2024 11:18 AM)  Respiration: 2 (11/19/2024 11:18 AM)  Circulation: 2 (11/19/2024 11:18 AM)  Consciousness: 2 (11/19/2024 11:18 AM)  Oxygen Saturation: 2 (11/19/2024 11:18 AM)  Modified Jose Carlos Score: 10 (11/19/2024 11:18 AM)

## 2024-11-25 PROCEDURE — 88305 TISSUE EXAM BY PATHOLOGIST: CPT | Performed by: PATHOLOGY

## 2024-11-26 ENCOUNTER — RESULTS FOLLOW-UP (OUTPATIENT)
Dept: GASTROENTEROLOGY | Facility: CLINIC | Age: 64
End: 2024-11-26

## 2024-11-26 NOTE — RESULT ENCOUNTER NOTE
Called patient and reviewed path.  1 hyperplastic polyp.  History of polyps before.  Recall for 7 years.  Left voice message for patient

## 2024-12-05 ENCOUNTER — RESULTS FOLLOW-UP (OUTPATIENT)
Dept: FAMILY MEDICINE CLINIC | Facility: HOSPITAL | Age: 64
End: 2024-12-05

## 2024-12-05 DIAGNOSIS — Z72.0 TOBACCO ABUSE: ICD-10-CM

## 2024-12-08 RX ORDER — NICOTINE 21 MG/24HR
1 PATCH, TRANSDERMAL 24 HOURS TRANSDERMAL EVERY 24 HOURS
Qty: 28 PATCH | Refills: 0 | Status: SHIPPED | OUTPATIENT
Start: 2024-12-08

## 2024-12-23 ENCOUNTER — OFFICE VISIT (OUTPATIENT)
Dept: GASTROENTEROLOGY | Facility: CLINIC | Age: 64
End: 2024-12-23
Payer: COMMERCIAL

## 2024-12-23 ENCOUNTER — OFFICE VISIT (OUTPATIENT)
Dept: FAMILY MEDICINE CLINIC | Facility: HOSPITAL | Age: 64
End: 2024-12-23
Payer: COMMERCIAL

## 2024-12-23 VITALS
BODY MASS INDEX: 22.92 KG/M2 | SYSTOLIC BLOOD PRESSURE: 163 MMHG | HEIGHT: 61 IN | WEIGHT: 121.4 LBS | DIASTOLIC BLOOD PRESSURE: 77 MMHG

## 2024-12-23 VITALS
OXYGEN SATURATION: 98 % | WEIGHT: 121.6 LBS | SYSTOLIC BLOOD PRESSURE: 132 MMHG | HEART RATE: 78 BPM | BODY MASS INDEX: 22.96 KG/M2 | HEIGHT: 61 IN | DIASTOLIC BLOOD PRESSURE: 80 MMHG

## 2024-12-23 DIAGNOSIS — G47.419 NARCOLEPSY WITHOUT CATAPLEXY: ICD-10-CM

## 2024-12-23 DIAGNOSIS — Z72.0 TOBACCO ABUSE: ICD-10-CM

## 2024-12-23 DIAGNOSIS — J45.20 MILD INTERMITTENT ASTHMA WITHOUT COMPLICATION: ICD-10-CM

## 2024-12-23 DIAGNOSIS — K64.2 PROLAPSED INTERNAL HEMORRHOIDS, GRADE 3: ICD-10-CM

## 2024-12-23 DIAGNOSIS — K43.9 ABDOMINAL WALL HERNIA: ICD-10-CM

## 2024-12-23 DIAGNOSIS — Z12.11 SCREENING FOR COLON CANCER: ICD-10-CM

## 2024-12-23 DIAGNOSIS — D75.89 MACROCYTOSIS: ICD-10-CM

## 2024-12-23 DIAGNOSIS — D50.8 IRON DEFICIENCY ANEMIA SECONDARY TO INADEQUATE DIETARY IRON INTAKE: ICD-10-CM

## 2024-12-23 DIAGNOSIS — K62.5 RECTAL BLEEDING: ICD-10-CM

## 2024-12-23 DIAGNOSIS — I10 BENIGN ESSENTIAL HTN: Primary | ICD-10-CM

## 2024-12-23 DIAGNOSIS — Z86.39 HISTORY OF IRON DEFICIENCY: Primary | ICD-10-CM

## 2024-12-23 DIAGNOSIS — Z13.220 SCREENING FOR HYPERLIPIDEMIA: ICD-10-CM

## 2024-12-23 PROCEDURE — 99214 OFFICE O/P EST MOD 30 MIN: CPT | Performed by: INTERNAL MEDICINE

## 2024-12-23 PROCEDURE — 99214 OFFICE O/P EST MOD 30 MIN: CPT | Performed by: STUDENT IN AN ORGANIZED HEALTH CARE EDUCATION/TRAINING PROGRAM

## 2024-12-23 RX ORDER — NICOTINE 21 MG/24HR
1 PATCH, TRANSDERMAL 24 HOURS TRANSDERMAL EVERY 24 HOURS
Qty: 28 PATCH | Refills: 0 | Status: SHIPPED | OUTPATIENT
Start: 2024-12-23

## 2024-12-23 NOTE — ASSESSMENT & PLAN NOTE
Nearly 2 months without ciggs, using nicotine patch, covered by insurance.   Working well, no relapse. Interested in another month at 21 mg.   SOB better. Activity levels better.   Orders:  •  nicotine (NICODERM CQ) 21 mg/24 hr TD 24 hr patch; Place 1 patch on the skin over 24 hours every 24 hours

## 2024-12-23 NOTE — PROGRESS NOTES
"Name: Lori Childs      : 1960      MRN: [id number]  Encounter Provider: Hakeem Mendes MD  Encounter Date: 2024   Encounter department: [facility]  :  Assessment & Plan  History of iron deficiency  --With a previous history of iron deficiency.  This was felt to be the result of NSAID use and ulcers in the small bowel.  This was demonstrated on small bowel enteroscopy and capsule endoscopy in the past.  -Patient reports being completely off NSAIDs at this time.  CBC has normalized.  Does have little bit of elevation of the MCV  Patient in for labs in the spring with Dr. Olson.  Reached panel.  Patient is on oral iron at this time.  May be able to discontinue in the future  Patient encouraged to avoid nonsteroidal anti-inflammatories going forward       Screening for colon cancer  Up-to-date with screening.  Patient's last examination was done this .  She had a small subcentimeter polyp.  Recall exam 7 years       Prolapsed internal hemorrhoids, grade 3  Not causing problems now.  Patient wants to hold off.  Had with Anusol cream.  Observe for now.  Does not require banding       Macrocytosis  Patient with mild elevation of her MCV.  Check B12 and folate levels--check TSH level  Orders:    Vitamin B12; Future    Folate; Future    Abdominal wall hernia  Notices \"a lump\" in the mid abdomen.  She thought it was a hiatal hernia.  Really abdominal wall hernia probably from her laparoscopic surgery  -No symptoms.  It seems easily reducible.  If patient would develop increasing abdominal pain would refer to surgery for evaluation.       Rectal bleeding  Patient with recent rectal bleeding.  2 episodes.  This made her present to the ED.  Patient has fairly large hemorrhoids but had diffuse colitis.  Probably had an episode or infectious colitis or ischemic colitis.  Situation has resolved.  This was probably the cause of the bleeding and not hemorrhoids   " "    5 months     History of Present Illness   HPI  Lori Childs is a 64 y.o. female who presents for follow-up after recent hospitalization for hematochezia.  Patient did have a fair amount of rectal bleeding at that time.  She had associated abdominal pain.  Lab work was revealing for a normal hemoglobin in the 12 g range.  A CAT scan was obtained which showed colitis involving segments of the colon including the descending and sigmoid colon.  She was discharged from the hospital subsequently had stopped bleeding.  Abdominal pain resolved as well.  Patient underwent colonoscopy which I performed on November 19 of this year.  Colonoscopy revealed a hyperplastic polyp and large internal hemorrhoids.  It was felt that she probably bled from the colitis as opposed to the hemorrhoids.  She is not having any further bleeding or discomfort around her rectum.  Overall she feels well.  She had a previous history of iron deficiency from chronic GI bleeding related to nonsteroidal anti-inflammatory use.  This has been demonstrated on small bowel capsule study along with double-balloon enteroscopy.  She has not been taking any NSAIDs since that time.  She just will take Tylenol for arthritis.  Patient does report having a \"lump\" in the middle of the abdomen.  She denies any abdominal pain.  Several years ago she had a laparoscopic surgery for small bowel obstruction.  She had a bezoar removed at that time and this was in February 2020 causing obstruction.      Review of Systems       Objective   /77   Ht 5' 1\" (1.549 m)   Wt 55.1 kg (121 lb 6.4 oz)   LMP  (LMP Unknown)   BMI 22.94 kg/m²      Physical Exam  .General Appearance: NAD, cooperative, alert  Eyes: Anicteric, conjunctiva pink  ENT:  Normocephalic, atraumatic, normal mucosa.    Neck:    Supple, symmetrical, trachea midline  Resp:  Clear to auscultation bilaterally; no rales, rhonchi or wheezing; respirations unlabored   CV:  S1 S2, Regular rate and rhythm; " no murmur, rub, or gallop.  GI:  Soft, non-tender, non-distended; reducible hernia anterior abdominal wall above the umbilicus.  No tenderness.  Normal bowel sounds; no masses, no organomegaly   Rectal: Deferred  Musculoskeletal: No cyanosis, clubbing or edema. Normal ROM.  Skin:     No jaundice, rashes, or lesions   Heme/Lymph: No palpable cervical lymphadenopathy  Psych: Normal affect, good eye contact  Neuro: No gross deficits, AAOx3

## 2024-12-23 NOTE — PROGRESS NOTES
Name: Lori Childs      : 1960      MRN: [id number]  Encounter Provider: Stacey Olson DO  Encounter Date: 2024   Encounter department: [facility] [address]  :  Assessment & Plan  Benign essential HTN  Compliant with both BP meds - Takes in am, lisinopril 10 mg & norvasc 10 mg.     Return in about 3 months (around 3/23/2025) for Yrly Physical.  Orders:  •  Comprehensive metabolic panel; Future    Tobacco abuse  Nearly 2 months without ciggs, using nicotine patch, covered by insurance.   Working well, no relapse. Interested in another month at 21 mg.   SOB better. Activity levels better.   Orders:  •  nicotine (NICODERM CQ) 21 mg/24 hr TD 24 hr patch; Place 1 patch on the skin over 24 hours every 24 hours    Narcolepsy without cataplexy  No longer having any issues.   Family history of it. Never needed the meds. Doing well.   Working second shift 1 pm to 9:30 pm. Not falling asleep at work.        Mild intermittent asthma without complication  Declined any inhalers at this time. Better without smoking.   No longer getting the cough either.        Iron deficiency anemia secondary to inadequate dietary iron intake  Recheck, on iron daily. No issues with constipation.   Orders:  •  Ferritin; Future  •  CBC and Platelet; Future    Screening for hyperlipidemia  Screening & diagnostic bloodwork ordered, our office will reach out with results once obtained.   Orders:  •  Lipid Panel with Direct LDL reflex; Future         History of Present Illness   Chief Complaint   Patient presents with   • Hypertension     F/u - Pt did bring a list of her b/p's      BP logs: Sept & Oct : 144/81, 136/83, 134/84, 123/81, 147/79, 140/85, 132/80  HR's 65-90  128/60 leaving the ED in Oct.   Elevated day of colonoscopy.   Overall much better.     Wt Readings from Last 3 Encounters:   24 55.2 kg (121 lb 9.6 oz)   24 52.2 kg (115 lb)   10/07/24 52.2 kg (115 lb)     Temp Readings from Last 3  "Encounters:   11/19/24 (!) 97.4 °F (36.3 °C) (Temporal)   10/06/24 98.5 °F (36.9 °C)   03/18/24 99 °F (37.2 °C)     BP Readings from Last 3 Encounters:   12/23/24 142/80   11/19/24 153/72   10/07/24 128/80     Pulse Readings from Last 3 Encounters:   12/23/24 78   11/19/24 68   10/06/24 77     Review of Systems   Constitutional:  Negative for chills and fever.   HENT:  Positive for sinus pain.    Eyes:  Positive for photophobia (sometimes with sunlight) and visual disturbance (some straining of her eyes due to dim lights in home).   Respiratory:  Negative for cough, chest tightness and shortness of breath.    Cardiovascular:  Negative for chest pain and palpitations.   Gastrointestinal:  Positive for diarrhea (chronic on and off). Negative for constipation, nausea and vomiting.   Neurological:  Positive for headaches (mild on L eye at times). Negative for dizziness and light-headedness.     Objective   /80   Pulse 78   Ht 5' 1\" (1.549 m)   Wt 55.2 kg (121 lb 9.6 oz)   LMP  (LMP Unknown)   SpO2 98%   BMI 22.98 kg/m²      Physical Exam  Vitals and nursing note reviewed.   Constitutional:       General: She is not in acute distress.     Appearance: Normal appearance. She is well-developed and normal weight. She is not ill-appearing.   HENT:      Head: Normocephalic and atraumatic.   Eyes:      General: No scleral icterus.        Right eye: No discharge.         Left eye: No discharge.      Conjunctiva/sclera: Conjunctivae normal.   Cardiovascular:      Rate and Rhythm: Normal rate and regular rhythm.      Pulses: Normal pulses.      Heart sounds: Normal heart sounds. No murmur heard.  Pulmonary:      Effort: Pulmonary effort is normal. No respiratory distress.      Breath sounds: Normal breath sounds.   Musculoskeletal:      Cervical back: Normal range of motion and neck supple. No rigidity.      Right lower leg: No edema.      Left lower leg: No edema.   Skin:     General: Skin is warm and dry.      " Capillary Refill: Capillary refill takes less than 2 seconds.   Neurological:      Mental Status: She is alert and oriented to person, place, and time.      Gait: Gait normal.   Psychiatric:         Mood and Affect: Mood normal.         Behavior: Behavior normal.         Thought Content: Thought content normal.         Judgment: Judgment normal.

## 2024-12-23 NOTE — ASSESSMENT & PLAN NOTE
Declined any inhalers at this time. Better without smoking.   No longer getting the cough either.

## 2024-12-23 NOTE — ASSESSMENT & PLAN NOTE
No longer having any issues.   Family history of it. Never needed the meds. Doing well.   Working second shift 1 pm to 9:30 pm. Not falling asleep at work.

## 2024-12-23 NOTE — ASSESSMENT & PLAN NOTE
Recheck, on iron daily. No issues with constipation.   Orders:  •  Ferritin; Future  •  CBC and Platelet; Future

## 2024-12-23 NOTE — ASSESSMENT & PLAN NOTE
Compliant with both BP meds - Takes in am, lisinopril 10 mg & norvasc 10 mg.     Return in about 3 months (around 3/23/2025) for Yrly Physical.  Orders:  •  Comprehensive metabolic panel; Future

## 2024-12-23 NOTE — ASSESSMENT & PLAN NOTE
Not causing problems now.  Patient wants to hold off.  Had with Anusol cream.  Observe for now.  Does not require banding

## 2024-12-24 PROBLEM — K56.699 SMALL BOWEL STRICTURE (HCC): Status: RESOLVED | Noted: 2021-03-05 | Resolved: 2024-12-24

## 2024-12-24 NOTE — PATIENT INSTRUCTIONS
"Name: Lori Childs      : 1960      MRN: [id number]  Encounter Provider: Hakeem Mendes MD  Encounter Date: 2024   Encounter department: [facility]  :  Assessment & Plan  History of iron deficiency  --With a previous history of iron deficiency.  This was felt to be the result of NSAID use and ulcers in the small bowel.  This was demonstrated on small bowel enteroscopy and capsule endoscopy in the past.  -Patient reports being completely off NSAIDs at this time.  CBC has normalized.  Does have little bit of elevation of the MCV  Patient in for labs in the spring with Dr. Olson.  Reached panel.  Patient is on oral iron at this time.  May be able to discontinue in the future  Patient encouraged to avoid nonsteroidal anti-inflammatories going forward       Screening for colon cancer  Up-to-date with screening.  Patient's last examination was done this .  She had a small subcentimeter polyp.  Recall exam 7 years       Prolapsed internal hemorrhoids, grade 3  Not causing problems now.  Patient wants to hold off.  Had with Anusol cream.  Observe for now.  Does not require banding       Macrocytosis  Patient with mild elevation of her MCV.  Check B12 and folate levels--check TSH level  Orders:    Vitamin B12; Future    Folate; Future    Abdominal wall hernia  Notices \"a lump\" in the mid abdomen.  She thought it was a hiatal hernia.  Really abdominal wall hernia probably from her laparoscopic surgery  -No symptoms.  It seems easily reducible.  If patient would develop increasing abdominal pain would refer to surgery for evaluation.       Rectal bleeding  Patient with recent rectal bleeding.  2 episodes.  This made her present to the ED.  Patient has fairly large hemorrhoids but had diffuse colitis.  Probably had an episode or infectious colitis or ischemic colitis.  Situation has resolved.  This was probably the cause of the bleeding and not hemorrhoids   "     5 months

## 2024-12-30 DIAGNOSIS — M62.830 LUMBAR PARASPINAL MUSCLE SPASM: ICD-10-CM

## 2024-12-30 RX ORDER — METHOCARBAMOL 500 MG/1
500 TABLET, FILM COATED ORAL 2 TIMES DAILY
Qty: 60 TABLET | Refills: 2 | Status: SHIPPED | OUTPATIENT
Start: 2024-12-30

## 2024-12-30 NOTE — TELEPHONE ENCOUNTER
Medication: methocarbamol (ROBAXIN)     Dose/Frequency: 500 mg    Quantity: 60    Pharmacy: [facility]/pharm[facility] #[id number] - [place] - [address]     Office:   [x] PCP/Provider -   [] Speciality/Provider -     Does the patient have enough for 3 days?   [] Yes   [x] No - Send as HP to POD

## 2025-01-31 DIAGNOSIS — R21 RASH AND NONSPECIFIC SKIN ERUPTION: ICD-10-CM

## 2025-01-31 RX ORDER — FLUOCINONIDE 0.5 MG/G
OINTMENT TOPICAL
Qty: 60 G | Refills: 0 | Status: SHIPPED | OUTPATIENT
Start: 2025-01-31

## 2025-02-05 DIAGNOSIS — Z72.0 TOBACCO ABUSE: ICD-10-CM

## 2025-02-07 RX ORDER — NICOTINE 21 MG/24HR
PATCH, TRANSDERMAL 24 HOURS TRANSDERMAL
Qty: 28 PATCH | Refills: 0 | Status: SHIPPED | OUTPATIENT
Start: 2025-02-07

## 2025-02-16 ENCOUNTER — RESULTS FOLLOW-UP (OUTPATIENT)
Dept: GASTROENTEROLOGY | Facility: CLINIC | Age: 65
End: 2025-02-16

## 2025-02-16 LAB
ALBUMIN SERPL-MCNC: 4.1 G/DL (ref 3.6–5.1)
ALBUMIN/GLOB SERPL: 2.4 (CALC) (ref 1–2.5)
ALP SERPL-CCNC: 41 U/L (ref 37–153)
ALT SERPL-CCNC: 48 U/L (ref 6–29)
AST SERPL-CCNC: 31 U/L (ref 10–35)
BILIRUB SERPL-MCNC: 0.5 MG/DL (ref 0.2–1.2)
BUN SERPL-MCNC: 15 MG/DL (ref 7–25)
BUN/CREAT SERPL: ABNORMAL (CALC) (ref 6–22)
CALCIUM SERPL-MCNC: 8.8 MG/DL (ref 8.6–10.4)
CHLORIDE SERPL-SCNC: 105 MMOL/L (ref 98–110)
CHOLEST SERPL-MCNC: 198 MG/DL
CHOLEST/HDLC SERPL: 3.1 (CALC)
CO2 SERPL-SCNC: 25 MMOL/L (ref 20–32)
CREAT SERPL-MCNC: 0.74 MG/DL (ref 0.5–1.05)
ERYTHROCYTE [DISTWIDTH] IN BLOOD BY AUTOMATED COUNT: 12.5 % (ref 11–15)
FERRITIN SERPL-MCNC: 22 NG/ML (ref 16–288)
FOLATE SERPL-MCNC: >24 NG/ML
GFR/BSA.PRED SERPLBLD CYS-BASED-ARV: 91 ML/MIN/1.73M2
GLOBULIN SER CALC-MCNC: 1.7 G/DL (CALC) (ref 1.9–3.7)
GLUCOSE SERPL-MCNC: 81 MG/DL (ref 65–99)
HCT VFR BLD AUTO: 37.5 % (ref 35–45)
HDLC SERPL-MCNC: 64 MG/DL
HGB BLD-MCNC: 12.5 G/DL (ref 11.7–15.5)
LDLC SERPL CALC-MCNC: 107 MG/DL (CALC)
MCH RBC QN AUTO: 32.6 PG (ref 27–33)
MCHC RBC AUTO-ENTMCNC: 33.3 G/DL (ref 32–36)
MCV RBC AUTO: 97.9 FL (ref 80–100)
NONHDLC SERPL-MCNC: 134 MG/DL (CALC)
PLATELET # BLD AUTO: 226 THOUSAND/UL (ref 140–400)
PMV BLD REES-ECKER: 9.9 FL (ref 7.5–12.5)
POTASSIUM SERPL-SCNC: 4.6 MMOL/L (ref 3.5–5.3)
PROT SERPL-MCNC: 5.8 G/DL (ref 6.1–8.1)
RBC # BLD AUTO: 3.83 MILLION/UL (ref 3.8–5.1)
SODIUM SERPL-SCNC: 140 MMOL/L (ref 135–146)
TRIGL SERPL-MCNC: 159 MG/DL
VIT B12 SERPL-MCNC: 1363 PG/ML (ref 200–1100)
WBC # BLD AUTO: 4.7 THOUSAND/UL (ref 3.8–10.8)

## 2025-02-24 DIAGNOSIS — U07.1 COVID: ICD-10-CM

## 2025-02-24 RX ORDER — FLUTICASONE PROPIONATE 50 MCG
SPRAY, SUSPENSION (ML) NASAL
Qty: 48 ML | Refills: 1 | Status: SHIPPED | OUTPATIENT
Start: 2025-02-24

## 2025-03-17 DIAGNOSIS — Z72.0 TOBACCO ABUSE: ICD-10-CM

## 2025-03-18 RX ORDER — NICOTINE 21 MG/24HR
PATCH, TRANSDERMAL 24 HOURS TRANSDERMAL
Qty: 28 PATCH | Refills: 0 | Status: SHIPPED | OUTPATIENT
Start: 2025-03-18

## 2025-03-20 DIAGNOSIS — M62.830 LUMBAR PARASPINAL MUSCLE SPASM: ICD-10-CM

## 2025-03-21 RX ORDER — METHOCARBAMOL 500 MG/1
500 TABLET, FILM COATED ORAL 2 TIMES DAILY
Qty: 60 TABLET | Refills: 2 | Status: SHIPPED | OUTPATIENT
Start: 2025-03-21

## 2025-04-14 DIAGNOSIS — Z72.0 TOBACCO ABUSE: ICD-10-CM

## 2025-04-15 RX ORDER — NICOTINE 21 MG/24HR
PATCH, TRANSDERMAL 24 HOURS TRANSDERMAL
Qty: 28 PATCH | Refills: 0 | Status: SHIPPED | OUTPATIENT
Start: 2025-04-15

## 2025-04-16 ENCOUNTER — OFFICE VISIT (OUTPATIENT)
Dept: FAMILY MEDICINE CLINIC | Facility: HOSPITAL | Age: 65
End: 2025-04-16
Payer: COMMERCIAL

## 2025-04-16 VITALS
WEIGHT: 129.8 LBS | HEIGHT: 61 IN | HEART RATE: 76 BPM | SYSTOLIC BLOOD PRESSURE: 122 MMHG | BODY MASS INDEX: 24.51 KG/M2 | DIASTOLIC BLOOD PRESSURE: 74 MMHG | OXYGEN SATURATION: 97 %

## 2025-04-16 DIAGNOSIS — Z12.31 ENCOUNTER FOR SCREENING MAMMOGRAM FOR BREAST CANCER: ICD-10-CM

## 2025-04-16 DIAGNOSIS — L56.4 POLYMORPHIC LIGHT ERUPTION: ICD-10-CM

## 2025-04-16 DIAGNOSIS — M81.0 AGE-RELATED OSTEOPOROSIS WITHOUT CURRENT PATHOLOGICAL FRACTURE: ICD-10-CM

## 2025-04-16 DIAGNOSIS — Z87.891 PERSONAL HISTORY OF TOBACCO USE: ICD-10-CM

## 2025-04-16 DIAGNOSIS — D50.8 IRON DEFICIENCY ANEMIA SECONDARY TO INADEQUATE DIETARY IRON INTAKE: ICD-10-CM

## 2025-04-16 DIAGNOSIS — Z00.00 ANNUAL PHYSICAL EXAM: Primary | ICD-10-CM

## 2025-04-16 DIAGNOSIS — I10 BENIGN ESSENTIAL HTN: ICD-10-CM

## 2025-04-16 DIAGNOSIS — J45.20 MILD INTERMITTENT ASTHMA WITHOUT COMPLICATION: ICD-10-CM

## 2025-04-16 DIAGNOSIS — K43.9 ABDOMINAL WALL HERNIA: ICD-10-CM

## 2025-04-16 PROCEDURE — 99396 PREV VISIT EST AGE 40-64: CPT | Performed by: STUDENT IN AN ORGANIZED HEALTH CARE EDUCATION/TRAINING PROGRAM

## 2025-04-16 NOTE — PROGRESS NOTES
Adult Annual Physical  Name: Lori Childs      : 1960      MRN: [id number]  Encounter Provider: Stacey Olson DO  Encounter Date: 2025   Encounter department: [facility] [address]    :  Assessment & Plan  Annual physical exam  Here today for her yearly physical.       Benign essential HTN  Blood pressure stable right now on lisinopril and amlodipine 10 mg each per day.       Abdominal wall hernia  Can see gen surg if interested, not painful       Iron deficiency anemia secondary to inadequate dietary iron intake  Updated in the chart that she is taking gentle iron over-the-counter at this time.       Age-related osteoporosis without current pathological fracture  Due for and ordered  Orders:  •  DXA bone density spine hip and pelvis; Future    Encounter for screening mammogram for breast cancer  Due for and ordered  Orders:  •  Mammo screening bilateral w 3d and cad; Future    Personal history of tobacco use  No chronic cough, did quit 6 months ago.   Orders:  •  CT lung screening program; Future    Mild intermittent asthma without complication  Not needing inhalers. No breathing concerns.        Polymorphic light eruption  Rash appears to be permanent sun damage on her neck more on the right than the left.       Return in about 5 months (around 2025) for BP Check, F/U Chronic DX - [place] .    Preventive Screenings:  - Diabetes Screening: screening up-to-date  - Cholesterol Screening: has hyperlipidemia and screening up-to-date   - Hepatitis C screening: screening up-to-date   - HIV screening: screening up-to-date   - Cervical cancer screening: screening up-to-date   - Colon cancer screening: screening up-to-date   - Osteoporosis screening: has osteoporosis and screening not indicated     Immunizations:  - Immunizations due: Influenza and Prevnar 20    Counseling/Anticipatory Guidance:  - Alcohol: discussed moderation in alcohol intake and recommendations for healthy  alcohol use.   - Drug use: discussed harms of illicit drug use and how it can negatively impact mental/physical health.   - Tobacco use: discussed harms of tobacco use and management options for quitting.   - Dental health: discussed importance of regular tooth brushing, flossing, and dental visits.   - Sexual health: discussed sexually transmitted diseases, partner selection, use of condoms, avoidance of unintended pregnancy, and contraceptive alternatives.   - Diet: discussed recommendations for a healthy/well-balanced diet.   - Exercise: the importance of regular exercise/physical activity was discussed. Recommend exercise 3-5 times per week for at least 30 minutes.   - Injury prevention: discussed safety/seat belts, safety helmets, smoke detectors, carbon monoxide detectors, and smoking near bedding or upholstery.       Depression Screening and Follow-up Plan: Patient was screened for depression during today's encounter. They screened negative with a PHQ-2 score of 0.      Lung Cancer Screening Shared Decision Making: I discussed with her that she is a candidate for lung cancer CT screening.     The following Shared Decision-Making points were covered:  Benefits of screening were discussed, including the rates of reduction in death from lung cancer and other causes.  Harms of screening were reviewed, including false positive tests, radiation exposure levels, risks of invasive procedures, risks of complications of screening, and risk of overdiagnosis.  I counseled on the importance of adherence to annual lung cancer LDCT screening, impact of co-morbidities, and ability or willingness to undergo diagnosis and treatment.  I counseled on the importance of maintaining abstinence as a former smoker or was counseled on the importance of smoking cessation if a current smoker    Review of Eligibility Criteria: She meets all of the criteria for Lung Cancer Screening.   - She is 64 y.o.   - She has 20 pack year tobacco  history and is a current smoker or has quit within the past 15 years  - She presents no signs or symptoms of lung cancer    After discussion, the patient decided to elect lung cancer screening.        History of Present Illness     Adult Annual Physical:  Patient presents for annual physical. Not sick recently.   5 days a week at work - [employer] - work is second shift. .     Diet and Physical Activity:  - Diet/Nutrition: no special diet. coffee or water, getting dairy  - Exercise: no formal exercise.    Depression Screening:  - PHQ-2 Score: 0    General Health:  - Sleep: 7-8 hours of sleep on average and sleeps well.  - Hearing: normal hearing bilateral ears.  - Vision: most recent eye exam > 1 year ago. wears readers  - Dental: regular dental visits and brushes teeth twice daily. upper dentures    /GYN Health:  - Follows with GYN: no.   - Menopause: postmenopausal.   - History of STDs: no  - Contraception: menopause.      Advanced Care Planning:  - Has an advanced directive?: no    - Has a durable medical POA?: no    - ACP document given to patient?: no      Wt Readings from Last 3 Encounters:   04/16/25 58.9 kg (129 lb 12.8 oz)   12/23/24 55.1 kg (121 lb 6.4 oz)   12/23/24 55.2 kg (121 lb 9.6 oz)     Temp Readings from Last 3 Encounters:   11/19/24 (!) 97.4 °F (36.3 °C) (Temporal)   10/06/24 98.5 °F (36.9 °C)   03/18/24 99 °F (37.2 °C)     BP Readings from Last 3 Encounters:   04/16/25 122/74   12/23/24 163/77   12/23/24 132/80     Pulse Readings from Last 3 Encounters:   04/16/25 76   12/23/24 78   11/19/24 68     Review of Systems   Constitutional:  Negative for chills and fever.   HENT:  Negative for ear pain and sore throat.    Respiratory:  Negative for cough and shortness of breath.    Cardiovascular:  Positive for palpitations (occas, few beats, self resolves). Negative for chest pain.   Gastrointestinal:  Negative for constipation and diarrhea.   Genitourinary:  Negative for dysuria and frequency.  "  Skin:  Positive for rash (almost one yr, R neck).   Neurological:  Negative for dizziness, light-headedness and headaches.     Current Outpatient Medications on File Prior to Visit   Medication Sig Dispense Refill   • amLODIPine (NORVASC) 10 mg tablet Take 1 tablet (10 mg total) by mouth daily Decreased dose 90 tablet 3   • fluocinonide (LIDEX) 0.05 % ointment APPLY TOPICALLY 2 TIMES A DAY AS NEEDED FOR RASH. 60 g 0   • fluticasone (FLONASE) 50 mcg/act nasal spray SPRAY 1 SPRAY INTO EACH NOSTRIL EVERY DAY 48 mL 1   • Iron Combinations (IRON COMPLEX PO) Take by mouth OTC from [facility]     • lisinopril (ZESTRIL) 10 mg tablet TAKE 1 TABLET BY MOUTH EVERY DAY 90 tablet 1   • loratadine-pseudoephedrine (CLARITIN-D 24-HOUR)  mg per 24 hr tablet Take 1 tablet by mouth daily     • methocarbamol (ROBAXIN) 500 mg tablet TAKE 1 TABLET BY MOUTH TWICE A DAY 60 tablet 2   • nicotine (NICODERM CQ) 21 mg/24 hr TD 24 hr patch PLACE 1 PATCH ON THE SKIN OVER 24 HOURS EVERY 24 HOURS NOT COVERED OTC 28 patch 0   • Probiotic Product (PROBIOTIC-10 ULTIMATE PO) Take by mouth     • [DISCONTINUED] ferrous sulfate 324 MG TBEC Take 1 tablet (324 mg total) by mouth daily with breakfast 90 tablet 3   • hydrocortisone (ANUSOL-HC) 2.5 % rectal cream Apply topically 2 (two) times a day for 14 days (Patient not taking: Reported on 4/16/2025) 28 g 1     No current facility-administered medications on file prior to visit.      Social History     Tobacco Use   • Smoking status: Former     Current packs/day: 1.00     Average packs/day: 1 pack/day for 30.0 years (30.0 ttl pk-yrs)     Types: Cigarettes   • Smokeless tobacco: Never   • Tobacco comments:     Attempting to stop   Vaping Use   • Vaping status: Never Used   Substance and Sexual Activity   • Alcohol use: Never   • Drug use: No   • Sexual activity: Yes     Partners: Male     Birth control/protection: None     Objective   /74   Pulse 76   Ht 5' 1\" (1.549 m)   Wt 58.9 kg (129 lb " 12.8 oz)   LMP  (LMP Unknown)   SpO2 97%   BMI 24.53 kg/m²     Physical Exam  Vitals and nursing note reviewed.   Constitutional:       General: She is not in acute distress.     Appearance: Normal appearance. She is well-developed and normal weight. She is not ill-appearing.   HENT:      Head: Normocephalic and atraumatic.      Right Ear: Ear canal and external ear normal. There is no impacted cerumen.      Left Ear: Ear canal and external ear normal. There is no impacted cerumen.      Ears:      Comments: Mild mucoid appearance b/l   No sinus TTP     Nose: Nose normal. No congestion or rhinorrhea.      Mouth/Throat:      Mouth: Mucous membranes are moist.      Pharynx: Oropharynx is clear. No oropharyngeal exudate or posterior oropharyngeal erythema.   Eyes:      General: No scleral icterus.        Right eye: No discharge.         Left eye: No discharge.      Conjunctiva/sclera: Conjunctivae normal.   Neck:      Vascular: No carotid bruit.      Comments: No thyroid nodules or thyromegaly.  Cardiovascular:      Rate and Rhythm: Normal rate and regular rhythm.      Pulses: Normal pulses.      Heart sounds: Normal heart sounds. No murmur heard.  Pulmonary:      Effort: Pulmonary effort is normal. No respiratory distress.      Breath sounds: Normal breath sounds. No wheezing.   Musculoskeletal:         General: Normal range of motion.      Cervical back: Normal range of motion and neck supple. No rigidity or tenderness.      Right lower leg: No edema.      Left lower leg: No edema.   Lymphadenopathy:      Cervical: No cervical adenopathy.   Skin:     General: Skin is warm and dry.      Capillary Refill: Capillary refill takes less than 2 seconds.      Findings: Rash (flat, reddish rash along both sides of neck, no pustules,, chronic) present. No erythema.   Neurological:      Mental Status: She is alert and oriented to person, place, and time.      Gait: Gait normal.   Psychiatric:         Mood and Affect: Mood  normal.         Behavior: Behavior normal.         Thought Content: Thought content normal.         Judgment: Judgment normal.

## 2025-04-16 NOTE — PATIENT INSTRUCTIONS
"Call GI to confirm about appt or not.     Patient Education     Routine physical for adults   The Basics   Written by the doctors and editors at [facility]   What is a physical? -- A physical is a routine visit, or \"check-up,\" with your doctor. You might also hear it called a \"wellness visit\" or \"preventive visit.\"  During each visit, the doctor will:   Ask about your physical and mental health   Ask about your habits, behaviors, and lifestyle   Do an exam   Give you vaccines if needed   Talk to you about any medicines you take   Give advice about your health   Answer your questions  Getting regular check-ups is an important part of taking care of your health. It can help your doctor find and treat any problems you have. But it's also important for preventing health problems.  A routine physical is different from a \"sick visit.\" A sick visit is when you see a doctor because of a health concern or problem. Since physicals are scheduled ahead of time, you can think about what you want to ask the doctor.  How often should I get a physical? -- It depends on your age and health. In general, for people age 21 years and older:   If you are younger than 50 years, you might be able to get a physical every 3 years.   If you are 50 years or older, your doctor might recommend a physical every year.  If you have an ongoing health condition, like diabetes or high blood pressure, your doctor will probably want to see you more often.  What happens during a physical? -- In general, each visit will include:   Physical exam - The doctor or nurse will check your height, weight, heart rate, and blood pressure. They will also look at your eyes and ears. They will ask about how you are feeling and whether you have any symptoms that bother you.   Medicines - It's a good idea to bring a list of all the medicines you take to each doctor visit. Your doctor will talk to you about your medicines and answer any questions. Tell them if you are " "having any side effects that bother you. You should also tell them if you are having trouble paying for any of your medicines.   Habits and behaviors - This includes:   Your diet   Your exercise habits   Whether you smoke, drink alcohol, or use drugs   Whether you are sexually active   Whether you feel safe at home  Your doctor will talk to you about things you can do to improve your health and lower your risk of health problems. They will also offer help and support. For example, if you want to quit smoking, they can give you advice and might prescribe medicines. If you want to improve your diet or get more physical activity, they can help you with this, too.   Lab tests, if needed - The tests you get will depend on your age and situation. For example, your doctor might want to check your:   Cholesterol   Blood sugar   Iron level   Vaccines - The recommended vaccines will depend on your age, health, and what vaccines you already had. Vaccines are very important because they can prevent certain serious or deadly infections.   Discussion of screening - \"Screening\" means checking for diseases or other health problems before they cause symptoms. Your doctor can recommend screening based on your age, risk, and preferences. This might include tests to check for:   Cancer, such as breast, prostate, cervical, ovarian, colorectal, prostate, lung, or skin cancer   Sexually transmitted infections, such as chlamydia and gonorrhea   Mental health conditions like depression and anxiety  Your doctor will talk to you about the different types of screening tests. They can help you decide which screenings to have. They can also explain what the results might mean.   Answering questions - The physical is a good time to ask the doctor or nurse questions about your health. If needed, they can refer you to other doctors or specialists, too.  Adults older than 65 years often need other care, too. As you get older, your doctor will talk " to you about:   How to prevent falling at home   Hearing or vision tests   Memory testing   How to take your medicines safely   Making sure that you have the help and support you need at home  All topics are updated as new evidence becomes available and our peer review process is complete.  This topic retrieved from [employer] on: May 02, 2024.  Topic 784100 Version 1.0  Release: 32.4.3 - C32.122  © 2024 UpToDate, Inc. and/or its affiliates. All rights reserved.  Consumer Information Use and Disclaimer   Disclaimer: This generalized information is a limited summary of diagnosis, treatment, and/or medication information. It is not meant to be comprehensive and should be used as a tool to help the user understand and/or assess potential diagnostic and treatment options. It does NOT include all information about conditions, treatments, medications, side effects, or risks that may apply to a specific patient. It is not intended to be medical advice or a substitute for the medical advice, diagnosis, or treatment of a health care provider based on the health care provider's examination and assessment of a patient's specific and unique circumstances. Patients must speak with a health care provider for complete information about their health, medical questions, and treatment options, including any risks or benefits regarding use of medications. This information does not endorse any treatments or medications as safe, effective, or approved for treating a specific patient. UpToDate, Inc. and its affiliates disclaim any warranty or liability relating to this information or the use thereof.The use of this information is governed by the Terms of Use, available at https://www.wolterskluwer.com/en/know/clinical-effectiveness-terms. 2024© UpToDate, Inc. and its affiliates and/or licensors. All rights reserved.  Copyright   © 2024 UpToDate, Inc. and/or its affiliates. All rights reserved.

## 2025-04-23 ENCOUNTER — TELEPHONE (OUTPATIENT)
Dept: FAMILY MEDICINE CLINIC | Facility: HOSPITAL | Age: 65
End: 2025-04-23

## 2025-05-01 ENCOUNTER — TELEPHONE (OUTPATIENT)
Age: 65
End: 2025-05-01

## 2025-05-01 NOTE — TELEPHONE ENCOUNTER
The patient called back to confirm if she has any appointments currently scheduled with Dr. Mendes. I confirmed that she does not have anything scheduled at this time.

## 2025-05-01 NOTE — TELEPHONE ENCOUNTER
Patients GI provider:  Dr. Mendes    Number to return call: [phone]    Reason for call: Pt calling to inquire if f/u apt is still sched for 05/23, advised not showing any apt was sched for then. Put pt on cancellation list for [facility] location.    Scheduled procedure/appointment date if applicable: N/A

## 2025-05-12 DIAGNOSIS — Z72.0 TOBACCO ABUSE: ICD-10-CM

## 2025-05-13 RX ORDER — NICOTINE 21 MG/24HR
PATCH, TRANSDERMAL 24 HOURS TRANSDERMAL
Qty: 28 PATCH | Refills: 0 | Status: SHIPPED | OUTPATIENT
Start: 2025-05-13

## 2025-05-30 DIAGNOSIS — I10 BENIGN ESSENTIAL HTN: ICD-10-CM

## 2025-05-30 RX ORDER — LISINOPRIL 10 MG/1
10 TABLET ORAL DAILY
Qty: 90 TABLET | Refills: 1 | Status: SHIPPED | OUTPATIENT
Start: 2025-05-30

## 2025-06-09 DIAGNOSIS — Z72.0 TOBACCO ABUSE: ICD-10-CM

## 2025-06-10 RX ORDER — NICOTINE 21 MG/24HR
PATCH, TRANSDERMAL 24 HOURS TRANSDERMAL
Qty: 28 PATCH | Refills: 0 | Status: SHIPPED | OUTPATIENT
Start: 2025-06-10

## 2025-06-11 DIAGNOSIS — M62.830 LUMBAR PARASPINAL MUSCLE SPASM: ICD-10-CM

## 2025-06-12 RX ORDER — METHOCARBAMOL 500 MG/1
500 TABLET, FILM COATED ORAL 2 TIMES DAILY
Qty: 60 TABLET | Refills: 2 | Status: SHIPPED | OUTPATIENT
Start: 2025-06-12

## 2025-07-22 DIAGNOSIS — I10 BENIGN ESSENTIAL HTN: ICD-10-CM

## 2025-07-24 RX ORDER — LISINOPRIL 5 MG/1
5 TABLET ORAL DAILY
Qty: 90 TABLET | Refills: 3 | OUTPATIENT
Start: 2025-07-24

## 2025-07-24 RX ORDER — LISINOPRIL 10 MG/1
10 TABLET ORAL DAILY
Qty: 90 TABLET | Refills: 1 | Status: SHIPPED | OUTPATIENT
Start: 2025-07-24

## 2025-07-29 DIAGNOSIS — I10 BENIGN ESSENTIAL HTN: ICD-10-CM

## 2025-07-30 RX ORDER — AMLODIPINE BESYLATE 10 MG/1
10 TABLET ORAL DAILY
Qty: 90 TABLET | Refills: 1 | Status: SHIPPED | OUTPATIENT
Start: 2025-07-30

## (undated) DEVICE — WIREGUIDED CYTOLOGY BRUSH: Brand: RX CYTOLOGY BRUSH

## (undated) DEVICE — INJECTION SYTEM RAPID REFILL RX

## (undated) DEVICE — CHLORAPREP HI-LITE 26ML ORANGE

## (undated) DEVICE — ELECTRODE BLADE MOD E-Z CLEAN 2.5IN 6.4CM -0012M

## (undated) DEVICE — ADHESIVE SKIN HIGH VISCOSITY EXOFIN 1ML

## (undated) DEVICE — GLOVE INDICATOR PI UNDERGLOVE SZ 6.5 BLUE

## (undated) DEVICE — VIAL DECANTER

## (undated) DEVICE — TOWEL SET X-RAY

## (undated) DEVICE — LOCKING DEVICE AND BIOPSY CAP FOR USE WITH RX BILIARY SYSTEM: Brand: RX LOCKING DEVICE AND BIOPSY CAP

## (undated) DEVICE — GLOVE INDICATOR PI UNDERGLOVE SZ 8 BLUE

## (undated) DEVICE — PENCIL ELECTROSURG E-Z CLEAN -0035H

## (undated) DEVICE — TROCAR: Brand: KII FIOS FIRST ENTRY

## (undated) DEVICE — DRAPE EQUIPMENT RF WAND

## (undated) DEVICE — 1200CC GUARDIAN II: Brand: GUARDIAN

## (undated) DEVICE — GLOVE SRG BIOGEL ECLIPSE 8

## (undated) DEVICE — SPHINCTEROTOME: Brand: DREAMTOME™ RX 44

## (undated) DEVICE — GLOVE SRG BIOGEL 6.5

## (undated) DEVICE — ENDOPATH 5MM CURVED SCISSORS WITH MONOPOLAR CAUTERY: Brand: ENDOPATH

## (undated) DEVICE — SUT MONOCRYL 4-0 PS-2 18 IN Y496G

## (undated) DEVICE — ACCESS PLATFORM FOR MINIMALLY INVASIVE SURGERY.: Brand: GELPORT® LAPAROSCOPIC  SYSTEM

## (undated) DEVICE — 2000CC GUARDIAN II: Brand: GUARDIAN

## (undated) DEVICE — INTENDED FOR TISSUE SEPARATION, AND OTHER PROCEDURES THAT REQUIRE A SHARP SURGICAL BLADE TO PUNCTURE OR CUT.: Brand: BARD-PARKER SAFETY BLADES SIZE 11, STERILE

## (undated) DEVICE — PACK PBDS LAP CHOLE RF